# Patient Record
Sex: FEMALE | Race: WHITE | NOT HISPANIC OR LATINO | Employment: OTHER | URBAN - METROPOLITAN AREA
[De-identification: names, ages, dates, MRNs, and addresses within clinical notes are randomized per-mention and may not be internally consistent; named-entity substitution may affect disease eponyms.]

---

## 2017-02-04 ENCOUNTER — HOSPITAL ENCOUNTER (EMERGENCY)
Facility: CLINIC | Age: 82
Discharge: HOME | End: 2017-02-04
Attending: FAMILY MEDICINE | Admitting: FAMILY MEDICINE
Payer: COMMERCIAL

## 2017-02-04 VITALS
DIASTOLIC BLOOD PRESSURE: 54 MMHG | OXYGEN SATURATION: 93 % | HEART RATE: 72 BPM | WEIGHT: 110.89 LBS | BODY MASS INDEX: 24.22 KG/M2 | SYSTOLIC BLOOD PRESSURE: 120 MMHG | RESPIRATION RATE: 7 BRPM | TEMPERATURE: 98.4 F

## 2017-02-04 DIAGNOSIS — R42 POSTURAL DIZZINESS WITH PRESYNCOPE: ICD-10-CM

## 2017-02-04 DIAGNOSIS — F43.21 ADJUSTMENT DISORDER WITH DEPRESSED MOOD: ICD-10-CM

## 2017-02-04 DIAGNOSIS — R55 POSTURAL DIZZINESS WITH PRESYNCOPE: ICD-10-CM

## 2017-02-04 DIAGNOSIS — E86.0 DEHYDRATION: ICD-10-CM

## 2017-02-04 LAB
ANION GAP SERPL CALCULATED.3IONS-SCNC: 8 MMOL/L (ref 3–14)
BASOPHILS # BLD AUTO: 0 10E9/L (ref 0–0.2)
BASOPHILS NFR BLD AUTO: 0.5 %
BUN SERPL-MCNC: 26 MG/DL (ref 7–30)
CALCIUM SERPL-MCNC: 8.2 MG/DL (ref 8.5–10.1)
CHLORIDE SERPL-SCNC: 102 MMOL/L (ref 94–109)
CO2 SERPL-SCNC: 25 MMOL/L (ref 20–32)
CREAT SERPL-MCNC: 1.25 MG/DL (ref 0.52–1.04)
DIFFERENTIAL METHOD BLD: ABNORMAL
EOSINOPHIL # BLD AUTO: 0.1 10E9/L (ref 0–0.7)
EOSINOPHIL NFR BLD AUTO: 1.5 %
ERYTHROCYTE [DISTWIDTH] IN BLOOD BY AUTOMATED COUNT: 14.1 % (ref 10–15)
GFR SERPL CREATININE-BSD FRML MDRD: 41 ML/MIN/1.7M2
GLUCOSE SERPL-MCNC: 102 MG/DL (ref 70–99)
HCT VFR BLD AUTO: 30.9 % (ref 35–47)
HGB BLD-MCNC: 10.2 G/DL (ref 11.7–15.7)
IMM GRANULOCYTES # BLD: 0 10E9/L (ref 0–0.4)
IMM GRANULOCYTES NFR BLD: 0.2 %
LYMPHOCYTES # BLD AUTO: 1.6 10E9/L (ref 0.8–5.3)
LYMPHOCYTES NFR BLD AUTO: 17.9 %
MCH RBC QN AUTO: 29.5 PG (ref 26.5–33)
MCHC RBC AUTO-ENTMCNC: 33 G/DL (ref 31.5–36.5)
MCV RBC AUTO: 89 FL (ref 78–100)
MONOCYTES # BLD AUTO: 0.8 10E9/L (ref 0–1.3)
MONOCYTES NFR BLD AUTO: 9.5 %
NEUTROPHILS # BLD AUTO: 6.2 10E9/L (ref 1.6–8.3)
NEUTROPHILS NFR BLD AUTO: 70.4 %
PLATELET # BLD AUTO: 412 10E9/L (ref 150–450)
POTASSIUM SERPL-SCNC: 3.9 MMOL/L (ref 3.4–5.3)
RBC # BLD AUTO: 3.46 10E12/L (ref 3.8–5.2)
SODIUM SERPL-SCNC: 135 MMOL/L (ref 133–144)
TROPONIN I SERPL-MCNC: NORMAL UG/L (ref 0–0.04)
WBC # BLD AUTO: 8.8 10E9/L (ref 4–11)

## 2017-02-04 PROCEDURE — 99284 EMERGENCY DEPT VISIT MOD MDM: CPT | Mod: 25

## 2017-02-04 PROCEDURE — 99284 EMERGENCY DEPT VISIT MOD MDM: CPT | Mod: 25 | Performed by: FAMILY MEDICINE

## 2017-02-04 PROCEDURE — 85025 COMPLETE CBC W/AUTO DIFF WBC: CPT | Performed by: FAMILY MEDICINE

## 2017-02-04 PROCEDURE — 25000132 ZZH RX MED GY IP 250 OP 250 PS 637: Performed by: FAMILY MEDICINE

## 2017-02-04 PROCEDURE — 93010 ELECTROCARDIOGRAM REPORT: CPT | Performed by: FAMILY MEDICINE

## 2017-02-04 PROCEDURE — 80048 BASIC METABOLIC PNL TOTAL CA: CPT | Performed by: FAMILY MEDICINE

## 2017-02-04 PROCEDURE — 93005 ELECTROCARDIOGRAM TRACING: CPT

## 2017-02-04 PROCEDURE — 84484 ASSAY OF TROPONIN QUANT: CPT | Performed by: FAMILY MEDICINE

## 2017-02-04 RX ORDER — LORAZEPAM 1 MG/1
1 TABLET ORAL ONCE
Status: COMPLETED | OUTPATIENT
Start: 2017-02-04 | End: 2017-02-04

## 2017-02-04 RX ADMIN — LORAZEPAM 1 MG: 1 TABLET ORAL at 11:35

## 2017-02-04 ASSESSMENT — ENCOUNTER SYMPTOMS
FATIGUE: 1
PSYCHIATRIC NEGATIVE: 1
ENDOCRINE NEGATIVE: 1
HEMATOLOGIC/LYMPHATIC NEGATIVE: 1
CHEST TIGHTNESS: 1
ALLERGIC/IMMUNOLOGIC NEGATIVE: 1
HEADACHES: 1
CARDIOVASCULAR NEGATIVE: 1
MUSCULOSKELETAL NEGATIVE: 1
LIGHT-HEADEDNESS: 1
EYES NEGATIVE: 1
APPETITE CHANGE: 1
GASTROINTESTINAL NEGATIVE: 1
ACTIVITY CHANGE: 1

## 2017-02-04 NOTE — DISCHARGE INSTRUCTIONS
Understanding Adjustment Disorders  Most people have stress in their lives, and sometimes you may have more than you can handle. You may find it hard to cope with a stressful event. As a result, you may become anxious and depressed. You might even get sick. These can be symptoms of an adjustment disorder. But you don t have to suffer. Ask your doctor or mental health professional for help.    Common symptoms of an adjustment disorder    Hopelessness    Frequent crying    Depressed mood    Trembling or twitching    Palpitations    Health problems    Withdrawal    Anxiety or tension   What is an adjustment disorder?  Adjustment disorders sometimes occur when life gets to be too much. They often appear within 3 months of a stressful time. The symptoms vary widely. You might pretend the stressful event never happened. Or, you might think about it so much you can t eat or sleep. In most cases, your feelings may seem beyond your control.  What causes it?  The events that trigger an adjustment disorder vary from person to person. Adults may be troubled by work, money, or marriage problems. Teens are more likely bothered by school or conflict with parents. They also may find it hard to cope with a divorce or sex. The death of a loved one can be especially hard to face. So can major life changes such as a move. Poverty or a lack of social skills may make matters worse.  What can be done?  Adjustment disorders can almost always be helped by therapy. You may feel relieved just to talk to someone. In some cases, only you and your therapist will meet. In others, your whole family may be involved. You might also join a group for people with this disorder. The support and concern of others can help you recover more quickly.    [id number] [employer]. [address] [place] [address], [place] [address]. All rights reserved. This information is not intended as a substitute for professional medical care. Always follow your  healthcare professional's instructions.      Home to rest.  Push fluids and try to eat a balanced diet despite the difficulty of this emotional time.  Return to the emergency department as needed.  Follow-up in office with your primary care provider this week.

## 2017-02-04 NOTE — ED PROVIDER NOTES
"  History     Chief Complaint   Patient presents with     Generalized Weakness     recent loss of family death, not eating well, became lightheaded today, laid on ground no injury, , 250cc bolus bt EMS. No CP. comes from [facility]  Nighat Gupta is a 81 year old female, past medical history is significant for depression, hypothyroidism, tobacco abuse, osteopenia, hypertension, chronic low back pain, lumbar radiculopathy, chronic pain syndrome, depression, hypercholesterolemia, presents to the emergency department via EMS after an episode of lightheadedness, intense emotion, and near syncope that led her to lay down on the ground, crying, chest tightness.  The history is obtained from the patient and to a lesser extent her daughter after EMS arrival.  The patient states that she recently found out about the death of her grandchild that was born with our defects after a difficult to conceive pregnancy for her youngest daughter.  The patient lost her  year ago.  Over the past week she has not been eating or drinking well at all, she has been taking her multiple medications that we reviewed, has been drinking lots of coffee but not much water, and has been \"smoking her brains out\" trying to deal with her emotional distress.  She states that she feels better after having some fluids in the ambulance and thinks that her reason for being here is all emotional.      Active Ambulatory Problems     Diagnosis Date Noted     Mild major depression (H) 11/04/2005     Hypothyroidism 11/04/2005     Tobacco abuse 09/08/2008     Osteopenia 11/18/2008     HYPERLIPIDEMIA LDL GOAL <130 10/31/2010     Advanced directives, counseling/discussion 11/21/2011     Benign essential hypertension 11/21/2011     Health Care Home 12/19/2012     Chronic low back pain 09/16/2013     Lumbar radiculopathy 09/16/2013     Chronic pain syndrome 01/07/2016     Resolved Ambulatory Problems     Diagnosis Date Noted     No " Resolved Ambulatory Problems     Past Medical History   Diagnosis Date     DEPRESSION W/ANXIETY 1980's     Unspecified hypothyroidism      Pure hypercholesterolemia      Essential hypertension, benign      Past Surgical History   Procedure Laterality Date     Surgical history of -   04/2004     Oopherectomy - (R)     Cataract iol, rt/lt  2009     Cataract IOL RT/LT     Inject epidural lumbar  4/2/2012     Procedure:INJECT EPIDURAL LUMBAR; MORENA with Flouro--; Surgeon:GENERIC ANESTHESIA PROVIDER; Location:[facility] OR     Inject epidural lumbar  6/4/2012     Procedure:INJECT EPIDURAL LUMBAR; MORENA with Flouro--; Surgeon:GENERIC ANESTHESIA PROVIDER; Location:[facility] OR     Inject epidural lumbar  6/18/2012     Procedure: INJECT EPIDURAL LUMBAR;  MORENA-Dr. Tapia;  Surgeon: Provider, Generic Anesthesia;  Location: [facility] OR     Social History     Social History     Marital Status:      Spouse Name: N/A     Number of Children: N/A     Years of Education: N/A     Occupational History     Not on file.     Social History Main Topics     Smoking status: Current Every Day Smoker -- 0.25 packs/day for 50 years     Types: Cigarettes     Smokeless tobacco: Never Used      Comment: 5-8 cigarettes daily     Alcohol Use: No     Drug Use: No     Sexual Activity: No     Other Topics Concern     Parent/Sibling W/ Cabg, Mi Or Angioplasty Before 65f 55m? No     Social History Narrative     Family History   Problem Relation Age of Onset     HEART DISEASE Mother      Lipids Mother      Hypertension Mother      Lipids Father      Hypertension Father      Hypertension Sister      Lipids Sister      Depression Daughter      HEART DISEASE Sister      Hypertension Sister      Depression Daughter      No current facility-administered medications for this encounter.     Current Outpatient Prescriptions   Medication     levothyroxine (SYNTHROID/LEVOTHROID) 75 MCG tablet     levothyroxine (SYNTHROID/LEVOTHROID) 50 MCG tablet      HYDROcodone-acetaminophen (NORCO) 5-325 MG per tablet     hydrochlorothiazide (HYDRODIURIL) 25 MG tablet     gabapentin (NEURONTIN) 300 MG capsule     gabapentin (NEURONTIN) 100 MG capsule     simvastatin (ZOCOR) 20 MG tablet     lisinopril (PRINIVIL,ZESTRIL) 40 MG tablet     doxycycline (VIBRAMYCIN) 100 MG capsule     citalopram (CELEXA) 40 MG tablet     cholecalciferol (VITAMIN D) 400 UNIT TABS     ZANTAC 75 75 MG OR TABS     CALCIUM 500 + D 500-200 MG-IU OR TABS     ASPIRIN 81 MG OR TABS     OMEGA 3 OR     MULTIVITAMIN TABS   OR        Allergies   Allergen Reactions     No Known Drug Allergy        I have reviewed the Medications, Allergies, Past Medical and Surgical History, and Social History in the Epic system.    Review of Systems   Constitutional: Positive for activity change, appetite change and fatigue.   Eyes: Negative.    Respiratory: Positive for chest tightness.    Cardiovascular: Negative.    Gastrointestinal: Negative.    Endocrine: Negative.    Genitourinary: Negative.    Musculoskeletal: Negative.    Skin: Negative.    Allergic/Immunologic: Negative.    Neurological: Positive for light-headedness and headaches.   Hematological: Negative.    Psychiatric/Behavioral: Negative.        Physical Exam   BP: 152/58 mmHg  Pulse: 72  Temp: 98.4  F (36.9  C)  Resp: 16  Weight: 50.3 kg (110 lb 14.3 oz)  SpO2: 97 %  Physical Exam   Constitutional: She is oriented to person, place, and time. She appears well-developed and well-nourished.   Anxious, tearful, very emotionally labile.   HENT:   Head: Normocephalic and atraumatic.   Right Ear: External ear normal.   Left Ear: External ear normal.   Nose: Nose normal.   Mouth/Throat: Oropharynx is clear and moist.   Eyes: Conjunctivae and EOM are normal. Pupils are equal, round, and reactive to light.   Neck: Normal range of motion. Neck supple.   Cardiovascular: Normal rate, regular rhythm, normal heart sounds and intact distal pulses.    Pulmonary/Chest: Effort  normal and breath sounds normal.   Abdominal: Soft. Bowel sounds are normal.   Musculoskeletal: Normal range of motion.   Neurological: She is alert and oriented to person, place, and time.   Skin: Skin is warm and dry.   Psychiatric: Her behavior is normal. Judgment and thought content normal. Her mood appears anxious. Her affect is labile. Her speech is tangential. Cognition and memory are normal.   Nursing note and vitals reviewed.    Patient declines medication discussed and offered to her at the time of her exam.  (Ativan).    ED Course   Procedures             EKG Interpretation:      Interpreted by Tal Donovan  Time reviewed: 10:20  Symptoms at time of EKG: None   Rhythm: normal sinus   Rate: 77  Axis: Normal  Ectopy: none  Conduction: normal  ST Segments/ T Waves: No ST-T wave changes and No acute ischemic changes  Q Waves: none  Comparison to prior: Unchanged from 12/17/13    Clinical Impression: normal EKG                Critical Care time:  none               Labs Ordered and Resulted from Time of ED Arrival Up to the Time of Departure from the ED   CBC WITH PLATELETS DIFFERENTIAL - Abnormal; Notable for the following:     RBC Count 3.46 (*)     Hemoglobin 10.2 (*)     Hematocrit 30.9 (*)     All other components within normal limits   BASIC METABOLIC PANEL - Abnormal; Notable for the following:     Glucose 102 (*)     Creatinine 1.25 (*)     GFR Estimate 41 (*)     GFR Estimate If Black 50 (*)     Calcium 8.2 (*)     All other components within normal limits   TROPONIN I    feeling considerably less anxious.  She would like to go home.  Tolerating oral intake fine.    Assessments & Plan (with Medical Decision Making)   81-year-old female past medical history reviewed above, presents to the emergency department by EMS with concerns of lightheadedness and presyncope in context of significant severe emotional distress due to light stressors and poor oral intake.  Physical exam finds her alert,  cheerful, very emotional and anxious.  The patient improved with the use of Ativan in the emergency department.  She was able to take oral fluids, became less tearful and less emotionally labile.  I plan to disposition her to home, I think her presentation today is largely due to light stressors and emotional as well as a component of dehydration.  I discussed this with her and she is comfortable with plan for disposition to home.  She feels she is not able to deal with the significant life stressors have encouraged her to follow up in clinic with her primary care provider for further counseling considerations as well as medication in the short-term.    Disclaimer: This note consists of symbols derived from keyboarding, dictation and/or voice recognition software. As a result, there may be errors in the script that have gone undetected. Please consider this when interpreting information found in this chart.      I have reviewed the nursing notes.    I have reviewed the findings, diagnosis, plan and need for follow up with the patient.    Discharge Medication List as of 2/4/2017  1:13 PM          Final diagnoses:   Adjustment disorder with depressed mood   Postural dizziness with presyncope   Dehydration       2/4/2017   [facility] EMERGENCY DEPARTMENT      Tal Donovan MD  02/05/17 1936

## 2017-02-04 NOTE — ED AVS SNAPSHOT
[facility] Emergency Department    [address] [address]    Phone:  [phone]    Fax:  [phone]                                       Nighat Gutpa   MRN: [id number]    Department:  [facility]   Date of Visit:  2/4/2017           After Visit Summary Signature Page     I have received my discharge instructions, and my questions have been answered. I have discussed any challenges I see with this plan with the nurse or doctor.    ..........................................................................................................................................  Patient/Patient Representative Signature      ..........................................................................................................................................  Patient Representative Print Name and Relationship to Patient    ..................................................               ................................................  Date                                            Time    ..........................................................................................................................................  Reviewed by Signature/Title    ...................................................              ..............................................  Date                                                            Time

## 2017-02-04 NOTE — ED NOTES
"Patient states she felt very weak this morning while visiting with a neighbor and had to lay down on the floor. The EMS was called by her friend. The patient states she has a lot of stressful family events in her life currently. She is admittedly depressed and lacking coping skills but states she is not suicidal. Her Daughter came in and and told her to \"get off her cross the entire family is struggling not just her, do you need a psych consult?\" The patient stated I hope I am having a heart attack but I am sure I will not be that maureen  "

## 2017-02-04 NOTE — ED AVS SNAPSHOT
[facility] Emergency Department    [address] [address]    Phone:  [phone]    Fax:  [phone]                                       Nighat Gupta   MRN: [id number]    Department:  [facility] Emergency Department   Date of Visit:  2/4/2017           Patient Information     Date Of Birth          1935        Your diagnoses for this visit were:     Adjustment disorder with depressed mood     Postural dizziness with presyncope     Dehydration        You were seen by Tal Donovan MD.      Follow-up Information     Schedule an appointment as soon as possible for a visit with Nancy Tapia MD.    Specialty:  Family Practice    Why:  As needed, If symptoms worsen    Contact information:    [facility]  [address] [place] [id number]  [phone]          Discharge Instructions         Understanding Adjustment Disorders  Most people have stress in their lives, and sometimes you may have more than you can handle. You may find it hard to cope with a stressful event. As a result, you may become anxious and depressed. You might even get sick. These can be symptoms of an adjustment disorder. But you don t have to suffer. Ask your doctor or mental health professional for help.    Common symptoms of an adjustment disorder    Hopelessness    Frequent crying    Depressed mood    Trembling or twitching    Palpitations    Health problems    Withdrawal    Anxiety or tension   What is an adjustment disorder?  Adjustment disorders sometimes occur when life gets to be too much. They often appear within 3 months of a stressful time. The symptoms vary widely. You might pretend the stressful event never happened. Or, you might think about it so much you can t eat or sleep. In most cases, your feelings may seem beyond your control.  What causes it?  The events that trigger an adjustment disorder vary from person to person. Adults may be troubled by work,  money, or marriage problems. Teens are more likely bothered by school or conflict with parents. They also may find it hard to cope with a divorce or sex. The death of a loved one can be especially hard to face. So can major life changes such as a move. Poverty or a lack of social skills may make matters worse.  What can be done?  Adjustment disorders can almost always be helped by therapy. You may feel relieved just to talk to someone. In some cases, only you and your therapist will meet. In others, your whole family may be involved. You might also join a group for people with this disorder. The support and concern of others can help you recover more quickly.    [id number] [employer]. [address] [place] [address], [place] [address]. All rights reserved. This information is not intended as a substitute for professional medical care. Always follow your healthcare professional's instructions.      Home to rest.  Push fluids and try to eat a balanced diet despite the difficulty of this emotional time.  Return to the emergency department as needed.  Follow-up in office with your primary care provider this week.        Future Appointments        Provider Department Dept Phone Center    2/28/2017 11:00 AM Nancy Tapia MD [facility] [phone] [facility]      24 Hour Appointment Hotline       To make an appointment at any [facility], call [phone] ([phone]). If you don't have a family doctor or clinic, we will help you find one. [facility] are conveniently located to serve the needs of you and your family.             Review of your medicines      Our records show that you are taking the medicines listed below. If these are incorrect, please call your family doctor or clinic.        Dose / Directions Last dose taken    aspirin 81 MG tablet   Quantity:  100        one daily   Refills:  0        CALCIUM 500 + D 500-200 MG-IU Tabs        1500mg daily   Refills:  0         cholecalciferol 400 UNIT Tabs tablet   Commonly known as:  vitamin D   Dose:  400 Units        Take 400 Units by mouth daily.   Refills:  0        citalopram 40 MG tablet   Commonly known as:  celeXA   Dose:  40 mg   Quantity:  90 tablet        Take 1 tablet (40 mg) by mouth daily   Refills:  1        doxycycline 100 MG capsule   Commonly known as:  VIBRAMYCIN   Dose:  100 mg   Quantity:  20 capsule        Take 1 capsule (100 mg) by mouth 2 times daily   Refills:  0        * gabapentin 100 MG capsule   Commonly known as:  NEURONTIN   Dose:  100 mg   Quantity:  30 capsule        Take 1 capsule (100 mg) by mouth 3 times daily   Refills:  1        * gabapentin 300 MG capsule   Commonly known as:  NEURONTIN   Quantity:  90 capsule        Take 300 mg three times per day   Refills:  11        hydrochlorothiazide 25 MG tablet   Commonly known as:  HYDRODIURIL   Dose:  25 mg   Quantity:  90 tablet        Take 1 tablet (25 mg) by mouth daily   Refills:  0        HYDROcodone-acetaminophen 5-325 MG per tablet   Commonly known as:  NORCO   Dose:  1 tablet   Quantity:  40 tablet        Take 1 tablet by mouth every 6 hours as needed for moderate to severe pain Need to last 2 months   Refills:  0        * levothyroxine 75 MCG tablet   Commonly known as:  SYNTHROID/LEVOTHROID   Quantity:  36 tablet        Take 1 tablet daily on Monday, Wednesday, Friday Needs lab before next refill   Refills:  11        * levothyroxine 50 MCG tablet   Commonly known as:  SYNTHROID/LEVOTHROID   Quantity:  30 tablet        Take 1 tab daily on Tuesday, Thursday, Saturday and Sunday (Needs lab work)   Refills:  11        lisinopril 40 MG tablet   Commonly known as:  PRINIVIL/ZESTRIL   Dose:  40 mg   Quantity:  30 tablet        Take 1 tablet (40 mg) by mouth daily Needs blood pressure check   Refills:  11        MULTIVITAMIN TABS   OR        1 TAB DAILY   Refills:  0        OMEGA 3 PO        one tablet daily   Refills:  0        ranitidine 75 MG tablet    Generic drug:  ranitidine        1 TABLET 1 TO 2 TIMES A DAY AS NEEDED   Refills:  0        simvastatin 20 MG tablet   Commonly known as:  ZOCOR   Dose:  20 mg   Quantity:  30 tablet        Take 1 tablet (20 mg) by mouth At Bedtime   Refills:  11        * Notice:  This list has 4 medication(s) that are the same as other medications prescribed for you. Read the directions carefully, and ask your doctor or other care provider to review them with you.            Procedures and tests performed during your visit     Basic metabolic panel    CBC with platelets differential    EKG 12 lead    Troponin I      Orders Needing Specimen Collection     None      Pending Results     No orders found from 2/3/2017 to 2/5/2017.            Pending Culture Results     No orders found from 2/3/2017 to 2/5/2017.       Test Results from your hospital stay           2/4/2017 10:32 AM - Interface, [facility] Results      Component Results     Component Value Ref Range & Units Status    WBC 8.8 4.0 - 11.0 10e9/L Final    RBC Count 3.46 (L) 3.8 - 5.2 10e12/L Final    Hemoglobin 10.2 (L) 11.7 - 15.7 g/dL Final    Hematocrit 30.9 (L) 35.0 - 47.0 % Final    MCV 89 78 - 100 fl Final    MCH 29.5 26.5 - 33.0 pg Final    MCHC 33.0 31.5 - 36.5 g/dL Final    RDW 14.1 10.0 - 15.0 % Final    Platelet Count 412 150 - 450 10e9/L Final    Diff Method Automated Method  Final    % Neutrophils 70.4 % Final    % Lymphocytes 17.9 % Final    % Monocytes 9.5 % Final    % Eosinophils 1.5 % Final    % Basophils 0.5 % Final    % Immature Granulocytes 0.2 % Final    Absolute Neutrophil 6.2 1.6 - 8.3 10e9/L Final    Absolute Lymphocytes 1.6 0.8 - 5.3 10e9/L Final    Absolute Monocytes 0.8 0.0 - 1.3 10e9/L Final    Absolute Eosinophils 0.1 0.0 - 0.7 10e9/L Final    Absolute Basophils 0.0 0.0 - 0.2 10e9/L Final    Abs Immature Granulocytes 0.0 0 - 0.4 10e9/L Final         2/4/2017 10:53 AM - Interface, Flexilab Results      Component Results     Component Value Ref  "Range & Units Status    Sodium 135 133 - 144 mmol/L Final    Potassium 3.9 3.4 - 5.3 mmol/L Final    Chloride 102 94 - 109 mmol/L Final    Carbon Dioxide 25 20 - 32 mmol/L Final    Anion Gap 8 3 - 14 mmol/L Final    Glucose 102 (H) 70 - 99 mg/dL Final    Urea Nitrogen 26 7 - 30 mg/dL Final    Creatinine 1.25 (H) 0.52 - 1.04 mg/dL Final    GFR Estimate 41 (L) >60 mL/min/1.7m2 Final    Non  GFR Calc    GFR Estimate If Black 50 (L) >60 mL/min/1.7m2 Final    African American GFR Calc    Calcium 8.2 (L) 8.5 - 10.1 mg/dL Final         2017 10:53 AM - Interface, [facility] Results      Component Results     Component Value Ref Range & Units Status    Troponin I ES  0.000 - 0.045 ug/L Final    <0.015  The 99th percentile for upper reference range is 0.045 ug/L.  Troponin values in   the range of 0.045 - 0.120 ug/L may be associated with risks of adverse   clinical events.                  Thank you for choosing [facility]       Thank you for choosing [facility] for your care. Our goal is always to provide you with excellent care. Hearing back from our patients is one way we can continue to improve our services. Please take a few minutes to complete the written survey that you may receive in the mail after you visit with us. Thank you!        wooju Information     [employer]oju lets you send messages to your doctor, view your test results, renew your prescriptions, schedule appointments and more. To sign up, go to [email or web address] . Click on \"Log in\" on the left side of the screen, which will take you to the Welcome page. Then click on \"Sign up Now\" on the right side of the page.     You will be asked to enter the access code listed below, as well as some personal information. Please follow the directions to create your username and password.     Your access code is: [email or web address]  Expires: 2017  1:13 PM     Your access code will  in 90 days. If you need help or a new code, please call your [facility] " clinic or [phone].        Care EveryWhere ID     This is your Care EveryWhere ID. This could be used by other organizations to access your [facility] medical records  [id number]        After Visit Summary       This is your record. Keep this with you and show to your community pharmacist(s) and doctor(s) at your next visit.

## 2017-02-04 NOTE — ED NOTES
recent loss of family death, not eating well, became lightheaded today, laid on ground no injury, , 250cc bolus bt EMS. No CP. comes from [facility]

## 2017-02-16 DIAGNOSIS — I10 BENIGN ESSENTIAL HYPERTENSION: ICD-10-CM

## 2017-02-16 RX ORDER — HYDROCHLOROTHIAZIDE 25 MG/1
25 TABLET ORAL DAILY
Qty: 90 TABLET | Refills: 1 | Status: SHIPPED | OUTPATIENT
Start: 2017-02-16 | End: 2017-05-24 | Stop reason: SINTOL

## 2017-02-16 NOTE — TELEPHONE ENCOUNTER
HCTZ 25 mg      Last Written Prescription Date: 11/16/16  Last Fill Quantity: 90, # refills: 0  Last Office Visit with G, P or [facility] prescribing provider: /2/16  Next 5 appointments (look out 90 days)     Feb 28, 2017 11:00 AM CST   SHORT with Nancy Tapia MD   [facility] ([facility])    [address] [place] [id number]   [phone]                   Potassium   Date Value Ref Range Status   02/04/2017 3.9 3.4 - 5.3 mmol/L Final     Creatinine   Date Value Ref Range Status   02/04/2017 1.25 (H) 0.52 - 1.04 mg/dL Final     BP Readings from Last 3 Encounters:   02/04/17 120/54   12/02/16 120/80   09/14/16 142/46

## 2017-02-28 ENCOUNTER — OFFICE VISIT (OUTPATIENT)
Dept: FAMILY MEDICINE | Facility: CLINIC | Age: 82
End: 2017-02-28
Payer: COMMERCIAL

## 2017-02-28 VITALS
WEIGHT: 111.6 LBS | DIASTOLIC BLOOD PRESSURE: 68 MMHG | HEART RATE: 68 BPM | TEMPERATURE: 97.2 F | BODY MASS INDEX: 24.36 KG/M2 | SYSTOLIC BLOOD PRESSURE: 122 MMHG

## 2017-02-28 DIAGNOSIS — G89.4 CHRONIC PAIN SYNDROME: Primary | ICD-10-CM

## 2017-02-28 DIAGNOSIS — E03.9 HYPOTHYROIDISM, UNSPECIFIED TYPE: ICD-10-CM

## 2017-02-28 DIAGNOSIS — M54.16 LUMBAR RADICULOPATHY: ICD-10-CM

## 2017-02-28 PROCEDURE — 99214 OFFICE O/P EST MOD 30 MIN: CPT | Performed by: FAMILY MEDICINE

## 2017-02-28 RX ORDER — HYDROCODONE BITARTRATE AND ACETAMINOPHEN 5; 325 MG/1; MG/1
1 TABLET ORAL EVERY 6 HOURS PRN
Qty: 40 TABLET | Refills: 0 | Status: SHIPPED | OUTPATIENT
Start: 2017-04-28 | End: 2017-05-15

## 2017-02-28 RX ORDER — LEVOTHYROXINE SODIUM 50 UG/1
TABLET ORAL
Qty: 30 TABLET | Refills: 11 | Status: SHIPPED | OUTPATIENT
Start: 2017-02-28 | End: 2018-04-18

## 2017-02-28 RX ORDER — HYDROCODONE BITARTRATE AND ACETAMINOPHEN 5; 325 MG/1; MG/1
1 TABLET ORAL EVERY 6 HOURS PRN
Qty: 40 TABLET | Refills: 0 | Status: SHIPPED | OUTPATIENT
Start: 2017-03-28 | End: 2017-02-28

## 2017-02-28 RX ORDER — HYDROCODONE BITARTRATE AND ACETAMINOPHEN 5; 325 MG/1; MG/1
1 TABLET ORAL EVERY 6 HOURS PRN
Qty: 40 TABLET | Refills: 0 | Status: SHIPPED | OUTPATIENT
Start: 2017-02-28 | End: 2017-02-28

## 2017-02-28 RX ORDER — LEVOTHYROXINE SODIUM 75 UG/1
TABLET ORAL
Qty: 36 TABLET | Refills: 11 | Status: SHIPPED
Start: 2017-02-28 | End: 2018-05-01

## 2017-02-28 ASSESSMENT — PAIN SCALES - GENERAL: PAINLEVEL: EXTREME PAIN (8)

## 2017-02-28 NOTE — NURSING NOTE
"Chief Complaint   Patient presents with     Recheck Medication     Chronic Pain       Initial /68 (BP Location: Right arm, Patient Position: Chair, Cuff Size: Adult Regular)  Pulse 68  Temp 97.2  F (36.2  C) (Tympanic)  Wt 111 lb 9.6 oz (50.6 kg)  BMI 24.36 kg/m2 Estimated body mass index is 24.36 kg/(m^2) as calculated from the following:    Height as of 7/19/16: 4' 8.75\" (1.441 m).    Weight as of this encounter: 111 lb 9.6 oz (50.6 kg).  Medication Reconciliation: complete    Health Maintenance that is potentially due pending provider review:  NONE    Malika Su MA  10:50 AM 2/28/2017  .    "

## 2017-02-28 NOTE — MR AVS SNAPSHOT
"              After Visit Summary   2/28/2017    Nighat Gupta    MRN: [id number]           Patient Information     Date Of Birth          1935        Visit Information        Provider Department      2/28/2017 11:00 AM Nancy Tapia MD [facility]        Today's Diagnoses     Chronic pain syndrome    -  1    Lumbar radiculopathy        Hypothyroidism, unspecified type          Care Instructions    Stay on same pain regimen, 40 pain pills need to last one month     Thyroid meds refilled     See me back in 3 months         Follow-ups after your visit        Who to contact     If you have questions or need follow up information about today's clinic visit or your schedule please contact [facility] directly at [phone].  Normal or non-critical lab and imaging results will be communicated to you by MyChart, letter or phone within 4 business days after the clinic has received the results. If you do not hear from us within 7 days, please contact the clinic through [employer] or phone. If you have a critical or abnormal lab result, we will notify you by phone as soon as possible.  Submit refill requests through [employer] or call your pharmacy and they will forward the refill request to us. Please allow 3 business days for your refill to be completed.          Additional Information About Your Visit        MyChart Information     [employer] lets you send messages to your doctor, view your test results, renew your prescriptions, schedule appointments and more. To sign up, go to [email or web address] . Click on \"Log in\" on the left side of the screen, which will take you to the Welcome page. Then click on \"Sign up Now\" on the right side of the page.     You will be asked to enter the access code listed below, as well as some personal information. Please follow the directions to create your username and password.     Your access code is: [email or web address]  Expires: 5/5/2017  1:13 " PM     Your access code will  in 90 days. If you need help or a new code, please call your [place] clinic or [phone].        Care EveryWhere ID     This is your Care EveryWhere ID. This could be used by other organizations to access your [place] medical records  [id number]        Your Vitals Were     Pulse Temperature BMI (Body Mass Index)             68 97.2  F (36.2  C) (Tympanic) 24.36 kg/m2          Blood Pressure from Last 3 Encounters:   17 122/68   17 120/54   16 120/80    Weight from Last 3 Encounters:   17 111 lb 9.6 oz (50.6 kg)   17 110 lb 14.3 oz (50.3 kg)   16 111 lb (50.3 kg)              Today, you had the following     No orders found for display         Today's Medication Changes          These changes are accurate as of: 17 11:07 AM.  If you have any questions, ask your nurse or doctor.               Start taking these medicines.        Dose/Directions    HYDROcodone-acetaminophen 5-325 MG per tablet   Commonly known as:  NORCO   Used for:  Lumbar radiculopathy   Started by:  Nancy Tapia MD        Dose:  1 tablet   Start taking on:  2017   Take 1 tablet by mouth every 6 hours as needed for moderate to severe pain One month supply   Quantity:  40 tablet   Refills:  0            Where to get your medicines      These medications were sent to [id number] [address] [facility]  [address] [facility] [address]    Hours:  Closed 10-16-08 business to [facility] Phone:  [phone]     levothyroxine 50 MCG tablet    levothyroxine 75 MCG tablet         Some of these will need a paper prescription and others can be bought over the counter.  Ask your nurse if you have questions.     Bring a paper prescription for each of these medications     HYDROcodone-acetaminophen 5-325 MG per tablet                Primary Care Provider Office Phone # Fax #    Nancy Tapia [phone]        [facility] [address] [facility] [address]        Thank you!     Thank you for choosing [facility]  for your care. Our goal is always to provide you with excellent care. Hearing back from our patients is one way we can continue to improve our services. Please take a few minutes to complete the written survey that you may receive in the mail after your visit with us. Thank you!             Your Updated Medication List - Protect others around you: Learn how to safely use, store and throw away your medicines at www.disposemymeds.org.          This list is accurate as of: 2/28/17 11:07 AM.  Always use your most recent med list.                   Brand Name Dispense Instructions for use    aspirin 81 MG tablet     100    one daily       CALCIUM 500 + D 500-200 MG-IU Tabs      1500mg daily       cholecalciferol 400 UNIT Tabs tablet    vitamin D     Take 400 Units by mouth daily.       citalopram 40 MG tablet    celeXA    90 tablet    Take 1 tablet (40 mg) by mouth daily       doxycycline 100 MG capsule    VIBRAMYCIN    20 capsule    Take 1 capsule (100 mg) by mouth 2 times daily       * gabapentin 100 MG capsule    NEURONTIN    30 capsule    Take 1 capsule (100 mg) by mouth 3 times daily       * gabapentin 300 MG capsule    NEURONTIN    90 capsule    Take 300 mg three times per day       hydrochlorothiazide 25 MG tablet    HYDRODIURIL    90 tablet    Take 1 tablet (25 mg) by mouth daily       HYDROcodone-acetaminophen 5-325 MG per tablet   Start taking on:  4/28/2017    NORCO    40 tablet    Take 1 tablet by mouth every 6 hours as needed for moderate to severe pain One month supply       * levothyroxine 75 MCG tablet    SYNTHROID/LEVOTHROID    36 tablet    Take 1 tablet daily on Monday, Wednesday, Friday Needs lab before next refill       * levothyroxine 50 MCG tablet    SYNTHROID/LEVOTHROID    30 tablet    Take 1 tab daily on Tuesday, Thursday, Saturday and Sunday (Needs lab  work)       lisinopril 40 MG tablet    PRINIVIL/ZESTRIL    30 tablet    Take 1 tablet (40 mg) by mouth daily Needs blood pressure check       MULTIVITAMIN TABS   OR      1 TAB DAILY       OMEGA 3 PO      one tablet daily       ranitidine 75 MG tablet   Generic drug:  ranitidine      1 TABLET 1 TO 2 TIMES A DAY AS NEEDED       simvastatin 20 MG tablet    ZOCOR    30 tablet    Take 1 tablet (20 mg) by mouth At Bedtime       * Notice:  This list has 4 medication(s) that are the same as other medications prescribed for you. Read the directions carefully, and ask your doctor or other care provider to review them with you.

## 2017-02-28 NOTE — PATIENT INSTRUCTIONS
Stay on same pain regimen, 40 pain pills need to last one month     Thyroid meds refilled     See me back in 3 months

## 2017-02-28 NOTE — PROGRESS NOTES
SUBJECTIVE:                                                    Nighat Gupta is a 81 year old female who presents to clinic today for the following health issues:        Chronic Pain Follow-Up       Type / Location of Pain: Low Back  Analgesia/pain control:       Recent changes:  Worse, is in the process of moving but has pushed her self and been lifting more than she should be      Overall control: Tolerable with discomfort  Activity level/function:      Daily activities:  Can do most things most days, with some rest    Work:  not applicable  Adverse effects:  No  Adherance    Taking medication as directed?  Yes    Participating in other treatments: no - not at this time  Risk Factors:    Sleep:  Good    Mood/anxiety:  controlled    Recent family or social stressors:  recent move    Other aggravating factors: Walking, Bending, Lifting  PHQ-9 SCORE 3/12/2015 10/23/2015 5/12/2016   Total Score 4 - -   Total Score - 5 3     PRECIOUS-7 SCORE 10/1/2014 3/12/2015 10/23/2015   Total Score 0 1 -   Total Score - - 0     Encounter-Level CSA - 12/02/2016:                 Controlled Substance Agreement - Scan on 12/8/2016  1:38 PM : CONTROLLED SUBSTANCE AGREEMENT 12/02/2016 (below)          Encounter-Level CSA - 12/02/2016:                 Controlled Substance Agreement - Scan on 4/8/2016 10:44 AM : CONTROLLED SUBSTANCE AGREEMENT 04/04/2016 (below)                   Hypothyroidism Follow-up      Since last visit, patient describes the following symptoms: Weight stable, no hair loss, no skin changes, no constipation, no loose stools       Problem list and histories reviewed & adjusted, as indicated.  Additional history: as documented    Labs reviewed in EPIC    Reviewed and updated as needed this visit by clinical staff       Reviewed and updated as needed this visit by Provider  Allergies  Meds  Problems         ROS:  Constitutional, HEENT, cardiovascular, pulmonary, gi and gu systems are negative, except as otherwise  noted.    OBJECTIVE:                                                    /68 (BP Location: Right arm, Patient Position: Chair, Cuff Size: Adult Regular)  Pulse 68  Temp 97.2  F (36.2  C) (Tympanic)  Wt 111 lb 9.6 oz (50.6 kg)  BMI 24.36 kg/m2  Body mass index is 24.36 kg/(m^2).  GENERAL APPEARANCE: healthy, alert and no distress  RESP: lungs clear to auscultation - no rales, rhonchi or wheezes  CV: regular rates and rhythm, normal S1 S2, no S3 or S4 and no murmur, click or rub  PSYCH: mentation appears normal and affect normal/bright         ASSESSMENT/PLAN:                                                    1. Chronic pain syndrome  Stable no change in treatment plan.     2. Lumbar radiculopathy  Stable no change in treatment plan.   - HYDROcodone-acetaminophen (NORCO) 5-325 MG per tablet; Take 1 tablet by mouth every 6 hours as needed for moderate to severe pain One month supply  Dispense: 40 tablet; Refill: 0    3. Hypothyroidism, unspecified type  Stable no change in treatment plan.   - levothyroxine (SYNTHROID/LEVOTHROID) 75 MCG tablet; Take 1 tablet daily on Monday, Wednesday, Friday  Needs lab before next refill  Dispense: 36 tablet; Refill: 11  - levothyroxine (SYNTHROID/LEVOTHROID) 50 MCG tablet; Take 1 tab daily on Tuesday, Thursday, Saturday and Sunday (Needs lab work)  Dispense: 30 tablet; Refill: 11      Patient Instructions   Stay on same pain regimen, 40 pain pills need to last one month     Thyroid meds refilled     See me back in 3 months     Risks, benefits, side effects and rationale for treatment plan fully discussed with the patient and understanding expressed.     Nancy Tapia MD  [facility]

## 2017-04-26 ENCOUNTER — TELEPHONE (OUTPATIENT)
Dept: FAMILY MEDICINE | Facility: CLINIC | Age: 82
End: 2017-04-26

## 2017-04-26 NOTE — TELEPHONE ENCOUNTER
Voice message was left.7:21 AM  \    Reason for call:  Patient reporting a symptom    Symptom or request: Nighat says she had a deer tick on her leg in the crease behind her knee and her daughter had a hard time pulling it off. She says it was on for 8 hrs. She would like a call back.    Phone Number patient can be reached at:  Home number on file [phone] (home)    Best Time:  anytime    Can we leave a detailed message on this number:  DId not say    Call taken on 4/26/2017 at 7:58 AM by Bernie Patterson

## 2017-04-27 NOTE — TELEPHONE ENCOUNTER
RN Tickbite Protocol: Ages 8 and older  Nighat Gupta is a 81 year old female is being assessed for indication of tick bite.     ASSESSMENT/PLAN:   1.  Patient instructions without treatment.   2.  Education: How to identify a deer tick, Important time frames related to tick bite and Preventing tick bites  3.  Follow-up:   4.  Patient/parent verbalized understanding of plan and is agreeable.     SUBJECTIVE/OBJECTIVE:  Removal of tick that has been attached at least 36 hours? no   Location on body of suspected tick bite: behind knee   Symptoms:  none  Complicating factors:  Reports: none   Denies: Allergy to Doxycycline, Age less than 8, Breast feeding, Pregnant and Greater than 72 hours since tick removed    Encounter handled by: Nurse Triage.    Pamela Granados RN

## 2017-04-27 NOTE — TELEPHONE ENCOUNTER
Pt called as she had not gotten a call back yesterday. Her main question is how long a tick has to be on to transmit lyme.  Please call.

## 2017-05-15 ENCOUNTER — OFFICE VISIT (OUTPATIENT)
Dept: FAMILY MEDICINE | Facility: CLINIC | Age: 82
End: 2017-05-15
Payer: COMMERCIAL

## 2017-05-15 ENCOUNTER — TELEPHONE (OUTPATIENT)
Dept: PALLIATIVE MEDICINE | Facility: CLINIC | Age: 82
End: 2017-05-15

## 2017-05-15 VITALS
HEART RATE: 60 BPM | DIASTOLIC BLOOD PRESSURE: 68 MMHG | HEIGHT: 59 IN | SYSTOLIC BLOOD PRESSURE: 124 MMHG | BODY MASS INDEX: 22.09 KG/M2 | WEIGHT: 109.6 LBS | TEMPERATURE: 97.8 F

## 2017-05-15 DIAGNOSIS — M54.42 CHRONIC LEFT-SIDED LOW BACK PAIN WITH LEFT-SIDED SCIATICA: ICD-10-CM

## 2017-05-15 DIAGNOSIS — I10 ESSENTIAL HYPERTENSION, BENIGN: ICD-10-CM

## 2017-05-15 DIAGNOSIS — F32.0 MAJOR DEPRESSIVE DISORDER, SINGLE EPISODE, MILD (H): Primary | ICD-10-CM

## 2017-05-15 DIAGNOSIS — G89.29 CHRONIC LEFT-SIDED LOW BACK PAIN WITH LEFT-SIDED SCIATICA: ICD-10-CM

## 2017-05-15 DIAGNOSIS — E78.5 HYPERLIPIDEMIA LDL GOAL <130: ICD-10-CM

## 2017-05-15 DIAGNOSIS — E03.9 HYPOTHYROIDISM, UNSPECIFIED TYPE: ICD-10-CM

## 2017-05-15 DIAGNOSIS — M54.16 LUMBAR RADICULOPATHY: ICD-10-CM

## 2017-05-15 PROCEDURE — 99214 OFFICE O/P EST MOD 30 MIN: CPT | Performed by: FAMILY MEDICINE

## 2017-05-15 PROCEDURE — 80061 LIPID PANEL: CPT | Performed by: FAMILY MEDICINE

## 2017-05-15 PROCEDURE — 80076 HEPATIC FUNCTION PANEL: CPT | Performed by: FAMILY MEDICINE

## 2017-05-15 PROCEDURE — 80048 BASIC METABOLIC PNL TOTAL CA: CPT | Performed by: FAMILY MEDICINE

## 2017-05-15 PROCEDURE — 36415 COLL VENOUS BLD VENIPUNCTURE: CPT | Performed by: FAMILY MEDICINE

## 2017-05-15 RX ORDER — HYDROCODONE BITARTRATE AND ACETAMINOPHEN 5; 325 MG/1; MG/1
1 TABLET ORAL
Qty: 40 TABLET | Refills: 0 | Status: SHIPPED | OUTPATIENT
Start: 2017-07-15 | End: 2017-07-17

## 2017-05-15 RX ORDER — LISINOPRIL 40 MG/1
40 TABLET ORAL DAILY
Qty: 30 TABLET | Refills: 11 | Status: SHIPPED | OUTPATIENT
Start: 2017-05-15 | End: 2018-05-04

## 2017-05-15 RX ORDER — CITALOPRAM HYDROBROMIDE 40 MG/1
40 TABLET ORAL DAILY
Qty: 90 TABLET | Refills: 1 | Status: SHIPPED | OUTPATIENT
Start: 2017-05-15 | End: 2018-05-11

## 2017-05-15 RX ORDER — GABAPENTIN 300 MG/1
CAPSULE ORAL
Qty: 90 CAPSULE | Refills: 11 | Status: SHIPPED | OUTPATIENT
Start: 2017-05-15 | End: 2017-10-16

## 2017-05-15 RX ORDER — HYDROCODONE BITARTRATE AND ACETAMINOPHEN 5; 325 MG/1; MG/1
1 TABLET ORAL
Qty: 40 TABLET | Refills: 0 | Status: SHIPPED | OUTPATIENT
Start: 2017-05-15 | End: 2017-05-15

## 2017-05-15 RX ORDER — SIMVASTATIN 20 MG
20 TABLET ORAL AT BEDTIME
Qty: 30 TABLET | Refills: 11 | Status: SHIPPED | OUTPATIENT
Start: 2017-05-15 | End: 2018-05-04

## 2017-05-15 RX ORDER — HYDROCODONE BITARTRATE AND ACETAMINOPHEN 5; 325 MG/1; MG/1
1 TABLET ORAL
Qty: 40 TABLET | Refills: 0 | Status: SHIPPED | OUTPATIENT
Start: 2017-06-15 | End: 2017-05-15

## 2017-05-15 ASSESSMENT — ANXIETY QUESTIONNAIRES
6. BECOMING EASILY ANNOYED OR IRRITABLE: NOT AT ALL
3. WORRYING TOO MUCH ABOUT DIFFERENT THINGS: SEVERAL DAYS
1. FEELING NERVOUS, ANXIOUS, OR ON EDGE: NOT AT ALL
2. NOT BEING ABLE TO STOP OR CONTROL WORRYING: SEVERAL DAYS
GAD7 TOTAL SCORE: 5
5. BEING SO RESTLESS THAT IT IS HARD TO SIT STILL: NOT AT ALL
7. FEELING AFRAID AS IF SOMETHING AWFUL MIGHT HAPPEN: NEARLY EVERY DAY

## 2017-05-15 ASSESSMENT — PATIENT HEALTH QUESTIONNAIRE - PHQ9: 5. POOR APPETITE OR OVEREATING: NOT AT ALL

## 2017-05-15 NOTE — MR AVS SNAPSHOT
After Visit Summary   5/15/2017    Nighat Gupta    MRN: [id number]           Patient Information     Date Of Birth          1935        Visit Information        Provider Department      5/15/2017 10:20 AM Nancy Tapia MD [facility]        Today's Diagnoses     Mild major depression (H)    -  1    Major depressive disorder, single episode, mild (H)        Lumbar radiculopathy        Essential hypertension, benign        Hyperlipidemia LDL goal <130          Care Instructions    Gabapentin 300 mg one tab in the afternoon and 2 tabs at bedtime every day     Narcotics - no more than 2 daily     Referral to Dr Urena at the [facility] in [place]    Jennifer with me in 3 months        Follow-ups after your visit        Additional Services     PAIN MANAGEMENT CENTER ([place]) REFERRAL       Your provider has referred you to the [facility].    Reason for Referral: Comprehensive Evaluation and Management    Please complete the following questions:    What is your diagnosis for the patient's pain? Chronic low back pain     Do you have any specific questions for the pain specialist? Yes: pt has had MORENA for many years and these had helped now they do not. Would she be a candidate for ablation procedure she is very interested in this     Are there any red flags that may impact the assessment or management of the patient? None    **ANY DIAGNOSTIC TESTS THAT ARE NOT IN EPIC SHOULD BE SENT TO THE PAIN CENTER**    Please note the Pre-Op Pain Consult must be scheduled 2-3 weeks prior to the patient's surgery.  Patient's trying to schedule within 2 weeks of surgery may not be accommodated.     Pre-Op Pain Consults are only good for 30 days.    REGARDING OPIOID MEDICATIONS:  We will always address appropriateness of opioid pain medications, but we generally will not automatically take on a prescribing role. When we do take on prescribing of opioids for  chronic pain, it is in collaboration with the referring physician for an intermediate period of time (months), with an expectation that the primary physician or provider will assume the prescribing role if medications are effective at stable doses with demonstrated compliance.  Therefore, please do not assume that your prescribing responsibilities end on the day of pain clinic consultation.  Is this agreeable to you? YES    For any questions, contact the [facility] at [phone].    Please be aware that coverage of these services is subject to the terms and limitations of your health insurance plan.  Call member services at your health plan with any benefit or coverage questions.      Please bring the following with you to your appointment:    (1) Any X-Rays, CTs or MRIs which have been performed.  Contact the facility where they were done to arrange for  prior to your scheduled appointment.    (2) List of current medications   (3) This referral request   (4) Any documents/labs given to you for this referral                  Who to contact     If you have questions or need follow up information about today's clinic visit or your schedule please contact [facility] directly at [phone].  Normal or non-critical lab and imaging results will be communicated to you by [employer], letter or phone within 4 business days after the clinic has received the results. If you do not hear from us within 7 days, please contact the clinic through [employer] or phone. If you have a critical or abnormal lab result, we will notify you by phone as soon as possible.  Submit refill requests through [employer] or call your pharmacy and they will forward the refill request to us. Please allow 3 business days for your refill to be completed.          Additional Information About Your Visit        [employer] Information     [employer] lets you send messages to your doctor, view your test results, renew  "your prescriptions, schedule appointments and more. To sign up, go to [email or web address] . Click on \"Log in\" on the left side of the screen, which will take you to the Welcome page. Then click on \"Sign up Now\" on the right side of the page.     You will be asked to enter the access code listed below, as well as some personal information. Please follow the directions to create your username and password.     Your access code is: [email or web address]  Expires: 2017 11:08 AM     Your access code will  in 90 days. If you need help or a new code, please call your [place] clinic or [phone].        Care EveryWhere ID     This is your Care EveryWhere ID. This could be used by other organizations to access your [place] medical records  [id number]        Your Vitals Were     Pulse Temperature Height BMI (Body Mass Index)          60 97.8  F (36.6  C) (Tympanic) 4' 10.75\" (1.492 m) 22.33 kg/m2         Blood Pressure from Last 3 Encounters:   05/15/17 124/68   17 122/68   17 120/54    Weight from Last 3 Encounters:   05/15/17 109 lb 9.6 oz (49.7 kg)   17 111 lb 9.6 oz (50.6 kg)   17 110 lb 14.3 oz (50.3 kg)              We Performed the Following     Basic metabolic panel     DEPRESSION ACTION PLAN (DAP)     Hepatic panel     Lipid panel reflex to direct [facility]     PAIN MANAGEMENT CENTER ([place]) REFERRAL          Today's Medication Changes          These changes are accurate as of: 5/15/17 11:08 AM.  If you have any questions, ask your nurse or doctor.               Start taking these medicines.        Dose/Directions    HYDROcodone-acetaminophen 5-325 MG per tablet   Commonly known as:  NORCO   Used for:  Lumbar radiculopathy   Started by:  Nancy Tapia MD        Dose:  1 tablet   Start taking on:  7/15/2017   Take 1 tablet by mouth 2 times daily PRN severe pain  One month supply   Quantity:  40 tablet   Refills:  0         These medicines have changed or have updated " prescriptions.        Dose/Directions    gabapentin 300 MG capsule   Commonly known as:  NEURONTIN   This may have changed:    - additional instructions  - Another medication with the same name was removed. Continue taking this medication, and follow the directions you see here.   Used for:  Lumbar radiculopathy   Changed by:  Nancy Tapia MD        1 tab in the afternoon and 2 tabs at bedtime   Quantity:  90 capsule   Refills:  11            Where to get your medicines      These medications were sent to [facility] #[id number] - [place], [place] - [address] [facility]  [address] [facility] [address]    Hours:  Closed 10-16-08 business to [facility] Phone:  [phone]     citalopram 40 MG tablet    gabapentin 300 MG capsule    lisinopril 40 MG tablet    simvastatin 20 MG tablet         Some of these will need a paper prescription and others can be bought over the counter.  Ask your nurse if you have questions.     Bring a paper prescription for each of these medications     HYDROcodone-acetaminophen 5-325 MG per tablet                Primary Care Provider Office Phone # Fax #    Nancy Tapia [phone]       [facility] [address] [facility] [address]        Thank you!     Thank you for choosing [facility]  for your care. Our goal is always to provide you with excellent care. Hearing back from our patients is one way we can continue to improve our services. Please take a few minutes to complete the written survey that you may receive in the mail after your visit with us. Thank you!             Your Updated Medication List - Protect others around you: Learn how to safely use, store and throw away your medicines at www.disposemymeds.org.          This list is accurate as of: 5/15/17 11:08 AM.  Always use your most recent med list.                   Brand Name Dispense Instructions for use    aspirin 81 MG tablet     100    one daily        CALCIUM 500 + D 500-200 MG-IU Tabs      1500mg daily       cholecalciferol 400 UNIT Tabs tablet    vitamin D     Take 400 Units by mouth daily.       citalopram 40 MG tablet    celeXA    90 tablet    Take 1 tablet (40 mg) by mouth daily       gabapentin 300 MG capsule    NEURONTIN    90 capsule    1 tab in the afternoon and 2 tabs at bedtime       hydrochlorothiazide 25 MG tablet    HYDRODIURIL    90 tablet    Take 1 tablet (25 mg) by mouth daily       HYDROcodone-acetaminophen 5-325 MG per tablet   Start taking on:  7/15/2017    NORCO    40 tablet    Take 1 tablet by mouth 2 times daily PRN severe pain  One month supply       * levothyroxine 75 MCG tablet    SYNTHROID/LEVOTHROID    36 tablet    Take 1 tablet daily on Monday, Wednesday, Friday Needs lab before next refill       * levothyroxine 50 MCG tablet    SYNTHROID/LEVOTHROID    30 tablet    Take 1 tab daily on Tuesday, Thursday, Saturday and Sunday (Needs lab work)       lisinopril 40 MG tablet    PRINIVIL/ZESTRIL    30 tablet    Take 1 tablet (40 mg) by mouth daily Needs blood pressure check       MULTIVITAMIN TABS   OR      1 TAB DAILY       OMEGA 3 PO      one tablet daily       ranitidine 75 MG tablet   Generic drug:  ranitidine      1 TABLET 1 TO 2 TIMES A DAY AS NEEDED       simvastatin 20 MG tablet    ZOCOR    30 tablet    Take 1 tablet (20 mg) by mouth At Bedtime       * Notice:  This list has 2 medication(s) that are the same as other medications prescribed for you. Read the directions carefully, and ask your doctor or other care provider to review them with you.

## 2017-05-15 NOTE — PROGRESS NOTES
SUBJECTIVE:                                                    Nighat Gupta is a 81 year old female who presents to clinic today for the following health issues:        Chronic Pain Follow-Up       Type / Location of Pain: low back and left leg  She has had mult MORENA they used to work well for her and they no longer do     She is is interested in pursuing other procedural options   She is ok on pain meds but is not able to be as active as she would like   Analgesia/pain control:       Recent changes:  same      Overall control: Inadequate pain control  Activity level/function:      Daily activities:  Able to do light housework, cooking    Work:  not applicable  Adverse effects:  No  Adherance    Taking medication as directed?  Yes    Participating in other treatments: no -   Risk Factors:    Sleep:  Good    Mood/anxiety:  controlled    Recent family or social stressors:  death in family:  Loss of grand daughter in January    Other aggravating factors: sedentary lifestyle  PHQ-9 SCORE 10/23/2015 5/12/2016 5/15/2017   Total Score - - -   Total Score 5 3 2     PRECIOUS-7 SCORE 3/12/2015 10/23/2015 5/15/2017   Total Score 1 - -   Total Score - 0 5     Encounter-Level CSA - 12/02/2016:                 Controlled Substance Agreement - Scan on 12/8/2016  1:38 PM : CONTROLLED SUBSTANCE AGREEMENT 12/02/2016 (below)          Encounter-Level CSA - 12/02/2016:                 Controlled Substance Agreement - Scan on 4/8/2016 10:44 AM : CONTROLLED SUBSTANCE AGREEMENT 04/04/2016 (below)                 Amount of exercise or physical activity: None    Problems taking medications regularly: No    Medication side effects: none    Diet: regular (no restrictions)      Hyperlipidemia Follow-Up      Rate your low fat/cholesterol diet?: good    Taking statin?  Yes no side affects     Other lipid medications/supplements?:  none     Hypertension Follow-up      Outpatient blood pressures are not being checked.    Low Salt Diet: no added  "salt     Depression Followup    Status since last visit: Stable     See PHQ-9 for current symptoms.  Other associated symptoms: None    Complicating factors:   Significant life event:  No   Current substance abuse:  None  Anxiety or Panic symptoms:  No    PHQ-9  English PHQ-9   Any Language          Hypothyroidism Follow-up      Since last visit, patient describes the following symptoms: Weight stable, no hair loss, no skin changes, no constipation, no loose stools       Problem list and histories reviewed & adjusted, as indicated.  Additional history: as documented    Labs reviewed in EPIC    Reviewed and updated as needed this visit by clinical staff  Tobacco  Allergies  Meds  Problems  Med Hx  Surg Hx  Fam Hx  Soc Hx        Reviewed and updated as needed this visit by Provider  Allergies  Meds  Problems         ROS:  Constitutional, HEENT, cardiovascular, pulmonary, gi and gu systems are negative, except as otherwise noted.    OBJECTIVE:                                                    /68  Pulse 60  Temp 97.8  F (36.6  C) (Tympanic)  Ht 4' 10.75\" (1.492 m)  Wt 109 lb 9.6 oz (49.7 kg)  BMI 22.33 kg/m2  Body mass index is 22.33 kg/(m^2).  GENERAL APPEARANCE: healthy, alert and no distress  NECK: no adenopathy, no asymmetry, masses, or scars and thyroid normal to palpation  RESP: lungs clear to auscultation - no rales, rhonchi or wheezes  CV: regular rates and rhythm, normal S1 S2, no S3 or S4 and no murmur, click or rub  MS: extremities normal- no gross deformities noted  PSYCH: mentation appears normal and affect normal/bright         ASSESSMENT/PLAN:                                                    1. Major depressive disorder, single episode, mild (H)  Stable no change in treatment plan.   - citalopram (CELEXA) 40 MG tablet; Take 1 tablet (40 mg) by mouth daily  Dispense: 90 tablet; Refill: 1    2. Lumbar radiculopathy  Not good pain control   - gabapentin (NEURONTIN) 300 MG capsule; 1 " tab in the afternoon and 2 tabs at bedtime  Dispense: 90 capsule; Refill: 11  - HYDROcodone-acetaminophen (NORCO) 5-325 MG per tablet; Take 1 tablet by mouth 2 times daily PRN severe pain  One month supply  Dispense: 40 tablet; Refill: 0  - [facility] REFERRAL    3. Chronic left-sided low back pain with left-sided sciatica  Not good pain control     4. Essential hypertension, benign  Stable no change in treatment plan.   - lisinopril (PRINIVIL/ZESTRIL) 40 MG tablet; Take 1 tablet (40 mg) by mouth daily Needs blood pressure check  Dispense: 30 tablet; Refill: 11  - Hepatic panel  - Basic metabolic panel    5. Hyperlipidemia LDL goal <130  Stable no change in treatment plan.   - simvastatin (ZOCOR) 20 MG tablet; Take 1 tablet (20 mg) by mouth At Bedtime  Dispense: 30 tablet; Refill: 11  - Lipid panel reflex to direct LDL    6. Hypothyroidism, unspecified type  Stable no change in treatment plan.       Patient Instructions   Gabapentin 300 mg one tab in the afternoon and 2 tabs at bedtime every day     Narcotics - no more than 2 daily     Referral to Dr Urena at the [facility] in [place] with me in 3 months    Risks, benefits, side effects and rationale for treatment plan fully discussed with the patient and understanding expressed.       Nancy Tapia MD  [facility]

## 2017-05-15 NOTE — NURSING NOTE
"Chief Complaint   Patient presents with     Pain       Initial /60 (BP Location: Right arm, Patient Position: Chair, Cuff Size: Adult Regular)  Pulse 60  Temp 97.8  F (36.6  C) (Tympanic)  Ht 4' 10.75\" (1.492 m)  Wt 109 lb 9.6 oz (49.7 kg)  BMI 22.33 kg/m2 Estimated body mass index is 22.33 kg/(m^2) as calculated from the following:    Height as of this encounter: 4' 10.75\" (1.492 m).    Weight as of this encounter: 109 lb 9.6 oz (49.7 kg).  Medication Reconciliation: complete      "

## 2017-05-15 NOTE — TELEPHONE ENCOUNTER
Left voicemail for patient to schedule new evaluation.       Marline GREENFIELD    [facility]

## 2017-05-15 NOTE — PATIENT INSTRUCTIONS
Gabapentin 300 mg one tab in the afternoon and 2 tabs at bedtime every day     Narcotics - no more than 2 daily     Referral to Dr Urena at the [facility] in [place]    Recheck with me in 3 months

## 2017-05-15 NOTE — LETTER
May 16, 2017    Nighat Gupta  [address] [place] [id number]    Dear Nighat         Welcome to the [facility].  We are located on the 2nd floor ([address]) of the [facility], located at [address] [place] [address] [place] [address].    Your appointment at the [facility] has been scheduled on Tuesday, July 11TH at 8:00AM with Zayra Chinchilla NP.    At your first visit, you will meet your team of caregivers who will help you to develop pain management strategies that will last a lifetime. You will meet with our support staff to review your insurance information, and collect your co-payment if required by your insurance company. You will also meet with a medical pain specialist and care coordinator who will assess your pain and develop a plan of care for your successful pain rehabilitation. You should expect to spend 1-2 hours at your first visit with us. Usually, patients work with us for a period of 6-12 months, and eventually return to their primary doctor once their pain management has stabilized.      To help us make your visit go as smoothly as possible, please bring the following items with you on your visit:     Completed Pain Questionnaire enclosed in this packet.  If you do not bring the completed questionnaire, we may have to reschedule your appointment.  List of any medicines that you are currently taking or have been prescribed  Important NON-[place] medical information such as medical records or tests results (X-rays, or laboratory tests)  Your health insurance card  Financial resources to cover your co-payment or balance due at the time of service (cash, personal check, Visa, and MasterCard are acceptable methods of payment)     Due to the demand for new patient evaluations, you must notify the scheduling department 48 hours in advance if you are not able to keep this appointment. Failure to do so could affect your ability to  reschedule with our clinic. Please be aware that we will not prescribe any medications at your first visit.     Please call [phone] with any questions regarding your appointment. We look forward to meeting you and working to address your health care needs.     Sincerely,      [facility]

## 2017-05-16 DIAGNOSIS — R79.89 LOW SERUM SODIUM: ICD-10-CM

## 2017-05-16 DIAGNOSIS — R79.89 LOW SERUM SODIUM: Primary | ICD-10-CM

## 2017-05-16 LAB
ALBUMIN SERPL-MCNC: 4 G/DL (ref 3.4–5)
ALP SERPL-CCNC: 55 U/L (ref 40–150)
ALT SERPL W P-5'-P-CCNC: 17 U/L (ref 0–50)
ANION GAP SERPL CALCULATED.3IONS-SCNC: 8 MMOL/L (ref 3–14)
ANION GAP SERPL CALCULATED.3IONS-SCNC: 9 MMOL/L (ref 3–14)
AST SERPL W P-5'-P-CCNC: 24 U/L (ref 0–45)
BILIRUB DIRECT SERPL-MCNC: 0.1 MG/DL (ref 0–0.2)
BILIRUB SERPL-MCNC: 0.4 MG/DL (ref 0.2–1.3)
BUN SERPL-MCNC: 14 MG/DL (ref 7–30)
BUN SERPL-MCNC: 15 MG/DL (ref 7–30)
CALCIUM SERPL-MCNC: 8.8 MG/DL (ref 8.5–10.1)
CALCIUM SERPL-MCNC: 9 MG/DL (ref 8.5–10.1)
CHLORIDE SERPL-SCNC: 92 MMOL/L (ref 94–109)
CHLORIDE SERPL-SCNC: 93 MMOL/L (ref 94–109)
CHOLEST SERPL-MCNC: 203 MG/DL
CO2 SERPL-SCNC: 26 MMOL/L (ref 20–32)
CO2 SERPL-SCNC: 27 MMOL/L (ref 20–32)
CREAT SERPL-MCNC: 0.86 MG/DL (ref 0.52–1.04)
CREAT SERPL-MCNC: 0.94 MG/DL (ref 0.52–1.04)
GFR SERPL CREATININE-BSD FRML MDRD: 57 ML/MIN/1.7M2
GFR SERPL CREATININE-BSD FRML MDRD: 64 ML/MIN/1.7M2
GLUCOSE SERPL-MCNC: 78 MG/DL (ref 70–99)
GLUCOSE SERPL-MCNC: 88 MG/DL (ref 70–99)
HDLC SERPL-MCNC: 93 MG/DL
LDLC SERPL CALC-MCNC: 87 MG/DL
NONHDLC SERPL-MCNC: 110 MG/DL
POTASSIUM SERPL-SCNC: 4.2 MMOL/L (ref 3.4–5.3)
POTASSIUM SERPL-SCNC: 4.6 MMOL/L (ref 3.4–5.3)
PROT SERPL-MCNC: 7 G/DL (ref 6.8–8.8)
SODIUM SERPL-SCNC: 127 MMOL/L (ref 133–144)
SODIUM SERPL-SCNC: 128 MMOL/L (ref 133–144)
TRIGL SERPL-MCNC: 113 MG/DL

## 2017-05-16 PROCEDURE — 80048 BASIC METABOLIC PNL TOTAL CA: CPT | Performed by: FAMILY MEDICINE

## 2017-05-16 PROCEDURE — 36415 COLL VENOUS BLD VENIPUNCTURE: CPT | Performed by: FAMILY MEDICINE

## 2017-05-16 ASSESSMENT — ANXIETY QUESTIONNAIRES: GAD7 TOTAL SCORE: 5

## 2017-05-16 ASSESSMENT — PATIENT HEALTH QUESTIONNAIRE - PHQ9: SUM OF ALL RESPONSES TO PHQ QUESTIONS 1-9: 2

## 2017-05-16 NOTE — TELEPHONE ENCOUNTER
Pain Management Center Referral      1. Confirmed address with patient? Yes  2. Confirmed phone number with patient? Yes  3. Confirmed referring provider? Yes  4. Is the PCP the same as the referring provider? Yes  5. Has the patient been to any previous pain clinics? No  (If yes, send ABHISHEK with welcome letter)  6. Which insurance are we to bill for this appointment?  [employer]    7. Informed pt of cancellation (48 hour) policy? Yes    REGARDING OPIOID MEDICATIONS: We will always address appropriateness of opioid pain medications, but we generally will not automatically take on a prescribing role. When we do take on prescribing of opioids for chronic pain, it is in collaboration with the referring physician for an intermediate period of time (months), with an expectation that the primary physician or provider will assume the prescribing role if medications are effective at stable doses with demonstrated compliance. Therefore, please do not assume that your prescribing responsibilities end on the day of pain clinic consultation.  7. Informed pt of prescribing policy? Yes      8. Referring Provider: Nancy Tapia    [facility]

## 2017-05-17 DIAGNOSIS — R79.89 LOW SERUM SODIUM: Primary | ICD-10-CM

## 2017-05-24 ENCOUNTER — ALLIED HEALTH/NURSE VISIT (OUTPATIENT)
Dept: FAMILY MEDICINE | Facility: CLINIC | Age: 82
End: 2017-05-24
Payer: COMMERCIAL

## 2017-05-24 VITALS — SYSTOLIC BLOOD PRESSURE: 142 MMHG | HEART RATE: 72 BPM | DIASTOLIC BLOOD PRESSURE: 58 MMHG

## 2017-05-24 DIAGNOSIS — R79.89 LOW SERUM SODIUM: ICD-10-CM

## 2017-05-24 DIAGNOSIS — Z01.30 BLOOD PRESSURE CHECK: Primary | ICD-10-CM

## 2017-05-24 LAB
ANION GAP SERPL CALCULATED.3IONS-SCNC: 7 MMOL/L (ref 3–14)
BUN SERPL-MCNC: 8 MG/DL (ref 7–30)
CALCIUM SERPL-MCNC: 9.2 MG/DL (ref 8.5–10.1)
CHLORIDE SERPL-SCNC: 95 MMOL/L (ref 94–109)
CO2 SERPL-SCNC: 28 MMOL/L (ref 20–32)
CREAT SERPL-MCNC: 0.86 MG/DL (ref 0.52–1.04)
GFR SERPL CREATININE-BSD FRML MDRD: 63 ML/MIN/1.7M2
GLUCOSE SERPL-MCNC: 87 MG/DL (ref 70–99)
POTASSIUM SERPL-SCNC: 4 MMOL/L (ref 3.4–5.3)
SODIUM SERPL-SCNC: 130 MMOL/L (ref 133–144)

## 2017-05-24 PROCEDURE — 99207 ZZC NO CHARGE NURSE ONLY: CPT

## 2017-05-24 PROCEDURE — 36415 COLL VENOUS BLD VENIPUNCTURE: CPT | Performed by: FAMILY MEDICINE

## 2017-05-24 PROCEDURE — 80048 BASIC METABOLIC PNL TOTAL CA: CPT | Performed by: FAMILY MEDICINE

## 2017-05-24 NOTE — MR AVS SNAPSHOT
"              After Visit Summary   5/24/2017    Nighat Gupta    MRN: [id number]           Patient Information     Date Of Birth          1935        Visit Information        Provider Department      5/24/2017 9:30 AM FL NB RN [facility]        Today's Diagnoses     Blood pressure check    -  1       Follow-ups after your visit        Your next 10 appointments already scheduled     Jul 11, 2017  8:00 AM CDT   New Visit with MAX Warner CNP   [facility] ([facility])    [address]  [place] [address] [phone]              Who to contact     If you have questions or need follow up information about today's clinic visit or your schedule please contact [facility] directly at [phone].  Normal or non-critical lab and imaging results will be communicated to you by MyChart, letter or phone within 4 business days after the clinic has received the results. If you do not hear from us within 7 days, please contact the clinic through MyChart or phone. If you have a critical or abnormal lab result, we will notify you by phone as soon as possible.  Submit refill requests through [employer] or call your pharmacy and they will forward the refill request to us. Please allow 3 business days for your refill to be completed.          Additional Information About Your Visit        MyChart Information     [employer] lets you send messages to your doctor, view your test results, renew your prescriptions, schedule appointments and more. To sign up, go to [email or web address] . Click on \"Log in\" on the left side of the screen, which will take you to the Welcome page. Then click on \"Sign up Now\" on the right side of the page.     You will be asked to enter the access code listed below, as well as some personal information. Please follow the directions to create your username and password.     Your access code is: " [email or web address]  Expires: 2017 11:08 AM     Your access code will  in 90 days. If you need help or a new code, please call your [place] clinic or [phone].        Care EveryWhere ID     This is your Care EveryWhere ID. This could be used by other organizations to access your [place] medical records  [id number]        Your Vitals Were     Pulse                   72            Blood Pressure from Last 3 Encounters:   17 142/58   05/15/17 124/68   17 122/68    Weight from Last 3 Encounters:   05/15/17 109 lb 9.6 oz (49.7 kg)   17 111 lb 9.6 oz (50.6 kg)   17 110 lb 14.3 oz (50.3 kg)              Today, you had the following     No orders found for display         Today's Medication Changes          These changes are accurate as of: 17  5:41 PM.  If you have any questions, ask your nurse or doctor.               Stop taking these medicines if you haven't already. Please contact your care team if you have questions.     hydrochlorothiazide 25 MG tablet   Commonly known as:  HYDRODIURIL                    Primary Care Provider Office Phone # Fax #    Nancy Taipa [phone]       [address] [address]        Thank you!     Thank you for choosing [facility]  for your care. Our goal is always to provide you with excellent care. Hearing back from our patients is one way we can continue to improve our services. Please take a few minutes to complete the written survey that you may receive in the mail after your visit with us. Thank you!             Your Updated Medication List - Protect others around you: Learn how to safely use, store and throw away your medicines at www.disposemymeds.org.          This list is accurate as of: 17  5:41 PM.  Always use your most recent med list.                   Brand Name Dispense Instructions for use    aspirin 81 MG tablet     100    one daily        CALCIUM 500 + D 500-200 MG-IU Tabs      1500mg daily       cholecalciferol 400 UNIT Tabs tablet    vitamin D     Take 400 Units by mouth daily.       citalopram 40 MG tablet    celeXA    90 tablet    Take 1 tablet (40 mg) by mouth daily       gabapentin 300 MG capsule    NEURONTIN    90 capsule    1 tab in the afternoon and 2 tabs at bedtime       HYDROcodone-acetaminophen 5-325 MG per tablet   Start taking on:  7/15/2017    NORCO    40 tablet    Take 1 tablet by mouth 2 times daily PRN severe pain  One month supply       * levothyroxine 75 MCG tablet    SYNTHROID/LEVOTHROID    36 tablet    Take 1 tablet daily on Monday, Wednesday, Friday Needs lab before next refill       * levothyroxine 50 MCG tablet    SYNTHROID/LEVOTHROID    30 tablet    Take 1 tab daily on Tuesday, Thursday, Saturday and Sunday (Needs lab work)       lisinopril 40 MG tablet    PRINIVIL/ZESTRIL    30 tablet    Take 1 tablet (40 mg) by mouth daily Needs blood pressure check       MULTIVITAMIN TABS   OR      1 TAB DAILY       OMEGA 3 PO      one tablet daily       ranitidine 75 MG tablet   Generic drug:  ranitidine      1 TABLET 1 TO 2 TIMES A DAY AS NEEDED       simvastatin 20 MG tablet    ZOCOR    30 tablet    Take 1 tablet (20 mg) by mouth At Bedtime       * Notice:  This list has 2 medication(s) that are the same as other medications prescribed for you. Read the directions carefully, and ask your doctor or other care provider to review them with you.

## 2017-05-24 NOTE — PROGRESS NOTES
Nighat Gupta is being followed for Blood Pressure management.    NURSING ASSESSMENT/PLAN:  Blood pressure reading today is not at the provider specified goal of <140/90.    Current blood pressure medication(s):  Lisinopril 40 mg one daily  1.  The patient will continue current medication regimen unchanged.     2.  We will  be checking a metabolic lab panel today.    3.  Follow up instructions include:     Next Nurse visit: 2 weeks.    SUBJECTIVE:                                                    The patient is taking medication as prescribed and is tolerating well.   Patient is not monitoring Blood Pressure at home.     In addition notes: none    Out of the following complicating factors: Cough, Headache, Lightheadedness, Shortness of breath, Fatigue, Nausea, Sexual Dysfunction, New onset of swelling or edema, Weakness and New onset of Chest Pain, the patient reports:  None    OBJECTIVE:                                                    Is pulse 55 or greater? - Yes  Pulse Readings from Last 1 Encounters:   05/24/17 72     Today's BP completed using cuff size: regular on right side arm.  BP Readings from Last 3 Encounters:   05/24/17 142/58   05/15/17 124/68   02/28/17 122/68       Current Outpatient Prescriptions   Medication Sig Dispense Refill     citalopram (CELEXA) 40 MG tablet Take 1 tablet (40 mg) by mouth daily 90 tablet 1     gabapentin (NEURONTIN) 300 MG capsule 1 tab in the afternoon and 2 tabs at bedtime 90 capsule 11     lisinopril (PRINIVIL/ZESTRIL) 40 MG tablet Take 1 tablet (40 mg) by mouth daily Needs blood pressure check 30 tablet 11     simvastatin (ZOCOR) 20 MG tablet Take 1 tablet (20 mg) by mouth At Bedtime 30 tablet 11     [START ON 7/15/2017] HYDROcodone-acetaminophen (NORCO) 5-325 MG per tablet Take 1 tablet by mouth 2 times daily PRN severe pain  One month supply 40 tablet 0     levothyroxine (SYNTHROID/LEVOTHROID) 75 MCG tablet Take 1 tablet daily on Monday, Wednesday, Friday  Needs  lab before next refill 36 tablet 11     levothyroxine (SYNTHROID/LEVOTHROID) 50 MCG tablet Take 1 tab daily on Tuesday, Thursday, Saturday and Sunday (Needs lab work) 30 tablet 11     cholecalciferol (VITAMIN D) 400 UNIT TABS Take 400 Units by mouth daily.       ZANTAC 75 75 MG OR TABS 1 TABLET 1 TO 2 TIMES A DAY AS NEEDED       CALCIUM 500 + D 500-200 MG-IU OR TABS 1500mg daily       ASPIRIN 81 MG OR TABS one daily 100 0     OMEGA 3 OR one tablet daily       MULTIVITAMIN TABS   OR 1 TAB DAILY  0     Potassium   Date Value Ref Range Status   05/16/2017 4.6 3.4 - 5.3 mmol/L Final     Creatinine   Date Value Ref Range Status   05/16/2017 0.94 0.52 - 1.04 mg/dL Final     Urea Nitrogen   Date Value Ref Range Status   05/16/2017 15 7 - 30 mg/dL Final     GFR Estimate   Date Value Ref Range Status   05/16/2017 57 (L) >60 mL/min/1.7m2 Final     Comment:     Non  GFR Calc      A baseline potassium, creatinine, BUN, GFR has been done within past 12 months          Professional Reference click here   Copy of current RN HTN MGMT order or refer to Other Orders:    Dosage adjustment made based on patient specific, physician directed, care plan.  Teresa Escobar RN

## 2017-06-06 ENCOUNTER — DOCUMENTATION ONLY (OUTPATIENT)
Dept: LAB | Facility: CLINIC | Age: 82
End: 2017-06-06

## 2017-06-06 DIAGNOSIS — I10 BENIGN ESSENTIAL HYPERTENSION: Primary | ICD-10-CM

## 2017-06-09 ENCOUNTER — ALLIED HEALTH/NURSE VISIT (OUTPATIENT)
Dept: FAMILY MEDICINE | Facility: CLINIC | Age: 82
End: 2017-06-09
Payer: COMMERCIAL

## 2017-06-09 VITALS — HEART RATE: 76 BPM | SYSTOLIC BLOOD PRESSURE: 156 MMHG | DIASTOLIC BLOOD PRESSURE: 60 MMHG | RESPIRATION RATE: 16 BRPM

## 2017-06-09 DIAGNOSIS — Z01.30 BLOOD PRESSURE CHECK: Primary | ICD-10-CM

## 2017-06-09 DIAGNOSIS — I10 BENIGN ESSENTIAL HYPERTENSION: ICD-10-CM

## 2017-06-09 LAB
ANION GAP SERPL CALCULATED.3IONS-SCNC: 6 MMOL/L (ref 3–14)
BUN SERPL-MCNC: 11 MG/DL (ref 7–30)
CALCIUM SERPL-MCNC: 9.3 MG/DL (ref 8.5–10.1)
CHLORIDE SERPL-SCNC: 91 MMOL/L (ref 94–109)
CO2 SERPL-SCNC: 28 MMOL/L (ref 20–32)
CREAT SERPL-MCNC: 0.81 MG/DL (ref 0.52–1.04)
GFR SERPL CREATININE-BSD FRML MDRD: 68 ML/MIN/1.7M2
GLUCOSE SERPL-MCNC: 85 MG/DL (ref 70–99)
POTASSIUM SERPL-SCNC: 4.1 MMOL/L (ref 3.4–5.3)
SODIUM SERPL-SCNC: 125 MMOL/L (ref 133–144)

## 2017-06-09 PROCEDURE — 36415 COLL VENOUS BLD VENIPUNCTURE: CPT | Performed by: FAMILY MEDICINE

## 2017-06-09 PROCEDURE — 80048 BASIC METABOLIC PNL TOTAL CA: CPT | Performed by: FAMILY MEDICINE

## 2017-06-09 PROCEDURE — 99207 ZZC NO CHARGE NURSE ONLY: CPT

## 2017-06-09 RX ORDER — METOPROLOL SUCCINATE 25 MG/1
25 TABLET, EXTENDED RELEASE ORAL DAILY
Qty: 90 TABLET | Refills: 3 | Status: CANCELLED | OUTPATIENT
Start: 2017-06-09

## 2017-06-09 NOTE — PROGRESS NOTES
Nighat Gupta is a 81 year old female who comes in today for a Blood Pressure check because of medication change and ongoing blood pressure monitoring and lab      Vitals as recorded, a regular cuff was used.    Patient is not taking medication as prescribed  Patient is tolerating medications well.  Patient is not monitoring Blood Pressure at home.      Current complaints: none.  When patient got phone call in May she thought she was supposed to stop the lisinopril and continue with the HCTZ.  She though that the HCTZ was for swelling only and was told to stop the blood pressure medication.      Disposition:   Start back on the lisinopril 40 mg one a day  STOP taking the hydrochlorothiazide (the water pill, which also used blood pressure)    See the clinic RN and get lab done in 2 weeks.  Teresa Escobar RN

## 2017-06-09 NOTE — MR AVS SNAPSHOT
"              After Visit Summary   6/9/2017    Nighat Gupta    MRN: [id number]           Patient Information     Date Of Birth          1935        Visit Information        Provider Department      6/9/2017 10:15 AM DAMARIS SMITH RN [facility]        Care Instructions    Start back on the lisinopril 40 mg one a day  STOP taking the hydrochlorothiazide (the water pill, which also used blood pressure)    See the clinic RN and get lab done in 2 weeks          Follow-ups after your visit        Your next 10 appointments already scheduled     Jul 11, 2017  8:00 AM CDT   New Visit with MAX Warner CNP   [facility] ([place] [facility])    [address] [facility] [address] [phone]              Who to contact     If you have questions or need follow up information about today's clinic visit or your schedule please contact [facility] directly at [phone].  Normal or non-critical lab and imaging results will be communicated to you by MyChart, letter or phone within 4 business days after the clinic has received the results. If you do not hear from us within 7 days, please contact the clinic through Invisible Sentinelhart or phone. If you have a critical or abnormal lab result, we will notify you by phone as soon as possible.  Submit refill requests through CrowdPlat or call your pharmacy and they will forward the refill request to us. Please allow 3 business days for your refill to be completed.          Additional Information About Your Visit        Invisible Sentinelhart Information     CrowdPlat lets you send messages to your doctor, view your test results, renew your prescriptions, schedule appointments and more. To sign up, go to [email or web address] . Click on \"Log in\" on the left side of the screen, which will take you to the Welcome page. Then click on \"Sign up Now\" on the right side of the page.     You will be asked to enter the access code " listed below, as well as some personal information. Please follow the directions to create your username and password.     Your access code is: [email or web address]  Expires: 2017 11:08 AM     Your access code will  in 90 days. If you need help or a new code, please call your [place] clinic or [phone].        Care EveryWhere ID     This is your Care EveryWhere ID. This could be used by other organizations to access your [place] medical records  [id number]        Your Vitals Were     Pulse Respirations                76 16           Blood Pressure from Last 3 Encounters:   17 156/60   17 142/58   05/15/17 124/68    Weight from Last 3 Encounters:   05/15/17 109 lb 9.6 oz (49.7 kg)   17 111 lb 9.6 oz (50.6 kg)   17 110 lb 14.3 oz (50.3 kg)              Today, you had the following     No orders found for display       Primary Care Provider Office Phone # Fax #    Nancy Tapia [phone]       [facility] [address] [place] [address]        Thank you!     Thank you for choosing [facility]  for your care. Our goal is always to provide you with excellent care. Hearing back from our patients is one way we can continue to improve our services. Please take a few minutes to complete the written survey that you may receive in the mail after your visit with us. Thank you!             Your Updated Medication List - Protect others around you: Learn how to safely use, store and throw away your medicines at www.disposemymeds.org.          This list is accurate as of: 17 10:59 AM.  Always use your most recent med list.                   Brand Name Dispense Instructions for use    aspirin 81 MG tablet     100    one daily       CALCIUM 500 + D 500-200 MG-IU Tabs      1500mg daily       cholecalciferol 400 UNIT Tabs tablet    vitamin D     Take 400 Units by mouth daily.       citalopram 40 MG tablet    celeXA    90 tablet    Take  1 tablet (40 mg) by mouth daily       gabapentin 300 MG capsule    NEURONTIN    90 capsule    1 tab in the afternoon and 2 tabs at bedtime       HYDROcodone-acetaminophen 5-325 MG per tablet   Start taking on:  7/15/2017    NORCO    40 tablet    Take 1 tablet by mouth 2 times daily PRN severe pain  One month supply       * levothyroxine 75 MCG tablet    SYNTHROID/LEVOTHROID    36 tablet    Take 1 tablet daily on Monday, Wednesday, Friday Needs lab before next refill       * levothyroxine 50 MCG tablet    SYNTHROID/LEVOTHROID    30 tablet    Take 1 tab daily on Tuesday, Thursday, Saturday and Sunday (Needs lab work)       lisinopril 40 MG tablet    PRINIVIL/ZESTRIL    30 tablet    Take 1 tablet (40 mg) by mouth daily Needs blood pressure check       MULTIVITAMIN TABS   OR      1 TAB DAILY       OMEGA 3 PO      one tablet daily       ranitidine 75 MG tablet   Generic drug:  ranitidine      1 TABLET 1 TO 2 TIMES A DAY AS NEEDED       simvastatin 20 MG tablet    ZOCOR    30 tablet    Take 1 tablet (20 mg) by mouth At Bedtime       * Notice:  This list has 2 medication(s) that are the same as other medications prescribed for you. Read the directions carefully, and ask your doctor or other care provider to review them with you.

## 2017-06-09 NOTE — PATIENT INSTRUCTIONS
Start back on the lisinopril 40 mg one a day  STOP taking the hydrochlorothiazide (the water pill, which also used blood pressure)    See the clinic RN and get lab done in 2 weeks

## 2017-06-14 ENCOUNTER — DOCUMENTATION ONLY (OUTPATIENT)
Dept: LAB | Facility: CLINIC | Age: 82
End: 2017-06-14

## 2017-06-14 DIAGNOSIS — I10 ESSENTIAL HYPERTENSION: Primary | ICD-10-CM

## 2017-06-20 ENCOUNTER — ALLIED HEALTH/NURSE VISIT (OUTPATIENT)
Dept: FAMILY MEDICINE | Facility: CLINIC | Age: 82
End: 2017-06-20
Payer: COMMERCIAL

## 2017-06-20 VITALS
WEIGHT: 113 LBS | RESPIRATION RATE: 16 BRPM | SYSTOLIC BLOOD PRESSURE: 170 MMHG | DIASTOLIC BLOOD PRESSURE: 62 MMHG | HEART RATE: 62 BPM | BODY MASS INDEX: 23.02 KG/M2

## 2017-06-20 DIAGNOSIS — I10 BENIGN ESSENTIAL HYPERTENSION: Primary | ICD-10-CM

## 2017-06-20 DIAGNOSIS — Z01.30 BLOOD PRESSURE CHECK: ICD-10-CM

## 2017-06-20 DIAGNOSIS — I10 ESSENTIAL HYPERTENSION: ICD-10-CM

## 2017-06-20 LAB
ANION GAP SERPL CALCULATED.3IONS-SCNC: 4 MMOL/L (ref 3–14)
BUN SERPL-MCNC: 7 MG/DL (ref 7–30)
CALCIUM SERPL-MCNC: 9 MG/DL (ref 8.5–10.1)
CHLORIDE SERPL-SCNC: 98 MMOL/L (ref 94–109)
CO2 SERPL-SCNC: 29 MMOL/L (ref 20–32)
CREAT SERPL-MCNC: 0.87 MG/DL (ref 0.52–1.04)
GFR SERPL CREATININE-BSD FRML MDRD: 63 ML/MIN/1.7M2
GLUCOSE SERPL-MCNC: 84 MG/DL (ref 70–99)
POTASSIUM SERPL-SCNC: 4.3 MMOL/L (ref 3.4–5.3)
SODIUM SERPL-SCNC: 131 MMOL/L (ref 133–144)

## 2017-06-20 PROCEDURE — 80048 BASIC METABOLIC PNL TOTAL CA: CPT | Performed by: FAMILY MEDICINE

## 2017-06-20 PROCEDURE — 99207 ZZC NO CHARGE NURSE ONLY: CPT

## 2017-06-20 PROCEDURE — 36415 COLL VENOUS BLD VENIPUNCTURE: CPT | Performed by: FAMILY MEDICINE

## 2017-06-20 RX ORDER — AMLODIPINE BESYLATE 10 MG/1
10 TABLET ORAL DAILY
Qty: 30 TABLET | Refills: 1 | Status: SHIPPED | OUTPATIENT
Start: 2017-06-20 | End: 2017-08-16

## 2017-06-20 NOTE — PROGRESS NOTES
Nighat Gupta is a 81 year old female who comes in today for a Blood Pressure check because of ongoing blood pressure monitoring.      Vitals as recorded, a regular cuff was used.    Patient is taking medication as prescribed  Patient is tolerating medications well.  Patient is not monitoring Blood Pressure at home.      Current complaints: states ankles feel puffy.  No pitting edema and to me visually, they do not appear swollen.    Disposition: patient instructed to Continue same medications.    Start taking amlodipine 10 mg one daily (this is for blood pressure) this is verbal order per Dr Tapia  Blood pressure check with the clinic RN in 1 week    Teresa Escobar RN

## 2017-06-20 NOTE — PATIENT INSTRUCTIONS
Continue same medications.    Start taking amlodipine 10 mg one daily (this is for blood pressure)  Blood pressure check with the clinic RN in 1 week

## 2017-06-20 NOTE — MR AVS SNAPSHOT
After Visit Summary   6/20/2017    Nighat Gupta    MRN: [id number]           Patient Information     Date Of Birth          1935        Visit Information        Provider Department      6/20/2017 10:00 AM FL NB RN [facility]        Today's Diagnoses     Benign essential hypertension    -  1      Care Instructions    Continue same medications.    Start taking amlodipine 10 mg one daily (this is for blood pressure)  Blood pressure check with the clinic RN in 1 week          Follow-ups after your visit        Your next 10 appointments already scheduled     Jun 20, 2017 10:15 AM CDT   LAB with NB LAB   [facility]    [address] [place] [address]   [phone]           Patient must bring picture ID.  Patient should be prepared to give a urine specimen  Please do not eat 10-12 hours before your appointment if you are coming in fasting for labs on lipids, cholesterol, or glucose (sugar).  Pregnant women should follow their Care Team instructions. Water with medications is okay. Do not drink coffee or other fluids.   If you have concerns about taking  your medications, please ask at office or if scheduling via Contact At Once!, send a message by clicking on Secure Messaging, Message Your Care Team.            Jul 11, 2017  8:00 AM CDT   New Visit with MAX Warner CNP   [facility] ([facility])    [address] [place] [id number] [phone]            Jul 17, 2017 10:20 AM CDT   SHORT with Nancy Tapia MD   [facility]    [address] [place] [address] [phone]              Who to contact     If you have questions or need follow up information about today's clinic visit or your schedule please contact [facility] directly at [phone].  Normal or non-critical  "lab and imaging results will be communicated to you by MyChart, letter or phone within 4 business days after the clinic has received the results. If you do not hear from us within 7 days, please contact the clinic through [place] or phone. If you have a critical or abnormal lab result, we will notify you by phone as soon as possible.  Submit refill requests through Shoptimise or call your pharmacy and they will forward the refill request to us. Please allow 3 business days for your refill to be completed.          Additional Information About Your Visit        [email or web address] Information     Shoptimise lets you send messages to your doctor, view your test results, renew your prescriptions, schedule appointments and more. To sign up, go to [email or web address] . Click on \"Log in\" on the left side of the screen, which will take you to the Welcome page. Then click on \"Sign up Now\" on the right side of the page.     You will be asked to enter the access code listed below, as well as some personal information. Please follow the directions to create your username and password.     Your access code is: [email or web address]  Expires: 2017 11:08 AM     Your access code will  in 90 days. If you need help or a new code, please call your [place] clinic or [phone].        Care EveryWhere ID     This is your Care EveryWhere ID. This could be used by other organizations to access your [place] medical records  [id number]        Your Vitals Were     Pulse Respirations BMI (Body Mass Index)             62 16 23.02 kg/m2          Blood Pressure from Last 3 Encounters:   17 170/62   17 156/60   17 142/58    Weight from Last 3 Encounters:   17 113 lb (51.3 kg)   05/15/17 109 lb 9.6 oz (49.7 kg)   17 111 lb 9.6 oz (50.6 kg)              Today, you had the following     No orders found for display         Today's Medication Changes          These changes are accurate as of: 17 10:12 AM.  If you have any " questions, ask your nurse or doctor.               Start taking these medicines.        Dose/Directions    amLODIPine 10 MG tablet   Commonly known as:  NORVASC   Used for:  Benign essential hypertension        Dose:  10 mg   Take 1 tablet (10 mg) by mouth daily   Quantity:  30 tablet   Refills:  1            Where to get your medicines      These medications were sent to [id number] - [place], [place] - [address] [facility]  [address] [facility] [address]    Hours:  Closed 10-16-08 business to [facility] Phone:  [phone]     amLODIPine 10 MG tablet                Primary Care Provider Office Phone # Fax #    Nancy Tapia -[phone]       [facility] [address] [facility] [address]        Thank you!     Thank you for choosing [facility]  for your care. Our goal is always to provide you with excellent care. Hearing back from our patients is one way we can continue to improve our services. Please take a few minutes to complete the written survey that you may receive in the mail after your visit with us. Thank you!             Your Updated Medication List - Protect others around you: Learn how to safely use, store and throw away your medicines at www.disposemymeds.org.          This list is accurate as of: 6/20/17 10:12 AM.  Always use your most recent med list.                   Brand Name Dispense Instructions for use    amLODIPine 10 MG tablet    NORVASC    30 tablet    Take 1 tablet (10 mg) by mouth daily       aspirin 81 MG tablet     100    one daily       CALCIUM 500 + D 500-200 MG-IU Tabs      1500mg daily       cholecalciferol 400 UNIT Tabs tablet    vitamin D     Take 400 Units by mouth daily.       citalopram 40 MG tablet    celeXA    90 tablet    Take 1 tablet (40 mg) by mouth daily       gabapentin 300 MG capsule    NEURONTIN    90 capsule    1 tab in the afternoon and 2 tabs at bedtime       HYDROcodone-acetaminophen  5-325 MG per tablet   Start taking on:  7/15/2017    NORCO    40 tablet    Take 1 tablet by mouth 2 times daily PRN severe pain  One month supply       * levothyroxine 75 MCG tablet    SYNTHROID/LEVOTHROID    36 tablet    Take 1 tablet daily on Monday, Wednesday, Friday Needs lab before next refill       * levothyroxine 50 MCG tablet    SYNTHROID/LEVOTHROID    30 tablet    Take 1 tab daily on Tuesday, Thursday, Saturday and Sunday (Needs lab work)       lisinopril 40 MG tablet    PRINIVIL/ZESTRIL    30 tablet    Take 1 tablet (40 mg) by mouth daily Needs blood pressure check       MULTIVITAMIN TABS   OR      1 TAB DAILY       OMEGA 3 PO      one tablet daily       ranitidine 75 MG tablet   Generic drug:  ranitidine      1 TABLET 1 TO 2 TIMES A DAY AS NEEDED       simvastatin 20 MG tablet    ZOCOR    30 tablet    Take 1 tablet (20 mg) by mouth At Bedtime       * Notice:  This list has 2 medication(s) that are the same as other medications prescribed for you. Read the directions carefully, and ask your doctor or other care provider to review them with you.

## 2017-06-27 ENCOUNTER — ALLIED HEALTH/NURSE VISIT (OUTPATIENT)
Dept: FAMILY MEDICINE | Facility: CLINIC | Age: 82
End: 2017-06-27
Payer: COMMERCIAL

## 2017-06-27 ENCOUNTER — TELEPHONE (OUTPATIENT)
Dept: FAMILY MEDICINE | Facility: CLINIC | Age: 82
End: 2017-06-27

## 2017-06-27 VITALS — SYSTOLIC BLOOD PRESSURE: 142 MMHG | HEART RATE: 72 BPM | RESPIRATION RATE: 16 BRPM | DIASTOLIC BLOOD PRESSURE: 58 MMHG

## 2017-06-27 DIAGNOSIS — Z01.30 BLOOD PRESSURE CHECK: Primary | ICD-10-CM

## 2017-06-27 PROCEDURE — 99207 ZZC NO CHARGE NURSE ONLY: CPT

## 2017-06-27 NOTE — TELEPHONE ENCOUNTER
Nighat Gupta is a 81 year old female who comes in today for a Blood Pressure check because of ongoing blood pressure monitoring.    Vitals as recorded, a regular cuff was used.    Patient is taking medication as prescribed  Patient is tolerating medications well.  Is taking lisinopril 40 mg one daily and amlodipine 10 mg one daily.  Patient is not monitoring Blood Pressure at home.      Current complaints: none    Disposition: patient to continue with the same medication.  Second BP reading is 142/58 and pulse 72.  She feels great she says.    My plan to her was to come back in a week for BP recheck.  She has only been taking the amlodipine for a week.  Are you OK with this plan or did you want amlodipine adjusted?  I told her I would call her back this afternoon.  Teresa Escobar RN

## 2017-06-27 NOTE — PROGRESS NOTES
Nighat Gupta is a 81 year old female who comes in today for a Blood Pressure check because of ongoing blood pressure monitoring.    Vitals as recorded, a regular cuff was used.    Patient is taking medication as prescribed  Patient is tolerating medications well.  Patient is not monitoring Blood Pressure at home.      Current complaints: none    Disposition: patient to continue with the same medication.  Second BP reading is 142/58 and pulse 72.  She feels great she says.    My plan to her was to come back in a week for BP recheck.  She has only been taking the amlodipine for a week.  Teresa Escobar RN

## 2017-06-27 NOTE — TELEPHONE ENCOUNTER
Great plan. I would be happy with this BP at her age if we can keep it here.   Recheck in one week is perfect

## 2017-06-27 NOTE — MR AVS SNAPSHOT
After Visit Summary   6/27/2017    Nighat Gupta    MRN: [id number]           Patient Information     Date Of Birth          1935        Visit Information        Provider Department      6/27/2017 10:00 AM [place] NB RN [facility]        Today's Diagnoses     Blood pressure check    -  1       Follow-ups after your visit        Your next 10 appointments already scheduled     Jul 05, 2017  1:30 PM CDT   Nurse Only with [place] NB RN   [facility]    [address] [place] [address]   [phone]            Jul 11, 2017  8:00 AM CDT   New Visit with MAX Warner CNP   [facility] ([place] [facility])    [address] [facility]  [place] [id number] [phone]            Jul 17, 2017 10:20 AM CDT   SHORT with Nancy Tapia MD   [facility]    [address] [place] [address]   [phone]              Who to contact     If you have questions or need follow up information about today's clinic visit or your schedule please contact [facility] directly at [phone].  Normal or non-critical lab and imaging results will be communicated to you by Caliopahart, letter or phone within 4 business days after the clinic has received the results. If you do not hear from us within 7 days, please contact the clinic through Caliopahart or phone. If you have a critical or abnormal lab result, we will notify you by phone as soon as possible.  Submit refill requests through RedKix or call your pharmacy and they will forward the refill request to us. Please allow 3 business days for your refill to be completed.          Additional Information About Your Visit        Caliopaharfrestyl Information     RedKix lets you send messages to your doctor, view your test results, renew your prescriptions, schedule appointments and more.  "To sign up, go to [email or web address] . Click on \"Log in\" on the left side of the screen, which will take you to the Welcome page. Then click on \"Sign up Now\" on the right side of the page.     You will be asked to enter the access code listed below, as well as some personal information. Please follow the directions to create your username and password.     Your access code is: [email or web address]  Expires: 2017 11:08 AM     Your access code will  in 90 days. If you need help or a new code, please call your [place] clinic or [phone].        Care EveryWhere ID     This is your Care EveryWhere ID. This could be used by other organizations to access your [place] medical records  [id number]        Your Vitals Were     Pulse Respirations                72 16           Blood Pressure from Last 3 Encounters:   17 142/58   17 170/62   17 156/60    Weight from Last 3 Encounters:   17 113 lb (51.3 kg)   05/15/17 109 lb 9.6 oz (49.7 kg)   17 111 lb 9.6 oz (50.6 kg)              Today, you had the following     No orders found for display       Primary Care Provider Office Phone # Fax #    Nancy Tapia [phone]       [facility] [address] [address]        Equal Access to Services     KAYDEN ALLEN AH: Hadii aad ku hadasho Soomaali, waaxda luqadaha, qaybta kaalmada adeegyada, adriana dotson. So [facility] [phone].    ATENCIÓN: Si habla español, tiene a whipple disposición servicios gratuitos de asistencia lingüística. Farshad al [phone].    We comply with applicable federal civil rights laws and [place] laws. We do not discriminate on the basis of race, color, national origin, age, disability sex, sexual orientation or gender identity.            Thank you!     Thank you for choosing [facility]  for your care. Our goal is always to provide you with excellent care. Hearing back from " our patients is one way we can continue to improve our services. Please take a few minutes to complete the written survey that you may receive in the mail after your visit with us. Thank you!             Your Updated Medication List - Protect others around you: Learn how to safely use, store and throw away your medicines at www.disposemymeds.org.          This list is accurate as of: 6/27/17 10:05 AM.  Always use your most recent med list.                   Brand Name Dispense Instructions for use Diagnosis    amLODIPine 10 MG tablet    NORVASC    30 tablet    Take 1 tablet (10 mg) by mouth daily    Benign essential hypertension       aspirin 81 MG tablet     100    one daily    Essential hypertension, benign, Other disorders of lipoid metabolism       CALCIUM 500 + D 500-200 MG-IU Tabs      1500mg daily        cholecalciferol 400 UNIT Tabs tablet    vitamin D     Take 400 Units by mouth daily.        citalopram 40 MG tablet    celeXA    90 tablet    Take 1 tablet (40 mg) by mouth daily    Major depressive disorder, single episode, mild (H)       gabapentin 300 MG capsule    NEURONTIN    90 capsule    1 tab in the afternoon and 2 tabs at bedtime    Lumbar radiculopathy       HYDROcodone-acetaminophen 5-325 MG per tablet   Start taking on:  7/15/2017    NORCO    40 tablet    Take 1 tablet by mouth 2 times daily PRN severe pain  One month supply    Lumbar radiculopathy       * levothyroxine 75 MCG tablet    SYNTHROID/LEVOTHROID    36 tablet    Take 1 tablet daily on Monday, Wednesday, Friday Needs lab before next refill    Hypothyroidism, unspecified type       * levothyroxine 50 MCG tablet    SYNTHROID/LEVOTHROID    30 tablet    Take 1 tab daily on Tuesday, Thursday, Saturday and Sunday (Needs lab work)    Hypothyroidism, unspecified type       lisinopril 40 MG tablet    PRINIVIL/ZESTRIL    30 tablet    Take 1 tablet (40 mg) by mouth daily Needs blood pressure check    Essential hypertension, benign        MULTIVITAMIN TABS   OR      1 TAB DAILY        OMEGA 3 PO      one tablet daily        ranitidine 75 MG tablet   Generic drug:  ranitidine      1 TABLET 1 TO 2 TIMES A DAY AS NEEDED        simvastatin 20 MG tablet    ZOCOR    30 tablet    Take 1 tablet (20 mg) by mouth At Bedtime    Hyperlipidemia LDL goal <130       * Notice:  This list has 2 medication(s) that are the same as other medications prescribed for you. Read the directions carefully, and ask your doctor or other care provider to review them with you.

## 2017-07-05 ENCOUNTER — ALLIED HEALTH/NURSE VISIT (OUTPATIENT)
Dept: FAMILY MEDICINE | Facility: CLINIC | Age: 82
End: 2017-07-05
Payer: COMMERCIAL

## 2017-07-05 VITALS — DIASTOLIC BLOOD PRESSURE: 60 MMHG | SYSTOLIC BLOOD PRESSURE: 122 MMHG | HEART RATE: 64 BPM

## 2017-07-05 DIAGNOSIS — Z01.30 BP CHECK: Primary | ICD-10-CM

## 2017-07-05 PROCEDURE — 99207 ZZC NO CHARGE NURSE ONLY: CPT

## 2017-07-05 NOTE — MR AVS SNAPSHOT
After Visit Summary   7/5/2017    Nighat Gupta    MRN: [id number]           Patient Information     Date Of Birth          1935        Visit Information        Provider Department      7/5/2017 1:30 PM [place] NB RN [facility]        Today's Diagnoses     BP check    -  1       Follow-ups after your visit        Your next 10 appointments already scheduled     Jul 05, 2017  1:30 PM CDT   Nurse Only with [place] NB RN   [facility] ([facility])    [address] [place] [id number]   [phone]            Jul 11, 2017  8:00 AM CDT   New Visit with MAX Warner CNP   [facility] ([facility])    [address] [facility] [id number] [phone]            Jul 17, 2017 10:20 AM CDT   SHORT with Nancy Tapia MD   [facility] ([facility])    [address] [place] [id number] [phone]              Who to contact     If you have questions or need follow up information about today's clinic visit or your schedule please contact [facility] directly at [phone].  Normal or non-critical lab and imaging results will be communicated to you by Aquaspyhart, letter or phone within 4 business days after the clinic has received the results. If you do not hear from us within 7 days, please contact the clinic through Aquaspyhart or phone. If you have a critical or abnormal lab result, we will notify you by phone as soon as possible.  Submit refill requests through [employer] or call your pharmacy and they will forward the refill request to us. Please allow 3 business days for your refill to be completed.          Additional Information About Your Visit        Aquaspyhart Information     [employer] lets you send messages to your doctor, view your test results, renew your prescriptions, schedule appointments and more. To sign up, go  "to [email or web address] . Click on \"Log in\" on the left side of the screen, which will take you to the Welcome page. Then click on \"Sign up Now\" on the right side of the page.     You will be asked to enter the access code listed below, as well as some personal information. Please follow the directions to create your username and password.     Your access code is: [email or web address]  Expires: 2017 11:08 AM     Your access code will  in 90 days. If you need help or a new code, please call your [facility] clinic or [phone].        Care EveryWhere ID     This is your Care EveryWhere ID. This could be used by other organizations to access your [facility] medical records  [id number]        Your Vitals Were     Pulse                   64            Blood Pressure from Last 3 Encounters:   17 122/60   17 142/58   17 170/62    Weight from Last 3 Encounters:   17 113 lb (51.3 kg)   05/15/17 109 lb 9.6 oz (49.7 kg)   17 111 lb 9.6 oz (50.6 kg)              Today, you had the following     No orders found for display       Primary Care Provider Office Phone # Fax #    Nancy Tapia [phone]       [facility] [address] [facility] [address]        Equal Access to Services     KAYDEN ALLEN : Hadii aad ku hadasho Soomaali, waaxda luqadaha, qaybta kaalmada adeegyada, adriana dotson. So [facility] [phone].    ATENCIÓN: Si habla español, tiene a whipple disposición servicios gratuitos de asistencia lingüística. Llame al [phone].    We comply with applicable federal civil rights laws and [place] laws. We do not discriminate on the basis of race, color, national origin, age, disability sex, sexual orientation or gender identity.            Thank you!     Thank you for choosing [facility]  for your care. Our goal is always to provide you with excellent care. Hearing back from our patients is one way we " can continue to improve our services. Please take a few minutes to complete the written survey that you may receive in the mail after your visit with us. Thank you!             Your Updated Medication List - Protect others around you: Learn how to safely use, store and throw away your medicines at www.disposemymeds.org.          This list is accurate as of: 7/5/17 12:54 PM.  Always use your most recent med list.                   Brand Name Dispense Instructions for use Diagnosis    amLODIPine 10 MG tablet    NORVASC    30 tablet    Take 1 tablet (10 mg) by mouth daily    Benign essential hypertension       aspirin 81 MG tablet     100    one daily    Essential hypertension, benign, Other disorders of lipoid metabolism       CALCIUM 500 + D 500-200 MG-IU Tabs      1500mg daily        cholecalciferol 400 UNIT Tabs tablet    vitamin D     Take 400 Units by mouth daily.        citalopram 40 MG tablet    celeXA    90 tablet    Take 1 tablet (40 mg) by mouth daily    Major depressive disorder, single episode, mild (H)       gabapentin 300 MG capsule    NEURONTIN    90 capsule    1 tab in the afternoon and 2 tabs at bedtime    Lumbar radiculopathy       HYDROcodone-acetaminophen 5-325 MG per tablet   Start taking on:  7/15/2017    NORCO    40 tablet    Take 1 tablet by mouth 2 times daily PRN severe pain  One month supply    Lumbar radiculopathy       * levothyroxine 75 MCG tablet    SYNTHROID/LEVOTHROID    36 tablet    Take 1 tablet daily on Monday, Wednesday, Friday Needs lab before next refill    Hypothyroidism, unspecified type       * levothyroxine 50 MCG tablet    SYNTHROID/LEVOTHROID    30 tablet    Take 1 tab daily on Tuesday, Thursday, Saturday and Sunday (Needs lab work)    Hypothyroidism, unspecified type       lisinopril 40 MG tablet    PRINIVIL/ZESTRIL    30 tablet    Take 1 tablet (40 mg) by mouth daily Needs blood pressure check    Essential hypertension, benign       MULTIVITAMIN TABS   OR      1 TAB DAILY         OMEGA 3 PO      one tablet daily        ranitidine 75 MG tablet   Generic drug:  ranitidine      1 TABLET 1 TO 2 TIMES A DAY AS NEEDED        simvastatin 20 MG tablet    ZOCOR    30 tablet    Take 1 tablet (20 mg) by mouth At Bedtime    Hyperlipidemia LDL goal <130       * Notice:  This list has 2 medication(s) that are the same as other medications prescribed for you. Read the directions carefully, and ask your doctor or other care provider to review them with you.

## 2017-07-05 NOTE — PROGRESS NOTES
Nighat Gupta is a 81 year old female who comes in today for a Blood Pressure check because of ongoing blood pressure monitoring.    Vitals as recorded, a regular cuff was used.    Patient is taking medication as prescribed  Patient is tolerating medications well.  Patient is not monitoring Blood Pressure at home.      Current complaints: none    Disposition: patient to continue with the same medication  Teresa Escobar RN

## 2017-07-11 ENCOUNTER — OFFICE VISIT (OUTPATIENT)
Dept: PALLIATIVE MEDICINE | Facility: CLINIC | Age: 82
End: 2017-07-11
Payer: COMMERCIAL

## 2017-07-11 VITALS
SYSTOLIC BLOOD PRESSURE: 156 MMHG | WEIGHT: 110 LBS | HEART RATE: 70 BPM | DIASTOLIC BLOOD PRESSURE: 68 MMHG | BODY MASS INDEX: 22.41 KG/M2

## 2017-07-11 DIAGNOSIS — M54.50 CHRONIC BILATERAL LOW BACK PAIN WITHOUT SCIATICA: Primary | ICD-10-CM

## 2017-07-11 DIAGNOSIS — M53.3 SI (SACROILIAC) JOINT DYSFUNCTION: ICD-10-CM

## 2017-07-11 DIAGNOSIS — G89.29 CHRONIC BILATERAL LOW BACK PAIN WITHOUT SCIATICA: Primary | ICD-10-CM

## 2017-07-11 DIAGNOSIS — M47.817 LUMBOSACRAL SPONDYLOSIS WITHOUT MYELOPATHY: ICD-10-CM

## 2017-07-11 DIAGNOSIS — M48.062 SPINAL STENOSIS OF LUMBAR REGION WITH NEUROGENIC CLAUDICATION: ICD-10-CM

## 2017-07-11 DIAGNOSIS — M79.18 MYOFASCIAL PAIN: ICD-10-CM

## 2017-07-11 PROCEDURE — 99205 OFFICE O/P NEW HI 60 MIN: CPT | Performed by: NURSE PRACTITIONER

## 2017-07-11 ASSESSMENT — PAIN SCALES - GENERAL: PAINLEVEL: SEVERE PAIN (6)

## 2017-07-11 NOTE — PROGRESS NOTES
"  [facility] Consultation    Date of visit: 7/11/2017    Reason for consultation:    Nighat Gupta is a 81 year old female who is seen in consultation today at the request of her provider, Dr. Nancy Tapia re: patient's chronic low back pain; has had ESIs for years but now they are not working, wondering about ablation procedure.     Primary Care Provider is Regina, Nancy Maya.  Pain medications are being prescribed by Dr. Tapia.    Please see the [facility] health questionnaire which the patient completed and reviewed with me in detail.    Chief Complaint:  Chronic low back pain   Chief Complaint   Patient presents with     Pain       Pain history:  Nighat Gupta is a 81 year old female who first started having problems with pain as follows:    -chronic low back pain  This pain has been present since 201. 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     Pain rating: intensity ranges from 3/10 to 10/10, and Averages 6/10 on a 0-10 scale.    Describes pain as \"aching, sharp and shooting.\"  Pain is constant.    Home self care includes: heating pads, pain medication and nerve medications    Aggravating factors include: riding walking, sitting, bending, kneeling    Relieving factors include: walking short distances with a walker, lying down    Any bowel or bladder incontinence: none    Current pain-related medication treatments include:  -gabapentin 300mg take 300mg in the afternoon and 600mg at bedtime (helpful)  -Norco 5/325mg may take 1 tablet twice daily as needed for pain (helpful, usually takes 1-2 tabs per day)    Other pertinent medications:  -Celexa 40mg QD    Previous medication treatments included:  OPIATES:Norco (helpful)  NSAIDS: ibuprofen (not helpful)  MUSCLE RELAXANTS: none  ANTI-MIGRAINE MEDS: none  ANTI-DEPRESSANTS: Celexa (helpful)  SLEEP AIDS: none  ANTI-CONVULSANTS: gabapentin " (helpful but drowsiness)  TOPICALS: lidocaine patches (none)  Other meds: Tylenol (not helpful)      Other treatments have included:  Nighat NEWELL Alonso has not been seen at a pain clinic in the past.    PT: tried, helpful and also made it worse  Chiropractic: tried, not helpful  Acupuncture: tried and was not helpful  TENs Unit: tried, not helpful    Injections:   Has had multiple lumbar ESIs in the past but these are no longer helpful. None for a couple of years    Past Medical History:  Past Medical History:   Diagnosis Date     DEPRESSION W/ANXIETY 1980's     Essential hypertension, benign      Pure hypercholesterolemia      Unspecified hypothyroidism      Past Surgical History:  Past Surgical History:   Procedure Laterality Date     CATARACT IOL, RT/LT  2009    Cataract IOL RT/LT     INJECT EPIDURAL LUMBAR  4/2/2012    Procedure:INJECT EPIDURAL LUMBAR; MORENA with Jaci--; Surgeon:GENERIC ANESTHESIA PROVIDER; Location:[facility]     INJECT EPIDURAL LUMBAR  6/4/2012    Procedure:INJECT EPIDURAL LUMBAR; MORENA with Jaci--; Surgeon:GENERIC ANESTHESIA PROVIDER; Location:[facility]     INJECT EPIDURAL LUMBAR  6/18/2012    Procedure: INJECT EPIDURAL LUMBAR;  MORENA-Dr. Tapia;  Surgeon: Provider, Generic Anesthesia;  Location: [facility] OR     SURGICAL HISTORY OF -   04/2004    Oopherectomy - (R)     Medications:  Current Outpatient Prescriptions   Medication Sig Dispense Refill     amLODIPine (NORVASC) 10 MG tablet Take 1 tablet (10 mg) by mouth daily 30 tablet 1     citalopram (CELEXA) 40 MG tablet Take 1 tablet (40 mg) by mouth daily 90 tablet 1     gabapentin (NEURONTIN) 300 MG capsule 1 tab in the afternoon and 2 tabs at bedtime 90 capsule 11     lisinopril (PRINIVIL/ZESTRIL) 40 MG tablet Take 1 tablet (40 mg) by mouth daily Needs blood pressure check 30 tablet 11     simvastatin (ZOCOR) 20 MG tablet Take 1 tablet (20 mg) by mouth At Bedtime 30 tablet 11     [START ON 7/15/2017] HYDROcodone-acetaminophen (NORCO) 5-325  MG per tablet Take 1 tablet by mouth 2 times daily PRN severe pain  One month supply 40 tablet 0     levothyroxine (SYNTHROID/LEVOTHROID) 75 MCG tablet Take 1 tablet daily on Monday, Wednesday, Friday  Needs lab before next refill 36 tablet 11     levothyroxine (SYNTHROID/LEVOTHROID) 50 MCG tablet Take 1 tab daily on Tuesday, Thursday, Saturday and Sunday (Needs lab work) 30 tablet 11     cholecalciferol (VITAMIN D) 400 UNIT TABS Take 400 Units by mouth daily.       ZANTAC 75 75 MG OR TABS 1 TABLET 1 TO 2 TIMES A DAY AS NEEDED       CALCIUM 500 + D 500-200 MG-IU OR TABS 1500mg daily       ASPIRIN 81 MG OR TABS one daily 100 0     OMEGA 3 OR one tablet daily       MULTIVITAMIN TABS   OR 1 TAB DAILY  0     Allergies:     Allergies   Allergen Reactions     No Known Drug Allergy      Social History:  Home situation: lives at home alone. .   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  Tobacco use: smoker, about a half a pack per day  Alcohol use: none  Drug use: none  History of chemical dependency treatment: none    Family history:  Family History   Problem Relation Age of Onset     HEART DISEASE Mother      Lipids Mother      Hypertension Mother      Lipids Father      Hypertension Father      Hypertension Sister      Lipids Sister      Depression Daughter      HEART DISEASE Sister      Hypertension Sister      Depression Daughter          Review of Systems:  Skin: negative  Eyes: negative  Ears/Nose/Throat: negative  Respiratory: No shortness of breath, dyspnea on exertion, cough, or hemoptysis  Cardiovascular: HTN   Gastrointestinal: constipation  Genitourinary: urgency and urge incontinence  Musculoskeletal: arthritis and back pain  Neurologic: negative  Psychiatric: well controlled depression  Hematologic/Lymphatic/Immunologic: negative  Endocrine: negative    Physical Exam:  Vitals:    07/11/17 0801   BP: 156/68   Pulse: 70   Weight: 49.9 kg (110 lb)      Exam:  Constitutional: healthy, alert and no distress  Head: normocephalic. Atraumatic.   Eyes: no redness or jaundice noted   ENT: oropharnx normal.  MMM.  Neck supple.    Cardiovascular: RRR no m/g/r   Respiratory: clear to auscultation A/P. Respirations easy and unlabored. Able to speak in full sentences without SOB or cough noted.   Gastrointestinal: soft, non-tender, normoactive bowel sounds   : deferred  Skin: no suspicious lesions or rashes  Psychiatric: mentation appears normal and affect normal/bright    Musculoskeletal exam:  Gait/Station/Posture: normal gait. Able to rise onto toes and heels. Able to perform tandem gait.   Fair posture    Cervical spine:    Flex:  30 degrees   Ext: 20 degrees   Rotation to right: 90 degrees   Rotation to left: 90 degrees       Thoracic spine:  Normal     Lumbar spine:    Flex:  80 degrees   Ext: 15 degrees   Rotation/ext to right: painful    Rotation/ext to left: painful    SI joints: mild tenderness on the right side   Greater trochanters: non-tender    Myofascial tenderness:  Mild lower lumbar paraspinals  Straight leg exam: negative  Sunil's maneuver: negative    Neurologic exam:  CN:  Cranial nerves 2-12 are  Grossly normal  Motor:  5/5 UE and LE strength  Reflexes:     Biceps:     R:  2+/4 L: 2+/4   Brachioradialis   R:  2+/4 L: 2+/4      Patella:  R:  2+/4 L: 2+/4   Achilles:  R:  2+/4 L: 2+/4  Other reflexes:  Toes downgoing   Sensory:  (upper and lower extremities):   Light touch: normal    Vibration: normal    Pin prick: normal    Allodynia: absent    Dysethesia: absent    Hyperalgesia: absent     Diagnostic tests:     MRI LUMBAR SPINE WITHOUT CONTRAST 9/18/2012 1:47 PM     HISTORY: Left low back and leg pain since March 2012.     COMPARISON: MRI on 4/7/2008.     TECHNIQUE: Sagittal T1, T2, and STIR, and transverse proton density  and T2-weighted images were obtained through the lumbar spine.     FINDINGS: Numbering of the levels is again based on the  demonstration  of 5 lumbar type vertebral bodies on prior radiographs. Again seen is  a grade 1 spondylolisthesis at L5-S1 which appears to be  degenerative. There is mild anterolisthesis at L4-5. Minimal  retrolisthesis is again seen at L2-3. There is also a mild right  convexity curvature of the mid lumbar spine. Vertebral body alignment  is otherwise normal. No fracture is seen. No pars interarticularis  defect is demonstrated. No osseous lesion is seen. Degenerative  signal changes are again seen in numerous vertebral body endplates.  No other abnormal marrow signal intensity is identified. The conus  medullaris terminates at the level of the L2 vertebral body. No  intrathecal abnormality is seen. The adjacent soft tissues are  unremarkable.     There has been progression of degenerative changes throughout the  entire spine. Again, no focal disc herniation is seen. The major  findings are as follows:     T12-L1: No stenosis is seen.     L1-L2: There is mild central canal stenosis and bilateral neural  foraminal stenosis.     L2-L3: There is moderate central canal stenosis with severe left and  moderate right neural foraminal stenosis.     L3-L4: There is moderate to severe central canal stenosis with severe  left and mild right neural foraminal stenosis.     L4-L5: There is moderate central canal stenosis with severe left and  mild to moderate right neural foraminal stenosis.     L5-S1: There is mild central canal stenosis with moderate to severe  right and moderate left neural foraminal stenosis.     IMPRESSION: Multilevel degenerative changes throughout the lumbar  spine, progressed since the previous study. There are varying degrees  of stenosis as described above.    Other testing (labs, diagnostics):  6/20/2017  Cr. 0.87  Est. GFR 63      Screening tools:    PHQ-9 score today is 6. Patient denies any suicidal ideation.     DIRE Score for ongoing opioid management is calculated as follows:    Diagnosis =  2-3    Intractability = 2    Risk: Psych = 2  Chem Hlth = 2  Reliability = 3  Social = 3    Efficacy = 2    Total DIRE Score = 16-17 (14 or higher predicts good candidate for ongoing opioid management; 13 or lower predicts poor candidate for opioid management)     Assessment:  1. Chronic axial low back pain  2. Lumbar spondylosis  3. Significant lumbar spinal stenosis with mild neurogenic claudication  4. 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 Myofascial pain  6. Smoker 1/2 ppd, no intent to quit  7. PMHx includes: hypothyroidism, HTN, depression and anxiety  8. PSHx includes: right oophorectomy (2004), LESI in past, last one at least 2 years ago, cataracts        Plan:  Diagnosis reviewed, treatment option addressed, and risk/benefits discussed.  Self-care instructions given.  I am recommending a multidisciplinary treatment plan to help this patient better manage her pain.      1. Physical Therapy: not at this time  2. Clinical Health Psychologist to address issues of relaxation, behavioral change, coping style, and other factors important to improvement: none needed  3. Introductory groups: not ordered  4. Diagnostic Studies: obtain lumbar MRI at [facility], call [phone] to schedule  5. Medication Management:   1. Agree with gabapentin  2. OK with current Norco use, will leave with Dr. Tapia  6. Further procedures recommended: none at present, need to see updated MRI (none since 2012) and will likely recommend LMBBs  7. Patient may be referred to neurosurgery in the future, has severe spinal canal stenosis but not really interested in surgery at this time. Smoker with no intent to quit  8. Acupuncture: none  9. Urine toxicology screen today: none   10. Recommendations/follow-up for PCP:  See above  11. Release of information: none  12. Follow up: 8-10 weeks    Total time spent was 65 minutes, and more than 50% of face to face time was spent in counseling and/or coordination of care regarding principles of  multidisciplinary care, medication management.       Zayra SANTANA RN CNP, FNP  [facility]

## 2017-07-11 NOTE — PATIENT INSTRUCTIONS
PLAN:  1. Obtain lumbar MRI at [facility], call [phone] to schedule  2. Medications:   1. I agree with gabapentin   2. I agree with current Norco dosing, will leave this with Dr. Tapia  3. Procedures: need to see updated MRI. Will likely recommend lumbar medial branch blocks (tests) and proceed to lumbar radiofrequency ablation as indicated.   4. You have known central spinal stenosis. We may decide to have you see neurosurgery in the future just for a consult.  5. Follow-up with me in 8-10 weeks.   6. Given handouts re: medial branch blocks (tests) and radiofrequency nerve ablation (RFA) to review    Nurse Triage line: [phone]   Call this number with any questions or concerns. You may leave a detailed message anytime. Calls are typically returned Monday through Friday between 8 AM and 4:30 PM. We usually get back to you within 2 business days depending on the issue/request.   Medication refills:     For non-narcotic medications, call your pharmacy directly to request a refill. The pharmacy will contact the [facility] for authorization. Please allow 3-4 days for these refills to be processed.     For narcotic refills, call the nurse triage line or send a videof.me message. Please contact us 7-10 days before your refill is due. The message MUST include the name of the specific medication(s) requested and how you would like to receive the prescription(s). The options are as follows:     Pain Clinic staff can mail the prescription to your pharmacy. Please tell us the name of the pharmacy.     You may pick the prescription up at the Pain Clinic (tell us the location) or during a clinic visit with your pain provider     Pain Clinic staff can deliver the prescription to the [facility] pharmacy in the clinic building. Please tell us the location.   Scheduling number: [phone]. Call this number to schedule or change appointments.   We believe regular attendance is key to your success in our program.    Any time you are unable to keep your appointment we ask that you call us at least 24 hours in advance to let us know. This will allow us to offer the appointment time to another patient.

## 2017-07-11 NOTE — NURSING NOTE
"Chief Complaint   Patient presents with     Pain       Initial /68  Pulse 70  Wt 49.9 kg (110 lb)  BMI 22.41 kg/m2 Estimated body mass index is 22.41 kg/(m^2) as calculated from the following:    Height as of 5/15/17: 1.492 m (4' 10.75\").    Weight as of this encounter: 49.9 kg (110 lb).  Medication Reconciliation: complete     Calos Whittington MA      "

## 2017-07-11 NOTE — MR AVS SNAPSHOT
After Visit Summary   7/11/2017    Nighat Gupta    MRN: [id number]           Patient Information     Date Of Birth          1935        Visit Information        Provider Department      7/11/2017 8:00 AM Zayra Chinchilla APRN [facility]        Today's Diagnoses     Chronic bilateral low back pain without sciatica    -  1    Lumbosacral spondylosis without myelopathy        Spinal stenosis of lumbar region with neurogenic claudication        SI (sacroiliac) joint dysfunction        Myofascial pain          Care Instructions    PLAN:  1. Obtain lumbar MRI at [facility], call [phone] to schedule  2. Medications:   1. I agree with gabapentin   2. I agree with current Norco dosing, will leave this with Dr. Tapia  3. Procedures: need to see updated MRI. Will likely recommend lumbar medial branch blocks (tests) and proceed to lumbar radiofrequency ablation as indicated.   4. You have known central spinal stenosis. We may decide to have you see neurosurgery in the future just for a consult.  5. Follow-up with me in 8-10 weeks.   6. Given handouts re: medial branch blocks (tests) and radiofrequency nerve ablation (RFA) to review    Nurse Triage line: [phone]   Call this number with any questions or concerns. You may leave a detailed message anytime. Calls are typically returned Monday through Friday between 8 AM and 4:30 PM. We usually get back to you within 2 business days depending on the issue/request.   Medication refills:     For non-narcotic medications, call your pharmacy directly to request a refill. The pharmacy will contact the [facility] for authorization. Please allow 3-4 days for these refills to be processed.     For narcotic refills, call the nurse triage line or send a [employer] message. Please contact us 7-10 days before your refill is due. The message MUST include the name of the specific medication(s) requested and how you would like to  receive the prescription(s). The options are as follows:     Pain Clinic staff can mail the prescription to your pharmacy. Please tell us the name of the pharmacy.     You may pick the prescription up at the Pain Clinic (tell us the location) or during a clinic visit with your pain provider     Pain Clinic staff can deliver the prescription to the [place] pharmacy in the clinic building. Please tell us the location.   Scheduling number: [phone]. Call this number to schedule or change appointments.   We believe regular attendance is key to your success in our program.   Any time you are unable to keep your appointment we ask that you call us at least 24 hours in advance to let us know. This will allow us to offer the appointment time to another patient.                 Follow-ups after your visit        Your next 10 appointments already scheduled     Jul 17, 2017 10:20 AM CDT   SHORT with Nancy Tapia MD   [facility] ([facility]    [address] [place] [id number]   [phone]              Future tests that were ordered for you today     Open Future Orders        Priority Expected Expires Ordered    MR Lumbar Spine w/o Contrast Routine  7/11/2018 7/11/2017            Who to contact     If you have questions or need follow up information about today's clinic visit or your schedule please contact [facility] directly at [phone].  Normal or non-critical lab and imaging results will be communicated to you by MyChart, letter or phone within 4 business days after the clinic has received the results. If you do not hear from us within 7 days, please contact the clinic through MyChart or phone. If you have a critical or abnormal lab result, we will notify you by phone as soon as possible.  Submit refill requests through [employer] or call your pharmacy and they will forward the refill request to us. Please allow 3 business days for your refill  "to be completed.          Additional Information About Your Visit        MyChart Information     [employer] lets you send messages to your doctor, view your test results, renew your prescriptions, schedule appointments and more. To sign up, go to [email or web address] . Click on \"Log in\" on the left side of the screen, which will take you to the Welcome page. Then click on \"Sign up Now\" on the right side of the page.     You will be asked to enter the access code listed below, as well as some personal information. Please follow the directions to create your username and password.     Your access code is: [email or web address]  Expires: 2017 11:08 AM     Your access code will  in 90 days. If you need help or a new code, please call your [place] clinic or [phone].        Care EveryWhere ID     This is your Care EveryWhere ID. This could be used by other organizations to access your [place] medical records  [id number]        Your Vitals Were     Pulse BMI (Body Mass Index)                70 22.41 kg/m2           Blood Pressure from Last 3 Encounters:   17 156/68   17 122/60   17 142/58    Weight from Last 3 Encounters:   17 49.9 kg (110 lb)   17 51.3 kg (113 lb)   05/15/17 49.7 kg (109 lb 9.6 oz)               Primary Care Provider Office Phone # Fax #    Nancy Tapia [phone]       [address] [address]        Equal Access to Services     [facility]: Hadii aad ku hadasho Sohillary, waaxda luqadaha, qaybta kaalmaadriana gould . So [facility] [phone].    ATENCIÓN: Si habla español, tiene a whipple disposición servicios gratuitos de asistencia lingüística. Llame al [phone].    We comply with applicable federal civil rights laws and [place] laws. We do not discriminate on the basis of race, color, national origin, age, disability sex, sexual orientation or gender " identity.            Thank you!     Thank you for choosing [facility]  for your care. Our goal is always to provide you with excellent care. Hearing back from our patients is one way we can continue to improve our services. Please take a few minutes to complete the written survey that you may receive in the mail after your visit with us. Thank you!             Your Updated Medication List - Protect others around you: Learn how to safely use, store and throw away your medicines at www.disposemymeds.org.          This list is accurate as of: 7/11/17  9:08 AM.  Always use your most recent med list.                   Brand Name Dispense Instructions for use Diagnosis    amLODIPine 10 MG tablet    NORVASC    30 tablet    Take 1 tablet (10 mg) by mouth daily    Benign essential hypertension       aspirin 81 MG tablet     100    one daily    Essential hypertension, benign, Other disorders of lipoid metabolism       CALCIUM 500 + D 500-200 MG-IU Tabs      1500mg daily        cholecalciferol 400 UNIT Tabs tablet    vitamin D     Take 400 Units by mouth daily.        citalopram 40 MG tablet    celeXA    90 tablet    Take 1 tablet (40 mg) by mouth daily    Major depressive disorder, single episode, mild (H)       gabapentin 300 MG capsule    NEURONTIN    90 capsule    1 tab in the afternoon and 2 tabs at bedtime    Lumbar radiculopathy       HYDROcodone-acetaminophen 5-325 MG per tablet   Start taking on:  7/15/2017    NORCO    40 tablet    Take 1 tablet by mouth 2 times daily PRN severe pain  One month supply    Lumbar radiculopathy       * levothyroxine 75 MCG tablet    SYNTHROID/LEVOTHROID    36 tablet    Take 1 tablet daily on Monday, Wednesday, Friday Needs lab before next refill    Hypothyroidism, unspecified type       * levothyroxine 50 MCG tablet    SYNTHROID/LEVOTHROID    30 tablet    Take 1 tab daily on Tuesday, Thursday, Saturday and Sunday (Needs lab work)    Hypothyroidism, unspecified type        lisinopril 40 MG tablet    PRINIVIL/ZESTRIL    30 tablet    Take 1 tablet (40 mg) by mouth daily Needs blood pressure check    Essential hypertension, benign       MULTIVITAMIN TABS   OR      1 TAB DAILY        OMEGA 3 PO      one tablet daily        ranitidine 75 MG tablet   Generic drug:  ranitidine      1 TABLET 1 TO 2 TIMES A DAY AS NEEDED        simvastatin 20 MG tablet    ZOCOR    30 tablet    Take 1 tablet (20 mg) by mouth At Bedtime    Hyperlipidemia LDL goal <130       * Notice:  This list has 2 medication(s) that are the same as other medications prescribed for you. Read the directions carefully, and ask your doctor or other care provider to review them with you.

## 2017-07-17 ENCOUNTER — OFFICE VISIT (OUTPATIENT)
Dept: FAMILY MEDICINE | Facility: CLINIC | Age: 82
End: 2017-07-17
Payer: COMMERCIAL

## 2017-07-17 VITALS
SYSTOLIC BLOOD PRESSURE: 124 MMHG | TEMPERATURE: 97.1 F | WEIGHT: 112.8 LBS | DIASTOLIC BLOOD PRESSURE: 58 MMHG | RESPIRATION RATE: 18 BRPM | BODY MASS INDEX: 22.98 KG/M2 | HEART RATE: 68 BPM

## 2017-07-17 DIAGNOSIS — M54.42 CHRONIC LEFT-SIDED LOW BACK PAIN WITH LEFT-SIDED SCIATICA: Primary | ICD-10-CM

## 2017-07-17 DIAGNOSIS — G89.4 CHRONIC PAIN SYNDROME: ICD-10-CM

## 2017-07-17 DIAGNOSIS — G89.29 CHRONIC LEFT-SIDED LOW BACK PAIN WITH LEFT-SIDED SCIATICA: Primary | ICD-10-CM

## 2017-07-17 PROCEDURE — 99214 OFFICE O/P EST MOD 30 MIN: CPT | Performed by: FAMILY MEDICINE

## 2017-07-17 RX ORDER — HYDROCODONE BITARTRATE AND ACETAMINOPHEN 5; 325 MG/1; MG/1
1 TABLET ORAL
Qty: 40 TABLET | Refills: 0 | Status: SHIPPED | OUTPATIENT
Start: 2017-07-20 | End: 2017-07-17

## 2017-07-17 RX ORDER — HYDROCODONE BITARTRATE AND ACETAMINOPHEN 5; 325 MG/1; MG/1
1 TABLET ORAL
Qty: 40 TABLET | Refills: 0 | Status: SHIPPED | OUTPATIENT
Start: 2017-09-20 | End: 2017-10-16

## 2017-07-17 RX ORDER — HYDROCODONE BITARTRATE AND ACETAMINOPHEN 5; 325 MG/1; MG/1
1 TABLET ORAL
Qty: 40 TABLET | Refills: 0 | Status: SHIPPED | OUTPATIENT
Start: 2017-08-20 | End: 2017-07-17

## 2017-07-17 ASSESSMENT — PAIN SCALES - GENERAL: PAINLEVEL: MODERATE PAIN (4)

## 2017-07-17 ASSESSMENT — PATIENT HEALTH QUESTIONNAIRE - PHQ9: 5. POOR APPETITE OR OVEREATING: NOT AT ALL

## 2017-07-17 ASSESSMENT — ANXIETY QUESTIONNAIRES
6. BECOMING EASILY ANNOYED OR IRRITABLE: NOT AT ALL
3. WORRYING TOO MUCH ABOUT DIFFERENT THINGS: NOT AT ALL
GAD7 TOTAL SCORE: 2
IF YOU CHECKED OFF ANY PROBLEMS ON THIS QUESTIONNAIRE, HOW DIFFICULT HAVE THESE PROBLEMS MADE IT FOR YOU TO DO YOUR WORK, TAKE CARE OF THINGS AT HOME, OR GET ALONG WITH OTHER PEOPLE: SOMEWHAT DIFFICULT
1. FEELING NERVOUS, ANXIOUS, OR ON EDGE: NOT AT ALL
7. FEELING AFRAID AS IF SOMETHING AWFUL MIGHT HAPPEN: MORE THAN HALF THE DAYS
5. BEING SO RESTLESS THAT IT IS HARD TO SIT STILL: NOT AT ALL
2. NOT BEING ABLE TO STOP OR CONTROL WORRYING: NOT AT ALL

## 2017-07-17 NOTE — NURSING NOTE
"Chief Complaint   Patient presents with     Back Pain     chronic low back pain.  Refill Vicodin.       Initial /58 (BP Location: Right arm, Patient Position: Chair, Cuff Size: Adult Regular)  Pulse 68  Temp 97.1  F (36.2  C) (Tympanic)  Resp 18  Wt 112 lb 12.8 oz (51.2 kg)  Breastfeeding? No  BMI 22.98 kg/m2 Estimated body mass index is 22.98 kg/(m^2) as calculated from the following:    Height as of 5/15/17: 4' 10.75\" (1.492 m).    Weight as of this encounter: 112 lb 12.8 oz (51.2 kg).  Medication Reconciliation: complete    Health Maintenance that is potentially due pending provider review:  NONE- does not want TDAP or Prevnar 13    N/a  Teresa Davis CMA      "

## 2017-07-17 NOTE — PATIENT INSTRUCTIONS
Stay on current pain med regimen     Set up MRI scan per pain clinic     Recheck with me in 3 months

## 2017-07-17 NOTE — MR AVS SNAPSHOT
"              After Visit Summary   7/17/2017    Nighat Gupta    MRN: [id number]           Patient Information     Date Of Birth          1935        Visit Information        Provider Department      7/17/2017 10:20 AM Nancy Tapia MD [facility]        Today's Diagnoses     Chronic left-sided low back pain with left-sided sciatica    -  1    Chronic pain syndrome        Lumbar radiculopathy          Care Instructions    Stay on current pain med regimen     Set up MRI scan per pain clinic     Recheck with me in 3 months           Follow-ups after your visit        Your next 10 appointments already scheduled     Sep 26, 2017 10:30 AM CDT   Return Visit with MAX Warner CNP   [facility] ([facility])    [address] [address] [phone]              Who to contact     If you have questions or need follow up information about today's clinic visit or your schedule please contact [facility] directly at [phone].  Normal or non-critical lab and imaging results will be communicated to you by deeplocalhart, letter or phone within 4 business days after the clinic has received the results. If you do not hear from us within 7 days, please contact the clinic through [employer] or phone. If you have a critical or abnormal lab result, we will notify you by phone as soon as possible.  Submit refill requests through [employer] or call your pharmacy and they will forward the refill request to us. Please allow 3 business days for your refill to be completed.          Additional Information About Your Visit        deeplocalhart Information     [employer] lets you send messages to your doctor, view your test results, renew your prescriptions, schedule appointments and more. To sign up, go to [email or web address] . Click on \"Log in\" on the left side of the screen, which will take you to the Welcome page. Then click on " "\"Sign up Now\" on the right side of the page.     You will be asked to enter the access code listed below, as well as some personal information. Please follow the directions to create your username and password.     Your access code is: [email or web address]  Expires: 2017 11:08 AM     Your access code will  in 90 days. If you need help or a new code, please call your [facility] or [phone].        Care EveryWhere ID     This is your Care EveryWhere ID. This could be used by other organizations to access your [place] medical records  [id number]        Your Vitals Were     Pulse Temperature Respirations Breastfeeding? BMI (Body Mass Index)       68 97.1  F (36.2  C) (Tympanic) 18 No 22.98 kg/m2        Blood Pressure from Last 3 Encounters:   17 124/58   17 156/68   17 122/60    Weight from Last 3 Encounters:   17 112 lb 12.8 oz (51.2 kg)   17 110 lb (49.9 kg)   17 113 lb (51.3 kg)              Today, you had the following     No orders found for display         Today's Medication Changes          These changes are accurate as of: 17 11:13 AM.  If you have any questions, ask your nurse or doctor.               Start taking these medicines.        Dose/Directions    HYDROcodone-acetaminophen 5-325 MG per tablet   Commonly known as:  NORCO   Used for:  Lumbar radiculopathy   Started by:  Nancy Tapia MD        Dose:  1 tablet   Start taking on:  2017   Take 1 tablet by mouth 2 times daily PRN severe pain  One month supply   Quantity:  40 tablet   Refills:  0            Where to get your medicines      Some of these will need a paper prescription and others can be bought over the counter.  Ask your nurse if you have questions.     Bring a paper prescription for each of these medications     HYDROcodone-acetaminophen 5-325 MG per tablet                Primary Care Provider Office Phone # Fax #    Nancy Tapia [phone]       " [facility] [address] [facility] [address]        Equal Access to Services     [facility] : Hadii aad ku hadmiriamleticia Soluzmaali, waaxda luqadaha, qaybta kaalmada lorialexanderolive, adriana antoineashfeli dotson. So [facility] [phone].    ATENCIÓN: Si habla español, tiene a whipple disposición servicios gratuitos de asistencia lingüística. Llame al [phone].    We comply with applicable federal civil rights laws and [place] laws. We do not discriminate on the basis of race, color, national origin, age, disability sex, sexual orientation or gender identity.            Thank you!     Thank you for choosing [facility]  for your care. Our goal is always to provide you with excellent care. Hearing back from our patients is one way we can continue to improve our services. Please take a few minutes to complete the written survey that you may receive in the mail after your visit with us. Thank you!             Your Updated Medication List - Protect others around you: Learn how to safely use, store and throw away your medicines at www.disposemymeds.org.          This list is accurate as of: 7/17/17 11:13 AM.  Always use your most recent med list.                   Brand Name Dispense Instructions for use Diagnosis    amLODIPine 10 MG tablet    NORVASC    30 tablet    Take 1 tablet (10 mg) by mouth daily    Benign essential hypertension       aspirin 81 MG tablet     100    one daily    Essential hypertension, benign, Other disorders of lipoid metabolism       CALCIUM 500 + D 500-200 MG-IU Tabs      1500mg daily        cholecalciferol 400 UNIT Tabs tablet    vitamin D     Take 400 Units by mouth daily.        citalopram 40 MG tablet    celeXA    90 tablet    Take 1 tablet (40 mg) by mouth daily    Major depressive disorder, single episode, mild (H)       gabapentin 300 MG capsule    NEURONTIN    90 capsule    1 tab in the afternoon and 2 tabs at bedtime    Lumbar radiculopathy        HYDROcodone-acetaminophen 5-325 MG per tablet   Start taking on:  9/20/2017    NORCO    40 tablet    Take 1 tablet by mouth 2 times daily PRN severe pain  One month supply    Lumbar radiculopathy       * levothyroxine 75 MCG tablet    SYNTHROID/LEVOTHROID    36 tablet    Take 1 tablet daily on Monday, Wednesday, Friday Needs lab before next refill    Hypothyroidism, unspecified type       * levothyroxine 50 MCG tablet    SYNTHROID/LEVOTHROID    30 tablet    Take 1 tab daily on Tuesday, Thursday, Saturday and Sunday (Needs lab work)    Hypothyroidism, unspecified type       lisinopril 40 MG tablet    PRINIVIL/ZESTRIL    30 tablet    Take 1 tablet (40 mg) by mouth daily Needs blood pressure check    Essential hypertension, benign       MULTIVITAMIN TABS   OR      1 TAB DAILY        OMEGA 3 PO      one tablet daily        ranitidine 75 MG tablet   Generic drug:  ranitidine      1 TABLET 1 TO 2 TIMES A DAY AS NEEDED        simvastatin 20 MG tablet    ZOCOR    30 tablet    Take 1 tablet (20 mg) by mouth At Bedtime    Hyperlipidemia LDL goal <130       * Notice:  This list has 2 medication(s) that are the same as other medications prescribed for you. Read the directions carefully, and ask your doctor or other care provider to review them with you.

## 2017-07-17 NOTE — PROGRESS NOTES
SUBJECTIVE:                                                    Nighat Gupta is a 81 year old female who presents to clinic today for the following health issues:      Back Pain Follow Up      Description:   Location of pain:  bilateral  Character of pain: sharp, dull ache, stabbing, gnawing and constant  Pain radiation: radiates into the right buttocks and radiates into the left buttocks  Since last visit, pain is:  unchanged  New numbness or weakness in legs, not attributed to pain:  no     Intensity: Currently 4-5/10, At its worst 9-10/10    History:   Pain interferes with job: Not applicable,   Therapies tried without relief: Physical Therapy  Therapies tried with relief: opioids           Accompanying Signs & Symptoms:  Risk of Fracture:  None  Risk of Cauda Equina:  None  Risk of Infection:  None  Risk of Cancer:  None          Chronic Pain Follow-Up       Type / Location of Pain: back pain   Analgesia/pain control:       Recent changes:  same      Overall control: Tolerable with discomfort  Activity level/function:      Daily activities:  Able to do light housework, cooking    Work:  not applicable  Adverse effects:  No  Adherance    Taking medication as directed?  Yes    Participating in other treatments: yes  Risk Factors:    Sleep:  Fair    Mood/anxiety:  controlled    Recent family or social stressors:  none noted    Other aggravating factors: none  PHQ-9 SCORE 5/12/2016 5/15/2017 7/17/2017   Total Score - - -   Total Score 3 2 9     PRECIOUS-7 SCORE 10/23/2015 5/15/2017 7/17/2017   Total Score - - -   Total Score 0 5 2     Encounter-Level CSA - 12/02/2016:          Controlled Substance Agreement - Scan on 12/8/2016  1:38 PM : CONTROLLED SUBSTANCE AGREEMENT 12/02/2016 (below)            Encounter-Level CSA - 12/02/2016:          Controlled Substance Agreement - Scan on 4/8/2016 10:44 AM : CONTROLLED SUBSTANCE AGREEMENT 04/04/2016 (below)                    Problem list and histories reviewed & adjusted,  as indicated.  Additional history: as documented    Labs reviewed in EPIC    Reviewed and updated as needed this visit by clinical staffTobacco  Allergies  Meds  Problems  Med Hx  Surg Hx  Fam Hx  Soc Hx        Reviewed and updated as needed this visit by Provider  Allergies  Meds  Problems         ROS:  Constitutional, HEENT, cardiovascular, pulmonary, gi and gu systems are negative, except as otherwise noted.      OBJECTIVE:                                                    /58 (BP Location: Right arm, Patient Position: Chair, Cuff Size: Adult Regular)  Pulse 68  Temp 97.1  F (36.2  C) (Tympanic)  Resp 18  Wt 112 lb 12.8 oz (51.2 kg)  Breastfeeding? No  BMI 22.98 kg/m2  Body mass index is 22.98 kg/(m^2).  GENERAL APPEARANCE: healthy, alert and no distress  RESP: lungs clear to auscultation - no rales, rhonchi or wheezes  CV: regular rates and rhythm, normal S1 S2, no S3 or S4 and no murmur, click or rub  ORTHO: Lumber/Thoracic Spine Exam: Tender:  left para lumbar muscles, right para lumbar muscles  Non-tender:    Range of Motion:  intact  Strength:  able to heel walk, able to toe walk  Special tests:  negative straight leg raises    Hip Exam: Hip ROM full      PSYCH: mentation appears normal and affect normal/bright         ASSESSMENT/PLAN:                                                    1. Chronic left-sided low back pain with left-sided sciatica  Pain meds with out change     2. Chronic pain syndrome  Pain meds with out change       Patient Instructions   Stay on current pain med regimen     Set up MRI scan per pain clinic     Recheck with me in 3 months     Risks, benefits, side effects and rationale for treatment plan fully discussed with the patient and understanding expressed.   Nancy Tapia MD  [facility]

## 2017-07-18 ASSESSMENT — ANXIETY QUESTIONNAIRES: GAD7 TOTAL SCORE: 2

## 2017-07-18 ASSESSMENT — PATIENT HEALTH QUESTIONNAIRE - PHQ9: SUM OF ALL RESPONSES TO PHQ QUESTIONS 1-9: 9

## 2017-08-02 ENCOUNTER — HOSPITAL ENCOUNTER (OUTPATIENT)
Dept: MRI IMAGING | Facility: CLINIC | Age: 82
Discharge: HOME | End: 2017-08-02
Attending: NURSE PRACTITIONER | Admitting: NURSE PRACTITIONER
Payer: COMMERCIAL

## 2017-08-02 DIAGNOSIS — M48.062 SPINAL STENOSIS OF LUMBAR REGION WITH NEUROGENIC CLAUDICATION: ICD-10-CM

## 2017-08-02 DIAGNOSIS — M47.817 LUMBOSACRAL SPONDYLOSIS WITHOUT MYELOPATHY: ICD-10-CM

## 2017-08-02 DIAGNOSIS — G89.29 CHRONIC BILATERAL LOW BACK PAIN WITHOUT SCIATICA: ICD-10-CM

## 2017-08-02 DIAGNOSIS — M54.50 CHRONIC BILATERAL LOW BACK PAIN WITHOUT SCIATICA: ICD-10-CM

## 2017-08-02 PROCEDURE — 72148 MRI LUMBAR SPINE W/O DYE: CPT

## 2017-10-16 ENCOUNTER — OFFICE VISIT (OUTPATIENT)
Dept: FAMILY MEDICINE | Facility: CLINIC | Age: 82
End: 2017-10-16
Payer: COMMERCIAL

## 2017-10-16 ENCOUNTER — TELEPHONE (OUTPATIENT)
Dept: FAMILY MEDICINE | Facility: CLINIC | Age: 82
End: 2017-10-16

## 2017-10-16 VITALS
TEMPERATURE: 97.4 F | HEIGHT: 59 IN | DIASTOLIC BLOOD PRESSURE: 60 MMHG | BODY MASS INDEX: 22.78 KG/M2 | SYSTOLIC BLOOD PRESSURE: 142 MMHG | WEIGHT: 113 LBS | HEART RATE: 76 BPM

## 2017-10-16 DIAGNOSIS — G89.29 CHRONIC LEFT-SIDED LOW BACK PAIN WITH LEFT-SIDED SCIATICA: ICD-10-CM

## 2017-10-16 DIAGNOSIS — F32.0 MILD MAJOR DEPRESSION (H): ICD-10-CM

## 2017-10-16 DIAGNOSIS — G89.4 CHRONIC PAIN SYNDROME: Primary | ICD-10-CM

## 2017-10-16 DIAGNOSIS — M54.16 LUMBAR RADICULOPATHY: ICD-10-CM

## 2017-10-16 DIAGNOSIS — M54.42 CHRONIC LEFT-SIDED LOW BACK PAIN WITH LEFT-SIDED SCIATICA: ICD-10-CM

## 2017-10-16 PROCEDURE — 99214 OFFICE O/P EST MOD 30 MIN: CPT | Performed by: FAMILY MEDICINE

## 2017-10-16 RX ORDER — HYDROCODONE BITARTRATE AND ACETAMINOPHEN 5; 325 MG/1; MG/1
1 TABLET ORAL
Qty: 40 TABLET | Refills: 0 | Status: SHIPPED | OUTPATIENT
Start: 2017-10-16 | End: 2017-10-16

## 2017-10-16 RX ORDER — HYDROCODONE BITARTRATE AND ACETAMINOPHEN 5; 325 MG/1; MG/1
1 TABLET ORAL
Qty: 40 TABLET | Refills: 0 | Status: SHIPPED | OUTPATIENT
Start: 2017-12-16 | End: 2017-12-15

## 2017-10-16 RX ORDER — GABAPENTIN 300 MG/1
CAPSULE ORAL
Qty: 120 CAPSULE | Refills: 11 | Status: SHIPPED | OUTPATIENT
Start: 2017-10-16 | End: 2018-05-04

## 2017-10-16 RX ORDER — HYDROCODONE BITARTRATE AND ACETAMINOPHEN 5; 325 MG/1; MG/1
1 TABLET ORAL
Qty: 40 TABLET | Refills: 0 | Status: SHIPPED | OUTPATIENT
Start: 2017-11-16 | End: 2017-10-16

## 2017-10-16 NOTE — PROGRESS NOTES
SUBJECTIVE:   Nighat Gupta is a 82 year old female who presents to clinic today for the following health issues:    Chronic Pain Follow-Up       Type / Location of Pain: low back  Analgesia/pain control:       Recent changes:  same      Overall control: Comfortably manageable  Activity level/function:      Daily activities:  Able to do all daily activities    Work:  not applicable  Adverse effects:  No  Adherance    Taking medication as directed?  Yes    Participating in other treatments: yes- saw a pain clinic in the [facility] last month  Risk Factors:    Sleep:  Good    Mood/anxiety:  controlled    Recent family or social stressors:  none noted    Other aggravating factors: none  PHQ-9 SCORE 5/12/2016 5/15/2017 7/17/2017   Total Score - - -   Total Score 3 2 9     PRECIOUS-7 SCORE 10/23/2015 5/15/2017 7/17/2017   Total Score - - -   Total Score 0 5 2     Encounter-Level CSA - 12/02/2016:          Controlled Substance Agreement - Scan on 12/8/2016  1:38 PM : CONTROLLED SUBSTANCE AGREEMENT 12/02/2016 (below)            Encounter-Level CSA - 12/02/2016:          Controlled Substance Agreement - Scan on 4/8/2016 10:44 AM : CONTROLLED SUBSTANCE AGREEMENT 04/04/2016 (below)                  Amount of exercise or physical activity: just housework, walking once a day    Problems taking medications regularly: No    Medication side effects: none  Diet: regular (no restrictions)      Depression Followup    Status since last visit: Stable     See PHQ-9 for current symptoms.  Other associated symptoms: None    Complicating factors:   Significant life event:  No   Current substance abuse:  None  Anxiety or Panic symptoms:  No    PHQ-9 Score and MyChart F/U Questions 5/15/2017 7/11/2017 7/17/2017   Total Score 2 - 9   Q9: Suicide Ideation Not at all Not at all Not at all     In the past two weeks have you had thoughts of suicide or self-harm?  No.    Do you have concerns about your personal safety or the safety of others?    "No    PHQ-9  English  PHQ-9   Any Language  Suicide Assessment Five-step Evaluation and Treatment (SAFE-T)        Problem list and histories reviewed & adjusted, as indicated.  Additional history: as documented    Labs reviewed in EPIC    Reviewed and updated as needed this visit by clinical staffTobacco  Allergies  Meds  Problems  Med Hx  Surg Hx  Fam Hx  Soc Hx        Reviewed and updated as needed this visit by Provider  Allergies  Meds  Problems         ROS:  Constitutional, HEENT, cardiovascular, pulmonary, gi and gu systems are negative, except as otherwise noted.      OBJECTIVE:                                                    /60 (BP Location: Right arm, Cuff Size: Adult Regular)  Pulse 76  Temp 97.4  F (36.3  C) (Tympanic)  Ht 4' 10.75\" (1.492 m)  Wt 113 lb (51.3 kg)  BMI 23.02 kg/m2  Body mass index is 23.02 kg/(m^2).  GENERAL APPEARANCE: healthy, alert and no distress  RESP: lungs clear to auscultation - no rales, rhonchi or wheezes  CV: regular rates and rhythm, normal S1 S2, no S3 or S4 and no murmur, click or rub  PSYCH: mentation appears normal and affect normal/bright         ASSESSMENT/PLAN:                                                    1. Chronic pain syndrome  Stable no change in treatment plan.   - HYDROcodone-acetaminophen (NORCO) 5-325 MG per tablet; Take 1 tablet by mouth 2 times daily PRN severe pain  One month supply  Dispense: 40 tablet; Refill: 0    2. Chronic left-sided low back pain with left-sided sciatica  Stable no change in treatment plan.   - HYDROcodone-acetaminophen (NORCO) 5-325 MG per tablet; Take 1 tablet by mouth 2 times daily PRN severe pain  One month supply  Dispense: 40 tablet; Refill: 0    3. Lumbar radiculopathy  May increase use of gabapentin if needed   - gabapentin (NEURONTIN) 300 MG capsule; 2 tab in the afternoon and 2 tabs at bedtime  Dispense: 120 capsule; Refill: 11    4. Mild major depression (H)  Stable no change in treatment plan. "       Recheck with me in 3 months and sooner if worse.     Risks, benefits, side effects and rationale for treatment plan fully discussed with the patient and understanding expressed.     Nancy Tapia MD  [facility]

## 2017-10-16 NOTE — NURSING NOTE
"Chief Complaint   Patient presents with     Pain Management       Initial /60 (BP Location: Right arm, Cuff Size: Adult Regular)  Pulse 76  Temp 97.4  F (36.3  C) (Tympanic)  Ht 4' 10.75\" (1.492 m)  Wt 113 lb (51.3 kg)  BMI 23.02 kg/m2 Estimated body mass index is 23.02 kg/(m^2) as calculated from the following:    Height as of this encounter: 4' 10.75\" (1.492 m).    Weight as of this encounter: 113 lb (51.3 kg).  Medication Reconciliation: complete    Health Maintenance that is potentially due pending provider review:  NONE    Pt declines to have flu and pneumonia vaccines.    Is there anyone who you would like to be able to receive your results? No  If yes have patient fill out ABHISHEK    Julisa Clark MA     "

## 2017-10-16 NOTE — MR AVS SNAPSHOT
"              After Visit Summary   10/16/2017    Nighat Gupta    MRN: [id number]           Patient Information     Date Of Birth          1935        Visit Information        Provider Department      10/16/2017 11:00 AM Nancy Tapia MD [facility]        Today's Diagnoses     Chronic pain syndrome    -  1    Chronic left-sided low back pain with left-sided sciatica        Lumbar radiculopathy        Mild major depression (H)           Follow-ups after your visit        Who to contact     If you have questions or need follow up information about today's clinic visit or your schedule please contact [facility] directly at [phone].  Normal or non-critical lab and imaging results will be communicated to you by MyChart, letter or phone within 4 business days after the clinic has received the results. If you do not hear from us within 7 days, please contact the clinic through MyChart or phone. If you have a critical or abnormal lab result, we will notify you by phone as soon as possible.  Submit refill requests through [employer] or call your pharmacy and they will forward the refill request to us. Please allow 3 business days for your refill to be completed.          Additional Information About Your Visit        MyChart Information     [employer] lets you send messages to your doctor, view your test results, renew your prescriptions, schedule appointments and more. To sign up, go to [email or web address] . Click on \"Log in\" on the left side of the screen, which will take you to the Welcome page. Then click on \"Sign up Now\" on the right side of the page.     You will be asked to enter the access code listed below, as well as some personal information. Please follow the directions to create your username and password.     Your access code is: [email or web address]  Expires: 2017 11:07 AM     Your access code will  in 90 days. If you need help or a new code, " "please call your [facility] clinic or [phone].        Care EveryWhere ID     This is your Care EveryWhere ID. This could be used by other organizations to access your [facility] medical records  [id number]        Your Vitals Were     Pulse Temperature Height BMI (Body Mass Index)          76 97.4  F (36.3  C) (Tympanic) 4' 10.75\" (1.492 m) 23.02 kg/m2         Blood Pressure from Last 3 Encounters:   10/16/17 142/60   07/17/17 124/58   07/11/17 156/68    Weight from Last 3 Encounters:   10/16/17 113 lb (51.3 kg)   07/17/17 112 lb 12.8 oz (51.2 kg)   07/11/17 110 lb (49.9 kg)              We Performed the Following     PAF COMPLETED          Today's Medication Changes          These changes are accurate as of: 10/16/17 11:59 PM.  If you have any questions, ask your nurse or doctor.               Start taking these medicines.        Dose/Directions    HYDROcodone-acetaminophen 5-325 MG per tablet   Commonly known as:  NORCO   Used for:  Chronic left-sided low back pain with left-sided sciatica, Chronic pain syndrome   Started by:  Nancy Tapia MD        Dose:  1 tablet   Start taking on:  12/16/2017   Take 1 tablet by mouth 2 times daily PRN severe pain  One month supply   Quantity:  40 tablet   Refills:  0         These medicines have changed or have updated prescriptions.        Dose/Directions    gabapentin 300 MG capsule   Commonly known as:  NEURONTIN   This may have changed:  additional instructions   Used for:  Lumbar radiculopathy   Changed by:  Nancy Tapia MD        2 tab in the afternoon and 2 tabs at bedtime   Quantity:  120 capsule   Refills:  11            Where to get your medicines      These medications were sent to [id number] [address]  [address] [facility] [address]    Hours:  Closed 10-16-08 business to [facility] Phone:  [phone]     gabapentin 300 MG capsule         Some of these will need a paper prescription and " others can be bought over the counter.  Ask your nurse if you have questions.     Bring a paper prescription for each of these medications     HYDROcodone-acetaminophen 5-325 MG per tablet                Primary Care Provider Office Phone # Fax #    Nancy Tapia [phone] [address] [place] [address]        Equal Access to Services     KAYDEN ALLEN : Hadii aad ku hadasho Soomaali, waaxda luqadaha, qaybta kaalmada adeegyada, waxkevin ortega hayaan lori antoineashfeli santana . So [facility] [phone].    ATENCIÓN: Si habla español, tiene a whipple disposición servicios gratuitos de asistencia lingüística. Llame al [phone].    We comply with applicable federal civil rights laws and [place] laws. We do not discriminate on the basis of race, color, national origin, age, disability, sex, sexual orientation, or gender identity.            Thank you!     Thank you for choosing [facility]  for your care. Our goal is always to provide you with excellent care. Hearing back from our patients is one way we can continue to improve our services. Please take a few minutes to complete the written survey that you may receive in the mail after your visit with us. Thank you!             Your Updated Medication List - Protect others around you: Learn how to safely use, store and throw away your medicines at www.disposemymeds.org.          This list is accurate as of: 10/16/17 11:59 PM.  Always use your most recent med list.                   Brand Name Dispense Instructions for use Diagnosis    amLODIPine 10 MG tablet    NORVASC    90 tablet    TAKE ONE TABLET BY MOUTH ONE TIME DAILY    Benign essential hypertension       aspirin 81 MG tablet     100    one daily    Essential hypertension, benign, Other disorders of lipoid metabolism       CALCIUM 500 + D 500-200 MG-IU Tabs      1500mg daily        cholecalciferol 400 UNIT Tabs tablet    vitamin D     Take 400 Units by mouth daily.         citalopram 40 MG tablet    celeXA    90 tablet    Take 1 tablet (40 mg) by mouth daily    Major depressive disorder, single episode, mild (H)       gabapentin 300 MG capsule    NEURONTIN    120 capsule    2 tab in the afternoon and 2 tabs at bedtime    Lumbar radiculopathy       HYDROcodone-acetaminophen 5-325 MG per tablet   Start taking on:  12/16/2017    NORCO    40 tablet    Take 1 tablet by mouth 2 times daily PRN severe pain  One month supply    Chronic left-sided low back pain with left-sided sciatica, Chronic pain syndrome       * levothyroxine 75 MCG tablet    SYNTHROID/LEVOTHROID    36 tablet    Take 1 tablet daily on Monday, Wednesday, Friday Needs lab before next refill    Hypothyroidism, unspecified type       * levothyroxine 50 MCG tablet    SYNTHROID/LEVOTHROID    30 tablet    Take 1 tab daily on Tuesday, Thursday, Saturday and Sunday (Needs lab work)    Hypothyroidism, unspecified type       lisinopril 40 MG tablet    PRINIVIL/ZESTRIL    30 tablet    Take 1 tablet (40 mg) by mouth daily Needs blood pressure check    Essential hypertension, benign       MULTIVITAMIN TABS   OR      1 TAB DAILY        OMEGA 3 PO      one tablet daily        ranitidine 75 MG tablet   Generic drug:  ranitidine      1 TABLET 1 TO 2 TIMES A DAY AS NEEDED        simvastatin 20 MG tablet    ZOCOR    30 tablet    Take 1 tablet (20 mg) by mouth At Bedtime    Hyperlipidemia LDL goal <130       * Notice:  This list has 2 medication(s) that are the same as other medications prescribed for you. Read the directions carefully, and ask your doctor or other care provider to review them with you.

## 2017-12-15 ENCOUNTER — OFFICE VISIT (OUTPATIENT)
Dept: FAMILY MEDICINE | Facility: CLINIC | Age: 82
End: 2017-12-15
Payer: COMMERCIAL

## 2017-12-15 VITALS
TEMPERATURE: 97.6 F | DIASTOLIC BLOOD PRESSURE: 54 MMHG | HEIGHT: 59 IN | OXYGEN SATURATION: 98 % | BODY MASS INDEX: 23.47 KG/M2 | WEIGHT: 116.4 LBS | SYSTOLIC BLOOD PRESSURE: 140 MMHG | HEART RATE: 64 BPM

## 2017-12-15 DIAGNOSIS — I10 BENIGN ESSENTIAL HYPERTENSION: ICD-10-CM

## 2017-12-15 DIAGNOSIS — G89.4 CHRONIC PAIN SYNDROME: ICD-10-CM

## 2017-12-15 DIAGNOSIS — E03.9 HYPOTHYROIDISM, UNSPECIFIED TYPE: ICD-10-CM

## 2017-12-15 DIAGNOSIS — M54.42 CHRONIC LEFT-SIDED LOW BACK PAIN WITH LEFT-SIDED SCIATICA: Primary | ICD-10-CM

## 2017-12-15 DIAGNOSIS — G89.29 CHRONIC LEFT-SIDED LOW BACK PAIN WITH LEFT-SIDED SCIATICA: Primary | ICD-10-CM

## 2017-12-15 LAB
BASOPHILS # BLD AUTO: 0 10E9/L (ref 0–0.2)
BASOPHILS NFR BLD AUTO: 0.5 %
DIFFERENTIAL METHOD BLD: ABNORMAL
EOSINOPHIL # BLD AUTO: 0.2 10E9/L (ref 0–0.7)
EOSINOPHIL NFR BLD AUTO: 1.7 %
ERYTHROCYTE [DISTWIDTH] IN BLOOD BY AUTOMATED COUNT: 14.7 % (ref 10–15)
HCT VFR BLD AUTO: 33.1 % (ref 35–47)
HGB BLD-MCNC: 11 G/DL (ref 11.7–15.7)
LYMPHOCYTES # BLD AUTO: 1.7 10E9/L (ref 0.8–5.3)
LYMPHOCYTES NFR BLD AUTO: 19.1 %
MCH RBC QN AUTO: 29.2 PG (ref 26.5–33)
MCHC RBC AUTO-ENTMCNC: 33.2 G/DL (ref 31.5–36.5)
MCV RBC AUTO: 88 FL (ref 78–100)
MONOCYTES # BLD AUTO: 0.9 10E9/L (ref 0–1.3)
MONOCYTES NFR BLD AUTO: 9.7 %
NEUTROPHILS # BLD AUTO: 6.1 10E9/L (ref 1.6–8.3)
NEUTROPHILS NFR BLD AUTO: 69 %
PLATELET # BLD AUTO: 298 10E9/L (ref 150–450)
RBC # BLD AUTO: 3.77 10E12/L (ref 3.8–5.2)
WBC # BLD AUTO: 8.8 10E9/L (ref 4–11)

## 2017-12-15 PROCEDURE — 99214 OFFICE O/P EST MOD 30 MIN: CPT | Performed by: FAMILY MEDICINE

## 2017-12-15 PROCEDURE — 80061 LIPID PANEL: CPT | Performed by: FAMILY MEDICINE

## 2017-12-15 PROCEDURE — 80048 BASIC METABOLIC PNL TOTAL CA: CPT | Performed by: FAMILY MEDICINE

## 2017-12-15 PROCEDURE — 36415 COLL VENOUS BLD VENIPUNCTURE: CPT | Performed by: FAMILY MEDICINE

## 2017-12-15 PROCEDURE — 85025 COMPLETE CBC W/AUTO DIFF WBC: CPT | Performed by: FAMILY MEDICINE

## 2017-12-15 PROCEDURE — 80076 HEPATIC FUNCTION PANEL: CPT | Performed by: FAMILY MEDICINE

## 2017-12-15 PROCEDURE — 84443 ASSAY THYROID STIM HORMONE: CPT | Performed by: FAMILY MEDICINE

## 2017-12-15 RX ORDER — HYDROCODONE BITARTRATE AND ACETAMINOPHEN 5; 325 MG/1; MG/1
1 TABLET ORAL
Qty: 40 TABLET | Refills: 0 | Status: SHIPPED | OUTPATIENT
Start: 2018-02-16 | End: 2017-12-15

## 2017-12-15 RX ORDER — HYDROCODONE BITARTRATE AND ACETAMINOPHEN 5; 325 MG/1; MG/1
1 TABLET ORAL
Qty: 40 TABLET | Refills: 0 | Status: SHIPPED | OUTPATIENT
Start: 2018-03-16 | End: 2018-05-04

## 2017-12-15 RX ORDER — HYDROCODONE BITARTRATE AND ACETAMINOPHEN 5; 325 MG/1; MG/1
1 TABLET ORAL
Qty: 40 TABLET | Refills: 0 | Status: SHIPPED | OUTPATIENT
Start: 2018-01-16 | End: 2017-12-15

## 2017-12-15 ASSESSMENT — ANXIETY QUESTIONNAIRES
GAD7 TOTAL SCORE: 2
3. WORRYING TOO MUCH ABOUT DIFFERENT THINGS: MORE THAN HALF THE DAYS
5. BEING SO RESTLESS THAT IT IS HARD TO SIT STILL: NOT AT ALL
7. FEELING AFRAID AS IF SOMETHING AWFUL MIGHT HAPPEN: NOT AT ALL
GAD7 TOTAL SCORE: 2
2. NOT BEING ABLE TO STOP OR CONTROL WORRYING: NOT AT ALL
6. BECOMING EASILY ANNOYED OR IRRITABLE: NOT AT ALL
1. FEELING NERVOUS, ANXIOUS, OR ON EDGE: NOT AT ALL
7. FEELING AFRAID AS IF SOMETHING AWFUL MIGHT HAPPEN: NOT AT ALL
4. TROUBLE RELAXING: NOT AT ALL
GAD7 TOTAL SCORE: 2

## 2017-12-15 ASSESSMENT — PATIENT HEALTH QUESTIONNAIRE - PHQ9
10. IF YOU CHECKED OFF ANY PROBLEMS, HOW DIFFICULT HAVE THESE PROBLEMS MADE IT FOR YOU TO DO YOUR WORK, TAKE CARE OF THINGS AT HOME, OR GET ALONG WITH OTHER PEOPLE: NOT DIFFICULT AT ALL
SUM OF ALL RESPONSES TO PHQ QUESTIONS 1-9: 7
SUM OF ALL RESPONSES TO PHQ QUESTIONS 1-9: 7

## 2017-12-15 NOTE — PROGRESS NOTES
SUBJECTIVE:   Nighat Gupta is a 82 year old female who presents to clinic today for the following health issues:      Chronic Pain Follow-Up       Type / Location of Pain: low back   Analgesia/pain control:       Recent changes:  same      Overall control: Comfortably manageable  Activity level/function:      Daily activities:  Able to do all daily activities    Work:  not applicable  Adverse effects:  No  Adherance    Taking medication as directed?  Yes    Participating in other treatments: no  Risk Factors:    Sleep:  Good    Mood/anxiety:  controlled    Recent family or social stressors:  none noted    Other aggravating factors: none  PHQ-9 SCORE 5/15/2017 7/17/2017 12/15/2017   Total Score - - -   Total Score MyChart - - 7 (Mild depression)   Total Score 2 9 7     PRECIOUS-7 SCORE 5/15/2017 7/17/2017 12/15/2017   Total Score - - -   Total Score - - 2 (minimal anxiety)   Total Score 5 2 2     Encounter-Level CSA - 12/02/2016:          Controlled Substance Agreement - Scan on 12/8/2016  1:38 PM : CONTROLLED SUBSTANCE AGREEMENT 12/02/2016 (below)            Encounter-Level CSA - 12/02/2016:          Controlled Substance Agreement - Scan on 4/8/2016 10:44 AM : CONTROLLED SUBSTANCE AGREEMENT 04/04/2016 (below)             Answers for HPI/ROS submitted by the patient on 12/15/2017   If you checked off any problems, how difficult have these problems made it for you to do your work, take care of things at home, or get along with other people?: Not difficult at all  PHQ9 TOTAL SCORE: 7  PRECIOUS 7 TOTAL SCORE: 2      Amount of exercise or physical activity: 2-3 days/week for an average of 30-45 minutes    Problems taking medications regularly: No    Medication side effects: none    Diet: regular (no restrictions)      Problem list and histories reviewed & adjusted, as indicated.  Additional history: as documented    Labs reviewed in EPIC    Reviewed and updated as needed this visit by clinical staffTobacco  Allergies   "Meds  Problems  Med Hx  Surg Hx  Fam Hx  Soc Hx        Reviewed and updated as needed this visit by Provider  Allergies  Meds  Problems         ROS:  Constitutional, HEENT, cardiovascular, pulmonary, gi and gu systems are negative, except as otherwise noted.      OBJECTIVE:                                                    /54  Pulse 64  Temp 97.6  F (36.4  C) (Tympanic)  Ht 4' 10.75\" (1.492 m)  Wt 116 lb 6.4 oz (52.8 kg)  SpO2 98%  BMI 23.71 kg/m2  Body mass index is 23.71 kg/(m^2).  GENERAL APPEARANCE: healthy, alert and no distress  RESP: lungs clear to auscultation - no rales, rhonchi or wheezes  CV: regular rates and rhythm, normal S1 S2, no S3 or S4 and no murmur, click or rub  MS: extremities normal- no gross deformities noted  PSYCH: mentation appears normal and affect normal/bright         ASSESSMENT/PLAN:                                                    1. Chronic left-sided low back pain with left-sided sciatica  Stable no change in treatment plan.   - HYDROcodone-acetaminophen (NORCO) 5-325 MG per tablet; Take 1 tablet by mouth 2 times daily PRN severe pain  One month supply  Dispense: 40 tablet; Refill: 0    2. Chronic pain syndrome  Stable no change in treatment plan.   - HYDROcodone-acetaminophen (NORCO) 5-325 MG per tablet; Take 1 tablet by mouth 2 times daily PRN severe pain  One month supply  Dispense: 40 tablet; Refill: 0    3. Hypothyroidism, unspecified type  Adjust meds as indicated by above labs.   - TSH with free T4 reflex    4. Benign essential hypertension  Stable no change in treatment plan.   - Hepatic panel  - Basic metabolic panel  - CBC with platelets differential  - Lipid panel reflex to direct LDL Fasting      Recheck with me in 3 months     No med changes     Risks, benefits, side effects and rationale for treatment plan fully discussed with the patient and understanding expressed.     Nancy Tapia MD  [facility]  "

## 2017-12-15 NOTE — LETTER
December 18, 2017      Nighat OSIRIS Gupta  [address] [place] [id number]        Dear ,    We are writing to inform you of your test results.    Your results are all stable. No change in medications.    Resulted Orders   Hepatic panel   Result Value Ref Range    Bilirubin Direct <0.1 0.0 - 0.2 mg/dL    Bilirubin Total 0.3 0.2 - 1.3 mg/dL    Albumin 3.8 3.4 - 5.0 g/dL    Protein Total 7.1 6.8 - 8.8 g/dL    Alkaline Phosphatase 63 40 - 150 U/L    ALT 17 0 - 50 U/L    AST 22 0 - 45 U/L   Basic metabolic panel   Result Value Ref Range    Sodium 133 133 - 144 mmol/L    Potassium 4.2 3.4 - 5.3 mmol/L    Chloride 100 94 - 109 mmol/L    Carbon Dioxide 28 20 - 32 mmol/L    Anion Gap 5 3 - 14 mmol/L    Glucose 77 70 - 99 mg/dL      Comment:      Fasting specimen    Urea Nitrogen 10 7 - 30 mg/dL    Creatinine 0.90 0.52 - 1.04 mg/dL    GFR Estimate 60 (L) >60 mL/min/1.7m2      Comment:      Non  GFR Calc    GFR Estimate If Black 73 >60 mL/min/1.7m2      Comment:       GFR Calc    Calcium 8.7 8.5 - 10.1 mg/dL   CBC with platelets differential   Result Value Ref Range    WBC 8.8 4.0 - 11.0 10e9/L    RBC Count 3.77 (L) 3.8 - 5.2 10e12/L    Hemoglobin 11.0 (L) 11.7 - 15.7 g/dL    Hematocrit 33.1 (L) 35.0 - 47.0 %    MCV 88 78 - 100 fl    MCH 29.2 26.5 - 33.0 pg    MCHC 33.2 31.5 - 36.5 g/dL    RDW 14.7 10.0 - 15.0 %    Platelet Count 298 150 - 450 10e9/L    Diff Method Automated Method     % Neutrophils 69.0 %    % Lymphocytes 19.1 %    % Monocytes 9.7 %    % Eosinophils 1.7 %    % Basophils 0.5 %    Absolute Neutrophil 6.1 1.6 - 8.3 10e9/L    Absolute Lymphocytes 1.7 0.8 - 5.3 10e9/L    Absolute Monocytes 0.9 0.0 - 1.3 10e9/L    Absolute Eosinophils 0.2 0.0 - 0.7 10e9/L    Absolute Basophils 0.0 0.0 - 0.2 10e9/L   TSH with free T4 reflex   Result Value Ref Range    TSH 0.62 0.40 - 4.00 mU/L   Lipid panel reflex to direct LDL Fasting   Result Value Ref Range    Cholesterol  203 (H) <200 mg/dL      Comment:      Desirable:       <200 mg/dl    Triglycerides 69 <150 mg/dL      Comment:      Fasting specimen    HDL Cholesterol 105 >49 mg/dL    LDL Cholesterol Calculated 84 <100 mg/dL      Comment:      Desirable:       <100 mg/dl    Non HDL Cholesterol 98 <130 mg/dL       If you have any questions or concerns, please call the clinic at the number listed above.       Sincerely,        Nancy Tapia MD/vasiliy

## 2017-12-15 NOTE — MR AVS SNAPSHOT
"              After Visit Summary   12/15/2017    Nighat Gupta    MRN: [id number]           Patient Information     Date Of Birth          1935        Visit Information        Provider Department      12/15/2017 11:20 AM Nancy Tapia MD [facility]        Today's Diagnoses     Chronic left-sided low back pain with left-sided sciatica    -  1    Chronic pain syndrome        Hypothyroidism, unspecified type        Benign essential hypertension           Follow-ups after your visit        Who to contact     If you have questions or need follow up information about today's clinic visit or your schedule please contact [facility] directly at [phone].  Normal or non-critical lab and imaging results will be communicated to you by [employer], letter or phone within 4 business days after the clinic has received the results. If you do not hear from us within 7 days, please contact the clinic through [employer] or phone. If you have a critical or abnormal lab result, we will notify you by phone as soon as possible.  Submit refill requests through [employer] or call your pharmacy and they will forward the refill request to us. Please allow 3 business days for your refill to be completed.          Additional Information About Your Visit        MyChart Information     [employer] lets you send messages to your doctor, view your test results, renew your prescriptions, schedule appointments and more. To sign up, go to [email or web address] . Click on \"Log in\" on the left side of the screen, which will take you to the Welcome page. Then click on \"Sign up Now\" on the right side of the page.     You will be asked to enter the access code listed below, as well as some personal information. Please follow the directions to create your username and password.     Your access code is: [email or web address]  Expires: 2017 10:07 AM     Your access code will  in 90 days. If you need help or " "a new code, please call your [facility] or [phone].        Care EveryWhere ID     This is your Care EveryWhere ID. This could be used by other organizations to access your [place] medical records  [id number]        Your Vitals Were     Pulse Temperature Height Pulse Oximetry BMI (Body Mass Index)       64 97.6  F (36.4  C) (Tympanic) 4' 10.75\" (1.492 m) 98% 23.71 kg/m2        Blood Pressure from Last 3 Encounters:   12/15/17 140/54   10/16/17 142/60   07/17/17 124/58    Weight from Last 3 Encounters:   12/15/17 116 lb 6.4 oz (52.8 kg)   10/16/17 113 lb (51.3 kg)   07/17/17 112 lb 12.8 oz (51.2 kg)              We Performed the Following     Basic metabolic panel     CBC with platelets differential     Hepatic panel     Lipid panel reflex to direct LDL Fasting     TSH with free T4 reflex          Today's Medication Changes          These changes are accurate as of: 12/15/17 11:59 PM.  If you have any questions, ask your nurse or doctor.               Start taking these medicines.        Dose/Directions    HYDROcodone-acetaminophen 5-325 MG per tablet   Commonly known as:  NORCO   Used for:  Chronic left-sided low back pain with left-sided sciatica, Chronic pain syndrome   Started by:  Nancy Tapia MD        Dose:  1 tablet   Start taking on:  3/16/2018   Take 1 tablet by mouth 2 times daily PRN severe pain  One month supply   Quantity:  40 tablet   Refills:  0            Where to get your medicines      Some of these will need a paper prescription and others can be bought over the counter.  Ask your nurse if you have questions.     Bring a paper prescription for each of these medications     HYDROcodone-acetaminophen 5-325 MG per tablet                Primary Care Provider Office Phone # Fax #    Nancy Tapia [phone] [address] [place], [place] [address]        Equal Access to Services     KAYDEN ALLEN AH: Helen Sanchez, laura meyers, qaybta " adriana rhodes hayallen keenriley [facility] [phone].    ATENCIÓN: Si nick cook, tiene a whipple disposición servicios gratuitos de asistencia lingüística. Farshad al [phone].    We comply with applicable federal civil rights laws and [place] laws. We do not discriminate on the basis of race, color, national origin, age, disability, sex, sexual orientation, or gender identity.            Thank you!     Thank you for choosing [facility]  for your care. Our goal is always to provide you with excellent care. Hearing back from our patients is one way we can continue to improve our services. Please take a few minutes to complete the written survey that you may receive in the mail after your visit with us. Thank you!             Your Updated Medication List - Protect others around you: Learn how to safely use, store and throw away your medicines at www.disposemymeds.org.          This list is accurate as of: 12/15/17 11:59 PM.  Always use your most recent med list.                   Brand Name Dispense Instructions for use Diagnosis    amLODIPine 10 MG tablet    NORVASC    90 tablet    TAKE ONE TABLET BY MOUTH ONE TIME DAILY    Benign essential hypertension       aspirin 81 MG tablet     100    one daily    Essential hypertension, benign, Other disorders of lipoid metabolism       CALCIUM 500 + D 500-200 MG-IU Tabs      1500mg daily        cholecalciferol 400 UNIT Tabs tablet    vitamin D3     Take 400 Units by mouth daily.        citalopram 40 MG tablet    celeXA    90 tablet    Take 1 tablet (40 mg) by mouth daily    Major depressive disorder, single episode, mild (H)       gabapentin 300 MG capsule    NEURONTIN    120 capsule    2 tab in the afternoon and 2 tabs at bedtime    Lumbar radiculopathy       HYDROcodone-acetaminophen 5-325 MG per tablet   Start taking on:  3/16/2018    NORCO    40 tablet    Take 1 tablet by mouth 2 times daily PRN severe pain  One month  supply    Chronic left-sided low back pain with left-sided sciatica, Chronic pain syndrome       * levothyroxine 75 MCG tablet    SYNTHROID/LEVOTHROID    36 tablet    Take 1 tablet daily on Monday, Wednesday, Friday Needs lab before next refill    Hypothyroidism, unspecified type       * levothyroxine 50 MCG tablet    SYNTHROID/LEVOTHROID    30 tablet    Take 1 tab daily on Tuesday, Thursday, Saturday and Sunday (Needs lab work)    Hypothyroidism, unspecified type       lisinopril 40 MG tablet    PRINIVIL/ZESTRIL    30 tablet    Take 1 tablet (40 mg) by mouth daily Needs blood pressure check    Essential hypertension, benign       MULTIVITAMIN TABS   OR      1 TAB DAILY        OMEGA 3 PO      one tablet daily        ranitidine 75 MG tablet   Generic drug:  ranitidine      1 TABLET 1 TO 2 TIMES A DAY AS NEEDED        simvastatin 20 MG tablet    ZOCOR    30 tablet    Take 1 tablet (20 mg) by mouth At Bedtime    Hyperlipidemia LDL goal <130       * Notice:  This list has 2 medication(s) that are the same as other medications prescribed for you. Read the directions carefully, and ask your doctor or other care provider to review them with you.

## 2017-12-15 NOTE — NURSING NOTE
"Chief Complaint   Patient presents with     Pain       Initial LMP 02/24/2017 (Exact Date) Estimated body mass index is 23.49 kg/(m^2) as calculated from the following:    Height as of 4/4/17: 5' 7\" (1.702 m).    Weight as of 4/4/17: 150 lb (68 kg).  Medication Reconciliation: complete     Alessandra Darling CMA   "

## 2017-12-16 LAB
ALBUMIN SERPL-MCNC: 3.8 G/DL (ref 3.4–5)
ALP SERPL-CCNC: 63 U/L (ref 40–150)
ALT SERPL W P-5'-P-CCNC: 17 U/L (ref 0–50)
ANION GAP SERPL CALCULATED.3IONS-SCNC: 5 MMOL/L (ref 3–14)
AST SERPL W P-5'-P-CCNC: 22 U/L (ref 0–45)
BILIRUB DIRECT SERPL-MCNC: <0.1 MG/DL (ref 0–0.2)
BILIRUB SERPL-MCNC: 0.3 MG/DL (ref 0.2–1.3)
BUN SERPL-MCNC: 10 MG/DL (ref 7–30)
CALCIUM SERPL-MCNC: 8.7 MG/DL (ref 8.5–10.1)
CHLORIDE SERPL-SCNC: 100 MMOL/L (ref 94–109)
CHOLEST SERPL-MCNC: 203 MG/DL
CO2 SERPL-SCNC: 28 MMOL/L (ref 20–32)
CREAT SERPL-MCNC: 0.9 MG/DL (ref 0.52–1.04)
GFR SERPL CREATININE-BSD FRML MDRD: 60 ML/MIN/1.7M2
GLUCOSE SERPL-MCNC: 77 MG/DL (ref 70–99)
HDLC SERPL-MCNC: 105 MG/DL
LDLC SERPL CALC-MCNC: 84 MG/DL
NONHDLC SERPL-MCNC: 98 MG/DL
POTASSIUM SERPL-SCNC: 4.2 MMOL/L (ref 3.4–5.3)
PROT SERPL-MCNC: 7.1 G/DL (ref 6.8–8.8)
SODIUM SERPL-SCNC: 133 MMOL/L (ref 133–144)
TRIGL SERPL-MCNC: 69 MG/DL
TSH SERPL DL<=0.005 MIU/L-ACNC: 0.62 MU/L (ref 0.4–4)

## 2017-12-16 ASSESSMENT — ANXIETY QUESTIONNAIRES: GAD7 TOTAL SCORE: 2

## 2017-12-16 ASSESSMENT — PATIENT HEALTH QUESTIONNAIRE - PHQ9: SUM OF ALL RESPONSES TO PHQ QUESTIONS 1-9: 7

## 2018-02-14 DIAGNOSIS — I10 BENIGN ESSENTIAL HYPERTENSION: ICD-10-CM

## 2018-02-14 NOTE — TELEPHONE ENCOUNTER
"Requested Prescriptions   Pending Prescriptions Disp Refills     amLODIPine (NORVASC) 10 MG tablet [Pharmacy Med Name: AMLODIPINE 10 MG    TAB ASCE] 90 tablet 0     Sig: TAKE ONE TABLET BY MOUTH DAILY    Calcium Channel Blockers Protocol  Failed    2/14/2018 10:07 AM       Failed - Blood pressure under 140/90 in past 12 months    BP Readings from Last 3 Encounters:   12/15/17 140/54   10/16/17 142/60   07/17/17 124/58                Passed - Recent or future visit with authorizing provider    Patient had office visit in the last year or has a visit in the next 30 days with authorizing provider.  See \"Patient Info\" tab in inbasket, or \"Choose Columns\" in Meds & Orders section of the refill encounter.            Passed - Patient is age 18 or older       Passed - No active pregnancy on record       Passed - Normal serum creatinine on file in past 12 months    Recent Labs   Lab Test  12/15/17   1149   CR  0.90            Passed - No positive pregnancy test in past 12 months        Last Written Prescription Date:  8/17/17  Last Fill Quantity: 90,  # refills: 1   Last office visit: 12/15/2017 with prescribing provider:  12/15/17   Future Office Visit:      "

## 2018-02-15 RX ORDER — AMLODIPINE BESYLATE 10 MG/1
TABLET ORAL
Qty: 90 TABLET | Refills: 0 | Status: SHIPPED | OUTPATIENT
Start: 2018-02-15 | End: 2018-05-17

## 2018-04-18 DIAGNOSIS — E03.9 HYPOTHYROIDISM, UNSPECIFIED TYPE: ICD-10-CM

## 2018-04-18 RX ORDER — LEVOTHYROXINE SODIUM 50 UG/1
TABLET ORAL
Qty: 30 TABLET | Refills: 10 | Status: SHIPPED | OUTPATIENT
Start: 2018-04-18 | End: 2019-05-10

## 2018-04-18 NOTE — TELEPHONE ENCOUNTER
"Requested Prescriptions   Pending Prescriptions Disp Refills     levothyroxine (SYNTHROID/LEVOTHROID) 50 MCG tablet [Pharmacy Med Name: LEVOTHYROXIN 50 MCG TAB SAND] 30 tablet 10     Sig: TAKE 1 TABLET BY MOUTH ON TUESDAY, THURSDAY, SATURDAY, AND SUNDAY    Thyroid Protocol Passed    4/18/2018 10:19 AM       Passed - Patient is 12 years or older       Passed - Recent (12 mo) or future (30 days) visit within the authorizing provider's specialty    Patient had office visit in the last 12 months or has a visit in the next 30 days with authorizing provider or within the authorizing provider's specialty.  See \"Patient Info\" tab in inbasket, or \"Choose Columns\" in Meds & Orders section of the refill encounter.           Passed - Normal TSH on file in past 12 months    Recent Labs   Lab Test  12/15/17   1149   TSH  0.62             Passed - No active pregnancy on record    If patient is pregnant or has had a positive pregnancy test, please check TSH.         Passed - No positive pregnancy test in past 12 months    If patient is pregnant or has had a positive pregnancy test, please check TSH.          levothyroxine (SYNTHROID/LEVOTHROID) 50 MCG tablet  Last Written Prescription Date:  02/28/2017  Last Fill Quantity: 30 tablet,  # refills: 11   Last office visit: 12/15/2017 with prescribing provider:  ANTONY Tapia   Future Office Visit:   Next 5 appointments (look out 90 days)     May 04, 2018 10:20 AM CDT   SHORT with Nancy Tapia MD   [facility] ([facility])    [address] [place] [id number] [phone]                 Crystal JOLLY (R) (M)    "

## 2018-05-01 DIAGNOSIS — E03.9 HYPOTHYROIDISM, UNSPECIFIED TYPE: ICD-10-CM

## 2018-05-01 RX ORDER — LEVOTHYROXINE SODIUM 75 UG/1
TABLET ORAL
Qty: 36 TABLET | Refills: 1 | Status: SHIPPED | OUTPATIENT
Start: 2018-05-01 | End: 2018-10-16

## 2018-05-04 ENCOUNTER — OFFICE VISIT (OUTPATIENT)
Dept: FAMILY MEDICINE | Facility: CLINIC | Age: 83
End: 2018-05-04
Payer: COMMERCIAL

## 2018-05-04 VITALS
BODY MASS INDEX: 23.35 KG/M2 | WEIGHT: 115.8 LBS | RESPIRATION RATE: 18 BRPM | HEART RATE: 64 BPM | TEMPERATURE: 97.5 F | SYSTOLIC BLOOD PRESSURE: 136 MMHG | HEIGHT: 59 IN | DIASTOLIC BLOOD PRESSURE: 60 MMHG

## 2018-05-04 DIAGNOSIS — G89.29 CHRONIC LEFT-SIDED LOW BACK PAIN WITH LEFT-SIDED SCIATICA: ICD-10-CM

## 2018-05-04 DIAGNOSIS — E78.5 HYPERLIPIDEMIA LDL GOAL <130: ICD-10-CM

## 2018-05-04 DIAGNOSIS — G89.4 CHRONIC PAIN SYNDROME: Primary | ICD-10-CM

## 2018-05-04 DIAGNOSIS — M54.42 CHRONIC LEFT-SIDED LOW BACK PAIN WITH LEFT-SIDED SCIATICA: ICD-10-CM

## 2018-05-04 DIAGNOSIS — E03.9 HYPOTHYROIDISM, UNSPECIFIED TYPE: ICD-10-CM

## 2018-05-04 DIAGNOSIS — M54.16 LUMBAR RADICULOPATHY: ICD-10-CM

## 2018-05-04 DIAGNOSIS — F32.0 MILD MAJOR DEPRESSION (H): ICD-10-CM

## 2018-05-04 DIAGNOSIS — I10 ESSENTIAL HYPERTENSION, BENIGN: ICD-10-CM

## 2018-05-04 LAB — TSH SERPL DL<=0.005 MIU/L-ACNC: 0.85 MU/L (ref 0.4–4)

## 2018-05-04 PROCEDURE — 36415 COLL VENOUS BLD VENIPUNCTURE: CPT | Performed by: FAMILY MEDICINE

## 2018-05-04 PROCEDURE — 84443 ASSAY THYROID STIM HORMONE: CPT | Performed by: FAMILY MEDICINE

## 2018-05-04 PROCEDURE — 99214 OFFICE O/P EST MOD 30 MIN: CPT | Performed by: FAMILY MEDICINE

## 2018-05-04 RX ORDER — GABAPENTIN 300 MG/1
CAPSULE ORAL
Qty: 180 CAPSULE | Refills: 11 | Status: SHIPPED | OUTPATIENT
Start: 2018-05-04 | End: 2019-05-17

## 2018-05-04 RX ORDER — HYDROCODONE BITARTRATE AND ACETAMINOPHEN 5; 325 MG/1; MG/1
1 TABLET ORAL
Qty: 50 TABLET | Refills: 0 | Status: SHIPPED | OUTPATIENT
Start: 2018-05-04 | End: 2018-05-04

## 2018-05-04 RX ORDER — HYDROCODONE BITARTRATE AND ACETAMINOPHEN 5; 325 MG/1; MG/1
1 TABLET ORAL
Qty: 50 TABLET | Refills: 0 | Status: SHIPPED | OUTPATIENT
Start: 2018-07-04 | End: 2018-08-09

## 2018-05-04 RX ORDER — SIMVASTATIN 20 MG
20 TABLET ORAL AT BEDTIME
Qty: 30 TABLET | Refills: 11 | Status: SHIPPED | OUTPATIENT
Start: 2018-05-04 | End: 2019-05-10

## 2018-05-04 RX ORDER — LISINOPRIL 40 MG/1
40 TABLET ORAL DAILY
Qty: 30 TABLET | Refills: 11 | Status: SHIPPED | OUTPATIENT
Start: 2018-05-04 | End: 2019-05-10

## 2018-05-04 RX ORDER — HYDROCODONE BITARTRATE AND ACETAMINOPHEN 5; 325 MG/1; MG/1
1 TABLET ORAL
Qty: 50 TABLET | Refills: 0 | Status: SHIPPED | OUTPATIENT
Start: 2018-06-04 | End: 2018-05-04

## 2018-05-04 ASSESSMENT — PAIN SCALES - GENERAL: PAINLEVEL: SEVERE PAIN (6)

## 2018-05-04 NOTE — NURSING NOTE
"Chief Complaint   Patient presents with     Pain       Initial /60  Pulse 64  Temp 97.5  F (36.4  C) (Tympanic)  Resp 18  Ht 4' 10.75\" (1.492 m)  Wt 115 lb 12.8 oz (52.5 kg)  BMI 23.59 kg/m2 Estimated body mass index is 23.59 kg/(m^2) as calculated from the following:    Height as of this encounter: 4' 10.75\" (1.492 m).    Weight as of this encounter: 115 lb 12.8 oz (52.5 kg).      Health Maintenance that is potentially due pending provider review:          Is there anyone who you would like to be able to receive your results?   If yes have patient fill out ABHISHEK    "

## 2018-05-04 NOTE — LETTER
My Depression Action Plan  Name: Nighat Gupta   Date of Birth 1935  Date: 5/4/2018    My doctor: Nancy Tapia   My clinic: [address] [address] [phone]          GREEN    ZONE   Good Control    What it looks like:     Things are going generally well. You have normal up s and down s. You may even feel depressed from time to time, but bad moods usually last less than a day.   What you need to do:  1. Continue to care for yourself (see self care plan)  2. Check your depression survival kit and update it as needed  3. Follow your physician s recommendations including any medication.  4. Do not stop taking medication unless you consult with your physician first.           YELLOW         ZONE Getting Worse    What it looks like:     Depression is starting to interfere with your life.     It may be hard to get out of bed; you may be starting to isolate yourself from others.    Symptoms of depression are starting to last most all day and this has happened for several days.     You may have suicidal thoughts but they are not constant.   What you need to do:     1. Call your care team, your response to treatment will improve if you keep your care team informed of your progress. Yellow periods are signs an adjustment may need to be made.     2. Continue your self-care, even if you have to fake it!    3. Talk to someone in your support network    4. Open up your depression survival kit           RED    ZONE Medical Alert - Get Help    What it looks like:     Depression is seriously interfering with your life.     You may experience these or other symptoms: You can t get out of bed most days, can t work or engage in other necessary activities, you have trouble taking care of basic hygiene, or basic responsibilities, thoughts of suicide or death that will not go away, self-injurious behavior.     What you need to do:  1. Call your care  team and request a same-day appointment. If they are not available (weekends or after hours) call your local crisis line, emergency room or 911.            Depression Self Care Plan / Survival Kit    Self-Care for Depression  Here s the deal. Your body and mind are really not as separate as most people think.  What you do and think affects how you feel and how you feel influences what you do and think. This means if you do things that people who feel good do, it will help you feel better.  Sometimes this is all it takes.  There is also a place for medication and therapy depending on how severe your depression is, so be sure to consult with your medical provider and/ or Behavioral Health Consultant if your symptoms are worsening or not improving.     In order to better manage my stress, I will:    Exercise  Get some form of exercise, every day. This will help reduce pain and release endorphins, the  feel good  chemicals in your brain. This is almost as good as taking antidepressants!  This is not the same as joining a gym and then never going! (they count on that by the way ) It can be as simple as just going for a walk or doing some gardening, anything that will get you moving.      Hygiene   Maintain good hygiene (Get out of bed in the morning, Make your bed, Brush your teeth, Take a shower, and Get dressed like you were going to work, even if you are unemployed).  If your clothes don't fit try to get ones that do.    Diet  I will strive to eat foods that are good for me, drink plenty of water, and avoid excessive sugar, caffeine, alcohol, and other mood-altering substances.  Some foods that are helpful in depression are: complex carbohydrates, B vitamins, flaxseed, fish or fish oil, fresh fruits and vegetables.    Psychotherapy  I agree to participate in Individual Therapy (if recommended).    Medication  If prescribed medications, I agree to take them.  Missing doses can result in serious side effects.  I  understand that drinking alcohol, or other illicit drug use, may cause potential side effects.  I will not stop my medication abruptly without first discussing it with my provider.    Staying Connected With Others  I will stay in touch with my friends, family members, and my primary care provider/team.    Use your imagination  Be creative.  We all have a creative side; it doesn t matter if it s oil painting, sand castles, or mud pies! This will also kick up the endorphins.    Witness Beauty  (AKA stop and smell the roses) Take a look outside, even in mid-winter. Notice colors, textures. Watch the squirrels and birds.     Service to others  Be of service to others.  There is always someone else in need.  By helping others we can  get out of ourselves  and remember the really important things.  This also provides opportunities for practicing all the other parts of the program.    Humor  Laugh and be silly!  Adjust your TV habits for less news and crime-drama and more comedy.    Control your stress  Try breathing deep, massage therapy, biofeedback, and meditation. Find time to relax each day.     My support system    Clinic Contact:  Phone number:    Contact 1:  Phone number:    Contact 2:  Phone number:    Sikh/:  Phone number:    Therapist:  Phone number:    Local crisis center:    Phone number:    Other community support:  Phone number:

## 2018-05-04 NOTE — PROGRESS NOTES
SUBJECTIVE:   Nighat Gupta is a 82 year old female who presents to clinic today for the following health issues:      Hyperlipidemia Follow-Up      Rate your low fat/cholesterol diet?: good    Taking statin?  Yes, no muscle aches from statin    Other lipid medications/supplements?:  none    Hypertension Follow-up      Outpatient blood pressures are not being checked.    Low Salt Diet: no added salt      Amount of exercise or physical activity: None    Problems taking medications regularly: No    Medication side effects: none    Chronic Pain Follow Up  Type / Location of Pain: low back   Analgesia/pain control:       Recent changes:  Worse      Overall control: break through pain has needed to take more gabapentin   Activity level/function:      Daily activities:  Able to do all daily activities    Work:  not applicable  Adverse effects:  No  Adherance    Taking medication as directed?  Yes     Participating in other treatments: no  Risk Factors:    Sleep:  Good    Mood/anxiety:  controlled    Recent family or social stressors: Daughter pregnant with twins.             Depression Followup    Status since last visit: Stable     See PHQ-9 for current symptoms.  Other associated symptoms: None    Complicating factors:   Significant life event:  No   Current substance abuse:  None  Anxiety or Panic symptoms:  No    PHQ-9 7/11/2017 7/17/2017 12/15/2017   Total Score - 9 7   Q9: Suicide Ideation Not at all Not at all Not at all     In the past two weeks have you had thoughts of suicide or self-harm?  No.    Do you have concerns about your personal safety or the safety of others?   No  PHQ-9  English  PHQ-9   Any Language  Suicide Assessment Five-step Evaluation and Treatment (SAFE-T)  Hypothyroidism Follow-up      Since last visit, patient describes the following symptoms: Weight stable, no hair loss, no skin changes, no constipation, no loose stools      Problem list and histories reviewed & adjusted, as  "indicated.  Additional history: as documented    Labs reviewed in EPIC    Reviewed and updated as needed this visit by clinical staff  Tobacco  Allergies  Meds  Problems  Med Hx  Surg Hx  Fam Hx  Soc Hx        Reviewed and updated as needed this visit by Provider  Allergies  Meds  Problems         ROS:  Constitutional, HEENT, cardiovascular, pulmonary, gi and gu systems are negative, except as otherwise noted.    OBJECTIVE:                                                    /60  Pulse 64  Temp 97.5  F (36.4  C) (Tympanic)  Resp 18  Ht 4' 10.75\" (1.492 m)  Wt 115 lb 12.8 oz (52.5 kg)  BMI 23.59 kg/m2  Body mass index is 23.59 kg/(m^2).  GENERAL APPEARANCE: healthy, alert and no distress  NECK: no adenopathy, no asymmetry, masses, or scars and thyroid normal to palpation  RESP: lungs clear to auscultation - no rales, rhonchi or wheezes  CV: regular rates and rhythm, normal S1 S2, no S3 or S4 and no murmur, click or rub  ABDOMEN: soft, nontender, without hepatosplenomegaly or masses and bowel sounds normal  MS: extremities normal- no gross deformities noted  PSYCH: mentation appears normal and affect normal/bright         ASSESSMENT/PLAN:                                                    1. Chronic pain syndrome  Slightly worse will give her #10 extra per month for prn use   - HYDROcodone-acetaminophen (NORCO) 5-325 MG per tablet; Take 1 tablet by mouth 2 times daily PRN severe pain  One month supply  Dispense: 50 tablet; Refill: 0    2. Lumbar radiculopathy    - gabapentin (NEURONTIN) 300 MG capsule; 2 tabs tid  Dispense: 180 capsule; Refill: 11    3. Chronic left-sided low back pain with left-sided sciatica    - HYDROcodone-acetaminophen (NORCO) 5-325 MG per tablet; Take 1 tablet by mouth 2 times daily PRN severe pain  One month supply  Dispense: 50 tablet; Refill: 0    4. Hyperlipidemia LDL goal <130  Stable no change in treatment plan.   - simvastatin (ZOCOR) 20 MG tablet; Take 1 tablet (20 " mg) by mouth At Bedtime  Dispense: 30 tablet; Refill: 11    5. Essential hypertension, benign  Stable no change in treatment plan.   - lisinopril (PRINIVIL/ZESTRIL) 40 MG tablet; Take 1 tablet (40 mg) by mouth daily  Dispense: 30 tablet; Refill: 11    6. Hypothyroidism, unspecified type  Adjust meds as indicated by above labs.   - TSH with free T4 reflex    7. Mild major depression (H)  Stable no change in treatment plan.       Patient Instructions   Meds refilled     Increase gabapentin to 2 tabs 3 times per day every day     Gave you #50 tabs of vicodin    Risks, benefits, side effects and rationale for treatment plan fully discussed with the patient and understanding expressed.   Nancy Tapia MD  [facility]

## 2018-05-04 NOTE — MR AVS SNAPSHOT
"              After Visit Summary   5/4/2018    Nighat Gupta    MRN: [id number]           Patient Information     Date Of Birth          1935        Visit Information        Provider Department      5/4/2018 10:20 AM Nancy Tapia MD [facility]        Today's Diagnoses     Chronic pain syndrome    -  1    Lumbar radiculopathy        Hyperlipidemia LDL goal <130        Essential hypertension, benign        Chronic left-sided low back pain with left-sided sciatica        Hypothyroidism, unspecified type          Care Instructions    Meds refilled     Increase gabapentin to 2 tabs 3 times per day every day     Gave you #50 tabs of vicodin          Follow-ups after your visit        Who to contact     If you have questions or need follow up information about today's clinic visit or your schedule please contact [facility] directly at [phone].  Normal or non-critical lab and imaging results will be communicated to you by MyChart, letter or phone within 4 business days after the clinic has received the results. If you do not hear from us within 7 days, please contact the clinic through MyChart or phone. If you have a critical or abnormal lab result, we will notify you by phone as soon as possible.  Submit refill requests through Sendbloom or call your pharmacy and they will forward the refill request to us. Please allow 3 business days for your refill to be completed.          Additional Information About Your Visit        MyChart Information     Sendbloom lets you send messages to your doctor, view your test results, renew your prescriptions, schedule appointments and more. To sign up, go to [email or web address] . Click on \"Log in\" on the left side of the screen, which will take you to the Welcome page. Then click on \"Sign up Now\" on the right side of the page.     You will be asked to enter the access code listed below, as well as some personal information. " "Please follow the directions to create your username and password.     Your access code is: [email or web address]  Expires: 2018 10:57 AM     Your access code will  in 90 days. If you need help or a new code, please call your [place] clinic or [phone].        Care EveryWhere ID     This is your Care EveryWhere ID. This could be used by other organizations to access your [place] medical records  [id number]        Your Vitals Were     Pulse Temperature Respirations Height BMI (Body Mass Index)       64 97.5  F (36.4  C) (Tympanic) 18 4' 10.75\" (1.492 m) 23.59 kg/m2        Blood Pressure from Last 3 Encounters:   18 136/60   12/15/17 140/54   10/16/17 142/60    Weight from Last 3 Encounters:   18 115 lb 12.8 oz (52.5 kg)   12/15/17 116 lb 6.4 oz (52.8 kg)   10/16/17 113 lb (51.3 kg)              We Performed the Following     DEPRESSION ACTION PLAN (DAP)     TSH with free T4 reflex          Today's Medication Changes          These changes are accurate as of 18 10:57 AM.  If you have any questions, ask your nurse or doctor.               Start taking these medicines.        Dose/Directions    HYDROcodone-acetaminophen 5-325 MG per tablet   Commonly known as:  NORCO   Used for:  Chronic left-sided low back pain with left-sided sciatica, Chronic pain syndrome   Started by:  Nancy Tapia MD        Dose:  1 tablet   Start taking on:  2018   Take 1 tablet by mouth 2 times daily PRN severe pain  One month supply   Quantity:  50 tablet   Refills:  0         These medicines have changed or have updated prescriptions.        Dose/Directions    gabapentin 300 MG capsule   Commonly known as:  NEURONTIN   This may have changed:  additional instructions   Used for:  Lumbar radiculopathy   Changed by:  Nancy Tapia MD        2 tabs tid   Quantity:  180 capsule   Refills:  11       lisinopril 40 MG tablet   Commonly known as:  PRINIVIL/ZESTRIL   This may have changed:  additional " instructions   Used for:  Essential hypertension, benign   Changed by:  Nancy Tapia MD        Dose:  40 mg   Take 1 tablet (40 mg) by mouth daily   Quantity:  30 tablet   Refills:  11            Where to get your medicines      These medications were sent to [id number] - [place], [place] - [address]  [address] [facility] [address]    Hours:  Closed 10-16-08 business to [facility] Phone:  [phone]     gabapentin 300 MG capsule    lisinopril 40 MG tablet    simvastatin 20 MG tablet         Some of these will need a paper prescription and others can be bought over the counter.  Ask your nurse if you have questions.     Bring a paper prescription for each of these medications     HYDROcodone-acetaminophen 5-325 MG per tablet               Information about OPIOIDS     PRESCRIPTION OPIOIDS: WHAT YOU NEED TO KNOW   You have a prescription for an opioid (narcotic) pain medicine. Opioids can cause addiction. If you have a history of chemical dependency of any type, you are at a higher risk of becoming addicted to opioids. Only take this medicine after all other options have been tried. Take it for as short a time and as few doses as possible.     Do not:    Drive. If you drive while taking these medicines, you could be arrested for driving under the influence (DUI).    Operate heavy machinery    Do any other dangerous activities while taking these medicines.     Drink any alcohol while taking these medicines.      Take with any other medicines that contain acetaminophen. Read all labels carefully. Look for the word  acetaminophen  or  Tylenol.  Ask your pharmacist if you have questions or are unsure.    Store your pills in a secure place, locked if possible. We will not replace any lost or stolen medicine. If you don t finish your medicine, please throw away (dispose) as directed by your pharmacist. The [place] Pollution Control Agency has more information about safe  disposal: https://www.pca.[facility].us/living-green/managing-unwanted-medications    All opioids tend to cause constipation. Drink plenty of water and eat foods that have a lot of fiber, such as fruits, vegetables, prune juice, apple juice and high-fiber cereal. Take a laxative (Miralax, milk of magnesia, Colace, Senna) if you don t move your bowels at least every other day.          Primary Care Provider Office Phone # Fax #    Nancy Tapia [phone] [address] [address]        Equal Access to Services     KAYDEN ALLEN : Hadii alvarez arias hadasho Sohillary, waaxda luqadaha, qaybta kaalmada aderileyyaolive, adriana santana . So [facility] [phone].    ATENCIÓN: Si habla español, tiene a whipple disposición servicios gratuitos de asistencia lingüística. Llame al [phone].    We comply with applicable federal civil rights laws and [place] laws. We do not discriminate on the basis of race, color, national origin, age, disability, sex, sexual orientation, or gender identity.            Thank you!     Thank you for choosing [facility]  for your care. Our goal is always to provide you with excellent care. Hearing back from our patients is one way we can continue to improve our services. Please take a few minutes to complete the written survey that you may receive in the mail after your visit with us. Thank you!             Your Updated Medication List - Protect others around you: Learn how to safely use, store and throw away your medicines at www.disposemymeds.org.          This list is accurate as of 5/4/18 10:57 AM.  Always use your most recent med list.                   Brand Name Dispense Instructions for use Diagnosis    amLODIPine 10 MG tablet    NORVASC    90 tablet    TAKE ONE TABLET BY MOUTH DAILY    Benign essential hypertension       aspirin 81 MG tablet     100    one daily    Essential hypertension, benign, Other disorders of  lipoid metabolism       CALCIUM 500 + D 500-200 MG-IU Tabs      1500mg daily        cholecalciferol 400 UNIT Tabs tablet    vitamin D3     Take 400 Units by mouth daily.        citalopram 40 MG tablet    celeXA    90 tablet    Take 1 tablet (40 mg) by mouth daily    Major depressive disorder, single episode, mild (H)       gabapentin 300 MG capsule    NEURONTIN    180 capsule    2 tabs tid    Lumbar radiculopathy       HYDROcodone-acetaminophen 5-325 MG per tablet   Start taking on:  7/4/2018    NORCO    50 tablet    Take 1 tablet by mouth 2 times daily PRN severe pain  One month supply    Chronic left-sided low back pain with left-sided sciatica, Chronic pain syndrome       * levothyroxine 50 MCG tablet    SYNTHROID/LEVOTHROID    30 tablet    TAKE 1 TABLET BY MOUTH ON TUESDAY, THURSDAY, SATURDAY, AND SUNDAY    Hypothyroidism, unspecified type       * levothyroxine 75 MCG tablet    SYNTHROID/LEVOTHROID    36 tablet    TAKE 1 TABLET BY MOUTH ON MONDAY, WEDNESDAY, AND FRIDAY    Hypothyroidism, unspecified type       lisinopril 40 MG tablet    PRINIVIL/ZESTRIL    30 tablet    Take 1 tablet (40 mg) by mouth daily    Essential hypertension, benign       MULTIVITAMIN TABS   OR      1 TAB DAILY        OMEGA 3 PO      one tablet daily        ranitidine 75 MG tablet   Generic drug:  ranitidine      1 TABLET 1 TO 2 TIMES A DAY AS NEEDED        simvastatin 20 MG tablet    ZOCOR    30 tablet    Take 1 tablet (20 mg) by mouth At Bedtime    Hyperlipidemia LDL goal <130       * Notice:  This list has 2 medication(s) that are the same as other medications prescribed for you. Read the directions carefully, and ask your doctor or other care provider to review them with you.

## 2018-05-04 NOTE — PATIENT INSTRUCTIONS
Meds refilled     Increase gabapentin to 2 tabs 3 times per day every day     Gave you #50 tabs of vicodin

## 2018-05-11 DIAGNOSIS — F32.0 MAJOR DEPRESSIVE DISORDER, SINGLE EPISODE, MILD (H): ICD-10-CM

## 2018-05-11 NOTE — TELEPHONE ENCOUNTER
"Requested Prescriptions   Pending Prescriptions Disp Refills     citalopram (CELEXA) 40 MG tablet [Pharmacy Med Name: CITALOPRAM 40 MG    TAB AURO] 90 tablet 0     Sig: TAKE ONE TABLET BY MOUTH ONE TIME DAILY    SSRIs Protocol Failed    5/11/2018  9:10 AM       Failed - PHQ-9 score less than 5 in past 6 months    Please review last PHQ-9 score.          Passed - Patient is age 18 or older       Passed - No active pregnancy on record       Passed - No positive pregnancy test in last 12 months       Passed - Recent (6 mo) or future (30 days) visit within the authorizing provider's specialty    Patient had office visit in the last 6 months or has a visit in the next 30 days with authorizing provider or within the authorizing provider's specialty.  See \"Patient Info\" tab in inbasket, or \"Choose Columns\" in Meds & Orders section of the refill encounter.            citalopram (CELEXA) 40 MG tablet  Last Written Prescription Date:  05/15/2017  Last Fill Quantity: 90 tablet,  # refills: 1   Last office visit: 5/4/2018 with prescribing provider:  ANTONY Tapia   Future Office Visit:      PHQ-9 SCORE 5/15/2017 7/17/2017 12/15/2017   Total Score - - -   Total Score MyChart - - 7 (Mild depression)   Total Score 2 9 7     PRECIOUS-7 SCORE 5/15/2017 7/17/2017 12/15/2017   Total Score - - -   Total Score - - 2 (minimal anxiety)   Total Score 5 2 2       Crystal Joshi RT (R) (M)      "

## 2018-05-21 RX ORDER — CITALOPRAM HYDROBROMIDE 40 MG/1
TABLET ORAL
Qty: 90 TABLET | Refills: 0 | Status: SHIPPED | OUTPATIENT
Start: 2018-05-21 | End: 2019-03-19

## 2018-05-22 DIAGNOSIS — F32.0 MAJOR DEPRESSIVE DISORDER, SINGLE EPISODE, MILD (H): ICD-10-CM

## 2018-05-22 RX ORDER — CITALOPRAM HYDROBROMIDE 40 MG/1
40 TABLET ORAL DAILY
Qty: 90 TABLET | Refills: 0 | Status: CANCELLED | OUTPATIENT
Start: 2018-05-22

## 2018-05-22 NOTE — TELEPHONE ENCOUNTER
"Nighat says she needs refill of her Citalopram. She says she had been cutting the dose but says some days she is finding she is getting in the \"red zone\" so she says she is going to go back to taking as she should.     Requested Prescriptions   Pending Prescriptions Disp Refills     citalopram (CELEXA) 40 MG tablet 90 tablet 0     Sig: Take 1 tablet (40 mg) by mouth daily    SSRIs Protocol Failed    5/22/2018  8:19 AM       Failed - PHQ-9 score less than 5 in past 6 months    Please review last PHQ-9 score.          Passed - Patient is age 18 or older       Passed - No active pregnancy on record       Passed - No positive pregnancy test in last 12 months       Passed - Recent (6 mo) or future (30 days) visit within the authorizing provider's specialty    Patient had office visit in the last 6 months or has a visit in the next 30 days with authorizing provider or within the authorizing provider's specialty.  See \"Patient Info\" tab in inbasket, or \"Choose Columns\" in Meds & Orders section of the refill encounter.            Last Written Prescription Date:  5/21/18  Last Fill Quantity: 90,  # refills: 0   Last office visit: 5/4/2018 with prescribing provider:  Regina   Future Office Visit:      "

## 2018-08-09 DIAGNOSIS — M54.42 CHRONIC LEFT-SIDED LOW BACK PAIN WITH LEFT-SIDED SCIATICA: ICD-10-CM

## 2018-08-09 DIAGNOSIS — G89.4 CHRONIC PAIN SYNDROME: ICD-10-CM

## 2018-08-09 DIAGNOSIS — G89.29 CHRONIC LEFT-SIDED LOW BACK PAIN WITH LEFT-SIDED SCIATICA: ICD-10-CM

## 2018-08-09 NOTE — TELEPHONE ENCOUNTER
Controlled Substance Refill Request for HYDROcodone-acetaminophen (NORCO) 5-325 MG per tablet  Problem List Complete:  Yes,   Chronic pain syndrome [G89.4]  - Primary       Chronic left-sided low back pain with left-sided sciatica [M54.42, G89.29]            checked in past 3 months?  No, route to RN      Last Written Prescription Date:  7/4/18  Last Fill Quantity: 50,  # refills: 0   Last office visit: 5/4/2018 with prescribing provider:     Future Office Visit:

## 2018-08-09 NOTE — TELEPHONE ENCOUNTER
Call pt needs visit every 3 months for chronic opioid use   I would fill amall number until appt but will not be in until Monday so another provider will have to sign

## 2018-08-09 NOTE — TELEPHONE ENCOUNTER
Routing refill request to provider for review/approval because:  Drug not on the [facility] refill protocol   Jennifer SNEED RN

## 2018-08-10 RX ORDER — HYDROCODONE BITARTRATE AND ACETAMINOPHEN 5; 325 MG/1; MG/1
TABLET ORAL
Qty: 10 TABLET | Refills: 0 | Status: SHIPPED | OUTPATIENT
Start: 2018-08-10 | End: 2018-12-11

## 2018-08-16 ENCOUNTER — OFFICE VISIT (OUTPATIENT)
Dept: FAMILY MEDICINE | Facility: CLINIC | Age: 83
End: 2018-08-16
Payer: COMMERCIAL

## 2018-08-16 VITALS
WEIGHT: 114.4 LBS | HEIGHT: 59 IN | HEART RATE: 72 BPM | SYSTOLIC BLOOD PRESSURE: 136 MMHG | BODY MASS INDEX: 23.06 KG/M2 | TEMPERATURE: 97.7 F | RESPIRATION RATE: 16 BRPM | DIASTOLIC BLOOD PRESSURE: 62 MMHG

## 2018-08-16 DIAGNOSIS — F32.0 MAJOR DEPRESSIVE DISORDER, SINGLE EPISODE, MILD (H): ICD-10-CM

## 2018-08-16 DIAGNOSIS — M54.42 CHRONIC LEFT-SIDED LOW BACK PAIN WITH LEFT-SIDED SCIATICA: ICD-10-CM

## 2018-08-16 DIAGNOSIS — G89.4 CHRONIC PAIN SYNDROME: ICD-10-CM

## 2018-08-16 DIAGNOSIS — G89.29 CHRONIC LEFT-SIDED LOW BACK PAIN WITH LEFT-SIDED SCIATICA: ICD-10-CM

## 2018-08-16 DIAGNOSIS — F32.0 MILD MAJOR DEPRESSION (H): Primary | ICD-10-CM

## 2018-08-16 PROCEDURE — 99214 OFFICE O/P EST MOD 30 MIN: CPT | Performed by: FAMILY MEDICINE

## 2018-08-16 RX ORDER — BUPROPION HYDROCHLORIDE 150 MG/1
150 TABLET ORAL EVERY MORNING
Qty: 90 TABLET | Refills: 1 | Status: SHIPPED | OUTPATIENT
Start: 2018-08-16 | End: 2019-03-19 | Stop reason: ALTCHOICE

## 2018-08-16 RX ORDER — HYDROCODONE BITARTRATE AND ACETAMINOPHEN 5; 325 MG/1; MG/1
1 TABLET ORAL 2 TIMES DAILY PRN
Qty: 50 TABLET | Refills: 0 | Status: SHIPPED | OUTPATIENT
Start: 2018-10-16 | End: 2018-12-11

## 2018-08-16 RX ORDER — CITALOPRAM HYDROBROMIDE 40 MG/1
TABLET ORAL
Qty: 90 TABLET | Refills: 0 | OUTPATIENT
Start: 2018-08-16

## 2018-08-16 RX ORDER — HYDROCODONE BITARTRATE AND ACETAMINOPHEN 5; 325 MG/1; MG/1
1 TABLET ORAL
Qty: 50 TABLET | Refills: 0 | Status: SHIPPED | OUTPATIENT
Start: 2018-08-16 | End: 2018-08-16

## 2018-08-16 RX ORDER — HYDROCODONE BITARTRATE AND ACETAMINOPHEN 5; 325 MG/1; MG/1
1 TABLET ORAL 2 TIMES DAILY PRN
Qty: 50 TABLET | Refills: 0 | Status: SHIPPED | OUTPATIENT
Start: 2018-09-16 | End: 2018-08-16

## 2018-08-16 ASSESSMENT — PATIENT HEALTH QUESTIONNAIRE - PHQ9
SUM OF ALL RESPONSES TO PHQ QUESTIONS 1-9: 6
SUM OF ALL RESPONSES TO PHQ QUESTIONS 1-9: 6

## 2018-08-16 ASSESSMENT — ANXIETY QUESTIONNAIRES
GAD7 TOTAL SCORE: 10
GAD7 TOTAL SCORE: 10
3. WORRYING TOO MUCH ABOUT DIFFERENT THINGS: MORE THAN HALF THE DAYS
7. FEELING AFRAID AS IF SOMETHING AWFUL MIGHT HAPPEN: MORE THAN HALF THE DAYS
2. NOT BEING ABLE TO STOP OR CONTROL WORRYING: MORE THAN HALF THE DAYS
GAD7 TOTAL SCORE: 10
5. BEING SO RESTLESS THAT IT IS HARD TO SIT STILL: NOT AT ALL
7. FEELING AFRAID AS IF SOMETHING AWFUL MIGHT HAPPEN: MORE THAN HALF THE DAYS
6. BECOMING EASILY ANNOYED OR IRRITABLE: MORE THAN HALF THE DAYS
4. TROUBLE RELAXING: NOT AT ALL
1. FEELING NERVOUS, ANXIOUS, OR ON EDGE: MORE THAN HALF THE DAYS

## 2018-08-16 NOTE — MR AVS SNAPSHOT
"              After Visit Summary   8/16/2018    Nighat Gupta    MRN: [id number]           Patient Information     Date Of Birth          1935        Visit Information        Provider Department      8/16/2018 9:20 AM Nancy Tapia MD [facility]        Today's Diagnoses     Mild major depression (H)    -  1    Chronic left-sided low back pain with left-sided sciatica        Chronic pain syndrome          Care Instructions    Pain meds refilled     Start wellbutrin 1 tab in the am for mood     Recheck with me in 3 months           Follow-ups after your visit        Who to contact     If you have questions or need follow up information about today's clinic visit or your schedule please contact [facility] directly at [phone].  Normal or non-critical lab and imaging results will be communicated to you by MyChart, letter or phone within 4 business days after the clinic has received the results. If you do not hear from us within 7 days, please contact the clinic through MyChart or phone. If you have a critical or abnormal lab result, we will notify you by phone as soon as possible.  Submit refill requests through Glow or call your pharmacy and they will forward the refill request to us. Please allow 3 business days for your refill to be completed.          Additional Information About Your Visit        Care EveryWhere ID     This is your Care EveryWhere ID. This could be used by other organizations to access your [place] medical records  [id number]        Your Vitals Were     Pulse Temperature Respirations Height BMI (Body Mass Index)       72 97.7  F (36.5  C) (Tympanic) 16 4' 10.75\" (1.492 m) 23.3 kg/m2        Blood Pressure from Last 3 Encounters:   08/16/18 136/62   05/04/18 136/60   12/15/17 140/54    Weight from Last 3 Encounters:   08/16/18 114 lb 6.4 oz (51.9 kg)   05/04/18 115 lb 12.8 oz (52.5 kg)   12/15/17 116 lb 6.4 oz (52.8 kg)            "   Today, you had the following     No orders found for display         Today's Medication Changes          These changes are accurate as of 8/16/18  9:46 AM.  If you have any questions, ask your nurse or doctor.               Start taking these medicines.        Dose/Directions    buPROPion 150 MG 24 hr tablet   Commonly known as:  WELLBUTRIN XL   Used for:  Mild major depression (H)   Started by:  Nancy Tapia MD        Dose:  150 mg   Take 1 tablet (150 mg) by mouth every morning   Quantity:  90 tablet   Refills:  1         These medicines have changed or have updated prescriptions.        Dose/Directions    * HYDROcodone-acetaminophen 5-325 MG per tablet   Commonly known as:  NORCO   This may have changed:  Another medication with the same name was added. Make sure you understand how and when to take each.   Used for:  Chronic left-sided low back pain with left-sided sciatica, Chronic pain syndrome   Changed by:  Nancy Tapia MD        TAKE ONE TABLET BY MOUTH 2 TIMES DAILY AS NEEDED FOR PAIN   Quantity:  10 tablet   Refills:  0       * HYDROcodone-acetaminophen 5-325 MG per tablet   Commonly known as:  NORCO   This may have changed:  You were already taking a medication with the same name, and this prescription was added. Make sure you understand how and when to take each.   Used for:  Chronic left-sided low back pain with left-sided sciatica, Chronic pain syndrome   Changed by:  Nancy Tapia MD        Dose:  1 tablet   Start taking on:  10/16/2018   Take 1 tablet by mouth 2 times daily as needed for severe pain PRN severe pain  One month supply   Quantity:  50 tablet   Refills:  0       * Notice:  This list has 2 medication(s) that are the same as other medications prescribed for you. Read the directions carefully, and ask your doctor or other care provider to review them with you.         Where to get your medicines      These medications were sent to [id number]  [place], [place] - [address]  [address] [facility] [address]    Hours:  Closed 10-16-08 business to [facility] Phone:  [phone]     buPROPion 150 MG 24 hr tablet         Some of these will need a paper prescription and others can be bought over the counter.  Ask your nurse if you have questions.     Bring a paper prescription for each of these medications     HYDROcodone-acetaminophen 5-325 MG per tablet               Information about OPIOIDS     PRESCRIPTION OPIOIDS: WHAT YOU NEED TO KNOW   We gave you an opioid (narcotic) pain medicine. It is important to manage your pain, but opioids are not always the best choice. You should first try all the other options your care team gave you. Take this medicine for as short a time (and as few doses) as possible.    Some activities can increase your pain, such as bandage changes or therapy sessions. It may help to take your pain medicine 30 to 60 minutes before these activities. Reduce your stress by getting enough sleep, working on hobbies you enjoy and practicing relaxation or meditation. Talk to your care team about ways to manage your pain beyond prescription opioids.    These medicines have risks:    DO NOT drive when on new or higher doses of pain medicine. These medicines can affect your alertness and reaction times, and you could be arrested for driving under the influence (DUI). If you need to use opioids long-term, talk to your care team about driving.    DO NOT operate heavy machinery    DO NOT do any other dangerous activities while taking these medicines.    DO NOT drink any alcohol while taking these medicines.     If the opioid prescribed includes acetaminophen, DO NOT take with any other medicines that contain acetaminophen. Read all labels carefully. Look for the word  acetaminophen  or  Tylenol.  Ask your pharmacist if you have questions or are unsure.    You can get addicted to pain medicines, especially if you have a history of  addiction (chemical, alcohol or substance dependence). Talk to your care team about ways to reduce this risk.    All opioids tend to cause constipation. Drink plenty of water and eat foods that have a lot of fiber, such as fruits, vegetables, prune juice, apple juice and high-fiber cereal. Take a laxative (Miralax, milk of magnesia, Colace, Senna) if you don t move your bowels at least every other day. Other side effects include upset stomach, sleepiness, dizziness, throwing up, tolerance (needing more of the medicine to have the same effect), physical dependence and slowed breathing.    Store your pills in a secure place, locked if possible. We will not replace any lost or stolen medicine. If you don t finish your medicine, please throw away (dispose) as directed by your pharmacist. The [place] Pollution Control Agency has more information about safe disposal: [email or web address].[facility].us/living-green/managing-unwanted-medications         Primary Care Provider Office Phone # Fax #    Nancy Tapia -[phone] [address] [place] [address]        Equal Access to Services     KAYDEN ALLEN : Hadii alvarez Sanchez, wasoniada luigi, qaamara lakhani, adriana santana . So [facility] [phone].    ATENCIÓN: Si habla español, tiene a whipple disposición servicios gratuitos de asistencia lingüística. Llame al [phone].    We comply with applicable federal civil rights laws and [place] laws. We do not discriminate on the basis of race, color, national origin, age, disability, sex, sexual orientation, or gender identity.            Thank you!     Thank you for choosing [facility]  for your care. Our goal is always to provide you with excellent care. Hearing back from our patients is one way we can continue to improve our services. Please take a few minutes to complete the written survey that you may receive in the mail after your  visit with us. Thank you!             Your Updated Medication List - Protect others around you: Learn how to safely use, store and throw away your medicines at www.disposemymeds.org.          This list is accurate as of 8/16/18  9:46 AM.  Always use your most recent med list.                   Brand Name Dispense Instructions for use Diagnosis    amLODIPine 10 MG tablet    NORVASC    90 tablet    TAKE ONE TABLET BY MOUTH DAILY    Benign essential hypertension       aspirin 81 MG tablet     100    one daily    Essential hypertension, benign, Other disorders of lipoid metabolism       buPROPion 150 MG 24 hr tablet    WELLBUTRIN XL    90 tablet    Take 1 tablet (150 mg) by mouth every morning    Mild major depression (H)       CALCIUM 500 + D 500-200 MG-IU Tabs      1500mg daily        cholecalciferol 400 UNIT Tabs tablet    vitamin D3     Take 400 Units by mouth daily.        citalopram 40 MG tablet    celeXA    90 tablet    TAKE ONE TABLET BY MOUTH ONE TIME DAILY    Major depressive disorder, single episode, mild (H)       gabapentin 300 MG capsule    NEURONTIN    180 capsule    2 tabs tid    Lumbar radiculopathy       * HYDROcodone-acetaminophen 5-325 MG per tablet    NORCO    10 tablet    TAKE ONE TABLET BY MOUTH 2 TIMES DAILY AS NEEDED FOR PAIN    Chronic left-sided low back pain with left-sided sciatica, Chronic pain syndrome       * HYDROcodone-acetaminophen 5-325 MG per tablet   Start taking on:  10/16/2018    NORCO    50 tablet    Take 1 tablet by mouth 2 times daily as needed for severe pain PRN severe pain  One month supply    Chronic left-sided low back pain with left-sided sciatica, Chronic pain syndrome       * levothyroxine 50 MCG tablet    SYNTHROID/LEVOTHROID    30 tablet    TAKE 1 TABLET BY MOUTH ON TUESDAY, THURSDAY, SATURDAY, AND SUNDAY    Hypothyroidism, unspecified type       * levothyroxine 75 MCG tablet    SYNTHROID/LEVOTHROID    36 tablet    TAKE 1 TABLET BY MOUTH ON MONDAY, WEDNESDAY, AND  FRIDAY    Hypothyroidism, unspecified type       lisinopril 40 MG tablet    PRINIVIL/ZESTRIL    30 tablet    Take 1 tablet (40 mg) by mouth daily    Essential hypertension, benign       MULTIVITAMIN TABS   OR      1 TAB DAILY        OMEGA 3 PO      one tablet daily        ranitidine 75 MG tablet   Generic drug:  ranitidine      1 TABLET 1 TO 2 TIMES A DAY AS NEEDED        simvastatin 20 MG tablet    ZOCOR    30 tablet    Take 1 tablet (20 mg) by mouth At Bedtime    Hyperlipidemia LDL goal <130       * Notice:  This list has 4 medication(s) that are the same as other medications prescribed for you. Read the directions carefully, and ask your doctor or other care provider to review them with you.

## 2018-08-16 NOTE — NURSING NOTE
"Chief Complaint   Patient presents with     Pain Management       Initial /62 (BP Location: Right arm, Patient Position: Chair, Cuff Size: Adult Regular)  Pulse 72  Temp 97.7  F (36.5  C) (Tympanic)  Resp 16  Ht 4' 10.75\" (1.492 m)  Wt 114 lb 6.4 oz (51.9 kg)  BMI 23.3 kg/m2 Estimated body mass index is 23.3 kg/(m^2) as calculated from the following:    Height as of this encounter: 4' 10.75\" (1.492 m).    Weight as of this encounter: 114 lb 6.4 oz (51.9 kg).      Health Maintenance that is potentially due pending provider review:  NONE        Is there anyone who you would like to be able to receive your results? No  If yes have patient fill out ABHISHEK      "

## 2018-08-16 NOTE — PROGRESS NOTES
SUBJECTIVE:   Nighat Gupta is a 82 year old female who presents to clinic today for the following health issues:    Depression and Anxiety Follow-Up    Status since last visit: Worsened feels she needs a dose increase.  Has no energy or motivation.  Hard to concentrate    Other associated symptoms:None    Complicating factors:     Significant life event: No     Current substance abuse: None    PHQ-9 7/17/2017 12/15/2017 8/16/2018   Total Score 9 7 6   Q9: Suicide Ideation Not at all Not at all Not at all     PRECIOUS-7 SCORE 7/17/2017 12/15/2017 8/16/2018   Total Score - - -   Total Score - 2 (minimal anxiety) 10 (moderate anxiety)   Total Score 2 2 10     In the past two weeks have you had thoughts of suicide or self-harm?  No.    Do you have concerns about your personal safety or the safety of others?   No  PHQ-9  English  PHQ-9   Any Language  PRECIOUS-7  Suicide Assessment Five-step Evaluation and Treatment (SAFE-T)        Chronic Pain Follow-Up       Type / Location of Pain: low back   Analgesia/pain control:       Recent changes:  Worse      Overall control: break through pain has needed to take more gabapentin   Activity level/function:      Daily activities:  Able to do all daily activities    Work:  not applicable  Adverse effects:  No  Adherance    Taking medication as directed?  Yes     Participating in other treatments: no  Risk Factors:    Sleep:  Good    Mood/anxiety:  Worsened, would like dose increase    Recent family or social stressors:  none noted    Other aggravating factors: none  PHQ-9 SCORE 7/17/2017 12/15/2017 8/16/2018   Total Score - - -   Total Score MyChart - 7 (Mild depression) 6 (Mild depression)   Total Score 9 7 6     PRECIOUS-7 SCORE 7/17/2017 12/15/2017 8/16/2018   Total Score - - -   Total Score - 2 (minimal anxiety) 10 (moderate anxiety)   Total Score 2 2 10     Encounter-Level CSA - 12/02/2016:          Controlled Substance Agreement - Scan on 12/8/2016  1:38 PM : CONTROLLED SUBSTANCE  "AGREEMENT 12/02/2016 (below)            Encounter-Level CSA - 12/02/2016:          Controlled Substance Agreement - Scan on 4/8/2016 10:44 AM : CONTROLLED SUBSTANCE AGREEMENT 04/04/2016 (below)                Amount of exercise or physical activity: None    Problems taking medications regularly: No    Medication side effects: none    Diet: regular (no restrictions)            Problem list and histories reviewed & adjusted, as indicated.  Additional history: as documented    Labs reviewed in EPIC    Reviewed and updated as needed this visit by clinical staff  Tobacco  Allergies  Meds  Problems  Med Hx  Surg Hx  Fam Hx  Soc Hx        Reviewed and updated as needed this visit by Provider  Allergies  Meds  Problems         ROS:  Constitutional, HEENT, cardiovascular, pulmonary, gi and gu systems are negative, except as otherwise noted.    OBJECTIVE:                                                    /62 (BP Location: Right arm, Patient Position: Chair, Cuff Size: Adult Regular)  Pulse 72  Temp 97.7  F (36.5  C) (Tympanic)  Resp 16  Ht 4' 10.75\" (1.492 m)  Wt 114 lb 6.4 oz (51.9 kg)  BMI 23.3 kg/m2  Body mass index is 23.3 kg/(m^2).  GENERAL APPEARANCE: healthy, alert and no distress  PSYCH: mentation appears normal and affect flat         ASSESSMENT/PLAN:                                                    1. Mild major depression (H)  worsened  - buPROPion (WELLBUTRIN XL) 150 MG 24 hr tablet; Take 1 tablet (150 mg) by mouth every morning  Dispense: 90 tablet; Refill: 1    2. Chronic left-sided low back pain with left-sided sciatica  Stable no change in treatment plan.   - HYDROcodone-acetaminophen (NORCO) 5-325 MG per tablet; Take 1 tablet by mouth 2 times daily as needed for severe pain PRN severe pain  One month supply  Dispense: 50 tablet; Refill: 0    3. Chronic pain syndrome  Stable no change in treatment plan.   - HYDROcodone-acetaminophen (NORCO) 5-325 MG per tablet; Take 1 tablet by mouth 2 " times daily as needed for severe pain PRN severe pain  One month supply  Dispense: 50 tablet; Refill: 0      Patient Instructions   Pain meds refilled     Start wellbutrin 1 tab in the am for mood     Recheck with me in 3 months     Risks, benefits, side effects and rationale for treatment plan fully discussed with the patient and understanding expressed.   Nancy Tapia MD  [facility]

## 2018-08-17 ASSESSMENT — PATIENT HEALTH QUESTIONNAIRE - PHQ9: SUM OF ALL RESPONSES TO PHQ QUESTIONS 1-9: 6

## 2018-08-17 ASSESSMENT — ANXIETY QUESTIONNAIRES: GAD7 TOTAL SCORE: 10

## 2018-09-21 ENCOUNTER — TELEPHONE (OUTPATIENT)
Dept: FAMILY MEDICINE | Facility: CLINIC | Age: 83
End: 2018-09-21

## 2018-09-21 ENCOUNTER — OFFICE VISIT (OUTPATIENT)
Dept: FAMILY MEDICINE | Facility: CLINIC | Age: 83
End: 2018-09-21
Payer: COMMERCIAL

## 2018-09-21 VITALS
DIASTOLIC BLOOD PRESSURE: 52 MMHG | TEMPERATURE: 96.5 F | BODY MASS INDEX: 23.22 KG/M2 | SYSTOLIC BLOOD PRESSURE: 120 MMHG | HEART RATE: 80 BPM | RESPIRATION RATE: 20 BRPM | WEIGHT: 114 LBS

## 2018-09-21 DIAGNOSIS — H61.23 BILATERAL IMPACTED CERUMEN: Primary | ICD-10-CM

## 2018-09-21 PROCEDURE — 69209 REMOVE IMPACTED EAR WAX UNI: CPT | Mod: 50 | Performed by: PHYSICIAN ASSISTANT

## 2018-09-21 ASSESSMENT — ENCOUNTER SYMPTOMS
COUGH: 0
SORE THROAT: 0
SHORTNESS OF BREATH: 0
CARDIOVASCULAR NEGATIVE: 1
FEVER: 0
RESPIRATORY NEGATIVE: 1
WOUND: 0
NECK STIFFNESS: 0
WEAKNESS: 0
BRUISES/BLEEDS EASILY: 0
NECK PAIN: 0
DIZZINESS: 0
VOMITING: 0
HEMATOLOGIC/LYMPHATIC NEGATIVE: 1
CHILLS: 0
MYALGIAS: 0
ALLERGIC/IMMUNOLOGIC NEGATIVE: 1
MUSCULOSKELETAL NEGATIVE: 1
DIARRHEA: 0
BACK PAIN: 0
LIGHT-HEADEDNESS: 0
NAUSEA: 0
ARTHRALGIAS: 0
ENDOCRINE NEGATIVE: 1
JOINT SWELLING: 0
EYES NEGATIVE: 1
HEADACHES: 0
RHINORRHEA: 0
PALPITATIONS: 0

## 2018-09-21 NOTE — PROGRESS NOTES
Chief Complaint:    Chief Complaint   Patient presents with     Otalgia     Bilateral ear pain.  sometimes pop.        HPI: Nighat Gupta is an 82 year old female who presents for evaluation and treatment of bilateral plugged ear sensation.  This started some time ago and has not changed.  She denies any dizziness or problems with balance.  She denies any hearing loss.  She has not tried any OTC medications for this.      ROS:      Review of Systems   Constitutional: Negative for chills and fever.   HENT: Negative for congestion, ear discharge, ear pain, rhinorrhea and sore throat.         Pos for plugged ears   Eyes: Negative.    Respiratory: Negative.  Negative for cough and shortness of breath.    Cardiovascular: Negative.  Negative for chest pain and palpitations.   Gastrointestinal: Negative for diarrhea, nausea and vomiting.   Endocrine: Negative.    Genitourinary: Negative.    Musculoskeletal: Negative.  Negative for arthralgias, back pain, joint swelling, myalgias, neck pain and neck stiffness.   Skin: Negative.  Negative for rash and wound.   Allergic/Immunologic: Negative.  Negative for immunocompromised state.   Neurological: Negative for dizziness, weakness, light-headedness and headaches.   Hematological: Negative.  Does not bruise/bleed easily.        Family History   Family History   Problem Relation Age of Onset     HEART DISEASE Mother      Lipids Mother      Hypertension Mother      Lipids Father      Hypertension Father      Hypertension Sister      Lipids Sister      Depression Daughter      HEART DISEASE Sister      Hypertension Sister      Depression Daughter        Social History  Social History     Social History     Marital status:      Spouse name: N/A     Number of children: N/A     Years of education: N/A     Occupational History     Not on file.     Social History Main Topics     Smoking status: Current Every Day Smoker     Packs/day: 0.25     Years: 50.00     Types:  Cigarettes     Smokeless tobacco: Never Used      Comment: 5-8 cigarettes daily     Alcohol use No     Drug use: No     Sexual activity: No     Other Topics Concern     Parent/Sibling W/ Cabg, Mi Or Angioplasty Before 65f 55m? No     Social History Narrative        Surgical History:  Past Surgical History:   Procedure Laterality Date     CATARACT IOL, RT/LT  2009    Cataract IOL RT/LT     INJECT EPIDURAL LUMBAR  4/2/2012    Procedure:INJECT EPIDURAL LUMBAR; MORENA with Roberto--; Surgeon:GENERIC ANESTHESIA PROVIDER; Location:[facility]     INJECT EPIDURAL LUMBAR  6/4/2012    Procedure:INJECT EPIDURAL LUMBAR; MORENA with Roberto--; Surgeon:GENERIC ANESTHESIA PROVIDER; Location:[facility]     INJECT EPIDURAL LUMBAR  6/18/2012    Procedure: INJECT EPIDURAL LUMBAR;  MORENA-Dr. Tapia;  Surgeon: Provider, Generic Anesthesia;  Location: [facility]     SURGICAL HISTORY OF -   04/2004    Oopherectomy - (R)        Problem List:  Patient Active Problem List   Diagnosis     Mild major depression (H)     Hypothyroidism     Tobacco abuse     Osteopenia     HYPERLIPIDEMIA LDL GOAL <130     Advanced directives, counseling/discussion     Benign essential hypertension     Health Care Home     Chronic low back pain     Lumbar radiculopathy     Chronic pain syndrome        Allergies:  Allergies   Allergen Reactions     No Known Drug Allergy         Current Meds:    Current Outpatient Prescriptions:      amLODIPine (NORVASC) 10 MG tablet, TAKE ONE TABLET BY MOUTH DAILY, Disp: 90 tablet, Rfl: 1     ASPIRIN 81 MG OR TABS, one daily, Disp: 100, Rfl: 0     buPROPion (WELLBUTRIN XL) 150 MG 24 hr tablet, Take 1 tablet (150 mg) by mouth every morning, Disp: 90 tablet, Rfl: 1     CALCIUM 500 + D 500-200 MG-IU OR TABS, 1500mg daily, Disp: , Rfl:      cholecalciferol (VITAMIN D) 400 UNIT TABS, Take 400 Units by mouth daily., Disp: , Rfl:      citalopram (CELEXA) 40 MG tablet, TAKE ONE TABLET BY MOUTH ONE TIME DAILY, Disp: 90 tablet, Rfl: 0      gabapentin (NEURONTIN) 300 MG capsule, 2 tabs tid, Disp: 180 capsule, Rfl: 11     [START ON 10/16/2018] HYDROcodone-acetaminophen (NORCO) 5-325 MG per tablet, Take 1 tablet by mouth 2 times daily as needed for severe pain PRN severe pain  One month supply, Disp: 50 tablet, Rfl: 0     HYDROcodone-acetaminophen (NORCO) 5-325 MG per tablet, TAKE ONE TABLET BY MOUTH 2 TIMES DAILY AS NEEDED FOR PAIN, Disp: 10 tablet, Rfl: 0     levothyroxine (SYNTHROID/LEVOTHROID) 50 MCG tablet, TAKE 1 TABLET BY MOUTH ON TUESDAY, THURSDAY, SATURDAY, AND SUNDAY, Disp: 30 tablet, Rfl: 10     levothyroxine (SYNTHROID/LEVOTHROID) 75 MCG tablet, TAKE 1 TABLET BY MOUTH ON MONDAY, WEDNESDAY, AND FRIDAY, Disp: 36 tablet, Rfl: 1     lisinopril (PRINIVIL/ZESTRIL) 40 MG tablet, Take 1 tablet (40 mg) by mouth daily, Disp: 30 tablet, Rfl: 11     MULTIVITAMIN TABS   OR, 1 TAB DAILY, Disp: , Rfl: 0     OMEGA 3 OR, one tablet daily, Disp: , Rfl:      simvastatin (ZOCOR) 20 MG tablet, Take 1 tablet (20 mg) by mouth At Bedtime, Disp: 30 tablet, Rfl: 11     ZANTAC 75 75 MG OR TABS, 1 TABLET 1 TO 2 TIMES A DAY AS NEEDED, Disp: , Rfl:      PHYSICAL EXAM:     Vital signs noted and reviewed by Scottie Puri  /52 (BP Location: Right arm, Cuff Size: Adult Regular)  Pulse 80  Temp 96.5  F (35.8  C) (Tympanic)  Resp 20  Wt 114 lb (51.7 kg)  BMI 23.22 kg/m2     PEFR:    Physical Exam   Constitutional: She is oriented to person, place, and time. She appears well-developed and well-nourished. No distress.   HENT:   Head: Normocephalic and atraumatic.   Right Ear: Tympanic membrane and external ear normal. A foreign body is present.   Left Ear: Tympanic membrane and external ear normal. A foreign body is present.   Mouth/Throat: Oropharynx is clear and moist.   Bilateral ears normal after cerumen was removed.   Eyes: EOM are normal. Pupils are equal, round, and reactive to light.   Neck: Normal range of motion. Neck supple.   Cardiovascular: Normal rate,  regular rhythm, normal heart sounds and intact distal pulses.  Exam reveals no gallop and no friction rub.    No murmur heard.  Pulmonary/Chest: Effort normal and breath sounds normal. No respiratory distress.   Abdominal: Soft. Bowel sounds are normal. She exhibits no distension and no mass. There is no tenderness. There is no guarding.   Lymphadenopathy:     She has no cervical adenopathy.   Neurological: She is alert and oriented to person, place, and time. She has normal reflexes. No cranial nerve deficit.   Skin: Skin is warm and dry. She is not diaphoretic.   Psychiatric: She has a normal mood and affect. Her behavior is normal. Judgment and thought content normal.   Nursing note and vitals reviewed.       ASSESSMENT:     1. Bilateral impacted cerumen           PLAN:     Bilateral ears were lavaged and cerumen removed with curette by me.  Patient verbalized resolution of symptoms.       Scottie Puri  9/21/2018, 1:33 PM

## 2018-09-21 NOTE — TELEPHONE ENCOUNTER
Patient forgot sunglasses in the room.  Called patient and informed that I left at .   Fazal Ferro M.A.

## 2018-09-21 NOTE — NURSING NOTE
"Chief Complaint   Patient presents with     Otalgia     Bilateral ear pain.  sometimes pop.        Initial /52 (BP Location: Right arm, Cuff Size: Adult Regular)  Pulse 80  Temp 96.5  F (35.8  C) (Tympanic)  Resp 20  Wt 114 lb (51.7 kg)  BMI 23.22 kg/m2 Estimated body mass index is 23.22 kg/(m^2) as calculated from the following:    Height as of 8/16/18: 4' 10.75\" (1.492 m).    Weight as of this encounter: 114 lb (51.7 kg).    Patient presents to the clinic using No DME    Health Maintenance that is potentially due pending provider review:  NONE    n/a    Is there anyone who you would like to be able to receive your results? Not Applicable  If yes have patient fill out ABHISHEK Ferro M.A.      "

## 2018-09-21 NOTE — MR AVS SNAPSHOT
After Visit Summary   9/21/2018    Nighat Gupta    MRN: [id number]           Patient Information     Date Of Birth          1935        Visit Information        Provider Department      9/21/2018 1:20 PM Scottie Puri PA-C [facility]        Today's Diagnoses     Bilateral impacted cerumen    -  1       Follow-ups after your visit        Who to contact     If you have questions or need follow up information about today's clinic visit or your schedule please contact [facility] directly at [phone].  Normal or non-critical lab and imaging results will be communicated to you by MyChart, letter or phone within 4 business days after the clinic has received the results. If you do not hear from us within 7 days, please contact the clinic through MyChart or phone. If you have a critical or abnormal lab result, we will notify you by phone as soon as possible.  Submit refill requests through [employer] or call your pharmacy and they will forward the refill request to us. Please allow 3 business days for your refill to be completed.          Additional Information About Your Visit        Care EveryWhere ID     This is your Care EveryWhere ID. This could be used by other organizations to access your [facility] medical records  [id number]        Your Vitals Were     Pulse Temperature Respirations BMI (Body Mass Index)          80 96.5  F (35.8  C) (Tympanic) 20 23.22 kg/m2         Blood Pressure from Last 3 Encounters:   09/21/18 120/52   08/16/18 136/62   05/04/18 136/60    Weight from Last 3 Encounters:   09/21/18 114 lb (51.7 kg)   08/16/18 114 lb 6.4 oz (51.9 kg)   05/04/18 115 lb 12.8 oz (52.5 kg)              We Performed the Following     REMOVE IMPACTED CERUMEN        Primary Care Provider Office Phone # Fax #    Nancy Tapia [phone] [address] [place] [id number]        Equal Access to Services     KAYDEN ALLEN  AH: Helen benitezmiriamleticia Laura, wasoniada luqadaha, qaybta kacarmella lakhani, adriana jordan johnnyclayton antoineashfeli dotson. So [facility] [phone].    ATENCIÓN: Si habla nelsonañol, tiene a whipple disposición servicios gratuitos de asistencia lingüística. Llame al [phone].    We comply with applicable federal civil rights laws and [place] laws. We do not discriminate on the basis of race, color, national origin, age, disability, sex, sexual orientation, or gender identity.            Thank you!     Thank you for choosing [facility]  for your care. Our goal is always to provide you with excellent care. Hearing back from our patients is one way we can continue to improve our services. Please take a few minutes to complete the written survey that you may receive in the mail after your visit with us. Thank you!             Your Updated Medication List - Protect others around you: Learn how to safely use, store and throw away your medicines at www.disposemymeds.org.          This list is accurate as of 9/21/18  1:58 PM.  Always use your most recent med list.                   Brand Name Dispense Instructions for use Diagnosis    amLODIPine 10 MG tablet    NORVASC    90 tablet    TAKE ONE TABLET BY MOUTH DAILY    Benign essential hypertension       aspirin 81 MG tablet     100    one daily    Essential hypertension, benign, Other disorders of lipoid metabolism       buPROPion 150 MG 24 hr tablet    WELLBUTRIN XL    90 tablet    Take 1 tablet (150 mg) by mouth every morning    Mild major depression (H)       CALCIUM 500 + D 500-200 MG-IU Tabs      1500mg daily        cholecalciferol 400 UNIT Tabs tablet    vitamin D3     Take 400 Units by mouth daily.        citalopram 40 MG tablet    celeXA    90 tablet    TAKE ONE TABLET BY MOUTH ONE TIME DAILY    Major depressive disorder, single episode, mild (H)       gabapentin 300 MG capsule    NEURONTIN    180 capsule    2 tabs tid    Lumbar radiculopathy       *  HYDROcodone-acetaminophen 5-325 MG per tablet    NORCO    10 tablet    TAKE ONE TABLET BY MOUTH 2 TIMES DAILY AS NEEDED FOR PAIN    Chronic left-sided low back pain with left-sided sciatica, Chronic pain syndrome       * HYDROcodone-acetaminophen 5-325 MG per tablet   Start taking on:  10/16/2018    NORCO    50 tablet    Take 1 tablet by mouth 2 times daily as needed for severe pain PRN severe pain  One month supply    Chronic left-sided low back pain with left-sided sciatica, Chronic pain syndrome       * levothyroxine 50 MCG tablet    SYNTHROID/LEVOTHROID    30 tablet    TAKE 1 TABLET BY MOUTH ON TUESDAY, THURSDAY, SATURDAY, AND SUNDAY    Hypothyroidism, unspecified type       * levothyroxine 75 MCG tablet    SYNTHROID/LEVOTHROID    36 tablet    TAKE 1 TABLET BY MOUTH ON MONDAY, WEDNESDAY, AND FRIDAY    Hypothyroidism, unspecified type       lisinopril 40 MG tablet    PRINIVIL/ZESTRIL    30 tablet    Take 1 tablet (40 mg) by mouth daily    Essential hypertension, benign       MULTIVITAMIN TABS   OR      1 TAB DAILY        OMEGA 3 PO      one tablet daily        ranitidine 75 MG tablet   Generic drug:  ranitidine      1 TABLET 1 TO 2 TIMES A DAY AS NEEDED        simvastatin 20 MG tablet    ZOCOR    30 tablet    Take 1 tablet (20 mg) by mouth At Bedtime    Hyperlipidemia LDL goal <130       * Notice:  This list has 4 medication(s) that are the same as other medications prescribed for you. Read the directions carefully, and ask your doctor or other care provider to review them with you.

## 2018-10-16 DIAGNOSIS — E03.9 HYPOTHYROIDISM, UNSPECIFIED TYPE: ICD-10-CM

## 2018-10-16 RX ORDER — LEVOTHYROXINE SODIUM 75 UG/1
TABLET ORAL
Qty: 36 TABLET | Refills: 1 | Status: SHIPPED | OUTPATIENT
Start: 2018-10-16 | End: 2019-03-19

## 2018-10-16 NOTE — TELEPHONE ENCOUNTER
"Requested Prescriptions   Pending Prescriptions Disp Refills     levothyroxine (SYNTHROID/LEVOTHROID) 75 MCG tablet [Pharmacy Med Name: LEVOTHYROXIN 75 MCG TAB SORIN] 36 tablet 0     Sig: TAKE 1 TABLET BY MOUTH ON MONDAY, WEDNESDAY, AND FRIDAY    Thyroid Protocol Passed    10/16/2018  9:03 AM       Passed - Patient is 12 years or older       Passed - Recent (12 mo) or future (30 days) visit within the authorizing provider's specialty    Patient had office visit in the last 12 months or has a visit in the next 30 days with authorizing provider or within the authorizing provider's specialty.  See \"Patient Info\" tab in inbasket, or \"Choose Columns\" in Meds & Orders section of the refill encounter.           Passed - Normal TSH on file in past 12 months    Recent Labs   Lab Test  05/04/18   1104   TSH  0.85             Passed - No active pregnancy on record    If patient is pregnant or has had a positive pregnancy test, please check TSH.         Passed - No positive pregnancy test in past 12 months    If patient is pregnant or has had a positive pregnancy test, please check TSH.          Last Written Prescription Date:  5/1/18  Last Fill Quantity: 36,  # refills: 1   Last office visit: 9/21/2018 with prescribing provider:  GARETT   Future Office Visit:   Next 5 appointments (look out 90 days)     Nov 19, 2018 10:20 AM CST   SHORT with Nancy Tapia MD   [facility]    [address] [place] [id number] [phone]                   "

## 2018-11-15 DIAGNOSIS — I10 BENIGN ESSENTIAL HYPERTENSION: ICD-10-CM

## 2018-11-15 NOTE — TELEPHONE ENCOUNTER
"Requested Prescriptions   Pending Prescriptions Disp Refills     amLODIPine (NORVASC) 10 MG tablet [Pharmacy Med Name: AMLODIPINE 10 MG    TAB ASCE] 90 tablet 0     Sig: TAKE ONE TABLET BY MOUTH ONE TIME DAILY    Calcium Channel Blockers Protocol  Passed    11/15/2018 10:04 AM       Passed - Blood pressure under 140/90 in past 12 months    BP Readings from Last 3 Encounters:   09/21/18 120/52   08/16/18 136/62   05/04/18 136/60                Passed - Recent (12 mo) or future (30 days) visit within the authorizing provider's specialty    Patient had office visit in the last 12 months or has a visit in the next 30 days with authorizing provider or within the authorizing provider's specialty.  See \"Patient Info\" tab in inbasket, or \"Choose Columns\" in Meds & Orders section of the refill encounter.             Passed - Patient is age 18 or older       Passed - No active pregnancy on record       Passed - Normal serum creatinine on file in past 12 months    Recent Labs   Lab Test  12/15/17   1149   CR  0.90            Passed - No positive pregnancy test in past 12 months        Last Written Prescription Date:  5/17/18  Last Fill Quantity: 90,  # refills: 1   Last office visit: 9/21/2018 with prescribing provider:     Future Office Visit:   Next 5 appointments (look out 90 days)     Nov 19, 2018 10:20 AM CST   SHORT with Nancy Tapia MD   [facility] ([facility])    [address] [place] [id number] [phone]                   "

## 2018-11-16 RX ORDER — AMLODIPINE BESYLATE 10 MG/1
TABLET ORAL
Qty: 90 TABLET | Refills: 0 | Status: SHIPPED | OUTPATIENT
Start: 2018-11-16 | End: 2019-07-19

## 2018-11-16 NOTE — TELEPHONE ENCOUNTER
Prescription approved per [facility] Refill Protocol.  Jennifer SNEED RN

## 2018-11-16 NOTE — TELEPHONE ENCOUNTER
I left a message for the patient to return my call.  Does she have enough pills to last until her appt with Dr. Tapia on Monday 11/19/18?    Jennifer SNEED RN

## 2018-11-16 NOTE — TELEPHONE ENCOUNTER
Pt returned call - Pt does not have enough Norvasc until appt.  Pt requesting script to be sent to Pharmacy  [facility]

## 2018-12-11 ENCOUNTER — OFFICE VISIT (OUTPATIENT)
Dept: FAMILY MEDICINE | Facility: CLINIC | Age: 83
End: 2018-12-11
Payer: COMMERCIAL

## 2018-12-11 VITALS
BODY MASS INDEX: 22.81 KG/M2 | DIASTOLIC BLOOD PRESSURE: 66 MMHG | OXYGEN SATURATION: 99 % | SYSTOLIC BLOOD PRESSURE: 130 MMHG | WEIGHT: 112 LBS | HEART RATE: 92 BPM | TEMPERATURE: 97.2 F

## 2018-12-11 DIAGNOSIS — G89.4 CHRONIC PAIN SYNDROME: ICD-10-CM

## 2018-12-11 DIAGNOSIS — G89.29 CHRONIC LEFT-SIDED LOW BACK PAIN WITH LEFT-SIDED SCIATICA: ICD-10-CM

## 2018-12-11 DIAGNOSIS — M54.42 CHRONIC LEFT-SIDED LOW BACK PAIN WITH LEFT-SIDED SCIATICA: ICD-10-CM

## 2018-12-11 PROCEDURE — 99214 OFFICE O/P EST MOD 30 MIN: CPT | Performed by: FAMILY MEDICINE

## 2018-12-11 RX ORDER — HYDROCODONE BITARTRATE AND ACETAMINOPHEN 5; 325 MG/1; MG/1
1 TABLET ORAL 2 TIMES DAILY PRN
Qty: 50 TABLET | Refills: 0 | Status: SHIPPED | OUTPATIENT
Start: 2018-12-11 | End: 2018-12-11

## 2018-12-11 RX ORDER — HYDROCODONE BITARTRATE AND ACETAMINOPHEN 5; 325 MG/1; MG/1
1 TABLET ORAL 2 TIMES DAILY PRN
Qty: 50 TABLET | Refills: 0 | Status: SHIPPED | OUTPATIENT
Start: 2019-01-11 | End: 2018-12-11

## 2018-12-11 RX ORDER — HYDROCODONE BITARTRATE AND ACETAMINOPHEN 5; 325 MG/1; MG/1
1 TABLET ORAL 2 TIMES DAILY PRN
Qty: 50 TABLET | Refills: 0 | Status: SHIPPED | OUTPATIENT
Start: 2019-02-11 | End: 2019-03-19

## 2018-12-11 NOTE — NURSING NOTE
"Chief Complaint   Patient presents with     Pain       Initial /66 (BP Location: Right arm, Patient Position: Chair, Cuff Size: Adult Regular)   Pulse 92   Temp 97.2  F (36.2  C) (Tympanic)   Wt 50.8 kg (112 lb)   SpO2 99%   BMI 22.81 kg/m   Estimated body mass index is 22.81 kg/m  as calculated from the following:    Height as of 8/16/18: 1.492 m (4' 10.75\").    Weight as of this encounter: 50.8 kg (112 lb).    Patient presents to the clinic using No DME    Health Maintenance that is potentially due pending provider review:  NONE    n/a    Is there anyone who you would like to be able to receive your results? No  If yes have patient fill out ABHISHEK    "

## 2019-03-19 ENCOUNTER — OFFICE VISIT (OUTPATIENT)
Dept: FAMILY MEDICINE | Facility: CLINIC | Age: 84
End: 2019-03-19
Payer: COMMERCIAL

## 2019-03-19 VITALS
TEMPERATURE: 97.2 F | SYSTOLIC BLOOD PRESSURE: 138 MMHG | WEIGHT: 113.6 LBS | RESPIRATION RATE: 14 BRPM | BODY MASS INDEX: 22.9 KG/M2 | HEIGHT: 59 IN | DIASTOLIC BLOOD PRESSURE: 60 MMHG | HEART RATE: 78 BPM | OXYGEN SATURATION: 98 %

## 2019-03-19 DIAGNOSIS — M54.42 CHRONIC LEFT-SIDED LOW BACK PAIN WITH LEFT-SIDED SCIATICA: ICD-10-CM

## 2019-03-19 DIAGNOSIS — E03.9 HYPOTHYROIDISM, UNSPECIFIED TYPE: ICD-10-CM

## 2019-03-19 DIAGNOSIS — F32.0 MAJOR DEPRESSIVE DISORDER, SINGLE EPISODE, MILD (H): Primary | ICD-10-CM

## 2019-03-19 DIAGNOSIS — G89.4 CHRONIC PAIN SYNDROME: ICD-10-CM

## 2019-03-19 DIAGNOSIS — G89.29 CHRONIC LEFT-SIDED LOW BACK PAIN WITH LEFT-SIDED SCIATICA: ICD-10-CM

## 2019-03-19 LAB
T4 FREE SERPL-MCNC: 1.15 NG/DL (ref 0.76–1.46)
TSH SERPL DL<=0.005 MIU/L-ACNC: 0.38 MU/L (ref 0.4–4)

## 2019-03-19 PROCEDURE — 99214 OFFICE O/P EST MOD 30 MIN: CPT | Performed by: FAMILY MEDICINE

## 2019-03-19 PROCEDURE — 84439 ASSAY OF FREE THYROXINE: CPT | Performed by: FAMILY MEDICINE

## 2019-03-19 PROCEDURE — 84443 ASSAY THYROID STIM HORMONE: CPT | Performed by: FAMILY MEDICINE

## 2019-03-19 PROCEDURE — 36415 COLL VENOUS BLD VENIPUNCTURE: CPT | Performed by: FAMILY MEDICINE

## 2019-03-19 RX ORDER — HYDROCODONE BITARTRATE AND ACETAMINOPHEN 5; 325 MG/1; MG/1
1 TABLET ORAL 2 TIMES DAILY PRN
Qty: 50 TABLET | Refills: 0 | Status: SHIPPED | OUTPATIENT
Start: 2019-04-19 | End: 2019-03-19

## 2019-03-19 RX ORDER — CITALOPRAM HYDROBROMIDE 40 MG/1
40 TABLET ORAL DAILY
Qty: 90 TABLET | Refills: 3 | Status: SHIPPED | OUTPATIENT
Start: 2019-03-19 | End: 2019-12-16

## 2019-03-19 RX ORDER — LEVOTHYROXINE SODIUM 75 UG/1
TABLET ORAL
Qty: 36 TABLET | Refills: 0 | Status: SHIPPED | OUTPATIENT
Start: 2019-03-19 | End: 2019-06-24

## 2019-03-19 RX ORDER — HYDROCODONE BITARTRATE AND ACETAMINOPHEN 5; 325 MG/1; MG/1
1 TABLET ORAL 2 TIMES DAILY PRN
Qty: 50 TABLET | Refills: 0 | Status: SHIPPED | OUTPATIENT
Start: 2019-03-19 | End: 2019-03-19

## 2019-03-19 RX ORDER — HYDROCODONE BITARTRATE AND ACETAMINOPHEN 5; 325 MG/1; MG/1
1 TABLET ORAL 2 TIMES DAILY PRN
Qty: 50 TABLET | Refills: 0 | Status: SHIPPED | OUTPATIENT
Start: 2019-05-19 | End: 2019-07-19

## 2019-03-19 ASSESSMENT — ANXIETY QUESTIONNAIRES
GAD7 TOTAL SCORE: 3
5. BEING SO RESTLESS THAT IT IS HARD TO SIT STILL: NOT AT ALL
7. FEELING AFRAID AS IF SOMETHING AWFUL MIGHT HAPPEN: SEVERAL DAYS
4. TROUBLE RELAXING: NOT AT ALL
1. FEELING NERVOUS, ANXIOUS, OR ON EDGE: NOT AT ALL
3. WORRYING TOO MUCH ABOUT DIFFERENT THINGS: SEVERAL DAYS
GAD7 TOTAL SCORE: 3
GAD7 TOTAL SCORE: 3
6. BECOMING EASILY ANNOYED OR IRRITABLE: NOT AT ALL
2. NOT BEING ABLE TO STOP OR CONTROL WORRYING: SEVERAL DAYS
7. FEELING AFRAID AS IF SOMETHING AWFUL MIGHT HAPPEN: SEVERAL DAYS

## 2019-03-19 ASSESSMENT — PATIENT HEALTH QUESTIONNAIRE - PHQ9
SUM OF ALL RESPONSES TO PHQ QUESTIONS 1-9: 4
10. IF YOU CHECKED OFF ANY PROBLEMS, HOW DIFFICULT HAVE THESE PROBLEMS MADE IT FOR YOU TO DO YOUR WORK, TAKE CARE OF THINGS AT HOME, OR GET ALONG WITH OTHER PEOPLE: NOT DIFFICULT AT ALL
SUM OF ALL RESPONSES TO PHQ QUESTIONS 1-9: 4

## 2019-03-19 ASSESSMENT — MIFFLIN-ST. JEOR: SCORE: 881.95

## 2019-03-19 NOTE — TELEPHONE ENCOUNTER
"Requested Prescriptions   Pending Prescriptions Disp Refills     levothyroxine (SYNTHROID/LEVOTHROID) 75 MCG tablet [Pharmacy Med Name: LEVOTHYROXIN 75MCG  TAB] 36 tablet 0     Sig:  TAKE 1 TABLET BY MOUTH ON MONDAY, WEDNESDAY, AND FRIDAY    Thyroid Protocol Passed - 3/19/2019 12:06 PM       Passed - Patient is 12 years or older       Passed - Recent (12 mo) or future (30 days) visit within the authorizing provider's specialty    Patient had office visit in the last 12 months or has a visit in the next 30 days with authorizing provider or within the authorizing provider's specialty.  See \"Patient Info\" tab in inbasket, or \"Choose Columns\" in Meds & Orders section of the refill encounter.             Passed - Medication is active on med list       Passed - Normal TSH on file in past 12 months    Recent Labs   Lab Test 05/04/18  1104   TSH 0.85             Passed - No active pregnancy on record    If patient is pregnant or has had a positive pregnancy test, please check TSH.         Passed - No positive pregnancy test in past 12 months    If patient is pregnant or has had a positive pregnancy test, please check TSH.          Last Written Prescription Date:  10/16/18  Last Fill Quantity: 36,  # refills: 1   Last office visit: 3/19/2019 with prescribing provider:     Future Office Visit:      "

## 2019-03-19 NOTE — NURSING NOTE
"Chief Complaint   Patient presents with     Depression     Pain       Initial /60 (BP Location: Right arm, Patient Position: Chair, Cuff Size: Adult Regular)   Pulse 78   Temp 97.2  F (36.2  C) (Tympanic)   Resp 14   Ht 1.492 m (4' 10.75\")   Wt 51.5 kg (113 lb 9.6 oz)   SpO2 98%   BMI 23.14 kg/m   Estimated body mass index is 23.14 kg/m  as calculated from the following:    Height as of this encounter: 1.492 m (4' 10.75\").    Weight as of this encounter: 51.5 kg (113 lb 9.6 oz).    Patient presents to the clinic using No DME    Health Maintenance that is potentially due pending provider review:  NONE    n/a    Is there anyone who you would like to be able to receive your results? No  If yes have patient fill out [facility]    "

## 2019-03-19 NOTE — PROGRESS NOTES
SUBJECTIVE:   Nighat Gupta is a 83 year old female who presents to clinic today for the following health issues:      Chronic Pain Follow-Up       Type / Location of Pain: low back  Analgesia/pain control:       Recent changes:  same      Overall control: Tolerable with discomfort  Activity level/function:      Daily activities:  Can do most things most days, with some rest    Work:  not applicable  Adverse effects:  No  Adherance    Taking medication as directed?  Yes    Participating in other treatments: no -   Risk Factors:    Sleep:  Good    Mood/anxiety:  controlled    Recent family or social stressors:  none noted    Other aggravating factors: none  PHQ-9 SCORE 12/15/2017 8/16/2018 3/19/2019   PHQ-9 Total Score - - -   PHQ-9 Total Score MyChart 7 (Mild depression) 6 (Mild depression) 4 (Minimal depression)   PHQ-9 Total Score 7 6 4     PRECIOUS-7 SCORE 12/15/2017 8/16/2018 3/19/2019   Total Score - - -   Total Score 2 (minimal anxiety) 10 (moderate anxiety) 3 (minimal anxiety)   Total Score 2 10 3     Encounter-Level CSA - 12/11/2018:    Controlled Substance Agreement - Scan on 12/27/2018  8:55 AM: CONTROLLED SUBSTANCE AGREEMENT - 11/11/18 (below)       Encounter-Level CSA - 12/02/2016:    Controlled Substance Agreement - Scan on 12/8/2016  1:38 PM: CONTROLLED SUBSTANCE AGREEMENT 12/02/2016 (below)       Encounter-Level CSA - 04/04/2016:    Controlled Substance Agreement - Scan on 4/8/2016 10:44 AM: CONTROLLED SUBSTANCE AGREEMENT 04/04/2016 (below)       Patient-Level CSA:    There are no patient-level csa.         Amount of exercise or physical activity: None    Problems taking medications regularly: No    Medication side effects: none    Diet: regular (no restrictions)    Hypothyroidism Follow-up      Since last visit, patient describes the following symptoms: Weight stable, no hair loss, no skin changes, no constipation, no loose stools           Depression Followup  PHQ-9 ([employer]) 3/19/2019   Cathie Montaño  interest or pleasure in doing things 1   2.  Feeling down, depressed, or hopeless 2   3.  Trouble falling or staying asleep, or sleeping too much 0   4.  Feeling tired or having little energy 1   5.  Poor appetite or overeating 0   6.  Feeling bad about yourself 0   7.  Trouble concentrating 0   8.  Moving slowly or restless 0   9.  Suicidal or self-harm thoughts 0   PHQ-9 Total Score 4   Difficulty at work, home, or with people      PRECIOUS-7   [employer], 2002; Used with Permission) 3/19/2019   1. Feeling nervous, anxious, or on edge 0   2. Not being able to stop or control worrying 1   3. Worrying too much about different things 1   4. Trouble relaxing 0   5. Being so restless that it is hard to sit still 0   6. Becoming easily annoyed or irritable 0   7. Feeling afraid, as if something awful might happen 1   PRECIOUS-7 Total Score 3       Status since last visit: Worsened     See PHQ-9 for current symptoms.  Other associated symptoms: None    Complicating factors:   Significant life event:  No   Current substance abuse:  None  Anxiety or Panic symptoms:  No    PHQ 12/15/2017 8/16/2018 3/19/2019   PHQ-9 Total Score 7 6 4   Q9: Suicide Ideation Not at all Not at all Not at all     In the past two weeks have you had thoughts of suicide or self-harm?  No.    Do you have concerns about your personal safety or the safety of others?   No  PHQ-9  English  PHQ-9   Any Language  Suicide Assessment Five-step Evaluation and Treatment (SAFE-T)    Problem list and histories reviewed & adjusted, as indicated.  Additional history: as documented    Labs reviewed in EPIC    Reviewed and updated as needed this visit by clinical staff  Tobacco  Allergies  Meds  Problems  Med Hx  Surg Hx  Fam Hx  Soc Hx        Reviewed and updated as needed this visit by Provider  Tobacco  Allergies  Meds  Problems  Med Hx  Surg Hx  Fam Hx         ROS:  Constitutional, HEENT, cardiovascular, pulmonary, gi and gu systems are negative, except as  "otherwise noted.    OBJECTIVE:                                                    /60 (BP Location: Right arm, Patient Position: Chair, Cuff Size: Adult Regular)   Pulse 78   Temp 97.2  F (36.2  C) (Tympanic)   Resp 14   Ht 1.492 m (4' 10.75\")   Wt 51.5 kg (113 lb 9.6 oz)   SpO2 98%   BMI 23.14 kg/m     Body mass index is 23.14 kg/m .  GENERAL APPEARANCE: healthy, alert and no distress  NECK: no adenopathy, no asymmetry, masses, or scars and thyroid normal to palpation  RESP: lungs clear to auscultation - no rales, rhonchi or wheezes  CV: regular rates and rhythm, normal S1 S2, no S3 or S4 and no murmur, click or rub  PSYCH: mentation appears normal and affect normal/bright         ASSESSMENT/PLAN:                                                    1. Major depressive disorder, single episode, mild (H)  Did not like the wellbutrin would like to go back to celexa   - citalopram (CELEXA) 40 MG tablet; Take 1 tablet (40 mg) by mouth daily  Dispense: 90 tablet; Refill: 3    2. Chronic left-sided low back pain with left-sided sciatica  Stable no change in treatment plan.   - HYDROcodone-acetaminophen (NORCO) 5-325 MG tablet; Take 1 tablet by mouth 2 times daily as needed for severe pain PRN severe pain  One month supply  Dispense: 50 tablet; Refill: 0    3. Chronic pain syndrome  Stable no change in treatment plan.   - HYDROcodone-acetaminophen (NORCO) 5-325 MG tablet; Take 1 tablet by mouth 2 times daily as needed for severe pain PRN severe pain  One month supply  Dispense: 50 tablet; Refill: 0    4. Hypothyroidism, unspecified type  Adjust meds as indicated by above labs.   - TSH with free T4 reflex      Patient Instructions   Pain meds refilled     Check thyroid today     Stop wellbutrin     Restart the celexa     Risks, benefits, side effects and rationale for treatment plan fully discussed with the patient and understanding expressed.   Nancy Tapia MD  [facility]  "

## 2019-03-20 ASSESSMENT — ANXIETY QUESTIONNAIRES: GAD7 TOTAL SCORE: 3

## 2019-05-10 DIAGNOSIS — E78.5 HYPERLIPIDEMIA LDL GOAL <130: ICD-10-CM

## 2019-05-10 DIAGNOSIS — E03.9 HYPOTHYROIDISM, UNSPECIFIED TYPE: ICD-10-CM

## 2019-05-10 DIAGNOSIS — I10 ESSENTIAL HYPERTENSION, BENIGN: ICD-10-CM

## 2019-05-10 NOTE — TELEPHONE ENCOUNTER
"Requested Prescriptions   Pending Prescriptions Disp Refills     levothyroxine (SYNTHROID/LEVOTHROID) 50 MCG tablet [Pharmacy Med Name: LEVOTHYROXIN 50MCG  TAB] 30 tablet 4     Sig:  TAKE 1 TABLET BY MOUTH ON TUESDAY, THURSDAY, SATURDAY, AND SUNDAY       Thyroid Protocol Failed - 5/10/2019  9:45 AM        Failed - Normal TSH on file in past 12 months     Recent Labs   Lab Test 03/19/19  1039   TSH 0.38*              Passed - Patient is 12 years or older        Passed - Recent (12 mo) or future (30 days) visit within the authorizing provider's specialty     Patient had office visit in the last 12 months or has a visit in the next 30 days with authorizing provider or within the authorizing provider's specialty.  See \"Patient Info\" tab in inbasket, or \"Choose Columns\" in Meds & Orders section of the refill encounter.      Last Written Prescription Date:  4/18/18  Last Fill Quantity: 30,  # refills: 10   Last office visit: 3/19/2019 with prescribing provider:     Future Office Visit:                Passed - Medication is active on med list        Passed - No active pregnancy on record     If patient is pregnant or has had a positive pregnancy test, please check TSH.          Passed - No positive pregnancy test in past 12 months     If patient is pregnant or has had a positive pregnancy test, please check TSH.          simvastatin (ZOCOR) 20 MG tablet [Pharmacy Med Name: SIMVASTATIN 20MG  TAB] 30 tablet 1     Sig: TAKE 1 TABLET BY MOUTH ONCE DAILY AT NIGHT AT BEDTIME       Statins Protocol Failed - 5/10/2019  9:45 AM        Failed - LDL on file in past 12 months     Recent Labs   Lab Test 12/15/17  1149   LDL 84             Passed - No abnormal creatine kinase in past 12 months     No lab results found.             Passed - Recent (12 mo) or future (30 days) visit within the authorizing provider's specialty     Patient had office visit in the last 12 months or has a visit in the next 30 days with authorizing provider or " "within the authorizing provider's specialty.  See \"Patient Info\" tab in inbasket, or \"Choose Columns\" in Meds & Orders section of the refill encounter.      Last Written Prescription Date:  5/4/18  Last Fill Quantity: 30,  # refills: 11   Last office visit: 3/19/2019 with prescribing provider:     Future Office Visit:                Passed - Medication is active on med list        Passed - Patient is age 18 or older        Passed - No active pregnancy on record        Passed - No positive pregnancy test in past 12 months        lisinopril (PRINIVIL/ZESTRIL) 40 MG tablet [Pharmacy Med Name: LISINOPRIL 40MG     TAB] 30 tablet 1     Sig: TAKE 1 TABLET BY MOUTH ONCE DAILY       ACE Inhibitors (Including Combos) Protocol Failed - 5/10/2019  9:45 AM        Failed - Normal serum creatinine on file in past 12 months     Recent Labs   Lab Test 12/15/17  1149   CR 0.90             Failed - Normal serum potassium on file in past 12 months     Recent Labs   Lab Test 12/15/17  1149   POTASSIUM 4.2             Passed - Blood pressure under 140/90 in past 12 months     BP Readings from Last 3 Encounters:   03/19/19 138/60   12/11/18 130/66   09/21/18 120/52                 Passed - Recent (12 mo) or future (30 days) visit within the authorizing provider's specialty     Patient had office visit in the last 12 months or has a visit in the next 30 days with authorizing provider or within the authorizing provider's specialty.  See \"Patient Info\" tab in inbasket, or \"Choose Columns\" in Meds & Orders section of the refill encounter.      Last Written Prescription Date:  5/4/18  Last Fill Quantity: 30,  # refills: 11   Last office visit: 3/19/2019 with prescribing provider:     Future Office Visit:              Passed - Medication is active on med list        Passed - Patient is age 18 or older        Passed - No active pregnancy on record        Passed - No positive pregnancy test within past 12 months          "

## 2019-05-13 RX ORDER — SIMVASTATIN 20 MG
TABLET ORAL
Qty: 30 TABLET | Refills: 1 | Status: SHIPPED | OUTPATIENT
Start: 2019-05-13 | End: 2019-07-19

## 2019-05-13 RX ORDER — LEVOTHYROXINE SODIUM 50 UG/1
TABLET ORAL
Qty: 30 TABLET | Refills: 4 | Status: SHIPPED | OUTPATIENT
Start: 2019-05-13 | End: 2020-01-14

## 2019-05-13 RX ORDER — LISINOPRIL 40 MG/1
TABLET ORAL
Qty: 30 TABLET | Refills: 1 | Status: SHIPPED | OUTPATIENT
Start: 2019-05-13 | End: 2019-07-19

## 2019-05-17 DIAGNOSIS — M54.16 LUMBAR RADICULOPATHY: ICD-10-CM

## 2019-05-17 NOTE — TELEPHONE ENCOUNTER
Requested Prescriptions   Pending Prescriptions Disp Refills     gabapentin (NEURONTIN) 300 MG capsule [Pharmacy Med Name: GABAPENTIN 300MG    CAP] 180 capsule 0     Sig: TAKE 2 CAPSULES BY MOUTH THREE TIMES DAILY       There is no refill protocol information for this order        gabapentin (NEURONTIN) 300 MG capsule  Last Written Prescription Date:  05/14/2018  Last Fill Quantity: 180 capsule,  # refills: 11   Last office visit: 3/19/2019 with prescribing provider:  ANTONY Tapia   Future Office Visit:   Next 5 appointments (look out 90 days)    Jun 18, 2019  8:40 AM CDT  [facility] MD  [facility] [address] [place] [id number]  [phone]           Crystal JOLLY (R) (M)

## 2019-05-20 RX ORDER — GABAPENTIN 300 MG/1
CAPSULE ORAL
Qty: 180 CAPSULE | Refills: 0 | Status: SHIPPED | OUTPATIENT
Start: 2019-05-20 | End: 2019-07-19

## 2019-05-20 NOTE — TELEPHONE ENCOUNTER
Routing refill request to provider for review/approval because:  Drug not on the [facility] refill protocol   Has OV scheduled 6/18/19.    Jennifer SNEED RN

## 2019-06-24 DIAGNOSIS — E03.9 HYPOTHYROIDISM, UNSPECIFIED TYPE: ICD-10-CM

## 2019-06-24 RX ORDER — LEVOTHYROXINE SODIUM 75 UG/1
TABLET ORAL
Qty: 90 TABLET | Refills: 0 | Status: SHIPPED | OUTPATIENT
Start: 2019-06-24 | End: 2020-01-28

## 2019-07-19 ENCOUNTER — OFFICE VISIT (OUTPATIENT)
Dept: FAMILY MEDICINE | Facility: CLINIC | Age: 84
End: 2019-07-19
Payer: COMMERCIAL

## 2019-07-19 VITALS
SYSTOLIC BLOOD PRESSURE: 132 MMHG | HEART RATE: 65 BPM | OXYGEN SATURATION: 98 % | BODY MASS INDEX: 22.01 KG/M2 | DIASTOLIC BLOOD PRESSURE: 62 MMHG | HEIGHT: 59 IN | WEIGHT: 109.2 LBS | RESPIRATION RATE: 16 BRPM | TEMPERATURE: 98.1 F

## 2019-07-19 DIAGNOSIS — M54.42 CHRONIC LEFT-SIDED LOW BACK PAIN WITH LEFT-SIDED SCIATICA: Primary | ICD-10-CM

## 2019-07-19 DIAGNOSIS — M54.16 LUMBAR RADICULOPATHY: ICD-10-CM

## 2019-07-19 DIAGNOSIS — G89.29 CHRONIC LEFT-SIDED LOW BACK PAIN WITH LEFT-SIDED SCIATICA: Primary | ICD-10-CM

## 2019-07-19 DIAGNOSIS — E78.5 HYPERLIPIDEMIA LDL GOAL <130: ICD-10-CM

## 2019-07-19 DIAGNOSIS — I10 ESSENTIAL HYPERTENSION, BENIGN: ICD-10-CM

## 2019-07-19 DIAGNOSIS — Z00.00 MEDICARE ANNUAL WELLNESS VISIT, SUBSEQUENT: ICD-10-CM

## 2019-07-19 DIAGNOSIS — E03.9 HYPOTHYROIDISM, UNSPECIFIED TYPE: ICD-10-CM

## 2019-07-19 DIAGNOSIS — F32.0 MILD MAJOR DEPRESSION (H): ICD-10-CM

## 2019-07-19 DIAGNOSIS — F11.90 CHRONIC, CONTINUOUS USE OF OPIOIDS: ICD-10-CM

## 2019-07-19 DIAGNOSIS — G89.4 CHRONIC PAIN SYNDROME: ICD-10-CM

## 2019-07-19 LAB
ALBUMIN SERPL-MCNC: 3.8 G/DL (ref 3.4–5)
ALP SERPL-CCNC: 54 U/L (ref 40–150)
ALT SERPL W P-5'-P-CCNC: 17 U/L (ref 0–50)
AMPHETAMINES UR QL: NOT DETECTED NG/ML
ANION GAP SERPL CALCULATED.3IONS-SCNC: 5 MMOL/L (ref 3–14)
AST SERPL W P-5'-P-CCNC: 21 U/L (ref 0–45)
BARBITURATES UR QL SCN: NOT DETECTED NG/ML
BENZODIAZ UR QL SCN: NOT DETECTED NG/ML
BILIRUB DIRECT SERPL-MCNC: 0.1 MG/DL (ref 0–0.2)
BILIRUB SERPL-MCNC: 0.3 MG/DL (ref 0.2–1.3)
BUN SERPL-MCNC: 10 MG/DL (ref 7–30)
BUPRENORPHINE UR QL: NOT DETECTED NG/ML
CALCIUM SERPL-MCNC: 9.2 MG/DL (ref 8.5–10.1)
CANNABINOIDS UR QL: NOT DETECTED NG/ML
CHLORIDE SERPL-SCNC: 98 MMOL/L (ref 94–109)
CO2 SERPL-SCNC: 27 MMOL/L (ref 20–32)
COCAINE UR QL SCN: NOT DETECTED NG/ML
CREAT SERPL-MCNC: 0.88 MG/DL (ref 0.52–1.04)
D-METHAMPHET UR QL: NOT DETECTED NG/ML
GFR SERPL CREATININE-BSD FRML MDRD: 61 ML/MIN/{1.73_M2}
GLUCOSE SERPL-MCNC: 90 MG/DL (ref 70–99)
METHADONE UR QL SCN: NOT DETECTED NG/ML
OPIATES UR QL SCN: ABNORMAL NG/ML
OXYCODONE UR QL SCN: NOT DETECTED NG/ML
PCP UR QL SCN: NOT DETECTED NG/ML
POTASSIUM SERPL-SCNC: 4.5 MMOL/L (ref 3.4–5.3)
PROPOXYPH UR QL: NOT DETECTED NG/ML
PROT SERPL-MCNC: 7 G/DL (ref 6.8–8.8)
SODIUM SERPL-SCNC: 130 MMOL/L (ref 133–144)
TRICYCLICS UR QL SCN: NOT DETECTED NG/ML
TSH SERPL DL<=0.005 MIU/L-ACNC: 1.23 MU/L (ref 0.4–4)

## 2019-07-19 PROCEDURE — 80048 BASIC METABOLIC PNL TOTAL CA: CPT | Performed by: FAMILY MEDICINE

## 2019-07-19 PROCEDURE — 80306 DRUG TEST PRSMV INSTRMNT: CPT | Performed by: FAMILY MEDICINE

## 2019-07-19 PROCEDURE — 80076 HEPATIC FUNCTION PANEL: CPT | Performed by: FAMILY MEDICINE

## 2019-07-19 PROCEDURE — 99207 C PAF COMPLETED  NO CHARGE: CPT | Performed by: FAMILY MEDICINE

## 2019-07-19 PROCEDURE — 84443 ASSAY THYROID STIM HORMONE: CPT | Performed by: FAMILY MEDICINE

## 2019-07-19 PROCEDURE — G0439 PPPS, SUBSEQ VISIT: HCPCS | Performed by: FAMILY MEDICINE

## 2019-07-19 PROCEDURE — 99214 OFFICE O/P EST MOD 30 MIN: CPT | Mod: 25 | Performed by: FAMILY MEDICINE

## 2019-07-19 PROCEDURE — 36415 COLL VENOUS BLD VENIPUNCTURE: CPT | Performed by: FAMILY MEDICINE

## 2019-07-19 RX ORDER — HYDROCODONE BITARTRATE AND ACETAMINOPHEN 5; 325 MG/1; MG/1
1 TABLET ORAL 2 TIMES DAILY PRN
Qty: 48 TABLET | Refills: 0 | Status: SHIPPED | OUTPATIENT
Start: 2019-07-20 | End: 2019-07-19

## 2019-07-19 RX ORDER — HYDROCODONE BITARTRATE AND ACETAMINOPHEN 5; 325 MG/1; MG/1
1 TABLET ORAL 2 TIMES DAILY PRN
Qty: 50 TABLET | Refills: 0 | Status: SHIPPED | OUTPATIENT
Start: 2019-08-17 | End: 2019-09-17

## 2019-07-19 RX ORDER — GABAPENTIN 300 MG/1
CAPSULE ORAL
Qty: 180 CAPSULE | Refills: 0 | Status: SHIPPED | OUTPATIENT
Start: 2019-07-19 | End: 2019-09-17

## 2019-07-19 RX ORDER — SIMVASTATIN 20 MG
TABLET ORAL
Qty: 90 TABLET | Refills: 3 | Status: SHIPPED | OUTPATIENT
Start: 2019-07-19 | End: 2020-08-17

## 2019-07-19 RX ORDER — LISINOPRIL 40 MG/1
40 TABLET ORAL DAILY
Qty: 90 TABLET | Refills: 3 | Status: SHIPPED | OUTPATIENT
Start: 2019-07-19 | End: 2020-08-17

## 2019-07-19 RX ORDER — HYDROCODONE BITARTRATE AND ACETAMINOPHEN 5; 325 MG/1; MG/1
1 TABLET ORAL 2 TIMES DAILY PRN
Qty: 50 TABLET | Refills: 0 | Status: SHIPPED | OUTPATIENT
Start: 2019-07-19 | End: 2019-07-19

## 2019-07-19 ASSESSMENT — MIFFLIN-ST. JEOR: SCORE: 861.99

## 2019-07-19 NOTE — PROGRESS NOTES
Subjective     Nighat Gupta is a 83 year old female who presents to clinic today for the following health issues:    HPI   Chronic Pain Follow-Up       Type / Location of Pain: low back  Analgesia/pain control:       Recent changes:  worse      Overall control: Tolerable with discomfort  Activity level/function:      Daily activities:  Able to do all daily activities    Work:  not applicable  Adverse effects:  No  Adherance    Taking medication as directed?  Yes    Participating in other treatments: yes, chiropractor  Risk Factors:    Sleep:  Good    Mood/anxiety:  controlled    Recent family or social stressors:  death in family:  Sister in law passed away    Other aggravating factors: sedentary lifestyle  PHQ-9 SCORE 12/15/2017 8/16/2018 3/19/2019   PHQ-9 Total Score - - -   PHQ-9 Total Score MyChart 7 (Mild depression) 6 (Mild depression) 4 (Minimal depression)   PHQ-9 Total Score 7 6 4     PRECIOUS-7 SCORE 12/15/2017 8/16/2018 3/19/2019   Total Score - - -   Total Score 2 (minimal anxiety) 10 (moderate anxiety) 3 (minimal anxiety)   Total Score 2 10 3     Encounter-Level CSA - 12/11/2018:    Controlled Substance Agreement - Scan on 12/27/2018  8:55 AM: CONTROLLED SUBSTANCE AGREEMENT - 11/11/18 (below)       Encounter-Level CSA - 12/02/2016:    Controlled Substance Agreement - Scan on 12/8/2016  1:38 PM: CONTROLLED SUBSTANCE AGREEMENT 12/02/2016 (below)       Encounter-Level CSA - 04/04/2016:    Controlled Substance Agreement - Scan on 4/8/2016 10:44 AM: CONTROLLED SUBSTANCE AGREEMENT 04/04/2016 (below)       Patient-Level CSA:    There are no patient-level csa.         Amount of exercise or physical activity: None    Problems taking medications regularly: No    Medication side effects: none    Diet: regular (no restrictions)      Annual Wellness Visit    Are you in the first 12 months of your Medicare Part B coverage?  No    Physical Health:    In general, how would you rate your overall physical health?  "good    If you drink alcohol do you typically have >3 drinks per day or >7 drinks per week? No    Do you usually eat at least 4 servings of fruit and vegetables a day, include whole grains & fiber and avoid regularly eating high fat or \"junk\" foods? NO    Needs assistance for the following daily activities: no assistance needed    Which of the following safety concerns are present in your home?  none identified     Hearing impairment: No    In the past 6 months, have you been bothered by leaking of urine? yes    Mental Health:    In general, how would you rate your overall mental or emotional health? good  PHQ-2 Score:      Do you feel safe in your environment? Yes    Do you have a Health Care Directive? Yes: Advance Directive has been received and scanned.    Fall risk:  Fallen 2 or more times in the past year?: No  Any fall with injury in the past year?: No    Cognitive Screenin) Repeat 3 items (Leader, Season, Table)    2) Clock draw: NORMAL  3) 3 item recall: Recalls 3 objects  Results: 3 items recalled: COGNITIVE IMPAIRMENT LESS LIKELY    Mini-CogTM Copyright S H[employer]. Licensed by the author for use in [facility] [employer]; reprinted with permission ([email or web address]). All rights reserved.      Current providers sharing in care for this patient include:   Patient Care Team:  Nancy Tapia MD as PCP - General (Family Practice)  Nancy Tapia MD as Assigned PCP        Do you have sleep apnea, excessive snoring or daytime drowsiness?: yes, daytime drowsiness, snores  Hyperlipidemia Follow-Up      Are you having any of the following symptoms? (Select all that apply)  No complaints of shortness of breath, chest pain or pressure.  No increased sweating or nausea with activity.  No left-sided neck or arm pain.  No complaints of pain in calves when walking 1-2 blocks.    Are you regularly taking any medication or supplement to lower your cholesterol?   Yes- statin    Are you having muscle aches " or other side effects that you think could be caused by your cholesterol lowering medication?  No      Hypertension Follow-up      Do you check your blood pressure regularly outside of the clinic? No     Are you following a low salt diet? No    Are your blood pressures ever more than 140 on the top number (systolic) OR more   than 90 on the bottom number (diastolic), for example 140/90? unsure  Hypothyroidism Follow-up      Since last visit, patient describes the following symptoms: Weight stable, no hair loss, no skin changes, no constipation, no loose stools          Depression Followup    How are you doing with your depression since your last visit? No change    Are you having other symptoms that might be associated with depression? No    Have you had a significant life event?  No     Are you feeling anxious or having panic attacks?   No    Do you have any concerns with your use of alcohol or other drugs? No    Social History     Tobacco Use     Smoking status: Current Every Day Smoker     Packs/day: 0.25     Years: 50.00     Pack years: 12.50     Types: Cigarettes     Smokeless tobacco: Never Used     Tobacco comment: 5-8 cigarettes daily   Substance Use Topics     Alcohol use: No     Drug use: No     PHQ 12/15/2017 8/16/2018 3/19/2019   PHQ-9 Total Score 7 6 4   Q9: Thoughts of better off dead/self-harm past 2 weeks Not at all Not at all Not at all     PRECIOUS-7 SCORE 12/15/2017 8/16/2018 3/19/2019   Total Score - - -   Total Score 2 (minimal anxiety) 10 (moderate anxiety) 3 (minimal anxiety)   Total Score 2 10 3       In the past two weeks have you had thoughts of suicide or self-harm?  No.    Do you have concerns about your personal safety or the safety of others?   No    Suicide Assessment Five-step Evaluation and Treatment (SAFE-T)  Reviewed and updated as needed this visit by Provider  Tobacco  Allergies  Meds  Problems  Med Hx  Surg Hx  Fam Hx         Review of Systems   ROS COMP: Constitutional,  "HEENT, cardiovascular, pulmonary, gi and gu systems are negative, except as otherwise noted.      Objective    /62   Pulse 65   Temp 98.1  F (36.7  C) (Tympanic)   Resp 16   Ht 1.492 m (4' 10.75\")   Wt 49.5 kg (109 lb 3.2 oz)   SpO2 98%   BMI 22.24 kg/m    Body mass index is 22.24 kg/m .  Physical Exam   GENERAL APPEARANCE: healthy, alert and no distress  NECK: no adenopathy, no asymmetry, masses, or scars and thyroid normal to palpation  RESP: lungs clear to auscultation - no rales, rhonchi or wheezes  CV: regular rates and rhythm, normal S1 S2, no S3 or S4 and no murmur, click or rub  MS: extremities normal- no gross deformities noted  PSYCH: mentation appears normal and affect normal/bright    Diagnostic Test Results:  Labs reviewed in Epic        Assessment & Plan     1. Chronic left-sided low back pain with left-sided sciatica  Stable no change in treatment plan.   - HYDROcodone-acetaminophen (NORCO) 5-325 MG tablet; Take 1 tablet by mouth 2 times daily as needed for severe pain PRN severe pain  30 day supply  Dispense: 50 tablet; Refill: 0    2. Chronic pain syndrome  Stable no change in treatment plan.   - Drug Abuse Screen Panel 13, Urine (Pain Care Package)  - HYDROcodone-acetaminophen (NORCO) 5-325 MG tablet; Take 1 tablet by mouth 2 times daily as needed for severe pain PRN severe pain  30 day supply  Dispense: 50 tablet; Refill: 0    3. Chronic, continuous use of opioids  Stable no change in treatment plan.   - HYDROcodone-acetaminophen (NORCO) 5-325 MG tablet; Take 1 tablet by mouth 2 times daily as needed for severe pain PRN severe pain  30 day supply  Dispense: 50 tablet; Refill: 0    4. Lumbar radiculopathy  Stable no change in treatment plan.   - gabapentin (NEURONTIN) 300 MG capsule; TAKE 2 CAPSULES BY MOUTH THREE TIMES DAILY  Dispense: 180 capsule; Refill: 0  - HYDROcodone-acetaminophen (NORCO) 5-325 MG tablet; Take 1 tablet by mouth 2 times daily as needed for severe pain PRN severe " pain  30 day supply  Dispense: 50 tablet; Refill: 0    5. Essential hypertension, benign  Stable no change in treatment plan.   - lisinopril (PRINIVIL/ZESTRIL) 40 MG tablet; Take 1 tablet (40 mg) by mouth daily  Dispense: 90 tablet; Refill: 3  - Hepatic panel  - Basic metabolic panel  - TSH with free T4 reflex    6. Hyperlipidemia LDL goal <130  Stable no change in treatment plan.   - simvastatin (ZOCOR) 20 MG tablet; TAKE 1 TABLET BY MOUTH ONCE DAILY AT NIGHT AT BEDTIME  Dispense: 90 tablet; Refill: 3    7. Medicare annual wellness visit, subsequent  No risk     8. Hypothyroidism, unspecified type  Stable no change in treatment plan.     9. Mild major depression (H)  Stable no change in treatment plan.        Tobacco Cessation:   reports that she has been smoking cigarettes.  She has a 12.50 pack-year smoking history. She has never used smokeless tobacco.  Tobacco Cessation Action Plan: Information offered: Patient not interested at this time        Patient Instructions   reventive Health Recommendations    See your health care provider every year to    Review health changes.     Discuss preventive care.      Review your medicines if your doctor has prescribed any.    You no longer need a yearly Pap test unless you've had an abnormal Pap test in the past 10 years. If you have vaginal symptoms, such as bleeding or discharge, be sure to talk with your provider about a Pap test.    Every 1 to 2 years, have a mammogram.  If you are over 69, talk with your health care provider about whether or not you want to continue having screening mammograms.    Every 10 years, have a colonoscopy. Or, have a yearly FIT test (stool test). These exams will check for colon cancer.     Have a cholesterol test every 5 years, or more often if your doctor advises it.     Have a diabetes test (fasting glucose) every three years. If you are at risk for diabetes, you should have this test more often.     At age 65, have a bone density scan  (DEXA) to check for osteoporosis (brittle bone disease).    Shots:    Get a flu shot each year.    Get a tetanus shot every 10 years.    Talk to your doctor about your pneumonia vaccines. There are now two you should receive - Pneumovax (PPSV 23) and Prevnar (PCV 13).    Talk to your pharmacist about the shingles vaccine.    Talk to your doctor about the hepatitis B vaccine.    Nutrition:     Eat at least 5 servings of fruits and vegetables each day.    Eat whole-grain bread, whole-wheat pasta and brown rice instead of white grains and rice.    Get adequate Calcium and Vitamin D.     Lifestyle    Exercise at least 150 minutes a week (30 minutes a day, 5 days a week). This will help you control your weight and prevent disease.    Limit alcohol to one drink per day.    No smoking.     Wear sunscreen to prevent skin cancer.     See your dentist twice a year for an exam and cleaning.    See your eye doctor every 1 to 2 years to screen for conditions such as glaucoma, macular degeneration and cataracts.    Personalized Prevention Plan  You are due for the preventive services outlined below.  Your care team is available to assist you in scheduling these services.  If you have already completed any of these items, please share that information with your care team to update in your medical record.      Labs today     meds refilled     Need to fill your second pain med filled on August 17, 2019    See me back in 2 months             Return in about 2 months (around 9/19/2019).      Risks, benefits, side effects and rationale for treatment plan fully discussed with the patient and understanding expressed.   Nancy Tapia MD  [facility]

## 2019-07-19 NOTE — PATIENT INSTRUCTIONS
reventive Health Recommendations    See your health care provider every year to    Review health changes.     Discuss preventive care.      Review your medicines if your doctor has prescribed any.    You no longer need a yearly Pap test unless you've had an abnormal Pap test in the past 10 years. If you have vaginal symptoms, such as bleeding or discharge, be sure to talk with your provider about a Pap test.    Every 1 to 2 years, have a mammogram.  If you are over 69, talk with your health care provider about whether or not you want to continue having screening mammograms.    Every 10 years, have a colonoscopy. Or, have a yearly FIT test (stool test). These exams will check for colon cancer.     Have a cholesterol test every 5 years, or more often if your doctor advises it.     Have a diabetes test (fasting glucose) every three years. If you are at risk for diabetes, you should have this test more often.     At age 65, have a bone density scan (DEXA) to check for osteoporosis (brittle bone disease).    Shots:    Get a flu shot each year.    Get a tetanus shot every 10 years.    Talk to your doctor about your pneumonia vaccines. There are now two you should receive - Pneumovax (PPSV 23) and Prevnar (PCV 13).    Talk to your pharmacist about the shingles vaccine.    Talk to your doctor about the hepatitis B vaccine.    Nutrition:     Eat at least 5 servings of fruits and vegetables each day.    Eat whole-grain bread, whole-wheat pasta and brown rice instead of white grains and rice.    Get adequate Calcium and Vitamin D.     Lifestyle    Exercise at least 150 minutes a week (30 minutes a day, 5 days a week). This will help you control your weight and prevent disease.    Limit alcohol to one drink per day.    No smoking.     Wear sunscreen to prevent skin cancer.     See your dentist twice a year for an exam and cleaning.    See your eye doctor every 1 to 2 years to screen for conditions such as glaucoma, macular  degeneration and cataracts.    Personalized Prevention Plan  You are due for the preventive services outlined below.  Your care team is available to assist you in scheduling these services.  If you have already completed any of these items, please share that information with your care team to update in your medical record.      Labs today     meds refilled     Need to fill your second pain med filled on August 17, 2019    See me back in 2 months

## 2019-07-19 NOTE — NURSING NOTE
"Chief Complaint   Patient presents with     Pain       Initial /60 (BP Location: Right arm, Patient Position: Chair, Cuff Size: Adult Regular)   Pulse 65   Temp 98.1  F (36.7  C) (Tympanic)   Resp 16   Ht 1.492 m (4' 10.75\")   Wt 49.5 kg (109 lb 3.2 oz)   SpO2 98%   BMI 22.24 kg/m   Estimated body mass index is 22.24 kg/m  as calculated from the following:    Height as of this encounter: 1.492 m (4' 10.75\").    Weight as of this encounter: 49.5 kg (109 lb 3.2 oz).    Patient presents to the clinic using No DME    Health Maintenance that is potentially due pending provider review:  NONE    n/a    Is there anyone who you would like to be able to receive your results? No  If yes have patient fill out [facility]    "

## 2019-09-17 ENCOUNTER — OFFICE VISIT (OUTPATIENT)
Dept: FAMILY MEDICINE | Facility: CLINIC | Age: 84
End: 2019-09-17
Payer: COMMERCIAL

## 2019-09-17 VITALS
HEART RATE: 66 BPM | HEIGHT: 59 IN | TEMPERATURE: 97.4 F | SYSTOLIC BLOOD PRESSURE: 168 MMHG | RESPIRATION RATE: 20 BRPM | OXYGEN SATURATION: 99 % | BODY MASS INDEX: 21.85 KG/M2 | DIASTOLIC BLOOD PRESSURE: 68 MMHG | WEIGHT: 108.4 LBS

## 2019-09-17 DIAGNOSIS — M54.42 CHRONIC LEFT-SIDED LOW BACK PAIN WITH LEFT-SIDED SCIATICA: ICD-10-CM

## 2019-09-17 DIAGNOSIS — M54.16 LUMBAR RADICULOPATHY: ICD-10-CM

## 2019-09-17 DIAGNOSIS — G89.29 CHRONIC LEFT-SIDED LOW BACK PAIN WITH LEFT-SIDED SCIATICA: ICD-10-CM

## 2019-09-17 DIAGNOSIS — F11.90 CHRONIC, CONTINUOUS USE OF OPIOIDS: ICD-10-CM

## 2019-09-17 DIAGNOSIS — G89.4 CHRONIC PAIN SYNDROME: ICD-10-CM

## 2019-09-17 DIAGNOSIS — I10 BENIGN ESSENTIAL HYPERTENSION: ICD-10-CM

## 2019-09-17 PROCEDURE — 99214 OFFICE O/P EST MOD 30 MIN: CPT | Performed by: FAMILY MEDICINE

## 2019-09-17 RX ORDER — AMLODIPINE BESYLATE 10 MG/1
10 TABLET ORAL DAILY
Qty: 90 TABLET | Refills: 3 | Status: SHIPPED | OUTPATIENT
Start: 2019-09-17 | End: 2020-10-14

## 2019-09-17 RX ORDER — HYDROCODONE BITARTRATE AND ACETAMINOPHEN 5; 325 MG/1; MG/1
1 TABLET ORAL 2 TIMES DAILY PRN
Qty: 46 TABLET | Refills: 0 | Status: SHIPPED | OUTPATIENT
Start: 2019-09-17 | End: 2019-11-15

## 2019-09-17 RX ORDER — GABAPENTIN 300 MG/1
CAPSULE ORAL
Qty: 180 CAPSULE | Refills: 3 | Status: SHIPPED | OUTPATIENT
Start: 2019-09-17 | End: 2019-11-15

## 2019-09-17 RX ORDER — METHYLPREDNISOLONE 4 MG
TABLET, DOSE PACK ORAL
Qty: 21 TABLET | Refills: 0 | Status: SHIPPED | OUTPATIENT
Start: 2019-09-17 | End: 2019-11-15

## 2019-09-17 RX ORDER — HYDROCODONE BITARTRATE AND ACETAMINOPHEN 5; 325 MG/1; MG/1
1 TABLET ORAL 2 TIMES DAILY PRN
Qty: 50 TABLET | Refills: 0 | Status: SHIPPED | OUTPATIENT
Start: 2019-09-17 | End: 2019-09-17

## 2019-09-17 ASSESSMENT — ANXIETY QUESTIONNAIRES
GAD7 TOTAL SCORE: 2
2. NOT BEING ABLE TO STOP OR CONTROL WORRYING: NOT AT ALL
7. FEELING AFRAID AS IF SOMETHING AWFUL MIGHT HAPPEN: SEVERAL DAYS
GAD7 TOTAL SCORE: 2
3. WORRYING TOO MUCH ABOUT DIFFERENT THINGS: SEVERAL DAYS
7. FEELING AFRAID AS IF SOMETHING AWFUL MIGHT HAPPEN: SEVERAL DAYS
1. FEELING NERVOUS, ANXIOUS, OR ON EDGE: NOT AT ALL
5. BEING SO RESTLESS THAT IT IS HARD TO SIT STILL: NOT AT ALL
6. BECOMING EASILY ANNOYED OR IRRITABLE: NOT AT ALL
4. TROUBLE RELAXING: NOT AT ALL
GAD7 TOTAL SCORE: 2

## 2019-09-17 ASSESSMENT — PATIENT HEALTH QUESTIONNAIRE - PHQ9
SUM OF ALL RESPONSES TO PHQ QUESTIONS 1-9: 1
SUM OF ALL RESPONSES TO PHQ QUESTIONS 1-9: 1
10. IF YOU CHECKED OFF ANY PROBLEMS, HOW DIFFICULT HAVE THESE PROBLEMS MADE IT FOR YOU TO DO YOUR WORK, TAKE CARE OF THINGS AT HOME, OR GET ALONG WITH OTHER PEOPLE: NOT DIFFICULT AT ALL

## 2019-09-17 ASSESSMENT — MIFFLIN-ST. JEOR: SCORE: 858.36

## 2019-09-17 ASSESSMENT — PAIN SCALES - GENERAL: PAINLEVEL: MODERATE PAIN (4)

## 2019-09-17 NOTE — NURSING NOTE
"Chief Complaint   Patient presents with     Pain       Initial BP (!) 170/70 (BP Location: Right arm, Patient Position: Sitting, Cuff Size: Adult Regular)   Pulse 66   Temp 97.4  F (36.3  C) (Tympanic)   Resp 20   Ht 1.492 m (4' 10.75\")   Wt 49.2 kg (108 lb 6.4 oz)   SpO2 99%   BMI 22.08 kg/m   Estimated body mass index is 22.08 kg/m  as calculated from the following:    Height as of this encounter: 1.492 m (4' 10.75\").    Weight as of this encounter: 49.2 kg (108 lb 6.4 oz).    Patient presents to the clinic using No DME    Health Maintenance that is potentially due pending provider review:  NONE    n/a    Is there anyone who you would like to be able to receive your results? No  If yes have patient fill out ABHIS[facility]K    Nola Franco CMA    "

## 2019-09-17 NOTE — PROGRESS NOTES
Subjective     Nighat Gupta is a 83 year old female who presents to clinic today for the following health issues:    HPI   Chronic Pain Follow-Up       Type / Location of Pain: Low back   Analgesia/pain control:       Recent changes:  worse      Overall control: Tolerable with discomfort  Activity level/function:      Daily activities:  Able to do light housework, cooking    Work:  not applicable  Adverse effects:  No  Adherance    Taking medication as directed?  Yes    Participating in other treatments: not applicable  Risk Factors:    Sleep:  Good    Mood/anxiety:  controlled    Recent family or social stressors:  none noted    Other aggravating factors: prolonged sitting, prolonged standing and bending  PHQ-9 SCORE 8/16/2018 3/19/2019 9/17/2019   PHQ-9 Total Score - - -   PHQ-9 Total Score MyChart 6 (Mild depression) 4 (Minimal depression) 1 (Minimal depression)   PHQ-9 Total Score 6 4 1     PRECIOUS-7 SCORE 8/16/2018 3/19/2019 9/17/2019   Total Score - - -   Total Score 10 (moderate anxiety) 3 (minimal anxiety) 2 (minimal anxiety)   Total Score 10 3 2     Encounter-Level CSA - 12/11/2018:    Controlled Substance Agreement - Scan on 12/27/2018  8:55 AM: CONTROLLED SUBSTANCE AGREEMENT - 11/11/18 (below)       Encounter-Level CSA - 12/02/2016:    Controlled Substance Agreement - Scan on 12/8/2016  1:38 PM: CONTROLLED SUBSTANCE AGREEMENT 12/02/2016 (below)       Encounter-Level CSA - 04/04/2016:    Controlled Substance Agreement - Scan on 4/8/2016 10:44 AM: CONTROLLED SUBSTANCE AGREEMENT 04/04/2016 (below)       Patient-Level CSA:    There are no patient-level csa.           How many servings of fruits and vegetables do you eat daily?  0-1    On average, how many sweetened beverages do you drink each day (soda, juice, sweet tea, etc)?   0    How many days per week do you miss taking your medication? 0  PHQ-9 ([employer]) 9/17/2019   1.  Little interest or pleasure in doing things Not at all   2.  Feeling down,  "depressed, or hopeless Not at all   3.  Trouble falling or staying asleep, or sleeping too much Not at all   4.  Feeling tired or having little energy Several days   5.  Poor appetite or overeating Not at all   6.  Feeling bad about yourself Not at all   7.  Trouble concentrating Not at all   8.  Moving slowly or restless Not at all   9.  Suicidal or self-harm thoughts Not at all   PHQ-9 via [employer] TOTAL SCORE-----> 1 (Minimal depression)     PRECIOUS-7   [employer], 2002; Used with Permission) 9/17/2019   1. Feeling nervous, anxious, or on edge Not at all   2. Not being able to stop or control worrying Not at all   3. Worrying too much about different things Several days   4. Trouble relaxing Not at all   5. Being so restless that it is hard to sit still Not at all   6. Becoming easily annoyed or irritable Not at all   7. Feeling afraid, as if something awful might happen Several days   PRECIOUS 7 TOTAL SCORE 2 (minimal anxiety)         Hypertension Follow-up      Do you check your blood pressure regularly outside of the clinic? No     Are you following a low salt diet? No    Are your blood pressures ever more than 140 on the top number (systolic) OR more   than 90 on the bottom number (diastolic), for example 140/90? unsure          Reviewed and updated as needed this visit by Provider  Tobacco  Allergies  Meds  Problems  Med Hx  Surg Hx  Fam Hx         Review of Systems   ROS COMP: Constitutional, HEENT, cardiovascular, pulmonary, gi and gu systems are negative, except as otherwise noted.      Objective    BP (!) 170/70 (BP Location: Right arm, Patient Position: Sitting, Cuff Size: Adult Regular)   Pulse 66   Temp 97.4  F (36.3  C) (Tympanic)   Resp 20   Ht 1.492 m (4' 10.75\")   Wt 49.2 kg (108 lb 6.4 oz)   SpO2 99%   BMI 22.08 kg/m    Body mass index is 22.08 kg/m .  Physical Exam   GENERAL APPEARANCE: healthy, alert and no distress  RESP: lungs clear to auscultation - no rales, rhonchi or wheezes  CV: " regular rates and rhythm, normal S1 S2, no S3 or S4 and no murmur, click or rub  MS: extremities normal- no gross deformities noted  PSYCH: mentation appears normal and affect normal/bright    Diagnostic Test Results:  Labs reviewed in Epic        Assessment & Plan     1. Benign essential hypertension  Not well controlled   - amLODIPine (NORVASC) 10 MG tablet; Take 1 tablet (10 mg) by mouth daily  Dispense: 90 tablet; Refill: 3    2. Lumbar radiculopathy  Stable no change in treatment plan.   - gabapentin (NEURONTIN) 300 MG capsule; TAKE 2 CAPSULES BY MOUTH THREE TIMES DAILY  Dispense: 180 capsule; Refill: 3  - HYDROcodone-acetaminophen (NORCO) 5-325 MG tablet; Take 1 tablet by mouth 2 times daily as needed for severe pain PRN severe pain  28 day supply  Dispense: 46 tablet; Refill: 0  - methylPREDNISolone (MEDROL DOSEPAK) 4 MG tablet therapy pack; Follow Package Directions  Dispense: 21 tablet; Refill: 0    3. Chronic left-sided low back pain with left-sided sciatica  Stable no change in treatment plan.   - HYDROcodone-acetaminophen (NORCO) 5-325 MG tablet; Take 1 tablet by mouth 2 times daily as needed for severe pain PRN severe pain  28 day supply  Dispense: 46 tablet; Refill: 0  - methylPREDNISolone (MEDROL DOSEPAK) 4 MG tablet therapy pack; Follow Package Directions  Dispense: 21 tablet; Refill: 0    4. Chronic pain syndrome  Stable no change in treatment plan.   - HYDROcodone-acetaminophen (NORCO) 5-325 MG tablet; Take 1 tablet by mouth 2 times daily as needed for severe pain PRN severe pain  28 day supply  Dispense: 46 tablet; Refill: 0    5. Chronic, continuous use of opioids  Stable no change in treatment plan.   - HYDROcodone-acetaminophen (NORCO) 5-325 MG tablet; Take 1 tablet by mouth 2 times daily as needed for severe pain PRN severe pain  28 day supply  Dispense: 46 tablet; Refill: 0     Tobacco Cessation:   reports that she has been smoking cigarettes.  She has a 12.50 pack-year smoking history. She  has never used smokeless tobacco.  Tobacco Cessation Action Plan: Information offered: Patient not interested at this time        Patient Instructions   Pain meds refilled     Restart the amlodipine for your BP take one daily     Start linseed oil capsules for constipation   May also try benafiber daily     See me back in 2 months           Return in about 2 months (around 11/17/2019).      Risks, benefits, side effects and rationale for treatment plan fully discussed with the patient and understanding expressed.   Nancy Tapia MD  [facility]

## 2019-09-17 NOTE — PATIENT INSTRUCTIONS
Pain meds refilled     Restart the amlodipine for your BP take one daily     Start linseed oil capsules for constipation   May also try benafiber daily     See me back in 2 months

## 2019-09-18 ASSESSMENT — PATIENT HEALTH QUESTIONNAIRE - PHQ9: SUM OF ALL RESPONSES TO PHQ QUESTIONS 1-9: 1

## 2019-09-18 ASSESSMENT — ANXIETY QUESTIONNAIRES: GAD7 TOTAL SCORE: 2

## 2019-09-24 ENCOUNTER — ALLIED HEALTH/NURSE VISIT (OUTPATIENT)
Dept: FAMILY MEDICINE | Facility: CLINIC | Age: 84
End: 2019-09-24
Payer: COMMERCIAL

## 2019-09-24 VITALS — HEART RATE: 76 BPM | SYSTOLIC BLOOD PRESSURE: 132 MMHG | DIASTOLIC BLOOD PRESSURE: 52 MMHG | RESPIRATION RATE: 16 BRPM

## 2019-09-24 DIAGNOSIS — Z01.30 BP CHECK: Primary | ICD-10-CM

## 2019-09-24 PROCEDURE — 99207 ZZC NO CHARGE NURSE ONLY: CPT

## 2019-09-24 NOTE — PROGRESS NOTES
Nighat Gupta is a 83 year old year old patient who comes in today for a Blood Pressure check because of restarting amlodipine 5 mg.  Vital Signs as repeated by /52  Patient is taking medication as prescribed  Patient is tolerating medications well.  Patient is not monitoring Blood Pressure at home.    Current complaints: none and states feels great  Disposition:  patient to continue with the same medication  Teresa Escobar RN

## 2019-10-14 ENCOUNTER — TELEPHONE (OUTPATIENT)
Dept: FAMILY MEDICINE | Facility: CLINIC | Age: 84
End: 2019-10-14

## 2019-10-14 NOTE — TELEPHONE ENCOUNTER
Pt is requesting day early refill for her Vicodin.  Pt said It's a nice day today and wants to pick this up today instead of Tues when it is to rain.  Please call the pharmacy to let them know.  Alexandrea [facility]

## 2019-10-14 NOTE — TELEPHONE ENCOUNTER
Pt called back and would like to go to [facility] in [place] please call when complete.    Kelly Rivas  [facility]

## 2019-11-15 ENCOUNTER — OFFICE VISIT (OUTPATIENT)
Dept: FAMILY MEDICINE | Facility: CLINIC | Age: 84
End: 2019-11-15
Payer: COMMERCIAL

## 2019-11-15 VITALS
BODY MASS INDEX: 21.88 KG/M2 | TEMPERATURE: 97.9 F | SYSTOLIC BLOOD PRESSURE: 106 MMHG | RESPIRATION RATE: 15 BRPM | HEART RATE: 79 BPM | WEIGHT: 107.4 LBS | OXYGEN SATURATION: 98 % | DIASTOLIC BLOOD PRESSURE: 52 MMHG

## 2019-11-15 DIAGNOSIS — M54.42 CHRONIC LEFT-SIDED LOW BACK PAIN WITH LEFT-SIDED SCIATICA: ICD-10-CM

## 2019-11-15 DIAGNOSIS — G89.4 CHRONIC PAIN SYNDROME: ICD-10-CM

## 2019-11-15 DIAGNOSIS — G89.29 CHRONIC LEFT-SIDED LOW BACK PAIN WITH LEFT-SIDED SCIATICA: ICD-10-CM

## 2019-11-15 DIAGNOSIS — M54.16 LUMBAR RADICULOPATHY: ICD-10-CM

## 2019-11-15 DIAGNOSIS — F11.90 CHRONIC, CONTINUOUS USE OF OPIOIDS: ICD-10-CM

## 2019-11-15 PROCEDURE — 99214 OFFICE O/P EST MOD 30 MIN: CPT | Performed by: FAMILY MEDICINE

## 2019-11-15 RX ORDER — HYDROCODONE BITARTRATE AND ACETAMINOPHEN 5; 325 MG/1; MG/1
1 TABLET ORAL 2 TIMES DAILY PRN
Qty: 46 TABLET | Refills: 0 | Status: CANCELLED | OUTPATIENT
Start: 2019-11-15

## 2019-11-15 RX ORDER — GABAPENTIN 300 MG/1
CAPSULE ORAL
Qty: 180 CAPSULE | Refills: 3 | Status: SHIPPED | OUTPATIENT
Start: 2019-11-15 | End: 2020-01-14

## 2019-11-15 RX ORDER — HYDROCODONE BITARTRATE AND ACETAMINOPHEN 5; 325 MG/1; MG/1
1 TABLET ORAL 2 TIMES DAILY PRN
Qty: 50 TABLET | Refills: 0 | Status: SHIPPED | OUTPATIENT
Start: 2019-12-13 | End: 2020-01-14

## 2019-11-15 RX ORDER — HYDROCODONE BITARTRATE AND ACETAMINOPHEN 5; 325 MG/1; MG/1
1 TABLET ORAL 2 TIMES DAILY PRN
Qty: 50 TABLET | Refills: 0 | Status: SHIPPED | OUTPATIENT
Start: 2019-11-15 | End: 2019-11-15

## 2019-11-15 RX ORDER — HYDROCODONE BITARTRATE AND ACETAMINOPHEN 5; 325 MG/1; MG/1
1 TABLET ORAL 2 TIMES DAILY PRN
Qty: 46 TABLET | Refills: 0 | Status: SHIPPED | OUTPATIENT
Start: 2019-11-15 | End: 2019-11-15

## 2019-11-15 NOTE — NURSING NOTE
"Chief Complaint   Patient presents with     Pain       Initial /52 (BP Location: Right arm, Patient Position: Chair, Cuff Size: Adult Regular)   Pulse 79   Temp 97.9  F (36.6  C) (Tympanic)   Resp 15   Wt 48.7 kg (107 lb 6.4 oz)   SpO2 98%   BMI 21.88 kg/m   Estimated body mass index is 21.88 kg/m  as calculated from the following:    Height as of 9/17/19: 1.492 m (4' 10.75\").    Weight as of this encounter: 48.7 kg (107 lb 6.4 oz).    Patient presents to the clinic using No DME    Health Maintenance that is potentially due pending provider review:  NONE    n/a    Is there anyone who you would like to be able to receive your results? No  If yes have patient fill out ABHISHEK    "

## 2019-11-15 NOTE — PROGRESS NOTES
Subjective     Nighat Gupta is a 84 year old female who presents to clinic today for the following health issues:    HPI   Chronic Pain Follow-Up       Type / Location of Pain: low back  Analgesia/pain control:       Recent changes:  same      Overall control: Tolerable with discomfort  Activity level/function:      Daily activities:  Able to do light housework, cooking    Work:  not applicable  Adverse effects:  No  Adherance    Taking medication as directed?  Yes    Participating in other treatments: no -   Risk Factors:    Sleep:  Good    Mood/anxiety:  controlled    Recent family or social stressors:  none noted    Other aggravating factors: sedentary lifestyle  PHQ-9 SCORE 8/16/2018 3/19/2019 9/17/2019   PHQ-9 Total Score - - -   PHQ-9 Total Score MyChart 6 (Mild depression) 4 (Minimal depression) 1 (Minimal depression)   PHQ-9 Total Score 6 4 1     PRECIOUS-7 SCORE 8/16/2018 3/19/2019 9/17/2019   Total Score - - -   Total Score 10 (moderate anxiety) 3 (minimal anxiety) 2 (minimal anxiety)   Total Score 10 3 2     Encounter-Level CSA - 12/11/2018:    Controlled Substance Agreement - Scan on 12/27/2018  8:55 AM: CONTROLLED SUBSTANCE AGREEMENT - 11/11/18     Encounter-Level CSA - 12/02/2016:    Controlled Substance Agreement - Scan on 12/8/2016  1:38 PM: CONTROLLED SUBSTANCE AGREEMENT 12/02/2016     Encounter-Level CSA - 04/04/2016:    Controlled Substance Agreement - Scan on 4/8/2016 10:44 AM: CONTROLLED SUBSTANCE AGREEMENT 04/04/2016     Patient-Level CSA:    There are no patient-level csa.        checked by Nancy Tapia MD November 15, 2019 no concerns    Discussed with pt tapering and she feels she just can not consider this at this time her function is minimal and pain is present even on these medications   She has exhausted all options  I did offer her a taper               Reviewed and updated as needed this visit by Provider  Tobacco  Allergies  Meds  Problems  Med Hx  Surg Hx  Fam Hx          Review of Systems   ROS COMP: Constitutional, HEENT, cardiovascular, pulmonary, gi and gu systems are negative, except as otherwise noted.      Objective    /52 (BP Location: Right arm, Patient Position: Chair, Cuff Size: Adult Regular)   Pulse 79   Temp 97.9  F (36.6  C) (Tympanic)   Resp 15   Wt 48.7 kg (107 lb 6.4 oz)   SpO2 98%   BMI 21.88 kg/m    Body mass index is 21.88 kg/m .  Physical Exam   GENERAL APPEARANCE: healthy, alert and no distress  ORTHO: Lumber/Thoracic Spine Exam: Tender:  left para lumbar muscles, right para lumbar muscles  Non-tender:    Range of Motion:  Minimal due to pain   Strength:  unable to heel walk, unable to toe walk  Special tests:  negative straight leg raises    Hip Exam: Hip ROM full    PSYCH: mentation appears normal and affect normal/bright    Diagnostic Test Results:  Labs reviewed in Epic        Assessment & Plan     1. Chronic left-sided low back pain with left-sided sciatica  Stable no change in treatment plan.   - HYDROcodone-acetaminophen (NORCO) 5-325 MG tablet; Take 1 tablet by mouth 2 times daily as needed for severe pain PRN severe pain  30 day supply  Dispense: 50 tablet; Refill: 0    2. Chronic pain syndrome  Stable no change in treatment plan.   - HYDROcodone-acetaminophen (NORCO) 5-325 MG tablet; Take 1 tablet by mouth 2 times daily as needed for severe pain PRN severe pain  30 day supply  Dispense: 50 tablet; Refill: 0    3. Chronic, continuous use of opioids  Stable no change in treatment plan.   - HYDROcodone-acetaminophen (NORCO) 5-325 MG tablet; Take 1 tablet by mouth 2 times daily as needed for severe pain PRN severe pain  30 day supply  Dispense: 50 tablet; Refill: 0  - lactulose (CEPHULAC) 20 GM packet; Take 1 packet (20 g) by mouth 3 times daily  Dispense: 90 packet; Refill: 3    4. Lumbar radiculopathy  Stable no change in treatment plan.   - gabapentin (NEURONTIN) 300 MG capsule; TAKE 2 CAPSULES BY MOUTH THREE TIMES DAILY  Dispense: 180  capsule; Refill: 3  - HYDROcodone-acetaminophen (NORCO) 5-325 MG tablet; Take 1 tablet by mouth 2 times daily as needed for severe pain PRN severe pain  30 day supply  Dispense: 50 tablet; Refill: 0     Tobacco Cessation:   reports that she has been smoking cigarettes. She has a 12.50 pack-year smoking history. She has never used smokeless tobacco.  Tobacco Cessation Action Plan: Information offered: Patient not interested at this time        Patient Instructions   See me back in 2 months     Try lactulose for bowels      Return in about 2 months (around 1/15/2020).      Risks, benefits, side effects and rationale for treatment plan fully discussed with the patient and understanding expressed.   Nancy Tapia MD  [facility]

## 2019-11-18 ENCOUNTER — TELEPHONE (OUTPATIENT)
Dept: FAMILY MEDICINE | Facility: CLINIC | Age: 84
End: 2019-11-18

## 2019-11-18 NOTE — TELEPHONE ENCOUNTER
Prior Authorization Retail Medication Request    Medication/Dose: Kristalose 20mg  ICD code (if different than what is on RX):  Chronic, continuous use of opioids [F11.90]   Previously Tried and Failed:  n/a  Rationale:   Pt takes opiods that cause constipation    Insurance Name:  teddy  Insurance ID:  [id number]      Pharmacy Information (if different than what is on RX)  Name:  [facility]  Phone:

## 2019-11-18 NOTE — TELEPHONE ENCOUNTER
PA Initiation    Medication: Kristalose 20gm halle  Insurance Company: [employer] - Phone [phone] Fax [phone]  Pharmacy Filling the Rx: [facility] [address] [place] [address]  Filling Pharmacy Phone: [phone]  Filling Pharmacy Fax:    Start Date: 11/18/2019    Central Prior Authorization Team   Phone: [phone]

## 2019-11-18 NOTE — TELEPHONE ENCOUNTER
Prior Authorization Approval    Authorization Effective Date: 10/19/2019  Authorization Expiration Date: 11/17/2020  Medication: Kristalose 20gm halle  Approved Dose/Quantity: 90  Reference #:   [id number]  Insurance Company: [employer] - Phone [phone] Fax [phone]  Which Pharmacy is filling the prescription (Not needed for infusion/clinic administered): [facility] [address] [place] [address]  Pharmacy Notified: Yes  Patient Notified:  **Instructed pharmacy to notify patient when script is ready to /ship.**

## 2019-12-16 ENCOUNTER — TELEPHONE (OUTPATIENT)
Dept: FAMILY MEDICINE | Facility: CLINIC | Age: 84
End: 2019-12-16

## 2019-12-16 DIAGNOSIS — F32.0 MAJOR DEPRESSIVE DISORDER, SINGLE EPISODE, MILD (H): ICD-10-CM

## 2019-12-16 RX ORDER — CITALOPRAM HYDROBROMIDE 40 MG/1
40 TABLET ORAL DAILY
Qty: 90 TABLET | Refills: 3 | Status: SHIPPED | OUTPATIENT
Start: 2019-12-16 | End: 2020-09-18

## 2019-12-16 NOTE — TELEPHONE ENCOUNTER
Was on Celexa 40 mg and was working great, she stopped it in the summer but now with the cold and winter and andre coming she is feeling sad and wants to restart. Okay for her to start back at 40 mg or does she have to wean up?

## 2019-12-16 NOTE — TELEPHONE ENCOUNTER
I sent the 40 mg over to the pharmacy but please call her and have her start at 20mg daily for one week and then up to 40mg

## 2019-12-16 NOTE — TELEPHONE ENCOUNTER
Reason for call:  Patient reporting a symptom    Symptom or request: Pt is calling asking for some depression medication she had been on in the past. Pt requesting to see Dr. Tapia. Pt is feeling depressed. This started last month. Pt does not feel suicidal but cries alot she said.     Phone Number patient can be reached at:  Home number on file [phone] (home)    Best Time:  Any Time      Can we leave a detailed message on this number:  YES    Call taken on 12/16/2019 at 8:31 AM by Alexandrea Canales

## 2020-01-14 ENCOUNTER — OFFICE VISIT (OUTPATIENT)
Dept: FAMILY MEDICINE | Facility: CLINIC | Age: 85
End: 2020-01-14
Payer: COMMERCIAL

## 2020-01-14 VITALS
SYSTOLIC BLOOD PRESSURE: 138 MMHG | WEIGHT: 104 LBS | OXYGEN SATURATION: 98 % | HEART RATE: 69 BPM | RESPIRATION RATE: 16 BRPM | HEIGHT: 59 IN | BODY MASS INDEX: 20.96 KG/M2 | DIASTOLIC BLOOD PRESSURE: 60 MMHG | TEMPERATURE: 98.1 F

## 2020-01-14 DIAGNOSIS — E03.9 HYPOTHYROIDISM, UNSPECIFIED TYPE: ICD-10-CM

## 2020-01-14 DIAGNOSIS — F32.0 MILD MAJOR DEPRESSION (H): ICD-10-CM

## 2020-01-14 DIAGNOSIS — G89.29 CHRONIC LEFT-SIDED LOW BACK PAIN WITH LEFT-SIDED SCIATICA: ICD-10-CM

## 2020-01-14 DIAGNOSIS — M54.16 LUMBAR RADICULOPATHY: ICD-10-CM

## 2020-01-14 DIAGNOSIS — M54.42 CHRONIC LEFT-SIDED LOW BACK PAIN WITH LEFT-SIDED SCIATICA: ICD-10-CM

## 2020-01-14 DIAGNOSIS — G89.4 CHRONIC PAIN SYNDROME: ICD-10-CM

## 2020-01-14 DIAGNOSIS — F11.90 CHRONIC, CONTINUOUS USE OF OPIOIDS: ICD-10-CM

## 2020-01-14 PROCEDURE — 99214 OFFICE O/P EST MOD 30 MIN: CPT | Performed by: FAMILY MEDICINE

## 2020-01-14 RX ORDER — LEVOTHYROXINE SODIUM 50 UG/1
TABLET ORAL
Qty: 30 TABLET | Refills: 4 | Status: SHIPPED | OUTPATIENT
Start: 2020-01-14 | End: 2020-08-18

## 2020-01-14 RX ORDER — HYDROCODONE BITARTRATE AND ACETAMINOPHEN 5; 325 MG/1; MG/1
1 TABLET ORAL 2 TIMES DAILY PRN
Qty: 48 TABLET | Refills: 0 | Status: SHIPPED | OUTPATIENT
Start: 2020-01-14 | End: 2020-01-14

## 2020-01-14 RX ORDER — HYDROCODONE BITARTRATE AND ACETAMINOPHEN 5; 325 MG/1; MG/1
1 TABLET ORAL 2 TIMES DAILY PRN
Qty: 50 TABLET | Refills: 0 | Status: SHIPPED | OUTPATIENT
Start: 2020-01-14 | End: 2020-03-06

## 2020-01-14 RX ORDER — GABAPENTIN 300 MG/1
CAPSULE ORAL
Qty: 180 CAPSULE | Refills: 3 | Status: SHIPPED | OUTPATIENT
Start: 2020-01-14 | End: 2020-05-22

## 2020-01-14 ASSESSMENT — MIFFLIN-ST. JEOR: SCORE: 833.4

## 2020-01-14 NOTE — PROGRESS NOTES
Subjective     Nighat Gupta is a 84 year old female who presents to clinic today for the following health issues:    HPI   Chronic Pain Follow-Up       Type / Location of Pain: low back  Analgesia/pain control:       Recent changes:  same      Overall control: Tolerable with discomfort  Activity level/function:      Daily activities:  Able to do light housework, cooking    Work:  not applicable  Adverse effects:  No  Adherance    Taking medication as directed?  Yes    Participating in other treatments: no -   Risk Factors:    Sleep:  Good    Mood/anxiety:  controlled    Recent family or social stressors:  none noted    Other aggravating factors: none  PHQ-9 SCORE 8/16/2018 3/19/2019 9/17/2019   PHQ-9 Total Score - - -   PHQ-9 Total Score MyChart 6 (Mild depression) 4 (Minimal depression) 1 (Minimal depression)   PHQ-9 Total Score 6 4 1     PRECIOUS-7 SCORE 8/16/2018 3/19/2019 9/17/2019   Total Score - - -   Total Score 10 (moderate anxiety) 3 (minimal anxiety) 2 (minimal anxiety)   Total Score 10 3 2     Encounter-Level CSA - 12/11/2018:    Controlled Substance Agreement - Scan on 12/27/2018  8:55 AM: CONTROLLED SUBSTANCE AGREEMENT - 11/11/18     Encounter-Level CSA - 12/02/2016:    Controlled Substance Agreement - Scan on 12/8/2016  1:38 PM: CONTROLLED SUBSTANCE AGREEMENT 12/02/2016     Encounter-Level CSA - 04/04/2016:    Controlled Substance Agreement - Scan on 4/8/2016 10:44 AM: CONTROLLED SUBSTANCE AGREEMENT 04/04/2016     Patient-Level CSA:    There are no patient-level csa.               Depression Followup    How are you doing with your depression since your last visit? No change    Are you having other symptoms that might be associated with depression? No    Have you had a significant life event?  No     Are you feeling anxious or having panic attacks?   No    Do you have any concerns with your use of alcohol or other drugs? No    Social History     Tobacco Use     Smoking status: Current Every Day Smoker  "    Packs/day: 0.25     Years: 50.00     Pack years: 12.50     Types: Cigarettes     Smokeless tobacco: Never Used     Tobacco comment: 5-8 cigarettes daily   Substance Use Topics     Alcohol use: No     Drug use: No     PHQ 8/16/2018 3/19/2019 9/17/2019   PHQ-9 Total Score 6 4 1   Q9: Thoughts of better off dead/self-harm past 2 weeks Not at all Not at all Not at all     PRECIOUS-7 SCORE 8/16/2018 3/19/2019 9/17/2019   Total Score - - -   Total Score 10 (moderate anxiety) 3 (minimal anxiety) 2 (minimal anxiety)   Total Score 10 3 2       In the past two weeks have you had thoughts of suicide or self-harm?  No.    Do you have concerns about your personal safety or the safety of others?   No    Suicide Assessment Five-step Evaluation and Treatment (SAFE-T)    Hypothyroidism Follow-up      Since last visit, patient describes the following symptoms: Weight stable, no hair loss, no skin changes, no constipation, no loose stools    Reviewed and updated as needed this visit by Provider  Tobacco  Allergies  Meds  Problems  Med Hx  Surg Hx  Fam Hx         Review of Systems   ROS COMP: Constitutional, HEENT, cardiovascular, pulmonary, gi and gu systems are negative, except as otherwise noted.      Objective    /60 (BP Location: Right arm, Patient Position: Chair, Cuff Size: Adult Regular)   Pulse 69   Temp 98.1  F (36.7  C) (Tympanic)   Resp 16   Ht 1.492 m (4' 10.75\")   Wt 47.2 kg (104 lb)   SpO2 98%   BMI 21.18 kg/m    Body mass index is 21.18 kg/m .  Physical Exam   GENERAL APPEARANCE: healthy, alert and no distress  RESP: lungs clear to auscultation - no rales, rhonchi or wheezes  CV: regular rates and rhythm, normal S1 S2, no S3 or S4 and no murmur, click or rub  PSYCH: mentation appears normal and affect normal/bright    Diagnostic Test Results:  Labs reviewed in Epic        Assessment & Plan     1. Chronic pain syndrome  Stable no change in treatment plan.   - HYDROcodone-acetaminophen (NORCO) 5-325 MG " tablet; Take 1 tablet by mouth 2 times daily as needed for severe pain PRN severe pain 30 day supply  Dispense: 50 tablet; Refill: 0    2. Hypothyroidism, unspecified type  Stable no change in treatment plan.   - levothyroxine (SYNTHROID/LEVOTHROID) 50 MCG tablet; TAKE 1 TABLET BY MOUTH ON TUESDAY, THURSDAY, SATURDAY, AND SUNDAY  Dispense: 30 tablet; Refill: 4    3. Chronic left-sided low back pain with left-sided sciatica    - HYDROcodone-acetaminophen (NORCO) 5-325 MG tablet; Take 1 tablet by mouth 2 times daily as needed for severe pain PRN severe pain 30 day supply  Dispense: 50 tablet; Refill: 0    4. Chronic, continuous use of opioids    - HYDROcodone-acetaminophen (NORCO) 5-325 MG tablet; Take 1 tablet by mouth 2 times daily as needed for severe pain PRN severe pain 30 day supply  Dispense: 50 tablet; Refill: 0    5. Lumbar radiculopathy    - gabapentin (NEURONTIN) 300 MG capsule; TAKE 2 CAPSULES BY MOUTH THREE TIMES DAILY  Dispense: 180 capsule; Refill: 3  - HYDROcodone-acetaminophen (NORCO) 5-325 MG tablet; Take 1 tablet by mouth 2 times daily as needed for severe pain PRN severe pain 30 day supply  Dispense: 50 tablet; Refill: 0    6. Mild major depression (H)  Stable no change in treatment plan.          Patient Instructions   Med refilled           Return in about 2 months (around 3/14/2020).    Risks, benefits, side effects and rationale for treatment plan fully discussed with the patient and understanding expressed.   Nancy Tapia MD  [facility]

## 2020-01-14 NOTE — NURSING NOTE
"Chief Complaint   Patient presents with     Pain       Initial /60 (BP Location: Right arm, Patient Position: Chair, Cuff Size: Adult Regular)   Pulse 69   Temp 98.1  F (36.7  C) (Tympanic)   Resp 16   Ht 1.492 m (4' 10.75\")   Wt 47.2 kg (104 lb)   SpO2 98%   BMI 21.18 kg/m   Estimated body mass index is 21.18 kg/m  as calculated from the following:    Height as of this encounter: 1.492 m (4' 10.75\").    Weight as of this encounter: 47.2 kg (104 lb).    Patient presents to the clinic using No DME    Health Maintenance that is potentially due pending provider review:  NONE    n/a    Is there anyone who you would like to be able to receive your results? No  If yes have patient fill out ABHISHEK    "

## 2020-01-28 DIAGNOSIS — E03.9 HYPOTHYROIDISM, UNSPECIFIED TYPE: ICD-10-CM

## 2020-01-28 RX ORDER — LEVOTHYROXINE SODIUM 75 UG/1
TABLET ORAL
Qty: 36 TABLET | Refills: 1 | Status: SHIPPED | OUTPATIENT
Start: 2020-01-28 | End: 2020-07-07

## 2020-01-28 NOTE — TELEPHONE ENCOUNTER
Prescription approved per [facility] Refill Protocol.  
The patient is a 41y Female complaining of seizures.

## 2020-03-06 ENCOUNTER — OFFICE VISIT (OUTPATIENT)
Dept: FAMILY MEDICINE | Facility: CLINIC | Age: 85
End: 2020-03-06
Payer: COMMERCIAL

## 2020-03-06 VITALS
WEIGHT: 104.2 LBS | BODY MASS INDEX: 21.23 KG/M2 | SYSTOLIC BLOOD PRESSURE: 124 MMHG | TEMPERATURE: 98 F | DIASTOLIC BLOOD PRESSURE: 60 MMHG | OXYGEN SATURATION: 98 % | HEART RATE: 67 BPM | RESPIRATION RATE: 16 BRPM

## 2020-03-06 DIAGNOSIS — I10 ESSENTIAL HYPERTENSION, BENIGN: ICD-10-CM

## 2020-03-06 DIAGNOSIS — G89.29 CHRONIC LEFT-SIDED LOW BACK PAIN WITH LEFT-SIDED SCIATICA: ICD-10-CM

## 2020-03-06 DIAGNOSIS — G89.4 CHRONIC PAIN SYNDROME: Primary | ICD-10-CM

## 2020-03-06 DIAGNOSIS — F32.0 MILD MAJOR DEPRESSION (H): ICD-10-CM

## 2020-03-06 DIAGNOSIS — M54.16 LUMBAR RADICULOPATHY: ICD-10-CM

## 2020-03-06 DIAGNOSIS — M54.42 CHRONIC LEFT-SIDED LOW BACK PAIN WITH LEFT-SIDED SCIATICA: ICD-10-CM

## 2020-03-06 DIAGNOSIS — F11.90 CHRONIC, CONTINUOUS USE OF OPIOIDS: ICD-10-CM

## 2020-03-06 PROCEDURE — 99214 OFFICE O/P EST MOD 30 MIN: CPT | Performed by: FAMILY MEDICINE

## 2020-03-06 RX ORDER — HYDROCODONE BITARTRATE AND ACETAMINOPHEN 5; 325 MG/1; MG/1
1 TABLET ORAL 2 TIMES DAILY PRN
Qty: 50 TABLET | Refills: 0 | Status: SHIPPED | OUTPATIENT
Start: 2020-03-06 | End: 2020-04-23

## 2020-03-06 RX ORDER — BUPROPION HYDROCHLORIDE 150 MG/1
150 TABLET ORAL EVERY MORNING
Qty: 90 TABLET | Refills: 3 | Status: SHIPPED | OUTPATIENT
Start: 2020-03-06 | End: 2021-01-13

## 2020-03-06 ASSESSMENT — ANXIETY QUESTIONNAIRES
6. BECOMING EASILY ANNOYED OR IRRITABLE: NOT AT ALL
7. FEELING AFRAID AS IF SOMETHING AWFUL MIGHT HAPPEN: NEARLY EVERY DAY
1. FEELING NERVOUS, ANXIOUS, OR ON EDGE: NEARLY EVERY DAY
GAD7 TOTAL SCORE: 12
3. WORRYING TOO MUCH ABOUT DIFFERENT THINGS: NEARLY EVERY DAY
2. NOT BEING ABLE TO STOP OR CONTROL WORRYING: NEARLY EVERY DAY
5. BEING SO RESTLESS THAT IT IS HARD TO SIT STILL: NOT AT ALL

## 2020-03-06 ASSESSMENT — PATIENT HEALTH QUESTIONNAIRE - PHQ9
5. POOR APPETITE OR OVEREATING: NOT AT ALL
SUM OF ALL RESPONSES TO PHQ QUESTIONS 1-9: 8

## 2020-03-06 NOTE — PROGRESS NOTES
Subjective     Nighat Gupta is a 84 year old female who presents to clinic today for the following health issues:    HPI   Chronic Pain Follow-Up    Where in your body do you have pain? Low back  How has your pain affected your ability to work? Not applicable  Which of these pain treatments have you tried since your last clinic visit?None  How well are you sleeping? Good  How has your mood been since your last visit? Slightly worse  Have you had a significant life event? No  Other aggravating factors: sedentary lifestyle  Taking medication as directed? Yes    PHQ-9 SCORE 3/19/2019 9/17/2019 3/6/2020   PHQ-9 Total Score - - -   PHQ-9 Total Score MyChart 4 (Minimal depression) 1 (Minimal depression) -   PHQ-9 Total Score 4 1 8     PRECIOUS-7 SCORE 3/19/2019 9/17/2019 3/6/2020   Total Score - - -   Total Score 3 (minimal anxiety) 2 (minimal anxiety) -   Total Score 3 2 12     No flowsheet data found.  Encounter-Level CSA - 12/11/2018:    Controlled Substance Agreement - Scan on 12/27/2018  8:55 AM: CONTROLLED SUBSTANCE AGREEMENT - 11/11/18     Encounter-Level CSA - 12/02/2016:    Controlled Substance Agreement - Scan on 12/8/2016  1:38 PM: CONTROLLED SUBSTANCE AGREEMENT 12/02/2016     Encounter-Level CSA - 04/04/2016:    Controlled Substance Agreement - Scan on 4/8/2016 10:44 AM: CONTROLLED SUBSTANCE AGREEMENT 04/04/2016     Patient-Level CSA:    There are no patient-level csa.           Hypertension Follow-up      Do you check your blood pressure regularly outside of the clinic? No     Are you following a low salt diet? Yes    Are your blood pressures ever more than 140 on the top number (systolic) OR more   than 90 on the bottom number (diastolic), for example 140/90? No    Depression Followup    How are you doing with your depression since your last visit? Worsened in the last several months     Not able to get out of bed     No energy or motivation     Mood is low    No anxiety     NOT suicidal just feels like she  wants to lay in bed all day     Are you having other symptoms that might be associated with depression? No    Have you had a significant life event?  No     Are you feeling anxious or having panic attacks?   No    Do you have any concerns with your use of alcohol or other drugs? No    Social History     Tobacco Use     Smoking status: Current Every Day Smoker     Packs/day: 0.25     Years: 50.00     Pack years: 12.50     Types: Cigarettes     Smokeless tobacco: Never Used     Tobacco comment: 5-8 cigarettes daily   Substance Use Topics     Alcohol use: No     Drug use: No     PHQ 3/19/2019 9/17/2019 3/6/2020   PHQ-9 Total Score 4 1 8   Q9: Thoughts of better off dead/self-harm past 2 weeks Not at all Not at all Not at all     PRECIOUS-7 SCORE 3/19/2019 9/17/2019 3/6/2020   Total Score - - -   Total Score 3 (minimal anxiety) 2 (minimal anxiety) -   Total Score 3 2 12       In the past two weeks have you had thoughts of suicide or self-harm?  No.    Do you have concerns about your personal safety or the safety of others?   No    Suicide Assessment Five-step Evaluation and Treatment (SAFE-T)            Reviewed and updated as needed this visit by Provider  Tobacco  Allergies  Meds  Problems  Med Hx  Surg Hx  Fam Hx         Review of Systems   ROS COMP: Constitutional, HEENT, cardiovascular, pulmonary, gi and gu systems are negative, except as otherwise noted.      Objective    /60   Pulse 67   Temp 98  F (36.7  C) (Tympanic)   Resp 16   Wt 47.3 kg (104 lb 3.2 oz)   SpO2 98%   BMI 21.23 kg/m    Body mass index is 21.23 kg/m .  Physical Exam   GENERAL APPEARANCE: healthy, alert and no distress  NECK: no adenopathy, no asymmetry, masses, or scars and thyroid normal to palpation  RESP: lungs clear to auscultation - no rales, rhonchi or wheezes  CV: regular rates and rhythm, normal S1 S2, no S3 or S4 and no murmur, click or rub  PSYCH: mentation appears normal and affect flat    Diagnostic Test Results:  Labs  reviewed in Epic        Assessment & Plan     1. Chronic pain syndrome  Stable no change in treatment plan.   - HYDROcodone-acetaminophen (NORCO) 5-325 MG tablet; Take 1 tablet by mouth 2 times daily as needed for severe pain PRN severe pain 30 day supply  Dispense: 50 tablet; Refill: 0    2. Chronic left-sided low back pain with left-sided sciatica  Stable no change in treatment plan.   - HYDROcodone-acetaminophen (NORCO) 5-325 MG tablet; Take 1 tablet by mouth 2 times daily as needed for severe pain PRN severe pain 30 day supply  Dispense: 50 tablet; Refill: 0    3. Chronic, continuous use of opioids  Stable no change in treatment plan.   - HYDROcodone-acetaminophen (NORCO) 5-325 MG tablet; Take 1 tablet by mouth 2 times daily as needed for severe pain PRN severe pain 30 day supply  Dispense: 50 tablet; Refill: 0    4. Lumbar radiculopathy  Stable no change in treatment plan.   - HYDROcodone-acetaminophen (NORCO) 5-325 MG tablet; Take 1 tablet by mouth 2 times daily as needed for severe pain PRN severe pain 30 day supply  Dispense: 50 tablet; Refill: 0    5. Essential hypertension, benign  Stable no change in treatment plan.     6. Mild major depression (H)  Not well controlled add the below and consider wean from celexa if helpful   - buPROPion (WELLBUTRIN XL) 150 MG 24 hr tablet; Take 1 tablet (150 mg) by mouth every morning  Dispense: 90 tablet; Refill: 3       Patient Instructions   Stay on the celexa for now     Add wellbutrin one tab in the morning      Return in about 1 month (around 4/6/2020).      Risks, benefits, side effects and rationale for treatment plan fully discussed with the patient and understanding expressed.   Nancy Tapia MD  [facility]

## 2020-03-06 NOTE — NURSING NOTE
"Chief Complaint   Patient presents with     Pain       Initial BP (!) 152/60 (BP Location: Right arm, Patient Position: Chair, Cuff Size: Adult Regular)   Pulse 67   Temp 98  F (36.7  C) (Tympanic)   Resp 16   Wt 47.3 kg (104 lb 3.2 oz)   SpO2 98%   BMI 21.23 kg/m   Estimated body mass index is 21.23 kg/m  as calculated from the following:    Height as of 1/14/20: 1.492 m (4' 10.75\").    Weight as of this encounter: 47.3 kg (104 lb 3.2 oz).    Patient presents to the clinic using No DME    Health Maintenance that is potentially due pending provider review:  NONE    n/a    Is there anyone who you would like to be able to receive your results? No  If yes have patient fill out ABHISHEK    "

## 2020-03-06 NOTE — LETTER
[facility]  03/06/20    Patient: Nighat Gupta  YOB: 1935  Medical Record Number: [id number]  CSN: [id number]                                                                              Non-opioid Controlled Substance Agreement    I understand that my care provider has prescribed a controlled substance to help manage my condition(s). I am taking this medicine to help me function or work. I know this is strong medicine, and that it can cause serious side effects. Controlled substances can be sedating, addicting and may cause a dependency on the drug. They can affect my ability to drive or think, and cause depression. They need to be taken exactly as prescribed. Combining controlled substances with certain medicines or chemicals (such as cocaine, sedatives and tranquilizers, sleeping pills, meth) can be dangerous or even fatal. Also, if I stop controlled substances suddenly, I may have severe withdrawal symptoms.  If not helpful, I may be asked to stop them.    The risks, benefits, and side effects of these medicine(s) were explained to me. I agree that:    1. I will take part in other treatments as advised by my care team. This may be psychiatry or counseling, physical therapy, behavioral therapy, group treatment or a referral to a pain clinic. I will reduce or stop my medicine when my care team tells me to do so.  2. I will take my medicines as prescribed. I will not change the dose or schedule unless my care team tells me to. There will be no refills if I  run out early.   I may be contactedwithout warning and asked to complete a urine drug test or pill count at any time.   3. I will keep all my appointments, and understand this is part of the monitoring of controlled substances. My care team may require an office visit for EVERY controlled substance refill. If I miss appointments or don t follow instructions, my care team may stop my medicine.  4. I will not ask other providers  to prescribe controlled substances, and I will not accept controlled substances from other people. If I need another prescribed controlled substance for a new reason, I will tell my care team within 1 business day.  5. I will use one pharmacy to fill all of my controlled substance prescriptions, and it is up to me to make sure that I do not run out of my medicines on weekends or holidays. If my care team is willing to refill my controlled substance prescription without a visit, I must request refills only during office hours, refills may take up to 3 days to process, and it may take up to 5 to 7 days for my medicine to be mailed and ready at my pharmacy. Prescriptions will not be mailed anywhere except my pharmacy.    6. I am responsible for my prescriptions. If the medicine/prescription is lost or stolen, it will not be replaced. I also agree not to share controlled substance medicines with anyone.              [facility]  03/06/20  Patient:  Nighat Gupta  YOB: 1935  Medical Record Number: [id number]  CSN: [id number]    7. I agree to not use ANY illegal or recreational drugs. This includes marijuana, cocaine, bath salts or other drugs. I agree not to use alcohol unless my care team says I may. I agree to give urine samples whenever asked. If I don t give a urine sample, the care team may stop my medicine.    8. If I enroll in the [place] Medical Marijuana program, I will tell my care team. I will also sign an agreement to share my medical records with my care team.    9. I will bring in my list of medicines (or my medicine bottles) each time I come to the clinic.   10. I will tell my care team right away if I become pregnant or have a new medical problem treated outside of my regular clinic.  11. I understand that this medicine can affect my thinking and judgment. It may be unsafe for me to drive, use machinery and do dangerous tasks. I will not do any of these things until I  know how the medicine affects me. If my dose changes, I will wait to see how it affects me. I will contact my care team if I have concerns about medicine side effects.    I understand that if I do not follow any of the conditions above, my prescriptions or treatment may be stopped.      I agree that my provider, clinic care team, and pharmacy may work with any city, state or federal law enforcement agency that investigates the misuse, sale, or other diversion of my controlled medicine. I will allow my provider to discuss my care with or share a copy of this agreement with any other treating provider, pharmacy or emergency room where I receive care. I agree to give up (waive) any right of privacy or confidentiality with respect to these consents.   I have read this agreement and have asked questions about anything I did not understand.    ____________________________________________________    ____________  ________  Patient signature                                                         Date      Time    ____________________________________________________     ____________  ________  Witness                                                          Date      Time    ____________________________________________________  Provider signature

## 2020-03-07 ASSESSMENT — ANXIETY QUESTIONNAIRES: GAD7 TOTAL SCORE: 12

## 2020-04-14 DIAGNOSIS — F11.90 CHRONIC, CONTINUOUS USE OF OPIOIDS: ICD-10-CM

## 2020-04-14 DIAGNOSIS — G89.29 CHRONIC LEFT-SIDED LOW BACK PAIN WITH LEFT-SIDED SCIATICA: ICD-10-CM

## 2020-04-14 DIAGNOSIS — M54.42 CHRONIC LEFT-SIDED LOW BACK PAIN WITH LEFT-SIDED SCIATICA: ICD-10-CM

## 2020-04-14 DIAGNOSIS — G89.4 CHRONIC PAIN SYNDROME: ICD-10-CM

## 2020-04-14 DIAGNOSIS — M54.16 LUMBAR RADICULOPATHY: ICD-10-CM

## 2020-04-14 NOTE — TELEPHONE ENCOUNTER
HYDROcodone-acetaminophen (NORCO) 5-325 MG tablet   Last Written Prescription Date:  3/6/20  Last Fill Quantity: 50,  # refills: 0   Last office visit: 3/6/2020 with prescribing provider:  liv   Future Office Visit:   Next 5 appointments (look out 90 days)    Apr 24, 2020  9:40 AM CDT  [facility] [address] [place] [id number] [phone]         Requested Prescriptions   Pending Prescriptions Disp Refills     HYDROcodone-acetaminophen (NORCO) 5-325 MG tablet 50 tablet 0     Sig: Take 1 tablet by mouth 2 times daily as needed for severe pain PRN severe pain 30 day supply       There is no refill protocol information for this order

## 2020-04-15 RX ORDER — HYDROCODONE BITARTRATE AND ACETAMINOPHEN 5; 325 MG/1; MG/1
1 TABLET ORAL 2 TIMES DAILY PRN
Qty: 50 TABLET | Refills: 0 | Status: CANCELLED | OUTPATIENT
Start: 2020-04-15

## 2020-04-15 NOTE — TELEPHONE ENCOUNTER
[place] reviewed:  reviewed- no concerns    [place]  [email or web address]  PRESCRIPTIONS   Total Prescriptions: 15   Total Private Pay: 0     Fill Date ID Written Drug Qty Days Prescriber Rx # Pharmacy Refill Daily Dose * Pymt Type    03/06/2020  1  03/06/2020  Hydrocodone-Acetamin 5-325 Mg    50.00 30 Pa Riki  [id number]  [facility] ([id number]  0/0 8.33 [place]   02/11/2020  1  01/14/2020  Hydrocodone-Acetamin 5-325 Mg    50.00 30 Pa Riki  [id number]  [facility] ([id number])  0/0 8.33 [place]   01/14/2020  1  01/14/2020  Gabapentin 300 Mg Capsule    180.00 30 Pa Riki  [id number]  [facility] ([id number][facility]  0/3  [place]   01/14/2020  1  01/14/2020  Hydrocodone-Acetamin 5-325 Mg    48.00 28 Pa Riki  [id number]  [facility] ([id number])  0/0 8.57 [place]   12/13/2019  1  11/15/2019  Hydrocodone-Acetamin 5-325 Mg    50.00 30 Pa Riki  [id number]  [facility] ([id number])  0/0 8.33 [place]   11/15/2019  1  11/15/2019  Gabapentin 300 Mg Capsule    180.00 30 Pa Riki  [id number]  [facility] ([id number][facility]  0/3  [place]   11/15/2019  1  11/15/2019  Hydrocodone-Acetamin 5-325 Mg    46.00 28 Pa Riki  [id number]  [facility] ([id number])  0/0 8.21 [place]   10/14/2019  1  09/17/2019  Hydrocodone-Acetamin 5-325 Mg    50.00 30 Pa Riki  [id number]  [facility] ([id number])  0/0 8.33 [place]   09/17/2019  1  09/17/2019  Gabapentin 300 Mg Capsule    180.00 30 Pa Riki  [id number]  [facility] ([id number][place]  0/3  [place]   09/17/2019  1  09/17/2019  Hydrocodone-Acetamin 5-325 Mg    46.00 28 Pa Riki  [id number]  [facility] ([id number])  0/0 8.21 [place]   07/20/2019  1  07/20/2019  Hydrocodone-Acetamin 5-325 Mg    48.00 28 Pa Riki  [id number]  [facility] ([id number][place]  0/0 8.57 [place]   07/19/2019  1  07/19/2019  Gabapentin 300 Mg Capsule    180.00 30 Pa [facility]iki  [id number]  [facility] ([id number][place]  [place]   05/20/2019  1  05/20/2019  Gabapentin 300 Mg Capsule    180.00 30 Pa Riki  [id number]  [facility] ([id number][place]  0/0  [place]   05/17/2019  1  03/19/2019  Hydrocodone-Acetamin 5-325 Mg    50.00 30 Pa Riki  [id number]   [facility] ([id number])  0/0 8.33 [place]   04/16/2019  1  03/19/2019  Hydrocodone-Acetamin 5-325 Mg    50.00 30 Pa Riki  [id number]  [facility] ([id number])  0/0 8.33 [place]

## 2020-04-20 NOTE — TELEPHONE ENCOUNTER
Called and left message for patient to call clinic back. She needs to schedule a phone or video visit for medication refills.    Julisa Clark MA

## 2020-04-23 ENCOUNTER — VIRTUAL VISIT (OUTPATIENT)
Dept: FAMILY MEDICINE | Facility: CLINIC | Age: 85
End: 2020-04-23
Payer: COMMERCIAL

## 2020-04-23 DIAGNOSIS — F11.90 CHRONIC, CONTINUOUS USE OF OPIOIDS: ICD-10-CM

## 2020-04-23 DIAGNOSIS — G89.29 CHRONIC LEFT-SIDED LOW BACK PAIN WITH LEFT-SIDED SCIATICA: ICD-10-CM

## 2020-04-23 DIAGNOSIS — M54.42 CHRONIC LEFT-SIDED LOW BACK PAIN WITH LEFT-SIDED SCIATICA: ICD-10-CM

## 2020-04-23 DIAGNOSIS — M54.16 LUMBAR RADICULOPATHY: ICD-10-CM

## 2020-04-23 DIAGNOSIS — G89.4 CHRONIC PAIN SYNDROME: ICD-10-CM

## 2020-04-23 PROCEDURE — 99214 OFFICE O/P EST MOD 30 MIN: CPT | Mod: 95 | Performed by: FAMILY MEDICINE

## 2020-04-23 RX ORDER — HYDROCODONE BITARTRATE AND ACETAMINOPHEN 5; 325 MG/1; MG/1
1 TABLET ORAL 2 TIMES DAILY PRN
Qty: 50 TABLET | Refills: 0 | Status: SHIPPED | OUTPATIENT
Start: 2020-04-23 | End: 2020-05-22

## 2020-04-23 NOTE — PROGRESS NOTES
"Left message for patient to call back.  Helen Maya CMA      Nighat Gupta is a 84 year old female who is being evaluated via a billable telephone visit.      The patient has been notified of following:     \"This telephone visit will be conducted via a call between you and your physician/provider. We have found that certain health care needs can be provided without the need for a physical exam.  This service lets us provide the care you need with a short phone conversation.  If a prescription is necessary we can send it directly to your pharmacy.  If lab work is needed we can place an order for that and you can then stop by our lab to have the test done at a later time.    Telephone visits are billed at different rates depending on your insurance coverage. During this emergency period, for some insurers they may be billed the same as an in-person visit.  Please reach out to your insurance provider with any questions.    If during the course of the call the physician/provider feels a telephone visit is not appropriate, you will not be charged for this service.\"    Patient has given verbal consent for Telephone visit?  Yes    How would you like to obtain your AVS? Declines AVS    Subjective     Nighat Gupta is a 84 year old female who presents to clinic today for the following health issues:    HPI  Chronic Pain Follow-Up    Where in your body do you have pain? Low back  How has your pain affected your ability to work? Not applicable  Which of these pain treatments have you tried since your last clinic visit? Other: none  How well are you sleeping? Good  How has your mood been since your last visit? Better  Have you had a significant life event? No  Other aggravating factors: sedentary lifestyle  Taking medication as directed? Yes      She did make her last Rx last much longer as she has not been active due to COVID  Last fill was 3/6  She likes to be active so mood is not the best right now   But she is " managing       PHQ-9 SCORE 3/19/2019 9/17/2019 3/6/2020   PHQ-9 Total Score - - -   PHQ-9 Total Score MyChart 4 (Minimal depression) 1 (Minimal depression) -   PHQ-9 Total Score 4 1 8     PRECIOUS-7 SCORE 3/19/2019 9/17/2019 3/6/2020   Total Score - - -   Total Score 3 (minimal anxiety) 2 (minimal anxiety) -   Total Score 3 2 12     No flowsheet data found.  Encounter-Level CSA - 12/11/2018:    Controlled Substance Agreement - Scan on 12/27/2018  8:55 AM: CONTROLLED SUBSTANCE AGREEMENT - 11/11/18     Encounter-Level CSA - 12/02/2016:    Controlled Substance Agreement - Scan on 12/8/2016  1:38 PM: CONTROLLED SUBSTANCE AGREEMENT 12/02/2016     Encounter-Level CSA - 04/04/2016:    Controlled Substance Agreement - Scan on 4/8/2016 10:44 AM: CONTROLLED SUBSTANCE AGREEMENT 04/04/2016     Patient-Level CSA:    Controlled Substance Agreement - Non - Opioid - Scan on 3/12/2020 11:46 AM: NON-OPIOID CONTROLLED SUBSTANCE AGREEMENT  Controlled Substance Agreement - Opioid - Scan on 3/6/2020  9:44 AM: FOR 3-6-20 VISIT  Controlled Substance Agreement - Opioid - Scan on 3/6/2020  9:41 AM                          Reviewed and updated as needed this visit by Provider  Tobacco  Allergies  Meds  Problems  Med Hx  Surg Hx  Fam Hx         Review of Systems   ROS COMP: Constitutional, HEENT, cardiovascular, pulmonary, gi and gu systems are negative, except as otherwise noted.       Objective   Reported vitals:  There were no vitals taken for this visit.   healthy, alert and no distress  PSYCH: Alert and oriented times 3; coherent speech, normal   rate and volume, able to articulate logical thoughts, able   to abstract reason, no tangential thoughts, no hallucinations   or delusions  Her affect is normal  RESP: No cough, no audible wheezing, able to talk in full sentences  Remainder of exam unable to be completed due to telephone visits    Diagnostic Test Results:  Labs reviewed in Epic        Assessment/Plan:    1. chronic pain  syndrome  Stable no change in treatment plan.   - HYDROcodone-acetaminophen (NORCO) 5-325 MG tablet; Take 1 tablet by mouth 2 times daily as needed for severe pain PRN severe pain 30 day supply  Dispense: 50 tablet; Refill: 0    2. Chronic, continuous use of opioids    - HYDROcodone-acetaminophen (NORCO) 5-325 MG tablet; Take 1 tablet by mouth 2 times daily as needed for severe pain PRN severe pain 30 day supply  Dispense: 50 tablet; Refill: 0    3. Lumbar radiculopathy  Stable no change in treatment plan. No progression of symptoms   - HYDROcodone-acetaminophen (NORCO) 5-325 MG tablet; Take 1 tablet by mouth 2 times daily as needed for severe pain PRN severe pain 30 day supply  Dispense: 50 tablet; Refill: 0  Risks, benefits, side effects and rationale for treatment plan fully discussed with the patient and understanding expressed.     Patient Instructions   Stay on current medications     Pain pills refilled for one month    Recheck with me via phone in one month 5/22 at 10:20 am           Return in about 29 days (around 5/22/2020).      Phone call duration:  22 minutes    Nancy Tapia MD

## 2020-04-23 NOTE — PATIENT INSTRUCTIONS
Stay on current medications     Pain pills refilled for one month    Recheck with me via phone in one month 5/22 at 10:20 am

## 2020-05-22 ENCOUNTER — VIRTUAL VISIT (OUTPATIENT)
Dept: FAMILY MEDICINE | Facility: CLINIC | Age: 85
End: 2020-05-22
Payer: COMMERCIAL

## 2020-05-22 DIAGNOSIS — M54.16 LUMBAR RADICULOPATHY: ICD-10-CM

## 2020-05-22 DIAGNOSIS — G89.4 CHRONIC PAIN SYNDROME: ICD-10-CM

## 2020-05-22 DIAGNOSIS — F11.90 CHRONIC, CONTINUOUS USE OF OPIOIDS: ICD-10-CM

## 2020-05-22 DIAGNOSIS — G89.29 CHRONIC LEFT-SIDED LOW BACK PAIN WITH LEFT-SIDED SCIATICA: ICD-10-CM

## 2020-05-22 DIAGNOSIS — M54.42 CHRONIC LEFT-SIDED LOW BACK PAIN WITH LEFT-SIDED SCIATICA: ICD-10-CM

## 2020-05-22 PROCEDURE — 99214 OFFICE O/P EST MOD 30 MIN: CPT | Mod: 95 | Performed by: FAMILY MEDICINE

## 2020-05-22 RX ORDER — GABAPENTIN 300 MG/1
CAPSULE ORAL
Qty: 180 CAPSULE | Refills: 3 | Status: SHIPPED | OUTPATIENT
Start: 2020-05-22 | End: 2020-08-31

## 2020-05-22 RX ORDER — HYDROCODONE BITARTRATE AND ACETAMINOPHEN 5; 325 MG/1; MG/1
1 TABLET ORAL 2 TIMES DAILY PRN
Qty: 50 TABLET | Refills: 0 | Status: SHIPPED | OUTPATIENT
Start: 2020-05-22 | End: 2020-07-17

## 2020-05-22 NOTE — PROGRESS NOTES
"Nighat Gputa is a 84 year old female who is being evaluated via a billable telephone visit.      The patient has been notified of following:     \"This telephone visit will be conducted via a call between you and your physician/provider. We have found that certain health care needs can be provided without the need for a physical exam.  This service lets us provide the care you need with a short phone conversation.  If a prescription is necessary we can send it directly to your pharmacy.  If lab work is needed we can place an order for that and you can then stop by our lab to have the test done at a later time.    Telephone visits are billed at different rates depending on your insurance coverage. During this emergency period, for some insurers they may be billed the same as an in-person visit.  Please reach out to your insurance provider with any questions.    If during the course of the call the physician/provider feels a telephone visit is not appropriate, you will not be charged for this service.\"    Patient has given verbal consent for Telephone visit?  Yes    What phone number would you like to be contacted at? [phone]    How would you like to obtain your AVS? Declined     Subjective     Nighat Gupta is a 84 year old female who presents via phone visit today for the following health issues:    HPI  Chronic/Recurring Back Pain Follow Up      Where is your back pain located? (Select all that apply) low back Lt>Rt    How would you describe your back pain?  burning, dull ache, sharp and shooting    Where does your back pain spread? the left  knee    Since your last clinic visit for back pain, how has your pain changed? unchanged    Does your back pain interfere with your job? Not applicable    Since your last visit, have you tried any new treatment? No     She is doing well and thinks current script may last 2 months   She is going to try and make it stretch  She will call for telephone visit when due " for refill       How many servings of fruits and vegetables do you eat daily?  0-1    On average, how many sweetened beverages do you drink each day (Examples: soda, juice, sweet tea, etc.  Do NOT count diet or artificially sweetened beverages)?   0    How many days per week do you exercise enough to make your heart beat faster? 3 or less    How many minutes a day do you exercise enough to make your heart beat faster? 10 - 19    How many days per week do you miss taking your medication? 0                 Reviewed and updated as needed this visit by Provider  Tobacco  Allergies  Meds  Problems  Med Hx  Surg Hx  Fam Hx         Review of Systems   Constitutional, HEENT, cardiovascular, pulmonary, gi and gu systems are negative, except as otherwise noted.       Objective   Reported vitals:  There were no vitals taken for this visit.   healthy, alert and no distress  PSYCH: Alert and oriented times 3; coherent speech, normal   rate and volume, able to articulate logical thoughts, able   to abstract reason, no tangential thoughts, no hallucinations   or delusions  Her affect is normal  RESP: No cough, no audible wheezing, able to talk in full sentences  Remainder of exam unable to be completed due to telephone visits    Diagnostic Test Results:  Labs reviewed in Epic        Assessment/Plan:  1. Lumbar radiculopathy  Stable no change in treatment plan.   - gabapentin (NEURONTIN) 300 MG capsule; TAKE 2 CAPSULES BY MOUTH THREE TIMES DAILY  Dispense: 180 capsule; Refill: 3  - HYDROcodone-acetaminophen (NORCO) 5-325 MG tablet; Take 1 tablet by mouth 2 times daily as needed for severe pain PRN severe pain 30 day supply  Dispense: 50 tablet; Refill: 0    2. Chronic left-sided low back pain with left-sided sciatica  Stable no change in treatment plan.    - HYDROcodone-acetaminophen (NORCO) 5-325 MG tablet; Take 1 tablet by mouth 2 times daily as needed for severe pain PRN severe pain 30 day supply  Dispense: 50 tablet;  Refill: 0    3. Chronic pain syndrome  Stable no change in treatment plan.   - HYDROcodone-acetaminophen (NORCO) 5-325 MG tablet; Take 1 tablet by mouth 2 times daily as needed for severe pain PRN severe pain 30 day supply  Dispense: 50 tablet; Refill: 0    4. Chronic, continuous use of opioids  Stable no change in treatment plan.   - HYDROcodone-acetaminophen (NORCO) 5-325 MG tablet; Take 1 tablet by mouth 2 times daily as needed for severe pain PRN severe pain 30 day supply  Dispense: 50 tablet; Refill: 0    Return in about 2 months (around 7/22/2020) for pain med recheck .      Phone call duration:  15 minutes    Nancy Tapia MD

## 2020-07-17 ENCOUNTER — VIRTUAL VISIT (OUTPATIENT)
Dept: FAMILY MEDICINE | Facility: CLINIC | Age: 85
End: 2020-07-17
Payer: COMMERCIAL

## 2020-07-17 DIAGNOSIS — G89.4 CHRONIC PAIN SYNDROME: ICD-10-CM

## 2020-07-17 DIAGNOSIS — M54.16 LUMBAR RADICULOPATHY: ICD-10-CM

## 2020-07-17 DIAGNOSIS — F11.90 CHRONIC, CONTINUOUS USE OF OPIOIDS: ICD-10-CM

## 2020-07-17 DIAGNOSIS — E03.9 HYPOTHYROIDISM, UNSPECIFIED TYPE: ICD-10-CM

## 2020-07-17 DIAGNOSIS — M54.42 CHRONIC LEFT-SIDED LOW BACK PAIN WITH LEFT-SIDED SCIATICA: ICD-10-CM

## 2020-07-17 DIAGNOSIS — G89.29 CHRONIC LEFT-SIDED LOW BACK PAIN WITH LEFT-SIDED SCIATICA: ICD-10-CM

## 2020-07-17 PROCEDURE — 99214 OFFICE O/P EST MOD 30 MIN: CPT | Mod: 95 | Performed by: FAMILY MEDICINE

## 2020-07-17 RX ORDER — HYDROCODONE BITARTRATE AND ACETAMINOPHEN 5; 325 MG/1; MG/1
1 TABLET ORAL 2 TIMES DAILY PRN
Qty: 50 TABLET | Refills: 0 | Status: SHIPPED | OUTPATIENT
Start: 2020-07-17 | End: 2020-08-31

## 2020-07-17 RX ORDER — LEVOTHYROXINE SODIUM 75 UG/1
TABLET ORAL
Qty: 60 TABLET | Refills: 0 | Status: SHIPPED | OUTPATIENT
Start: 2020-07-17 | End: 2020-10-26

## 2020-07-17 NOTE — PROGRESS NOTES
"Nighat Gupta is a 84 year old female who is being evaluated via a billable telephone visit.      The patient has been notified of following:     \"This telephone visit will be conducted via a call between you and your physician/provider. We have found that certain health care needs can be provided without the need for a physical exam.  This service lets us provide the care you need with a short phone conversation.  If a prescription is necessary we can send it directly to your pharmacy.  If lab work is needed we can place an order for that and you can then stop by our lab to have the test done at a later time.    Telephone visits are billed at different rates depending on your insurance coverage. During this emergency period, for some insurers they may be billed the same as an in-person visit.  Please reach out to your insurance provider with any questions.    If during the course of the call the physician/provider feels a telephone visit is not appropriate, you will not be charged for this service.\"    Patient has given verbal consent for Telephone visit?  Yes    What phone number would you like to be contacted at? [phone]    How would you like to obtain your AVS? Mail a copy    Subjective     Nighat Gupta is a 84 year old female who presents via phone visit today for the following health issues:    HPI    Chronic Pain Follow-Up      Where is your back pain located? (Select all that apply) low back Lt>Rt    How would you describe your back pain?  burning, dull ache, sharp and shooting    Where does your back pain spread? the left  knee    Since your last clinic visit for back pain, how has your pain changed? unchanged    Does your back pain interfere with your job? Not applicable    Since your last visit, have you tried any new treatment? No     PHQ-9 SCORE 3/19/2019 9/17/2019 3/6/2020   PHQ-9 Total Score - - -   PHQ-9 Total Score MyChart 4 (Minimal depression) 1 (Minimal depression) -   PHQ-9 Total " Score 4 1 8     PRECIOUS-7 SCORE 3/19/2019 9/17/2019 3/6/2020   Total Score - - -   Total Score 3 (minimal anxiety) 2 (minimal anxiety) -   Total Score 3 2 12     No flowsheet data found.  Encounter-Level CSA - 12/11/2018:    Controlled Substance Agreement - Scan on 12/27/2018  8:55 AM: CONTROLLED SUBSTANCE AGREEMENT - 11/11/18     Encounter-Level CSA - 12/02/2016:    Controlled Substance Agreement - Scan on 12/8/2016  1:38 PM: CONTROLLED SUBSTANCE AGREEMENT 12/02/2016     Encounter-Level CSA - 04/04/2016:    Controlled Substance Agreement - Scan on 4/8/2016 10:44 AM: CONTROLLED SUBSTANCE AGREEMENT 04/04/2016     Patient-Level CSA:    Controlled Substance Agreement - Non - Opioid - Scan on 3/12/2020 11:46 AM: NON-OPIOID CONTROLLED SUBSTANCE AGREEMENT  Controlled Substance Agreement - Opioid - Scan on 3/6/2020  9:44 AM: FOR 3-6-20 VISIT  Controlled Substance Agreement - Opioid - Scan on 3/6/2020  9:41 AM            Hypothyroidism Follow-up      Since last visit, patient describes the following symptoms: Weight stable, no hair loss, no skin changes, no constipation, no loose stools                Reviewed and updated as needed this visit by Provider  Tobacco  Allergies  Meds  Problems  Med Hx  Surg Hx  Fam Hx         Review of Systems   Constitutional, HEENT, cardiovascular, pulmonary, gi and gu systems are negative, except as otherwise noted.       Objective   Reported vitals:  There were no vitals taken for this visit.   healthy, alert and no distress  PSYCH: Alert and oriented times 3; coherent speech, normal   rate and volume, able to articulate logical thoughts, able   to abstract reason, no tangential thoughts, no hallucinations   or delusions  Her affect is normal  RESP: No cough, no audible wheezing, able to talk in full sentences  Remainder of exam unable to be completed due to telephone visits    Diagnostic Test Results:  Labs reviewed in Epic        Assessment/Plan:    1. Hypothyroidism, unspecified  type  Stable no change in treatment plan.   - levothyroxine (SYNTHROID/LEVOTHROID) 75 MCG tablet; TAKE ONE TABLET BY MOUTH ONCE DAILY MONDAY, WEDNESDAY & FRIDAY  Dispense: 60 tablet; Refill: 0    2. Chronic left-sided low back pain with left-sided sciatica  Stable no change in treatment plan.   - HYDROcodone-acetaminophen (NORCO) 5-325 MG tablet; Take 1 tablet by mouth 2 times daily as needed for severe pain PRN severe pain 30 day supply  Dispense: 50 tablet; Refill: 0    3. Chronic pain syndrome  Stable no change in treatment plan.   - HYDROcodone-acetaminophen (NORCO) 5-325 MG tablet; Take 1 tablet by mouth 2 times daily as needed for severe pain PRN severe pain 30 day supply  Dispense: 50 tablet; Refill: 0    4. Chronic, continuous use of opioids  Stable no change in treatment plan.   - HYDROcodone-acetaminophen (NORCO) 5-325 MG tablet; Take 1 tablet by mouth 2 times daily as needed for severe pain PRN severe pain 30 day supply  Dispense: 50 tablet; Refill: 0    5. Lumbar radiculopathy  Stable no change in treatment plan.   - HYDROcodone-acetaminophen (NORCO) 5-325 MG tablet; Take 1 tablet by mouth 2 times daily as needed for severe pain PRN severe pain 30 day supply  Dispense: 50 tablet; Refill: 0    Return in about 3 months (around 10/17/2020) for chronic pain follow up via video.      Phone call duration:  25 minutes    Nancy Tapia MD

## 2020-08-13 DIAGNOSIS — I10 ESSENTIAL HYPERTENSION, BENIGN: ICD-10-CM

## 2020-08-13 DIAGNOSIS — E78.5 HYPERLIPIDEMIA LDL GOAL <130: ICD-10-CM

## 2020-08-17 DIAGNOSIS — E03.9 HYPOTHYROIDISM, UNSPECIFIED TYPE: ICD-10-CM

## 2020-08-17 RX ORDER — LISINOPRIL 40 MG/1
TABLET ORAL
Qty: 30 TABLET | Refills: 0 | Status: SHIPPED | OUTPATIENT
Start: 2020-08-17 | End: 2020-09-18

## 2020-08-17 RX ORDER — SIMVASTATIN 20 MG
TABLET ORAL
Qty: 30 TABLET | Refills: 0 | Status: SHIPPED | OUTPATIENT
Start: 2020-08-17 | End: 2020-09-18

## 2020-08-17 NOTE — TELEPHONE ENCOUNTER
Medication is being filled for 1 time refill only due to:  Needs labs and labs/virtual visit.         RICHARD Dupont

## 2020-08-18 RX ORDER — LEVOTHYROXINE SODIUM 50 UG/1
TABLET ORAL
Qty: 30 TABLET | Refills: 0 | Status: SHIPPED | OUTPATIENT
Start: 2020-08-18 | End: 2020-10-14

## 2020-08-31 ENCOUNTER — VIRTUAL VISIT (OUTPATIENT)
Dept: FAMILY MEDICINE | Facility: CLINIC | Age: 85
End: 2020-08-31
Payer: COMMERCIAL

## 2020-08-31 DIAGNOSIS — M54.42 CHRONIC LEFT-SIDED LOW BACK PAIN WITH LEFT-SIDED SCIATICA: ICD-10-CM

## 2020-08-31 DIAGNOSIS — E03.9 HYPOTHYROIDISM, UNSPECIFIED TYPE: Primary | ICD-10-CM

## 2020-08-31 DIAGNOSIS — G89.29 CHRONIC LEFT-SIDED LOW BACK PAIN WITH LEFT-SIDED SCIATICA: ICD-10-CM

## 2020-08-31 DIAGNOSIS — M54.16 LUMBAR RADICULOPATHY: ICD-10-CM

## 2020-08-31 DIAGNOSIS — G89.4 CHRONIC PAIN SYNDROME: ICD-10-CM

## 2020-08-31 DIAGNOSIS — F11.90 CHRONIC, CONTINUOUS USE OF OPIOIDS: ICD-10-CM

## 2020-08-31 PROCEDURE — 99214 OFFICE O/P EST MOD 30 MIN: CPT | Mod: 95 | Performed by: FAMILY MEDICINE

## 2020-08-31 RX ORDER — GABAPENTIN 300 MG/1
CAPSULE ORAL
Qty: 180 CAPSULE | Refills: 3 | Status: SHIPPED | OUTPATIENT
Start: 2020-08-31 | End: 2020-10-14

## 2020-08-31 RX ORDER — HYDROCODONE BITARTRATE AND ACETAMINOPHEN 5; 325 MG/1; MG/1
1 TABLET ORAL 2 TIMES DAILY PRN
Qty: 50 TABLET | Refills: 0 | Status: SHIPPED | OUTPATIENT
Start: 2020-08-31 | End: 2020-10-05

## 2020-08-31 NOTE — PROGRESS NOTES
"Nighat Gupta is a 84 year old female who is being evaluated via a billable telephone visit.      The patient has been notified of following:     \"This telephone visit will be conducted via a call between you and your physician/provider. We have found that certain health care needs can be provided without the need for a physical exam.  This service lets us provide the care you need with a short phone conversation.  If a prescription is necessary we can send it directly to your pharmacy.  If lab work is needed we can place an order for that and you can then stop by our lab to have the test done at a later time.    Telephone visits are billed at different rates depending on your insurance coverage. During this emergency period, for some insurers they may be billed the same as an in-person visit.  Please reach out to your insurance provider with any questions.    If during the course of the call the physician/provider feels a telephone visit is not appropriate, you will not be charged for this service.\"    Patient has given verbal consent for Telephone visit?  Yes    What phone number would you like to be contacted at? [phone]    How would you like to obtain your AVS? Mail a copy    Subjective     Nighat Gupta is a 84 year old female who presents via phone visit today for the following health issues:    HPI    Chronic Pain Follow-Up    Knows you would like to see her     Where in your body do you have pain? Back  How has your pain affected your ability to work? Unable to work  How well are you sleeping? Good  How has your mood been since your last visit? Better  Have you had a significant life event? No  Other aggravating factors: none  Taking medication as directed? Yes    PHQ-9 SCORE 3/19/2019 9/17/2019 3/6/2020   PHQ-9 Total Score - - -   PHQ-9 Total Score MyChart 4 (Minimal depression) 1 (Minimal depression) -   PHQ-9 Total Score 4 1 8     PRECIOUS-7 SCORE 3/19/2019 9/17/2019 3/6/2020   Total Score - - - "   Total Score 3 (minimal anxiety) 2 (minimal anxiety) -   Total Score 3 2 12     No flowsheet data found.  Encounter-Level CSA - 12/11/2018:    Controlled Substance Agreement - Scan on 12/27/2018  8:55 AM: CONTROLLED SUBSTANCE AGREEMENT - 11/11/18     Encounter-Level CSA - 12/02/2016:    Controlled Substance Agreement - Scan on 12/8/2016  1:38 PM: CONTROLLED SUBSTANCE AGREEMENT 12/02/2016     Encounter-Level CSA - 04/04/2016:    Controlled Substance Agreement - Scan on 4/8/2016 10:44 AM: CONTROLLED SUBSTANCE AGREEMENT 04/04/2016     Patient-Level CSA:    Controlled Substance Agreement - Non - Opioid - Scan on 3/12/2020 11:46 AM: NON-OPIOID CONTROLLED SUBSTANCE AGREEMENT  Controlled Substance Agreement - Opioid - Scan on 3/6/2020  9:44 AM: FOR 3-6-20 VISIT  Controlled Substance Agreement - Opioid - Scan on 3/6/2020  9:41 AM         How many servings of fruits and vegetables do you eat daily?  0-1    On average, how many sweetened beverages do you drink each day (Examples: soda, juice, sweet tea, etc.  Do NOT count diet or artificially sweetened beverages)?   0    How many days per week do you exercise enough to make your heart beat faster? 3 or less    How many minutes a day do you exercise enough to make your heart beat faster? 10 - 19    How many days per week do you miss taking your medication? 0     reviewed     07/17/2020  2   07/17/2020  Hydrocodone-Acetamin 5-325 MG  50.00 30 Pa Riki   [id number]   [facility] ([id number])   0  8.33 MME  Medic[place]   [place]   05/22/2020  2   05/22/2020  Hydrocodone-Acetamin 5-325 MG  50.00 30 Pa Riki   [id number]   [facility]om ([id number])   0  8.33             Hypothyroidism Follow-up      Since last visit, patient describes the following symptoms: Weight stable, no hair loss, no skin changes, no constipation, no loose stools  Very tired and no ambition   She says this has been coming on over months     May be mood she is not sure       Review of Systems   Constitutional, HEENT, cardiovascular, pulmonary, gi and  gu systems are negative, except as otherwise noted.       Objective          Vitals:  No vitals were obtained today due to virtual visit.    healthy, alert and no distress  PSYCH: Alert and oriented times 3; coherent speech, normal   rate and volume, able to articulate logical thoughts, able   to abstract reason, no tangential thoughts, no hallucinations   or delusions  Her affect is normal  RESP: No cough, no audible wheezing, able to talk in full sentences  Remainder of exam unable to be completed due to telephone visits            Assessment/Plan:    Assessment & Plan     Chronic left-sided low back pain with left-sided sciatica  Stable no change in treatment plan.   - HYDROcodone-acetaminophen (NORCO) 5-325 MG tablet; Take 1 tablet by mouth 2 times daily as needed for severe pain PRN severe pain 30 day supply    Chronic pain syndrome  Stable no change in treatment plan.   - HYDROcodone-acetaminophen (NORCO) 5-325 MG tablet; Take 1 tablet by mouth 2 times daily as needed for severe pain PRN severe pain 30 day supply    Chronic, continuous use of opioids  Stable no change in treatment plan.   - HYDROcodone-acetaminophen (NORCO) 5-325 MG tablet; Take 1 tablet by mouth 2 times daily as needed for severe pain PRN severe pain 30 day supply    Lumbar radiculopathy  Stable no change in treatment plan.   - HYDROcodone-acetaminophen (NORCO) 5-325 MG tablet; Take 1 tablet by mouth 2 times daily as needed for severe pain PRN severe pain 30 day supply  - gabapentin (NEURONTIN) 300 MG capsule; TAKE 2 CAPSULES BY MOUTH THREE TIMES DAILY    Hypothyroidism, unspecified type  Adjust meds as indicated by above labs.   - TSH with free T4 reflex; Future       Patient Instructions   Need to check thyroid and be sure that is ok     See me this month so if thyroid is not out of control we can assess the fatigue further         Return in about 2 weeks (around 9/14/2020) for in person visit to evaluate fatigue .    Nancy Tapia,  MD  [facility]    Phone call duration:  23 minutes

## 2020-08-31 NOTE — PATIENT INSTRUCTIONS
Need to check thyroid and be sure that is ok     See me this month so if thyroid is not out of control we can assess the fatigue further

## 2020-09-06 ENCOUNTER — APPOINTMENT (OUTPATIENT)
Dept: GENERAL RADIOLOGY | Facility: CLINIC | Age: 85
DRG: 193 | End: 2020-09-06
Attending: FAMILY MEDICINE
Payer: COMMERCIAL

## 2020-09-06 ENCOUNTER — HOSPITAL ENCOUNTER (INPATIENT)
Facility: CLINIC | Age: 85
LOS: 1 days | Discharge: HOME | DRG: 193 | End: 2020-09-09
Attending: FAMILY MEDICINE | Admitting: FAMILY MEDICINE
Payer: COMMERCIAL

## 2020-09-06 DIAGNOSIS — F17.210 CIGARETTE SMOKER: ICD-10-CM

## 2020-09-06 DIAGNOSIS — Z20.828 EXPOSURE TO SARS-ASSOCIATED CORONAVIRUS: ICD-10-CM

## 2020-09-06 DIAGNOSIS — J18.9 PNEUMONIA OF RIGHT LOWER LOBE DUE TO INFECTIOUS ORGANISM: ICD-10-CM

## 2020-09-06 DIAGNOSIS — D64.9 ANEMIA, UNSPECIFIED TYPE: Primary | ICD-10-CM

## 2020-09-06 PROBLEM — D72.829 LEUKOCYTOSIS: Status: ACTIVE | Noted: 2020-09-06

## 2020-09-06 PROBLEM — Z20.822 SUSPECTED COVID-19 VIRUS INFECTION: Status: ACTIVE | Noted: 2020-09-06

## 2020-09-06 LAB
ALBUMIN SERPL-MCNC: 2.1 G/DL (ref 3.4–5)
ALP SERPL-CCNC: 145 U/L (ref 40–150)
ALT SERPL W P-5'-P-CCNC: 15 U/L (ref 0–50)
ANION GAP SERPL CALCULATED.3IONS-SCNC: 4 MMOL/L (ref 3–14)
AST SERPL W P-5'-P-CCNC: 16 U/L (ref 0–45)
BASOPHILS # BLD AUTO: 0.1 10E9/L (ref 0–0.2)
BASOPHILS NFR BLD AUTO: 0.3 %
BILIRUB SERPL-MCNC: 0.3 MG/DL (ref 0.2–1.3)
BUN SERPL-MCNC: 24 MG/DL (ref 7–30)
CALCIUM SERPL-MCNC: 8.9 MG/DL (ref 8.5–10.1)
CHLORIDE SERPL-SCNC: 103 MMOL/L (ref 94–109)
CO2 SERPL-SCNC: 29 MMOL/L (ref 20–32)
CREAT SERPL-MCNC: 1.05 MG/DL (ref 0.52–1.04)
CRP SERPL-MCNC: 375 MG/L (ref 0–8)
DIFFERENTIAL METHOD BLD: ABNORMAL
EOSINOPHIL # BLD AUTO: 0.2 10E9/L (ref 0–0.7)
EOSINOPHIL NFR BLD AUTO: 0.6 %
ERYTHROCYTE [DISTWIDTH] IN BLOOD BY AUTOMATED COUNT: 15.5 % (ref 10–15)
GFR SERPL CREATININE-BSD FRML MDRD: 49 ML/MIN/{1.73_M2}
GLUCOSE SERPL-MCNC: 83 MG/DL (ref 70–99)
HCT VFR BLD AUTO: 26 % (ref 35–47)
HGB BLD-MCNC: 8.2 G/DL (ref 11.7–15.7)
IMM GRANULOCYTES # BLD: 1.2 10E9/L (ref 0–0.4)
IMM GRANULOCYTES NFR BLD: 4.1 %
LACTATE BLD-SCNC: 1.1 MMOL/L (ref 0.7–2)
LIPASE SERPL-CCNC: 45 U/L (ref 73–393)
LYMPHOCYTES # BLD AUTO: 1.1 10E9/L (ref 0.8–5.3)
LYMPHOCYTES NFR BLD AUTO: 3.7 %
MCH RBC QN AUTO: 27.2 PG (ref 26.5–33)
MCHC RBC AUTO-ENTMCNC: 31.5 G/DL (ref 31.5–36.5)
MCV RBC AUTO: 86 FL (ref 78–100)
MONOCYTES # BLD AUTO: 1.9 10E9/L (ref 0–1.3)
MONOCYTES NFR BLD AUTO: 6.6 %
NEUTROPHILS # BLD AUTO: 24.5 10E9/L (ref 1.6–8.3)
NEUTROPHILS NFR BLD AUTO: 84.7 %
NRBC # BLD AUTO: 0.1 10*3/UL
NRBC BLD AUTO-RTO: 0 /100
PLATELET # BLD AUTO: 572 10E9/L (ref 150–450)
POTASSIUM SERPL-SCNC: 4.7 MMOL/L (ref 3.4–5.3)
PROT SERPL-MCNC: 6.5 G/DL (ref 6.8–8.8)
RBC # BLD AUTO: 3.01 10E12/L (ref 3.8–5.2)
SARS-COV-2 RNA SPEC QL NAA+PROBE: NORMAL
SODIUM SERPL-SCNC: 136 MMOL/L (ref 133–144)
SPECIMEN SOURCE: NORMAL
TROPONIN I SERPL-MCNC: <0.015 UG/L (ref 0–0.04)
WBC # BLD AUTO: 28.9 10E9/L (ref 4–11)

## 2020-09-06 PROCEDURE — C9803 HOPD COVID-19 SPEC COLLECT: HCPCS | Performed by: FAMILY MEDICINE

## 2020-09-06 PROCEDURE — 96366 THER/PROPH/DIAG IV INF ADDON: CPT | Performed by: FAMILY MEDICINE

## 2020-09-06 PROCEDURE — 84484 ASSAY OF TROPONIN QUANT: CPT | Performed by: FAMILY MEDICINE

## 2020-09-06 PROCEDURE — 84145 PROCALCITONIN (PCT): CPT | Performed by: FAMILY MEDICINE

## 2020-09-06 PROCEDURE — 93005 ELECTROCARDIOGRAM TRACING: CPT | Performed by: FAMILY MEDICINE

## 2020-09-06 PROCEDURE — 99220 ZZC INITIAL OBSERVATION CARE,LEVL III: CPT | Performed by: FAMILY MEDICINE

## 2020-09-06 PROCEDURE — 25000128 H RX IP 250 OP 636: Performed by: FAMILY MEDICINE

## 2020-09-06 PROCEDURE — 71045 X-RAY EXAM CHEST 1 VIEW: CPT

## 2020-09-06 PROCEDURE — 99285 EMERGENCY DEPT VISIT HI MDM: CPT | Mod: 25 | Performed by: FAMILY MEDICINE

## 2020-09-06 PROCEDURE — 25800030 ZZH RX IP 258 OP 636: Performed by: FAMILY MEDICINE

## 2020-09-06 PROCEDURE — G0378 HOSPITAL OBSERVATION PER HR: HCPCS

## 2020-09-06 PROCEDURE — 80053 COMPREHEN METABOLIC PANEL: CPT | Performed by: FAMILY MEDICINE

## 2020-09-06 PROCEDURE — 86140 C-REACTIVE PROTEIN: CPT | Performed by: FAMILY MEDICINE

## 2020-09-06 PROCEDURE — 96361 HYDRATE IV INFUSION ADD-ON: CPT

## 2020-09-06 PROCEDURE — 83605 ASSAY OF LACTIC ACID: CPT | Performed by: FAMILY MEDICINE

## 2020-09-06 PROCEDURE — U0003 INFECTIOUS AGENT DETECTION BY NUCLEIC ACID (DNA OR RNA); SEVERE ACUTE RESPIRATORY SYNDROME CORONAVIRUS 2 (SARS-COV-2) (CORONAVIRUS DISEASE [COVID-19]), AMPLIFIED PROBE TECHNIQUE, MAKING USE OF HIGH THROUGHPUT TECHNOLOGIES AS DESCRIBED BY CMS-2020-01-R: HCPCS | Performed by: FAMILY MEDICINE

## 2020-09-06 PROCEDURE — 83690 ASSAY OF LIPASE: CPT | Performed by: FAMILY MEDICINE

## 2020-09-06 PROCEDURE — 25000132 ZZH RX MED GY IP 250 OP 250 PS 637: Performed by: FAMILY MEDICINE

## 2020-09-06 PROCEDURE — 96367 TX/PROPH/DG ADDL SEQ IV INF: CPT | Performed by: FAMILY MEDICINE

## 2020-09-06 PROCEDURE — 85025 COMPLETE CBC W/AUTO DIFF WBC: CPT | Performed by: FAMILY MEDICINE

## 2020-09-06 PROCEDURE — 96365 THER/PROPH/DIAG IV INF INIT: CPT | Performed by: FAMILY MEDICINE

## 2020-09-06 RX ORDER — HYDROMORPHONE HYDROCHLORIDE 1 MG/ML
.3-.5 INJECTION, SOLUTION INTRAMUSCULAR; INTRAVENOUS; SUBCUTANEOUS
Status: DISCONTINUED | OUTPATIENT
Start: 2020-09-06 | End: 2020-09-09 | Stop reason: HOSPADM

## 2020-09-06 RX ORDER — ONDANSETRON 4 MG/1
4 TABLET, ORALLY DISINTEGRATING ORAL EVERY 6 HOURS PRN
Status: DISCONTINUED | OUTPATIENT
Start: 2020-09-06 | End: 2020-09-09 | Stop reason: HOSPADM

## 2020-09-06 RX ORDER — NALOXONE HYDROCHLORIDE 0.4 MG/ML
.1-.4 INJECTION, SOLUTION INTRAMUSCULAR; INTRAVENOUS; SUBCUTANEOUS
Status: DISCONTINUED | OUTPATIENT
Start: 2020-09-06 | End: 2020-09-06

## 2020-09-06 RX ORDER — NALOXONE HYDROCHLORIDE 0.4 MG/ML
.1-.4 INJECTION, SOLUTION INTRAMUSCULAR; INTRAVENOUS; SUBCUTANEOUS
Status: DISCONTINUED | OUTPATIENT
Start: 2020-09-06 | End: 2020-09-09 | Stop reason: HOSPADM

## 2020-09-06 RX ORDER — CEFTRIAXONE SODIUM 1 G/50ML
1 INJECTION, SOLUTION INTRAVENOUS ONCE
Status: COMPLETED | OUTPATIENT
Start: 2020-09-06 | End: 2020-09-06

## 2020-09-06 RX ORDER — CEFTRIAXONE SODIUM 1 G/50ML
1 INJECTION, SOLUTION INTRAVENOUS EVERY 24 HOURS
Status: DISCONTINUED | OUTPATIENT
Start: 2020-09-07 | End: 2020-09-09 | Stop reason: HOSPADM

## 2020-09-06 RX ORDER — AMLODIPINE BESYLATE 10 MG/1
10 TABLET ORAL DAILY
Status: DISCONTINUED | OUTPATIENT
Start: 2020-09-07 | End: 2020-09-09 | Stop reason: HOSPADM

## 2020-09-06 RX ORDER — SODIUM CHLORIDE 9 MG/ML
INJECTION, SOLUTION INTRAVENOUS CONTINUOUS
Status: DISCONTINUED | OUTPATIENT
Start: 2020-09-06 | End: 2020-09-07

## 2020-09-06 RX ORDER — ACETAMINOPHEN 325 MG/1
650 TABLET ORAL EVERY 4 HOURS PRN
Status: DISCONTINUED | OUTPATIENT
Start: 2020-09-06 | End: 2020-09-09 | Stop reason: HOSPADM

## 2020-09-06 RX ORDER — LACTULOSE 10 G/15ML
20 SOLUTION ORAL 3 TIMES DAILY
Status: DISCONTINUED | OUTPATIENT
Start: 2020-09-06 | End: 2020-09-07

## 2020-09-06 RX ORDER — ONDANSETRON 4 MG/1
4 TABLET, ORALLY DISINTEGRATING ORAL EVERY 6 HOURS PRN
Status: DISCONTINUED | OUTPATIENT
Start: 2020-09-06 | End: 2020-09-06

## 2020-09-06 RX ORDER — LEVOTHYROXINE SODIUM 50 UG/1
50 TABLET ORAL
Status: DISCONTINUED | OUTPATIENT
Start: 2020-09-08 | End: 2020-09-09 | Stop reason: HOSPADM

## 2020-09-06 RX ORDER — SODIUM CHLORIDE, SODIUM LACTATE, POTASSIUM CHLORIDE, CALCIUM CHLORIDE 600; 310; 30; 20 MG/100ML; MG/100ML; MG/100ML; MG/100ML
INJECTION, SOLUTION INTRAVENOUS CONTINUOUS
Status: DISCONTINUED | OUTPATIENT
Start: 2020-09-06 | End: 2020-09-07 | Stop reason: ALTCHOICE

## 2020-09-06 RX ORDER — GABAPENTIN 300 MG/1
600 CAPSULE ORAL 3 TIMES DAILY
Status: DISCONTINUED | OUTPATIENT
Start: 2020-09-06 | End: 2020-09-09 | Stop reason: HOSPADM

## 2020-09-06 RX ORDER — CEFTRIAXONE SODIUM 1 G/50ML
1 INJECTION, SOLUTION INTRAVENOUS EVERY 24 HOURS
Status: DISCONTINUED | OUTPATIENT
Start: 2020-09-06 | End: 2020-09-06

## 2020-09-06 RX ORDER — ONDANSETRON 2 MG/ML
4 INJECTION INTRAMUSCULAR; INTRAVENOUS EVERY 6 HOURS PRN
Status: DISCONTINUED | OUTPATIENT
Start: 2020-09-06 | End: 2020-09-09 | Stop reason: HOSPADM

## 2020-09-06 RX ORDER — BUPROPION HYDROCHLORIDE 150 MG/1
150 TABLET ORAL EVERY MORNING
Status: DISCONTINUED | OUTPATIENT
Start: 2020-09-07 | End: 2020-09-09 | Stop reason: HOSPADM

## 2020-09-06 RX ORDER — ONDANSETRON 2 MG/ML
4 INJECTION INTRAMUSCULAR; INTRAVENOUS EVERY 6 HOURS PRN
Status: DISCONTINUED | OUTPATIENT
Start: 2020-09-06 | End: 2020-09-06

## 2020-09-06 RX ORDER — NICOTINE 21 MG/24HR
1 PATCH, TRANSDERMAL 24 HOURS TRANSDERMAL EVERY 24 HOURS
Status: DISCONTINUED | OUTPATIENT
Start: 2020-09-06 | End: 2020-09-09 | Stop reason: HOSPADM

## 2020-09-06 RX ORDER — ACETAMINOPHEN 650 MG/1
650 SUPPOSITORY RECTAL EVERY 4 HOURS PRN
Status: DISCONTINUED | OUTPATIENT
Start: 2020-09-06 | End: 2020-09-09 | Stop reason: HOSPADM

## 2020-09-06 RX ORDER — LEVOTHYROXINE SODIUM 75 UG/1
75 TABLET ORAL
Status: DISCONTINUED | OUTPATIENT
Start: 2020-09-07 | End: 2020-09-09 | Stop reason: HOSPADM

## 2020-09-06 RX ORDER — SIMVASTATIN 20 MG
20 TABLET ORAL EVERY MORNING
Status: DISCONTINUED | OUTPATIENT
Start: 2020-09-07 | End: 2020-09-09 | Stop reason: HOSPADM

## 2020-09-06 RX ADMIN — SODIUM CHLORIDE: 9 INJECTION, SOLUTION INTRAVENOUS at 20:42

## 2020-09-06 RX ADMIN — GABAPENTIN 600 MG: 300 CAPSULE ORAL at 20:57

## 2020-09-06 RX ADMIN — SODIUM CHLORIDE, POTASSIUM CHLORIDE, SODIUM LACTATE AND CALCIUM CHLORIDE 1000 ML: 600; 310; 30; 20 INJECTION, SOLUTION INTRAVENOUS at 16:27

## 2020-09-06 RX ADMIN — AZITHROMYCIN 500 MG: 500 INJECTION, POWDER, LYOPHILIZED, FOR SOLUTION INTRAVENOUS at 16:27

## 2020-09-06 RX ADMIN — CEFTRIAXONE SODIUM 1 G: 1 INJECTION, SOLUTION INTRAVENOUS at 15:54

## 2020-09-06 RX ADMIN — LACTULOSE 20 G: 20 SOLUTION ORAL at 20:57

## 2020-09-06 RX ADMIN — ACETAMINOPHEN 650 MG: 325 TABLET, FILM COATED ORAL at 19:14

## 2020-09-06 ASSESSMENT — ENCOUNTER SYMPTOMS
UNEXPECTED WEIGHT CHANGE: 0
MUSCULOSKELETAL NEGATIVE: 1
CHILLS: 0
FATIGUE: 0
COUGH: 1
GASTROINTESTINAL NEGATIVE: 1
SHORTNESS OF BREATH: 1
APPETITE CHANGE: 0
EYES NEGATIVE: 1
ENDOCRINE NEGATIVE: 1
ALLERGIC/IMMUNOLOGIC NEGATIVE: 1
FEVER: 1
PSYCHIATRIC NEGATIVE: 1
HEMATOLOGIC/LYMPHATIC NEGATIVE: 1
DIAPHORESIS: 0
NEUROLOGICAL NEGATIVE: 1
ACTIVITY CHANGE: 1

## 2020-09-06 ASSESSMENT — MIFFLIN-ST. JEOR: SCORE: 864.75

## 2020-09-06 NOTE — ED NOTES
"Patient has  Greig to Observation  order. Patient has been given the Observation brochure -  What does Observation mean to me.\"  Patient has been given the opportunity to ask questions about observation status and their plan of care.      Teofilo Crouch RN    "

## 2020-09-06 NOTE — H&P
[facility]    History and Physical  Hospital Medicine       Date of Admission:  9/6/2020  Date of Service: 9/6/2020     Assessment & Plan   Nighat Gupta is a 84 year old female who presents on 9/6/2020 with dry cough, intermittent chest pain, shortness of breath and admitted with pneumonia    Principal Problem:    Pneumonia of right lower lobe due to infectious organism  Patient presented with cough, chest pain, shortness of breath and sore throat.  Labs showed leukocytosis at 28.9, anemia with hemoglobin at 8.2.  Chest x-ray showed right lung base consolidation with small right pleural effusion suspicious for pneumonia.  Patient was started on ceftriaxone and azithromycin  Plan:  -Continue ceftriaxone azithromycin  -Will obtain procalcitonin  -Follow-up on COVID-19 PCR  -Continue IV fluid with  mL /h  - Continue to monitor   Active Problems:      Anemia  Hemoglobin is 8.2 on admission  Last hemoglobin was 11 2 years ago  Patient denies any bleeding.  Particularly blood in stool or melena.  Last colonoscopy was 08/29/2007 showed - Diverticulosis, The examination was otherwise normal.   Patient is currently stable with no bleeding source  ?  Anemia could be related to chronic blood loss due to diverticulosis versus bone marrow suppression in the setting of infection  Plan:  -Continue to monitor hemoglobin  -Consider GI follow-up for colonoscopy    Leukocytosis  See problem 1      Hypothyroidism  Chronic stable problem  Continue home levothyroxine    Tobacco abuse  Offered nicotine patch    Hyperlipidemia LDL goal <130  Chronic stable problem  Continue home statin    Essential hypertension, benign  Chronic problem  Continue Norvasc 10 mg  Hold lisinopril for now, resume on discharge      Chronic pain syndrome  Continue home gabapentin        Mild major depression (H)  Resume home Wellbutrin    Suspected COVID-19 virus infection  This patient was evaluated during a global  COVID-19 pandemic, which necessitated consideration that the patient might be at risk for infection with the SARS-CoV-2 virus that causes COVID-19  COVID-19 PCR in process     Diet: regular  DVT Prophylaxis: Pneumatic Compression Devices  Castle Catheter: not present  Code Status: DNR  Lines: peripheral IV line    Disposition Plan   Expected discharge: Tomorrow, recommended to prior living arrangement once antibiotic plan established, hemoglobin stable, safe disposition plan/ TCU bed available and SIRS/Sepsis treated.  Entered: Carlos Lares MD 09/06/2020, 9:29 PM     Status: Patient is appropriate for Observation   Carlos Lares MD        The patient's care was discussed with the Patient.and Dr. polanco    Primary Care Physician   Nancy Tapia [phone]    History is obtained from the patient,  and review of old records via the EMR.    History of Present Illness   Nighat Gupta is a 84 year old female with past medical history of hypertension, hypothyroidism, chronic pain syndrome, depression now presents on 9/6/2020 with dry cough for 2 weeks.  Patient lives alone, with no sick contact.  Other associated symptoms include intermittent chest pain for 2 weeks, shortness of breath sore throat and headache.  Chest pain described as nonradiating, substernal discomfort, she rates her pain as 1/10.  Patient states her symptoms have been getting worse over the past few days.  In the ED,  patient was found to have leukocytosis at 28.9, anemia with hemoglobin at 8.2.  Of note hemoglobin was at 11 -  2 years ago, patient denies any bleeding, no blood in stool or melena.  Chest x-ray showed right lung base consolidation with small right pleural effusion suspicious for pneumonia.  Patient denies any diaphoresis, palpitations, fever, chills,, blurred vision, abdominal pain, dysuria, hematuria, sick contact, exposure to COVID-19 patient.  In the ED patient was started on ceftriaxone and azithromycin for concern  of pneumonia.  She was given 1 L LR bolus followed by LR infusion 125 mL/h.  Patient was interviewed and examined at bedside  Review of Systems   The 10 point Review of Systems is negative other than noted in the HPI or here.patient reports headache, chest pain , shortness of breath, sore throat, dry cough, dizziness,     Past Medical History    Reviewed PMH  Past Medical History:   Diagnosis Date     DEPRESSION W/ANXIETY 1980's     Essential hypertension, benign      Pure hypercholesterolemia      Unspecified hypothyroidism      Patient Active Problem List    Diagnosis Date Noted     Pneumonia of right lower lobe due to infectious organism 09/06/2020     Priority: High     Anemia 09/06/2020     Priority: High     Leukocytosis 09/06/2020     Priority: High     Pneumonia 09/06/2020     Priority: Medium     Chronic, continuous use of opioids 07/19/2019     Priority: Medium     Chronic pain syndrome 01/07/2016     Priority: Medium     Patient is followed by Nancy Tapia MD for ongoing prescription of pain medication.  All refills should be approved by this provider, or covering partner.    Chronic back pain   Medication(s): 40.   Maximum quantity per month: [place]   Clinic visit frequency required: Q 3 months     Controlled substance agreement:  Encounter-Level CSA - 4/4/16:               Controlled Substance Agreement - Scan on 4/8/2016 10:44 AM : CONTROLLED SUBSTANCE AGREEMENT 04/04/2016 (below)            Pain Clinic evaluation in the past: Yes    DIRE Total Score(s):  No flowsheet data found.    Last [facility] website verification:  December 2, 2016    [email or web address]         Chronic low back pain 09/16/2013     Priority: Medium     Lumbar radiculopathy 09/16/2013     Priority: Medium     Health Care Home 12/19/2012     Priority: Medium     Chasidy Winchester RN-PHN  FPA / TRUDY [facility]   [phone]    DX V65.8 REPLACED WITH 49245 HEALTH CARE HOME (04/08/2013)       Advanced  directives, counseling/discussion 11/21/2011     Priority: Medium     Advance Care Planning:   Receipt of ACP document:  Received: Health Care Directive which was witnessed or notarized on 3/13/13.  Document not previously scanned.  Validation form completed and sent with document to be scanned.  Code Status needs to be updated to reflect choices in most recent ACP document. Orders placed.  Confirmed/documented designated decision maker(s). See permanent comments section of demographics in clinical tab. View document(s) and details by clicking on code status.   Added by Felisha Elizabeth on 3/26/2013.               Essential hypertension, benign 11/21/2011     Priority: Medium     Hyperlipidemia LDL goal <130 10/31/2010     Priority: Medium     Osteopenia 11/18/2008     Priority: Medium     Tobacco abuse 09/08/2008     Priority: Medium     Hypothyroidism 11/04/2005     Priority: Medium     Problem list name updated by automated process. Provider to review       Mild major depression (H) 11/04/2005     Priority: Low        Past Surgical History   Reviewed PSH  Past Surgical History:   Procedure Laterality Date     CATARACT IOL, RT/LT  2009    Cataract IOL RT/LT     INJECT EPIDURAL LUMBAR  4/2/2012    Procedure:INJECT EPIDURAL LUMBAR; MORENA with Flouro--; Surgeon:GENERIC ANESTHESIA PROVIDER; Location:[place] OR     INJECT EPIDURAL LUMBAR  6/4/2012    Procedure:INJECT EPIDURAL LUMBAR; MORENA with Flouro--; Surgeon:GENERIC ANESTHESIA PROVIDER; Location:[facility] OR     INJECT EPIDURAL LUMBAR  6/18/2012    Procedure: INJECT EPIDURAL LUMBAR;  MORENA-Dr. Tapia;  Surgeon: Provider, Generic Anesthesia;  Location: [facility] OR     SURGICAL HISTORY OF -   04/2004    Oopherectomy - (R)        Prior to Admission Medications   Prior to Admission Medications   Prescriptions Last Dose Informant Patient Reported? Taking?   ASPIRIN 81 MG OR TABS 9/6/2020 at am Self Yes Yes   Sig: Take 81 mg by mouth daily    CALCIUM 500 + D 500-200 MG-IU OR TABS  9/6/2020 at am Self Yes Yes   Sig: Take 1 tablet by mouth daily    HYDROcodone-acetaminophen (NORCO) 5-325 MG tablet 9/6/2020 at am Self No Yes   Sig: Take 1 tablet by mouth 2 times daily as needed for severe pain PRN severe pain 30 day supply   MULTIVITAMIN TABS   OR 9/6/2020 at am Self Yes Yes   Sig: Take 1 tablet by mouth daily    OMEGA 3 OR 9/6/2020 at am Self Yes Yes   Sig: Take 1 capsule by mouth daily    amLODIPine (NORVASC) 10 MG tablet 9/6/2020 at am Self No Yes   Sig: Take 1 tablet (10 mg) by mouth daily   buPROPion (WELLBUTRIN XL) 150 MG 24 hr tablet 9/6/2020 at am Self No Yes   Sig: Take 1 tablet (150 mg) by mouth every morning   citalopram (CELEXA) 40 MG tablet 9/6/2020 at am Self No Yes   Sig: Take 1 tablet (40 mg) by mouth daily   gabapentin (NEURONTIN) 300 MG capsule 9/6/2020 at am Self No Yes   Sig: TAKE 2 CAPSULES BY MOUTH THREE TIMES DAILY   Patient taking differently: Take 600 mg by mouth 3 times daily TAKE 2 CAPSULES BY MOUTH THREE TIMES DAILY   lactulose (CEPHULAC) 20 GM packet 9/6/2020 at am Self No Yes   Sig: Take 1 packet (20 g) by mouth 3 times daily   levothyroxine (SYNTHROID/LEVOTHROID) 50 MCG tablet 9/6/2020 at am Self No Yes   Sig: TAKE ONE TABLET BY MOUTH EVERY TUESDAY, THURSDAY, SATURDAY AND SUNDAY   levothyroxine (SYNTHROID/LEVOTHROID) 75 MCG tablet 9/6/2020 at am Self No Yes   Sig: TAKE ONE TABLET BY MOUTH ONCE DAILY MONDAY, WEDNESDAY & FRIDAY   lisinopril (ZESTRIL) 40 MG tablet 9/6/2020 at am Self No Yes   Sig: TAKE ONE TABLET BY MOUTH ONCE DAILY   Patient taking differently: Take 40 mg by mouth daily    simvastatin (ZOCOR) 20 MG tablet 9/6/2020 at am Self No Yes   Sig: TAKE ONE TABLET BY MOUTH AT BEDTIME   Patient taking differently: Take 20 mg by mouth every morning       Facility-Administered Medications: None     Allergies   Allergies   Allergen Reactions     No Known Drug Allergy        Family History    Family History   Problem Relation Age of Onset     Heart Disease Mother   "    Lipids Mother      Hypertension Mother      Lipids Father      Hypertension Father      Hypertension Sister      Lipids Sister      Depression Daughter      Heart Disease Sister      Hypertension Sister      Depression Daughter    reviewed family history     Social History   Social History     Socioeconomic History     Marital status:      Spouse name: Not on file     Number of children: Not on file     Years of education: Not on file     Highest education level: Not on file   Occupational History     Not on file   Social Needs     Financial resource strain: Not on file     Food insecurity     Worry: Not on file     Inability: Not on file     Transportation needs     Medical: Not on file     Non-medical: Not on file   Tobacco Use     Smoking status: Current Every Day Smoker     Packs/day: 0.25     Years: 50.00     Pack years: 12.50     Types: Cigarettes     Smokeless tobacco: Never Used     Tobacco comment: 5-8 cigarettes daily   Substance and Sexual Activity     Alcohol use: No     Drug use: No     Sexual activity: Not Currently   Lifestyle     Physical activity     Days per week: Not on file     Minutes per session: Not on file     Stress: Not on file   Relationships     Social connections     Talks on phone: Not on file     Gets together: Not on file     Attends Orthodoxy service: Not on file     Active member of club or organization: Not on file     Attends meetings of clubs or organizations: Not on file     Relationship status: Not on file     Intimate partner violence     Fear of current or ex partner: Not on file     Emotionally abused: Not on file     Physically abused: Not on file     Forced sexual activity: Not on file   Other Topics Concern     Parent/sibling w/ CABG, MI or angioplasty before 65F 55M? No   Social History Narrative     Not on file   reviewed social history     Physical Exam   BP (!) 147/45   Pulse 93   Temp 100.5  F (38.1  C) (Oral)   Resp 20   Ht 1.473 m (4' 10\")   Wt 51.5 " kg (113 lb 8.6 oz)   SpO2 94%   BMI 23.73 kg/m       Weight: 113 lbs 8.59 oz Body mass index is 23.73 kg/m .     Constitutional: Alert, oriented, cooperative, no apparent distress, appears nontoxic,  Eyes: Eyes are clear, pupils are reactive.  HENT: not examined   Lymph/Hematologic: No epitrochlear, axillary, anterior or posterior cervical, or supraclavicular lymphadenopathy is appreciated.  Cardiovascular: systolic murmur , Regular rate and rhythm, normal S1 and S2, normal. Good peripheral pulses in wrists bilaterally. No lower extremity edema.  Respiratory: rhonchi cleared with coughing   GI: Soft, non-tender  Genitourinary: Deferred  Musculoskeletal: Normal muscle bulk and tone.  Skin: Warm and dry, no rashes.   Neurologic: Neck supple. Cranial nerves are grossly intact.  is symmetric.     Data   Data reviewed today:   Recent Labs   Lab 09/06/20 2018 09/06/20  1415   WBC  --  28.9*   HGB  --  8.2*   MCV  --  86   PLT  --  572*   NA  --  136   POTASSIUM  --  4.7   CHLORIDE  --  103   CO2  --  29   BUN  --  24   CR  --  1.05*   ANIONGAP  --  4   KODI  --  8.9   GLC  --  83   ALBUMIN  --  2.1*   PROTTOTAL  --  6.5*   BILITOTAL  --  0.3   ALKPHOS  --  145   ALT  --  15   AST  --  16   LIPASE  --  45*   TROPI <0.015  --        Recent Results (from the past 24 hour(s))   XR Chest Port 1 View    Narrative    CHEST PORTABLE ONE VIEW September 6, 2020 2:59 PM     HISTORY: Short of breath and cough.    COMPARISON: Chest x-ray 11/13/2014.      Impression    IMPRESSION: Portable chest. Patient is rotated to the right. Heart  appears normal in size. Right lung base consolidation is suspicious  for pneumonia. Probable small right pleural effusion as well. Left  lung is clear. No pneumothorax or left pleural effusion.    SANDHYA STACY MD       I personally reviewed the EKG tracing showing normal sinus rhythm

## 2020-09-06 NOTE — ED PROVIDER NOTES
History     Chief Complaint   Patient presents with     Cough     sob with activity, dizzy last night     Shortness of Breath   Cough, congestion, shortness of breath  HPI  Nighat Gupta is a 84 year old female, past medical history is significant for chronic pain syndrome on opioids, chronic low back pain, lumbar radiculopathy, hypertension, hyperlipidemia, osteopenia, tobacco abuse, depression, hypothyroidism, presents to the emergency department concerns of cough, congestion, shortness of breath.  History is obtained from the patient who states that she began with a congested sounding cough approximately 2 perhaps 3 weeks ago, progressively more easily fatigable, mild shortness of breath on exertion, no chills or sweats.  Some muscle aches, increased low back pain for which she uses Vicodin per pain contract.  She lives alone in her own apartment, very cautious about avoiding potential COVID type exposures and she has no specific concerns in that regard today.  She notes no change in taste or any loss of sense of smell.  No nausea or vomiting.  No change in bowel habits.  No change in voiding concerns recently.    Allergies:  Allergies   Allergen Reactions     No Known Drug Allergy        Problem List:    Patient Active Problem List    Diagnosis Date Noted     Chronic, continuous use of opioids 07/19/2019     Priority: Medium     Chronic pain syndrome 01/07/2016     Priority: Medium     Patient is followed by Nancy Tapia MD for ongoing prescription of pain medication.  All refills should be approved by this provider, or covering partner.    Chronic back pain   Medication(s): 40.   Maximum quantity per month: [place]   Clinic visit frequency required: Q 3 months     Controlled substance agreement:  Encounter-Level CSA - 4/4/16:               Controlled Substance Agreement - Scan on 4/8/2016 10:44 AM : CONTROLLED SUBSTANCE AGREEMENT 04/04/2016 (below)            Pain Clinic evaluation in the past:  Yes    DIRE Total Score(s):  No flowsheet data found.    Last [facility] website verification:  December 2, 2016    [email or web address]         Chronic low back pain 09/16/2013     Priority: Medium     Lumbar radiculopathy 09/16/2013     Priority: Medium     Health Care Home 12/19/2012     Priority: Medium     Chasidy Ranulfo, RN-PHN  [facility]   [phone]    DX V65.8 REPLACED WITH 22228 HEALTH CARE HOME (04/08/2013)       Advanced directives, counseling/discussion 11/21/2011     Priority: Medium     Advance Care Planning:   Receipt of ACP document:  Received: Health Care Directive which was witnessed or notarized on 3/13/13.  Document not previously scanned.  Validation form completed and sent with document to be scanned.  Code Status needs to be updated to reflect choices in most recent ACP document. Orders placed.  Confirmed/documented designated decision maker(s). See permanent comments section of demographics in clinical tab. View document(s) and details by clicking on code status.   Added by Felisha Elizabeth on 3/26/2013.               Essential hypertension, benign 11/21/2011     Priority: Medium     HYPERLIPIDEMIA LDL GOAL <130 10/31/2010     Priority: Medium     Osteopenia 11/18/2008     Priority: Medium     Tobacco abuse 09/08/2008     Priority: Medium     Mild major depression (H) 11/04/2005     Priority: Medium     Hypothyroidism 11/04/2005     Priority: Medium     Problem list name updated by automated process. Provider to review          Past Medical History:    Past Medical History:   Diagnosis Date     DEPRESSION W/ANXIETY 1980's     Essential hypertension, benign      Pure hypercholesterolemia      Unspecified hypothyroidism        Past Surgical History:    Past Surgical History:   Procedure Laterality Date     CATARACT IOL, RT/LT  2009    Cataract IOL RT/LT     INJECT EPIDURAL LUMBAR  4/2/2012    Procedure:INJECT EPIDURAL LUMBAR; MORENA with Flouro--;  Surgeon:GENERIC ANESTHESIA PROVIDER; Location:[place] OR     INJECT EPIDURAL LUMBAR  6/4/2012    Procedure:INJECT EPIDURAL LUMBAR; MORENA with Flouro--; Surgeon:GENERIC ANESTHESIA PROVIDER; Location:[place] OR     INJECT EPIDURAL LUMBAR  6/18/2012    Procedure: INJECT EPIDURAL LUMBAR;  MORENA-Dr. Tapia;  Surgeon: Provider, Generic Anesthesia;  Location: [place] OR     SURGICAL HISTORY OF -   04/2004    Oopherectomy - (R)       Family History:    Family History   Problem Relation Age of Onset     Heart Disease Mother      Lipids Mother      Hypertension Mother      Lipids Father      Hypertension Father      Hypertension Sister      Lipids Sister      Depression Daughter      Heart Disease Sister      Hypertension Sister      Depression Daughter        Social History:  Marital Status:   [2]  Social History     Tobacco Use     Smoking status: Current Every Day Smoker     Packs/day: 0.25     Years: 50.00     Pack years: 12.50     Types: Cigarettes     Smokeless tobacco: Never Used     Tobacco comment: 5-8 cigarettes daily   Substance Use Topics     Alcohol use: No     Drug use: No        Medications:    amLODIPine (NORVASC) 10 MG tablet  ASPIRIN 81 MG OR TABS  buPROPion (WELLBUTRIN XL) 150 MG 24 hr tablet  CALCIUM 500 + D 500-200 MG-IU OR TABS  citalopram (CELEXA) 40 MG tablet  gabapentin (NEURONTIN) 300 MG capsule  HYDROcodone-acetaminophen (NORCO) 5-325 MG tablet  lactulose (CEPHULAC) 20 GM packet  levothyroxine (SYNTHROID/LEVOTHROID) 50 MCG tablet  levothyroxine (SYNTHROID/LEVOTHROID) 75 MCG tablet  lisinopril (ZESTRIL) 40 MG tablet  MULTIVITAMIN TABS   OR  OMEGA 3 OR  simvastatin (ZOCOR) 20 MG tablet          Review of Systems   Constitutional: Positive for activity change and fever. Negative for appetite change, chills, diaphoresis, fatigue and unexpected weight change.   HENT: Negative.    Eyes: Negative.    Respiratory: Positive for cough and shortness of breath.    Cardiovascular: Positive for chest pain.         Chest pain with coughing   Gastrointestinal: Negative.    Endocrine: Negative.    Genitourinary: Negative.    Musculoskeletal: Negative.    Skin: Negative.    Allergic/Immunologic: Negative.    Neurological: Negative.    Hematological: Negative.    Psychiatric/Behavioral: Negative.        Physical Exam   BP: (!) 143/56  Pulse: 83  Temp: 97  F (36.1  C)  Resp: 16  Weight: 48.1 kg (106 lb)  SpO2: 97 %      Physical Exam  Vitals signs and nursing note reviewed.   Constitutional:       General: She is not in acute distress.     Appearance: Normal appearance. She is not ill-appearing.   HENT:      Head: Normocephalic and atraumatic.      Right Ear: Tympanic membrane normal.      Left Ear: Tympanic membrane normal.      Nose: Nose normal.      Mouth/Throat:      Mouth: Mucous membranes are moist.      Pharynx: Oropharynx is clear.   Eyes:      Extraocular Movements: Extraocular movements intact.      Conjunctiva/sclera: Conjunctivae normal.      Pupils: Pupils are equal, round, and reactive to light.   Neck:      Musculoskeletal: Normal range of motion and neck supple.   Cardiovascular:      Rate and Rhythm: Normal rate and regular rhythm.      Pulses: Normal pulses.      Heart sounds: Normal heart sounds.   Pulmonary:      Comments: Inspiratory crackles that do not clear with deep inspiration at the right base.  No other adventitious sounds  Abdominal:      General: Bowel sounds are normal.      Palpations: Abdomen is soft.   Musculoskeletal: Normal range of motion.   Skin:     General: Skin is warm and dry.      Capillary Refill: Capillary refill takes less than 2 seconds.   Neurological:      General: No focal deficit present.      Mental Status: She is alert and oriented to person, place, and time.   Psychiatric:         Mood and Affect: Mood normal.         Behavior: Behavior normal.         ED Course        Procedures               Critical Care time:  none               Results for orders placed or performed during  the hospital encounter of 09/06/20 (from the past 24 hour(s))   CBC with platelets differential   Result Value Ref Range    WBC 28.9 (H) 4.0 - 11.0 10e9/L    RBC Count 3.01 (L) 3.8 - 5.2 10e12/L    Hemoglobin 8.2 (L) 11.7 - 15.7 g/dL    Hematocrit 26.0 (L) 35.0 - 47.0 %    MCV 86 78 - 100 fl    MCH 27.2 26.5 - 33.0 pg    MCHC 31.5 31.5 - 36.5 g/dL    RDW 15.5 (H) 10.0 - 15.0 %    Platelet Count 572 (H) 150 - 450 10e9/L    Diff Method Automated Method     % Neutrophils 84.7 %    % Lymphocytes 3.7 %    % Monocytes 6.6 %    % Eosinophils 0.6 %    % Basophils 0.3 %    % Immature Granulocytes 4.1 %    Nucleated RBCs 0 0 /100    Absolute Neutrophil 24.5 (H) 1.6 - 8.3 10e9/L    Absolute Lymphocytes 1.1 0.8 - 5.3 10e9/L    Absolute Monocytes 1.9 (H) 0.0 - 1.3 10e9/L    Absolute Eosinophils 0.2 0.0 - 0.7 10e9/L    Absolute Basophils 0.1 0.0 - 0.2 10e9/L    Abs Immature Granulocytes 1.2 (H) 0 - 0.4 10e9/L    Absolute Nucleated RBC 0.1    CRP inflammation   Result Value Ref Range    CRP Inflammation 375.0 (H) 0.0 - 8.0 mg/L   Comprehensive metabolic panel   Result Value Ref Range    Sodium 136 133 - 144 mmol/L    Potassium 4.7 3.4 - 5.3 mmol/L    Chloride 103 94 - 109 mmol/L    Carbon Dioxide 29 20 - 32 mmol/L    Anion Gap 4 3 - 14 mmol/L    Glucose 83 70 - 99 mg/dL    Urea Nitrogen 24 7 - 30 mg/dL    Creatinine 1.05 (H) 0.52 - 1.04 mg/dL    GFR Estimate 49 (L) >60 mL/min/[1.73_m2]    GFR Estimate If Black 57 (L) >60 mL/min/[1.73_m2]    Calcium 8.9 8.5 - 10.1 mg/dL    Bilirubin Total 0.3 0.2 - 1.3 mg/dL    Albumin 2.1 (L) 3.4 - 5.0 g/dL    Protein Total 6.5 (L) 6.8 - 8.8 g/dL    Alkaline Phosphatase 145 40 - 150 U/L    ALT 15 0 - 50 U/L    AST 16 0 - 45 U/L   Lipase   Result Value Ref Range    Lipase 45 (L) 73 - 393 U/L   Lactic acid whole blood   Result Value Ref Range    Lactic Acid 1.1 0.7 - 2.0 mmol/L   XR Chest Port 1 View    Narrative    CHEST PORTABLE ONE VIEW September 6, 2020 2:59 PM     HISTORY: Short of breath and  cough.    COMPARISON: Chest x-ray 11/13/2014.      Impression    IMPRESSION: Portable chest. Patient is rotated to the right. Heart  appears normal in size. Right lung base consolidation is suspicious  for pneumonia. Probable small right pleural effusion as well. Left  lung is clear. No pneumothorax or left pleural effusion.    SANDHYA STACY MD   5:53 PM  After discussing disposition plan with the patient for admission to hospital to observation status I spoke with the on-call hospitalist Dr. Carlos Lares this patient's admission.  He agreed to accept her care upon admission to their service.  I placed transition orders in the emergency department.    Medications   lactated ringers BOLUS 1,000 mL (1,000 mLs Intravenous New Bag 9/6/20 1627)     Followed by   lactated ringers infusion (has no administration in time range)   cefTRIAXone in d5w (ROCEPHIN) intermittent infusion 1 g (0 g Intravenous Stopped 9/6/20 1627)   azithromycin (ZITHROMAX) 500 mg in sodium chloride 0.9 % 250 mL intermittent infusion (500 mg Intravenous New Bag 9/6/20 1627)       Assessments & Plan (with Medical Decision Making)   84-year-old female past medical history reviewed as above who presents the emergency department concerns of cough congestion shortness of breath as discussed in the HPI.  Heart rate at rest is in the 90s, the patient is not hypotensive, she is afebrile and maintains oxygen saturations around 91-93% on room air.  There is no sensory muscle use there is no respiratory distress.  She is comfortable.  Chest auscultation is consistent with right lower lobe pneumonia.  Imaging with one view portable chest is consistent with this.  Market leukocytosis, anemia.  Given the patient's comorbidities, anemia especially, borderline tachycardia but acceptable oxygen saturations without oxygen I think it would be reasonable to admit her here for observation overnight I discussed this as well as the potential for disposition to home which  I would be less in favor of with the patient.  She is in agreement with the plan for admission and the patient was discussed with the hospitalist team and transition orders placed by myself in the emergency department.      Disclaimer: This note consists of symbols derived from keyboarding, dictation and/or voice recognition software. As a result, there may be errors in the script that have gone undetected. Please consider this when interpreting information found in this chart.    I have reviewed the nursing notes.    I have reviewed the findings, diagnosis, plan and need for follow up with the patient.       New Prescriptions    No medications on file       Final diagnoses:   Pneumonia of right lower lobe due to infectious organism       9/6/2020   [facility] EMERGENCY DEPARTMENT     Tal Donovan MD  09/06/20 [id number]

## 2020-09-06 NOTE — ED NOTES
Pt presents with 2 weeks of cough and increasing SOA/activity intol. Pt states she has felt dizzy today, no CP. Pt denies fever. States no one in her apartment is ill with COVID that she knows, of and pt states she lives alone. No hx of CHF or lung disease. No acute resp distress. Pt denies dizziness at this time. Currently in NSR.

## 2020-09-07 PROBLEM — R19.5 OCCULT BLOOD POSITIVE STOOL: Status: ACTIVE | Noted: 2020-09-07

## 2020-09-07 LAB
ABO + RH BLD: NORMAL
ABO + RH BLD: NORMAL
ANION GAP SERPL CALCULATED.3IONS-SCNC: 5 MMOL/L (ref 3–14)
BASOPHILS # BLD AUTO: 0.1 10E9/L (ref 0–0.2)
BASOPHILS NFR BLD AUTO: 0.3 %
BUN SERPL-MCNC: 15 MG/DL (ref 7–30)
CALCIUM SERPL-MCNC: 8.3 MG/DL (ref 8.5–10.1)
CHLORIDE SERPL-SCNC: 110 MMOL/L (ref 94–109)
CO2 SERPL-SCNC: 26 MMOL/L (ref 20–32)
CREAT SERPL-MCNC: 0.8 MG/DL (ref 0.52–1.04)
DIFFERENTIAL METHOD BLD: ABNORMAL
EOSINOPHIL # BLD AUTO: 0.1 10E9/L (ref 0–0.7)
EOSINOPHIL NFR BLD AUTO: 0.4 %
ERYTHROCYTE [DISTWIDTH] IN BLOOD BY AUTOMATED COUNT: 15.5 % (ref 10–15)
FERRITIN SERPL-MCNC: 171 NG/ML (ref 8–252)
GFR SERPL CREATININE-BSD FRML MDRD: 69 ML/MIN/{1.73_M2}
GLUCOSE SERPL-MCNC: 99 MG/DL (ref 70–99)
HCT VFR BLD AUTO: 24.5 % (ref 35–47)
HEMOCCULT STL QL: POSITIVE
HGB BLD-MCNC: 7.8 G/DL (ref 11.7–15.7)
HGB BLD-MCNC: 7.9 G/DL (ref 11.7–15.7)
IMM GRANULOCYTES # BLD: 1.3 10E9/L (ref 0–0.4)
IMM GRANULOCYTES NFR BLD: 4.7 %
IRON SATN MFR SERPL: 6 % (ref 15–46)
IRON SERPL-MCNC: 14 UG/DL (ref 35–180)
LABORATORY COMMENT REPORT: NORMAL
LYMPHOCYTES # BLD AUTO: 0.9 10E9/L (ref 0.8–5.3)
LYMPHOCYTES NFR BLD AUTO: 3.3 %
MCH RBC QN AUTO: 27.8 PG (ref 26.5–33)
MCHC RBC AUTO-ENTMCNC: 32.2 G/DL (ref 31.5–36.5)
MCV RBC AUTO: 86 FL (ref 78–100)
MONOCYTES # BLD AUTO: 1.9 10E9/L (ref 0–1.3)
MONOCYTES NFR BLD AUTO: 7 %
NEUTROPHILS # BLD AUTO: 22.5 10E9/L (ref 1.6–8.3)
NEUTROPHILS NFR BLD AUTO: 84.3 %
NRBC # BLD AUTO: 0 10*3/UL
NRBC BLD AUTO-RTO: 0 /100
PLATELET # BLD AUTO: 474 10E9/L (ref 150–450)
POTASSIUM SERPL-SCNC: 4.6 MMOL/L (ref 3.4–5.3)
PROCALCITONIN SERPL-MCNC: 0.62 NG/ML
RBC # BLD AUTO: 2.84 10E12/L (ref 3.8–5.2)
SARS-COV-2 RNA SPEC QL NAA+PROBE: NEGATIVE
SODIUM SERPL-SCNC: 141 MMOL/L (ref 133–144)
SPECIMEN EXP DATE BLD: NORMAL
SPECIMEN SOURCE: NORMAL
TIBC SERPL-MCNC: 244 UG/DL (ref 240–430)
WBC # BLD AUTO: 26.7 10E9/L (ref 4–11)

## 2020-09-07 PROCEDURE — 25000132 ZZH RX MED GY IP 250 OP 250 PS 637: Performed by: INTERNAL MEDICINE

## 2020-09-07 PROCEDURE — 96376 TX/PRO/DX INJ SAME DRUG ADON: CPT

## 2020-09-07 PROCEDURE — 82728 ASSAY OF FERRITIN: CPT | Performed by: INTERNAL MEDICINE

## 2020-09-07 PROCEDURE — 25800030 ZZH RX IP 258 OP 636: Performed by: FAMILY MEDICINE

## 2020-09-07 PROCEDURE — 83540 ASSAY OF IRON: CPT | Performed by: INTERNAL MEDICINE

## 2020-09-07 PROCEDURE — 85018 HEMOGLOBIN: CPT | Performed by: INTERNAL MEDICINE

## 2020-09-07 PROCEDURE — 85025 COMPLETE CBC W/AUTO DIFF WBC: CPT | Performed by: FAMILY MEDICINE

## 2020-09-07 PROCEDURE — 36415 COLL VENOUS BLD VENIPUNCTURE: CPT | Performed by: FAMILY MEDICINE

## 2020-09-07 PROCEDURE — G0378 HOSPITAL OBSERVATION PER HR: HCPCS

## 2020-09-07 PROCEDURE — 82272 OCCULT BLD FECES 1-3 TESTS: CPT | Performed by: FAMILY MEDICINE

## 2020-09-07 PROCEDURE — 96361 HYDRATE IV INFUSION ADD-ON: CPT

## 2020-09-07 PROCEDURE — 25000128 H RX IP 250 OP 636: Performed by: FAMILY MEDICINE

## 2020-09-07 PROCEDURE — 80048 BASIC METABOLIC PNL TOTAL CA: CPT | Performed by: FAMILY MEDICINE

## 2020-09-07 PROCEDURE — 36415 COLL VENOUS BLD VENIPUNCTURE: CPT | Performed by: INTERNAL MEDICINE

## 2020-09-07 PROCEDURE — 83550 IRON BINDING TEST: CPT | Performed by: INTERNAL MEDICINE

## 2020-09-07 PROCEDURE — 25000132 ZZH RX MED GY IP 250 OP 250 PS 637: Performed by: FAMILY MEDICINE

## 2020-09-07 PROCEDURE — 86901 BLOOD TYPING SEROLOGIC RH(D): CPT | Performed by: INTERNAL MEDICINE

## 2020-09-07 PROCEDURE — 86900 BLOOD TYPING SEROLOGIC ABO: CPT | Performed by: INTERNAL MEDICINE

## 2020-09-07 PROCEDURE — 99225 ZZC SUBSEQUENT OBSERVATION CARE,LEVEL II: CPT | Performed by: INTERNAL MEDICINE

## 2020-09-07 RX ORDER — GUAIFENESIN/DEXTROMETHORPHAN 100-10MG/5
5 SYRUP ORAL EVERY 4 HOURS PRN
Status: DISCONTINUED | OUTPATIENT
Start: 2020-09-07 | End: 2020-09-09 | Stop reason: HOSPADM

## 2020-09-07 RX ORDER — PANTOPRAZOLE SODIUM 20 MG/1
40 TABLET, DELAYED RELEASE ORAL
Status: DISCONTINUED | OUTPATIENT
Start: 2020-09-07 | End: 2020-09-09 | Stop reason: HOSPADM

## 2020-09-07 RX ORDER — BENZONATATE 100 MG/1
100 CAPSULE ORAL 3 TIMES DAILY PRN
Status: DISCONTINUED | OUTPATIENT
Start: 2020-09-07 | End: 2020-09-09 | Stop reason: HOSPADM

## 2020-09-07 RX ORDER — LISINOPRIL 40 MG/1
40 TABLET ORAL DAILY
Status: DISCONTINUED | OUTPATIENT
Start: 2020-09-07 | End: 2020-09-09 | Stop reason: HOSPADM

## 2020-09-07 RX ADMIN — GABAPENTIN 600 MG: 300 CAPSULE ORAL at 19:45

## 2020-09-07 RX ADMIN — PANTOPRAZOLE SODIUM 40 MG: 20 TABLET, DELAYED RELEASE ORAL at 12:16

## 2020-09-07 RX ADMIN — SODIUM CHLORIDE: 9 INJECTION, SOLUTION INTRAVENOUS at 12:53

## 2020-09-07 RX ADMIN — AZITHROMYCIN 500 MG: 500 INJECTION, POWDER, LYOPHILIZED, FOR SOLUTION INTRAVENOUS at 17:34

## 2020-09-07 RX ADMIN — LISINOPRIL 40 MG: 40 TABLET ORAL at 17:55

## 2020-09-07 RX ADMIN — GABAPENTIN 600 MG: 300 CAPSULE ORAL at 08:22

## 2020-09-07 RX ADMIN — SIMVASTATIN 20 MG: 20 TABLET, FILM COATED ORAL at 08:22

## 2020-09-07 RX ADMIN — CEFTRIAXONE SODIUM 1 G: 1 INJECTION, SOLUTION INTRAVENOUS at 16:54

## 2020-09-07 RX ADMIN — ACETAMINOPHEN 650 MG: 325 TABLET, FILM COATED ORAL at 08:24

## 2020-09-07 RX ADMIN — BUPROPION HYDROCHLORIDE 150 MG: 150 TABLET, FILM COATED, EXTENDED RELEASE ORAL at 08:22

## 2020-09-07 RX ADMIN — GABAPENTIN 600 MG: 300 CAPSULE ORAL at 15:13

## 2020-09-07 RX ADMIN — LEVOTHYROXINE SODIUM 75 MCG: 75 TABLET ORAL at 04:56

## 2020-09-07 RX ADMIN — AMLODIPINE BESYLATE 10 MG: 10 TABLET ORAL at 08:23

## 2020-09-07 RX ADMIN — SODIUM CHLORIDE: 9 INJECTION, SOLUTION INTRAVENOUS at 04:48

## 2020-09-07 NOTE — PROGRESS NOTES
"[place] [facility] [place]    Patient admitted to room 2314 at approximately 1845 via cart from emergency room. Patient was accompanied by transport tech.     Verbal SBAR report received from RN prior to patient arrival.     Patient ambulated to bed with one assist. Patient alert and oriented X 3. The patient is not having any pain. 0-10 Pain Scale: 2. Admission vital signs: Blood pressure (!) 147/45, pulse 93, temperature 100.5  F (38.1  C), temperature source Oral, resp. rate 20, height 1.473 m (4' 10\"), weight 51.5 kg (113 lb 8.6 oz), SpO2 93 %, not currently breastfeeding. Patient was oriented to plan of care, call light, bed controls, tv, telephone, bathroom and visiting hours.     Risk Assessment    The following safety risks were identified during admission: fall. Yellow risk band applied: YES.     Skin Initial Assessment    This writer admitted this patient and completed a full skin assessment and Misael score in the Adult PCS flowsheet. Appropriate interventions initiated as needed.     Secondary skin check - deferred at this time due to covid r/o.         Education    Patient has a [place] to Observation order: Yes  Observation education completed and documented: Yes      Eva Bains RN    "

## 2020-09-07 NOTE — PROGRESS NOTES
COVID test came back negative- MD paged with update. Awaiting to see if patient can come off precautions.  Gaby Cline RN BSN

## 2020-09-07 NOTE — PLAN OF CARE
Problem: Gas Exchange Impaired  Goal: Optimal Gas Exchange  Outcome: No Change     Declined nicotine patch.  Continuous pulse oximetry in place.  No stool sample collected as patient reports their baseline is a BM every 2-3 days.  O2 sat 88-92% on room air.  1 L oxygen via nasal cannula applied, now satting > 92%.  Lung sounds clear posteriorly.  Frequent/nonproductive cough.

## 2020-09-07 NOTE — PLAN OF CARE
Patient is alert & oriented x 4.  Had a headache this morning that resolved after tylenol given.  Patient is on oxygen 2 LPM via NC with oxygen saturation 92-99 %.  Room air trial patients oxygen saturation was 88 % at rest.  Patient has been afebrile.  Has a non-productive cough.

## 2020-09-07 NOTE — PROGRESS NOTES
Pt had a smear of BM when wiping pt reported hemorrhoid bleeding some.  Assess rectum and just slight blood noted on external hemorrhoid.

## 2020-09-07 NOTE — PROGRESS NOTES
[facility]    Medicine Progress Note - Hospitalist Service       Date of Admission:  9/6/2020  Assessment & Plan The email address for your [employer] account has been updated     Nighat Gupta is a 84 year old female who presents on 9/6/2020 with dry cough, intermittent chest pain, shortness of breath and admitted with pneumonia        Pneumonia of right lower lobe due to infectious organism  Mild hypoxemic respiratory fsilure  Patient presented with cough, chest pain, shortness of breath and sore throat.  Labs showed leukocytosis at 28.9 (with premature forms). Chest x-ray showed right lung base consolidation and/or right pleural effusion suspicious for pneumonia. Covid 19 negative. Procalcitonin 0.62. Patient was started on ceftriaxone and azithromycin    Afebrile. 02 Room air trial patients oxygen saturation was 88 % at rest  -Continue ceftriaxone azithromycin Day 2  -Continue IV fluid with  mL /h       Normocytic  Anemia  Hemoglobin is 8.2 on admission. Last hemoglobin was 11 2 years ago. Patient denies any bleeding, particularly blood in stool or melena, however she was noted to have a hemorroid with some blood on it by RN. Occult blood testing positive. Last colonoscopy 08/29/2007 showed dverticulosis, examination was otherwise normal. Iron Sat% 6, Ferritin 171 consistent with anemia of chronic disease or iron deficiency with acute pneumonia    Patient is currently stable with no clear bleeding source  -Continue to monitor hemoglobin  -Consider GI follow-up for colonoscopy. Patient states that if she had colon cancer she would not want to do anything  - Started proton pump inhibitor         Hypothyroidism  Chronic stable problem  -Continue home levothyroxine      Tobacco abuse  Offered nicotine patch      Hyperlipidemia LDL goal <130  Chronic stable problem  - Continue home statin      Essential hypertension, benign  Chronic problem. BPs mildly elevated  - Continued Norvasc  10 mg  - Resume lisinopril       Chronic pain syndrome  - Continue home gabapentin      Mild major depression (H)  - Resume home Wellbutrin         Diet: Regular Diet Adult    DVT Prophylaxis: Low Risk/Ambulatory with no VTE prophylaxis indicated  Castle Catheter: not present  Code Status: DNR/DNI         Disposition Plan   Expected discharge: 1-2 days, recommended to prior living arrangement once antibiotic plan established, hemoglobin stable and O2 use less than 1 liters/minute.  Entered: Jeremy Rodrigues MD 09/07/2020, 4:11 PM       The patient's care was discussed with the Bedside Nurse and Patient.    Jeremy Rodrigues MD  Hospitalist Service  [facility]    ______________________________________________________________________    Interval History   Still coughing  Urinating alot    Data reviewed today: I reviewed all medications, new labs and imaging results over the last 24 hours. I personally reviewed the chest x-ray image(s) showing RLL infiltrate vs plef.    Physical Exam   Vital Signs: Temp: 98.5  F (36.9  C) Temp src: Oral BP: (!) 150/53 Pulse: 93   Resp: 18 SpO2: 96 % O2 Device: Nasal cannula Oxygen Delivery: 2 LPM  Weight: 113 lbs 8.59 oz  Constitutional: awake, alert, cooperative, no apparent distress, and appears stated age    Data   Component      Latest Ref Rng & Units 9/6/2020 9/7/2020   Iron      35 - 180 ug/dL  14 (L)   Iron Binding Cap      240 - 430 ug/dL  244   Iron Saturation Index      15 - 46 %  6 (L)   Procalcitonin      ng/ml 0.62    Ferritin      8 - 252 ng/mL  171       CMP  Recent Labs   Lab 09/07/20  0601 09/06/20  1415    136   POTASSIUM 4.6 4.7   CHLORIDE 110* 103   CO2 26 29   ANIONGAP 5 4   GLC 99 83   BUN 15 24   CR 0.80 1.05*   GFRESTIMATED 69 49*   GFRESTBLACK 80 57*   KODI 8.3* 8.9   PROTTOTAL  --  6.5*   ALBUMIN  --  2.1*   BILITOTAL  --  0.3   ALKPHOS  --  145   AST  --  16   ALT  --  15     CBC  Recent Labs   Lab 09/07/20  1506 09/07/20  0601  09/06/20  1415   WBC  --  26.7* 28.9*   RBC  --  2.84* 3.01*   HGB 7.8* 7.9* 8.2*   HCT  --  24.5* 26.0*   MCV  --  86 86   MCH  --  27.8 27.2   MCHC  --  32.2 31.5   RDW  --  15.5* 15.5*   PLT  --  474* 572*     Results for orders placed or performed during the hospital encounter of 09/06/20   XR Chest Port 1 View    Narrative    CHEST PORTABLE ONE VIEW September 6, 2020 2:59 PM     HISTORY: Short of breath and cough.    COMPARISON: Chest x-ray 11/13/2014.      Impression    IMPRESSION: Portable chest. Patient is rotated to the right. Heart  appears normal in size. Right lung base consolidation is suspicious  for pneumonia. Probable small right pleural effusion as well. Left  lung is clear. No pneumothorax or left pleural effusion.    SANDHYA STACY MD

## 2020-09-08 ENCOUNTER — APPOINTMENT (OUTPATIENT)
Dept: PHYSICAL THERAPY | Facility: CLINIC | Age: 85
DRG: 193 | End: 2020-09-08
Payer: COMMERCIAL

## 2020-09-08 PROBLEM — R09.02 HYPOXEMIA: Status: ACTIVE | Noted: 2020-09-08

## 2020-09-08 LAB
ALBUMIN SERPL-MCNC: 1.7 G/DL (ref 3.4–5)
ALP SERPL-CCNC: 138 U/L (ref 40–150)
ALT SERPL W P-5'-P-CCNC: 14 U/L (ref 0–50)
ANION GAP SERPL CALCULATED.3IONS-SCNC: 7 MMOL/L (ref 3–14)
AST SERPL W P-5'-P-CCNC: 16 U/L (ref 0–45)
BILIRUB SERPL-MCNC: 0.2 MG/DL (ref 0.2–1.3)
BUN SERPL-MCNC: 10 MG/DL (ref 7–30)
CALCIUM SERPL-MCNC: 8.9 MG/DL (ref 8.5–10.1)
CHLORIDE SERPL-SCNC: 107 MMOL/L (ref 94–109)
CO2 SERPL-SCNC: 25 MMOL/L (ref 20–32)
CREAT SERPL-MCNC: 0.82 MG/DL (ref 0.52–1.04)
ERYTHROCYTE [DISTWIDTH] IN BLOOD BY AUTOMATED COUNT: 15.6 % (ref 10–15)
GFR SERPL CREATININE-BSD FRML MDRD: 66 ML/MIN/{1.73_M2}
GLUCOSE SERPL-MCNC: 99 MG/DL (ref 70–99)
HCT VFR BLD AUTO: 25.1 % (ref 35–47)
HGB BLD-MCNC: 8.1 G/DL (ref 11.7–15.7)
INR PPP: 1.29 (ref 0.86–1.14)
MCH RBC QN AUTO: 27.6 PG (ref 26.5–33)
MCHC RBC AUTO-ENTMCNC: 32.3 G/DL (ref 31.5–36.5)
MCV RBC AUTO: 86 FL (ref 78–100)
PLATELET # BLD AUTO: 531 10E9/L (ref 150–450)
POTASSIUM SERPL-SCNC: 4.1 MMOL/L (ref 3.4–5.3)
PROT SERPL-MCNC: 5.9 G/DL (ref 6.8–8.8)
RBC # BLD AUTO: 2.93 10E12/L (ref 3.8–5.2)
SODIUM SERPL-SCNC: 139 MMOL/L (ref 133–144)
TSH SERPL DL<=0.005 MIU/L-ACNC: 2.97 MU/L (ref 0.4–4)
WBC # BLD AUTO: 21.1 10E9/L (ref 4–11)

## 2020-09-08 PROCEDURE — 25000128 H RX IP 250 OP 636: Performed by: FAMILY MEDICINE

## 2020-09-08 PROCEDURE — 36415 COLL VENOUS BLD VENIPUNCTURE: CPT | Performed by: INTERNAL MEDICINE

## 2020-09-08 PROCEDURE — 12000000 ZZH R&B MED SURG/OB

## 2020-09-08 PROCEDURE — 25000132 ZZH RX MED GY IP 250 OP 250 PS 637: Performed by: INTERNAL MEDICINE

## 2020-09-08 PROCEDURE — 99207 ZZC CDG-CODE CATEGORY CHANGED: CPT | Performed by: INTERNAL MEDICINE

## 2020-09-08 PROCEDURE — G0378 HOSPITAL OBSERVATION PER HR: HCPCS

## 2020-09-08 PROCEDURE — 80053 COMPREHEN METABOLIC PANEL: CPT | Performed by: INTERNAL MEDICINE

## 2020-09-08 PROCEDURE — 84443 ASSAY THYROID STIM HORMONE: CPT | Performed by: INTERNAL MEDICINE

## 2020-09-08 PROCEDURE — 25800030 ZZH RX IP 258 OP 636: Performed by: FAMILY MEDICINE

## 2020-09-08 PROCEDURE — 99232 SBSQ HOSP IP/OBS MODERATE 35: CPT | Performed by: INTERNAL MEDICINE

## 2020-09-08 PROCEDURE — 25000132 ZZH RX MED GY IP 250 OP 250 PS 637: Performed by: FAMILY MEDICINE

## 2020-09-08 PROCEDURE — 85027 COMPLETE CBC AUTOMATED: CPT | Performed by: INTERNAL MEDICINE

## 2020-09-08 PROCEDURE — 97161 PT EVAL LOW COMPLEX 20 MIN: CPT | Mod: GP | Performed by: PHYSICAL THERAPIST

## 2020-09-08 PROCEDURE — 85610 PROTHROMBIN TIME: CPT | Performed by: INTERNAL MEDICINE

## 2020-09-08 RX ADMIN — GABAPENTIN 600 MG: 300 CAPSULE ORAL at 14:44

## 2020-09-08 RX ADMIN — LISINOPRIL 40 MG: 40 TABLET ORAL at 08:31

## 2020-09-08 RX ADMIN — GABAPENTIN 600 MG: 300 CAPSULE ORAL at 08:31

## 2020-09-08 RX ADMIN — GABAPENTIN 300 MG: 300 CAPSULE ORAL at 14:46

## 2020-09-08 RX ADMIN — AMLODIPINE BESYLATE 10 MG: 10 TABLET ORAL at 08:31

## 2020-09-08 RX ADMIN — SIMVASTATIN 20 MG: 20 TABLET, FILM COATED ORAL at 08:31

## 2020-09-08 RX ADMIN — BENZONATATE 100 MG: 100 CAPSULE ORAL at 15:09

## 2020-09-08 RX ADMIN — AZITHROMYCIN 500 MG: 500 INJECTION, POWDER, LYOPHILIZED, FOR SOLUTION INTRAVENOUS at 17:10

## 2020-09-08 RX ADMIN — PANTOPRAZOLE SODIUM 40 MG: 20 TABLET, DELAYED RELEASE ORAL at 06:21

## 2020-09-08 RX ADMIN — CEFTRIAXONE SODIUM 1 G: 1 INJECTION, SOLUTION INTRAVENOUS at 16:17

## 2020-09-08 RX ADMIN — BUPROPION HYDROCHLORIDE 150 MG: 150 TABLET, FILM COATED, EXTENDED RELEASE ORAL at 08:32

## 2020-09-08 RX ADMIN — LEVOTHYROXINE SODIUM 50 MCG: 50 TABLET ORAL at 06:21

## 2020-09-08 RX ADMIN — GABAPENTIN 600 MG: 300 CAPSULE ORAL at 21:26

## 2020-09-08 RX ADMIN — GUAIFENESIN AND DEXTROMETHORPHAN 5 ML: 100; 10 SYRUP ORAL at 16:24

## 2020-09-08 ASSESSMENT — ACTIVITIES OF DAILY LIVING (ADL)
ADLS_ACUITY_SCORE: 20
ADLS_ACUITY_SCORE: 20

## 2020-09-08 NOTE — PLAN OF CARE
OT: Per discussion with physical therapy no IP OT needs identified. Will discontinue orders at this time.

## 2020-09-08 NOTE — PLAN OF CARE
Patient states feeling better this am, cough is less harsh and not as frequent.  Up frequently to void but states this is very normal for her.  Incontinent each time up to void.  Denies any pain with cough.  Continues on 2 LPM/NC thru the night.  Plan of care continues.

## 2020-09-08 NOTE — PLAN OF CARE
Discharge Planner PT   Patient plan for discharge: Home- resides in [facility] apartment    Current status: Eval completed- full note to follow.  Pt up ambulating in her room w/ no device; good balance; able to ambulate 300 feet x1 with RW/  SBA using 2 LO2, SPo2 92% post amb    Barriers to return to prior living situation: medical stability     Recommendations for discharge: home  If remain hospitalized, pt to continue ambulating w/ nursing staff 3-4x/ daily in hallway in preparation for discahrge    Rationale for recommendations: Pt at or near baseline w/ mobility- ambulating in room distances w/ no device, using a RW for longer community distances.        Entered by: Abby Gonzalez 09/08/2020 9:13 AM

## 2020-09-08 NOTE — UTILIZATION REVIEW
Admission Status; Secondary Review Determination   Admission date:  9/6/2020  2:02 PM    Under the authority of the Utilization Management Committee, the utilization review process indicated a secondary review on the above patient. The review outcome is based on review of the medical records, discussions with staff, and applying clinical experience noted on the date of the review.     (x) Inpatient Status Appropriate - This patient's medical care is consistent with medical management for inpatient care and reasonable inpatient medical practice.     RATIONALE FOR DETERMINATION   84-year-old female with hypothyroidism, tobacco dependence, hyperlipidemia, hypertension, and chronic pain was admitted on 9/6 with shortness of breath and fever.  Found to have community-acquired pneumonia.  Started on ceftriaxone and azithromycin.  She remains hypoxic requiring supplemental oxygen.  This patient is elderly, complex, high risk for rapid clinical deterioration, will require a greater than 3 midnight hospitalization and is appropriate for inpatient status.  The severity of illness, intensity of service provided, expected LOS and risk for adverse outcome make the care complex, high risk and appropriate for hospital admission.    At the time of admission with the information available to the attending physician more than 2 nights Hospital complex care was anticipated, based on patient risk of adverse outcome if treated as outpatient and complex care required.  Inpatient admission is appropriate based on the Medicare guidelines.      The information on this document is developed by the utilization review team in order for the business office to ensure compliance. This only denotes the appropriateness of proper admission status and does not reflect the quality of care rendered.   The definitions of Inpatient Status and Observation Status used in making the determination above are those provided in the CMS Coverage Manual, Chapter  1 and Chapter 6, section 70.4.     Sincerely,   Cole Gonzalez DO MPH   Physician Advisor  Utilization Review  [facility]

## 2020-09-08 NOTE — PLAN OF CARE
Nighat ambulates well in room.  Pt calls for assistance as needed.  Tolerating food and fluids.  VSS.  Trial of decreasing oxygen.  This writer turned of while pt was sleeping-alarmed x1 then went right back up.  Oxygen remains off.  Able to sleep/no complaints of pain.

## 2020-09-08 NOTE — PROGRESS NOTES
09/08/20 0800   Quick Adds   Type of Visit Initial PT Evaluation   Living Environment   Lives With alone   Living Arrangements apartment  (SR apartment)   Functional Level Prior   Ambulation 0-->independent   Transferring 0-->independent   Toileting 1-->assistive equipment   Bathing 1-->assistive equipment   Communication 0-->understands/communicates without difficulty   Swallowing 0-->swallows foods/liquids without difficulty   Cognition 0 - no cognition issues reported   Fall history within last six months no   Prior Functional Level Comment PLOF- Pt indep. w/ ambulation  with no device ; uses a 4ww or cart for longer community distances due to Low back issues    General Information   Onset of Illness/Injury or Date of Surgery - Date 09/07/20   Patient/Family Goals Statement Return home- does not feel the need for home care services   Pertinent History of Current Problem (include personal factors and/or comorbidities that impact the POC) 84 year old female, past medical history is significant for chronic pain syndrome on opioids, chronic low back pain, lumbar radiculopathy, hypertension, hyperlipidemia, osteopenia, tobacco abuse, depression, hypothyroidism, presents to the emergency department concerns of cough, congestion, shortness of breath; admitted to the hospital w/ pneumonia    Referring Physician  -- Dr Rodrigues   General Observations Pt alert, pleasant- Pt states she is feeling better; but continues w/ cough   Cognitive Status Examination   Level of Consciousness alert   Follows Commands and Answers Questions able to follow multistep instructions   Personal Safety and Judgment intact   Pain Assessment   Patient Currently in Pain Yes, see Vital Sign flowsheet  (Chronic LBP)   Posture    Posture Comments Pt mildly flexed foward   Range of Motion (ROM)   ROM Comment WFL    Strength   Strength Comments WFL    Transfer Skills   Transfer Comments Indep   Gait   Gait Comments Pt up ambulating in her room w/ no  "device; good balance; able to ambulate 300 feet x1 with RW/  SBA using 2 LO2, SPo2 92% post amb; to continue amb  w/ nursing staff if remains hospitalized 3-4x/dauly w/ RW in hallway    Balance   Balance Comments Good dynamic standing balance   Sensory Examination   Sensory Perception no deficits were identified   Clinical Impression   Criteria for Skilled Therapeutic Intervention evaluation only;no problems identified which require skilled intervention;current level of function same as previous level of function   Clinical Presentation Stable/Uncomplicated   Clinical Presentation Rationale clinical judgement   Clinical Decision Making (Complexity) Low complexity   Therapy Frequency Daily   Anticipated Discharge Disposition Home   Risk & Benefits of therapy have been explained Yes   Patient, Family & other staff in agreement with plan of care Yes   [facility] AM-PAC  \"6 Clicks\" V.2 Basic Mobility Inpatient Short Form   1. Turning from your back to your side while in a flat bed without using bedrails? 4 - None   2. Moving from lying on your back to sitting on the side of a flat bed without using bedrails? 4 - None   3. Moving to and from a bed to a chair (including a wheelchair)? 4 - None   4. Standing up from a chair using your arms (e.g., wheelchair, or bedside chair)? 4 - None   5. To walk in hospital room? 4 - None   6. Climbing 3-5 steps with a railing? 4 - None  (clinical judgement, NT)   Basic Mobility Raw Score (Score out of 24.Lower scores equate to lower levels of function) 24   Total Evaluation Time   Total Evaluation Time (Minutes) 25     "

## 2020-09-08 NOTE — PROGRESS NOTES
[facility]    Medicine Progress Note - Hospitalist Service       Date of Admission:  9/6/2020  Assessment & Plan The email address for your MyChart account has been updated     The email address for your [employer] account has been updated     Nighat Gupta is a 84 year old female who presents on 9/6/2020 with dry cough, intermittent chest pain, shortness of breath and admitted with pneumonia        Pneumonia of right lower lobe due to infectious organism  Mild hypoxemic respiratory fsilure  Patient presented with cough, chest pain, shortness of breath and sore throat.  Labs showed leukocytosis at 28.9 (with premature forms). Chest x-ray showed right lung base consolidation and/or right pleural effusion suspicious for pneumonia. Covid 19 negative. Procalcitonin 0.62. Patient was started on ceftriaxone and azithromycin    Afebrile. On O2 2 LPM  - Wean O2 if able  -Continue ceftriaxone azithromycin Day 3       Normocytic  Anemia  Occult blood testing positive  Hemoglobin is 8.2 on admission. Last hemoglobin was 11 two years ago. Patient denies any bleeding, particularly blood in stool or melena, however she was noted to have a hemorroid with some blood on it by RN.  Last colonoscopy 08/29/2007 showed dverticulosis, examination was otherwise normal. Iron Sat% 6, Ferritin 171 consistent with anemia of chronic disease or iron deficiency with acute pneumonia    Patient is stable with no clear bleeding source  -Continue to monitor hemoglobin  -Consider GI follow-up for colonoscopy. Patient states that if she had colon cancer she would not want to do anything. Will defer to PCP as long as stable.   - Started proton pump inhibitor   - Recheck iron levels after pneumonia treated      Hypothyroidism  Chronic stable problem  -Continue home levothyroxine  - Check TSH      Tobacco abuse  Offered nicotine patch      Hyperlipidemia LDL goal <130  Chronic stable problem  - Continued home statin       Essential hypertension, benign  Chronic problem. BPs mildly elevated  - Continued Norvasc 10 mg  - Resumed lisinopril       Chronic pain syndrome  - Continued home gabapentin      Mild major depression (H)  - Resumed home Wellbutrin           Diet: Regular Diet Adult    DVT Prophylaxis: Low Risk/Ambulatory with no VTE prophylaxis indicated  Castle Catheter: not present  Code Status: DNR/DNI         Disposition Plan   Expected discharge: Tomorrow, recommended to prior living arrangement once O2 use less than 1 liters/minute.  Entered: Jeremy Rodrigues MD 09/08/2020, 12:18 PM       The patient's care was discussed with the Bedside Nurse and Patient.    Jeremy Rodrigues MD  Hospitalist Service  [facility]    ______________________________________________________________________    Interval History   Feels okay, weak, not sure she can manage at home  Cough stable    Data reviewed today: I reviewed all medications, new labs and imaging results over the last 24 hours. I personally reviewed no images or EKG's today.    Physical Exam   Vital Signs: Temp: 98.7  F (37.1  C) Temp src: Oral BP: 134/56 Pulse: 93   Resp: 18 SpO2: 96 % O2 Device: Nasal cannula Oxygen Delivery: 2 LPM  Weight: 113 lbs 8.59 oz  Constitutional: awake, alert, cooperative, no apparent distress, and appears stated age  Respiratory: No increased work of breathing, good air exchange, few ronhi right lower lobe     Data      Lab Results   Component Value Date    TSH 1.23 07/19/2019         CMP  Recent Labs   Lab 09/08/20  0535 09/07/20  0601 09/06/20  1415    141 136   POTASSIUM 4.1 4.6 4.7   CHLORIDE 107 110* 103   CO2 25 26 29   ANIONGAP 7 5 4   GLC 99 99 83   BUN 10 15 24   CR 0.82 0.80 1.05*   GFRESTIMATED 66 69 49*   GFRESTBLACK 77 80 57*   KODI 8.9 8.3* 8.9   PROTTOTAL 5.9*  --  6.5*   ALBUMIN 1.7*  --  2.1*   BILITOTAL 0.2  --  0.3   ALKPHOS 138  --  145   AST 16  --  16   ALT 14  --  15     CBC  Recent Labs   Lab  09/08/20  0535 09/07/20  1506 09/07/20  0601 09/06/20  1415   WBC 21.1*  --  26.7* 28.9*   RBC 2.93*  --  2.84* 3.01*   HGB 8.1* 7.8* 7.9* 8.2*   HCT 25.1*  --  24.5* 26.0*   MCV 86  --  86 86   MCH 27.6  --  27.8 27.2   MCHC 32.3  --  32.2 31.5   RDW 15.6*  --  15.5* 15.5*   *  --  474* 572*     INR  Recent Labs   Lab 09/08/20 0535   INR 1.29*       Recent Labs   Lab 09/08/20  0535 09/07/20  0601 09/06/20  1415   GLC 99 99 83     Unresulted Labs Ordered in the Past 30 Days of this Admission     No orders found from 8/7/2020 to 9/7/2020.

## 2020-09-09 VITALS
BODY MASS INDEX: 23.83 KG/M2 | HEIGHT: 58 IN | OXYGEN SATURATION: 92 % | DIASTOLIC BLOOD PRESSURE: 60 MMHG | HEART RATE: 81 BPM | RESPIRATION RATE: 16 BRPM | TEMPERATURE: 97.4 F | WEIGHT: 113.54 LBS | SYSTOLIC BLOOD PRESSURE: 143 MMHG

## 2020-09-09 LAB
ALBUMIN SERPL-MCNC: 1.7 G/DL (ref 3.4–5)
ALP SERPL-CCNC: 130 U/L (ref 40–150)
ALT SERPL W P-5'-P-CCNC: 37 U/L (ref 0–50)
ANION GAP SERPL CALCULATED.3IONS-SCNC: 7 MMOL/L (ref 3–14)
AST SERPL W P-5'-P-CCNC: 64 U/L (ref 0–45)
BILIRUB SERPL-MCNC: 0.2 MG/DL (ref 0.2–1.3)
BUN SERPL-MCNC: 14 MG/DL (ref 7–30)
CALCIUM SERPL-MCNC: 9 MG/DL (ref 8.5–10.1)
CHLORIDE SERPL-SCNC: 107 MMOL/L (ref 94–109)
CO2 SERPL-SCNC: 25 MMOL/L (ref 20–32)
CREAT SERPL-MCNC: 0.73 MG/DL (ref 0.52–1.04)
ERYTHROCYTE [DISTWIDTH] IN BLOOD BY AUTOMATED COUNT: 15.5 % (ref 10–15)
GFR SERPL CREATININE-BSD FRML MDRD: 76 ML/MIN/{1.73_M2}
GLUCOSE SERPL-MCNC: 98 MG/DL (ref 70–99)
HCT VFR BLD AUTO: 26.6 % (ref 35–47)
HGB BLD-MCNC: 8.6 G/DL (ref 11.7–15.7)
MCH RBC QN AUTO: 27.6 PG (ref 26.5–33)
MCHC RBC AUTO-ENTMCNC: 32.3 G/DL (ref 31.5–36.5)
MCV RBC AUTO: 85 FL (ref 78–100)
PLATELET # BLD AUTO: 573 10E9/L (ref 150–450)
POTASSIUM SERPL-SCNC: 4.2 MMOL/L (ref 3.4–5.3)
PROT SERPL-MCNC: 6.2 G/DL (ref 6.8–8.8)
RBC # BLD AUTO: 3.12 10E12/L (ref 3.8–5.2)
SODIUM SERPL-SCNC: 139 MMOL/L (ref 133–144)
WBC # BLD AUTO: 16.4 10E9/L (ref 4–11)

## 2020-09-09 PROCEDURE — 80053 COMPREHEN METABOLIC PANEL: CPT | Performed by: INTERNAL MEDICINE

## 2020-09-09 PROCEDURE — 25000132 ZZH RX MED GY IP 250 OP 250 PS 637: Performed by: FAMILY MEDICINE

## 2020-09-09 PROCEDURE — 36415 COLL VENOUS BLD VENIPUNCTURE: CPT | Performed by: INTERNAL MEDICINE

## 2020-09-09 PROCEDURE — 99238 HOSP IP/OBS DSCHRG MGMT 30/<: CPT | Performed by: INTERNAL MEDICINE

## 2020-09-09 PROCEDURE — 85027 COMPLETE CBC AUTOMATED: CPT | Performed by: INTERNAL MEDICINE

## 2020-09-09 PROCEDURE — 25000132 ZZH RX MED GY IP 250 OP 250 PS 637: Performed by: INTERNAL MEDICINE

## 2020-09-09 RX ORDER — AZITHROMYCIN 250 MG/1
250 TABLET, FILM COATED ORAL DAILY
Qty: 4 TABLET | Refills: 0 | Status: SHIPPED | OUTPATIENT
Start: 2020-09-09 | End: 2020-09-13

## 2020-09-09 RX ORDER — PANTOPRAZOLE SODIUM 40 MG/1
40 TABLET, DELAYED RELEASE ORAL
Qty: 30 TABLET | Refills: 1 | Status: SHIPPED | OUTPATIENT
Start: 2020-09-10 | End: 2020-10-14

## 2020-09-09 RX ORDER — BENZONATATE 100 MG/1
100 CAPSULE ORAL 3 TIMES DAILY PRN
Qty: 30 CAPSULE | Refills: 1 | Status: SHIPPED | OUTPATIENT
Start: 2020-09-09 | End: 2021-11-03

## 2020-09-09 RX ORDER — HYDROCODONE BITARTRATE AND ACETAMINOPHEN 5; 325 MG/1; MG/1
1-2 TABLET ORAL EVERY 6 HOURS PRN
Status: DISCONTINUED | OUTPATIENT
Start: 2020-09-09 | End: 2020-09-09 | Stop reason: HOSPADM

## 2020-09-09 RX ADMIN — BENZONATATE 100 MG: 100 CAPSULE ORAL at 07:01

## 2020-09-09 RX ADMIN — PANTOPRAZOLE SODIUM 40 MG: 20 TABLET, DELAYED RELEASE ORAL at 06:57

## 2020-09-09 RX ADMIN — AMLODIPINE BESYLATE 10 MG: 10 TABLET ORAL at 08:28

## 2020-09-09 RX ADMIN — BUPROPION HYDROCHLORIDE 150 MG: 150 TABLET, FILM COATED, EXTENDED RELEASE ORAL at 08:28

## 2020-09-09 RX ADMIN — LEVOTHYROXINE SODIUM 75 MCG: 75 TABLET ORAL at 06:57

## 2020-09-09 RX ADMIN — GABAPENTIN 600 MG: 300 CAPSULE ORAL at 08:28

## 2020-09-09 RX ADMIN — SIMVASTATIN 20 MG: 20 TABLET, FILM COATED ORAL at 08:28

## 2020-09-09 RX ADMIN — LISINOPRIL 40 MG: 40 TABLET ORAL at 08:28

## 2020-09-09 ASSESSMENT — ACTIVITIES OF DAILY LIVING (ADL)
ADLS_ACUITY_SCORE: 20

## 2020-09-09 ASSESSMENT — PAIN DESCRIPTION - DESCRIPTORS: DESCRIPTORS: ACHING

## 2020-09-09 NOTE — PLAN OF CARE
Pt has been on room air this shift with sat's 91-92%. Denied any SOB. Denied pain. Saline locked. Afebrile. VSS.

## 2020-09-09 NOTE — DISCHARGE SUMMARY
[facility]  Hospitalist Discharge Summary      Date of Admission:  9/6/2020  Date of Discharge:  9/9/2020  Discharging Provider: Jeremy Rodrigues MD      Discharge Diagnoses   Principal Problem:    Pneumonia of right lower lobe due to infectious organism (9/6/2020)  Active Problems:    Chronic pain syndrome (1/7/2016)    Mild major depression (H) (11/4/2005)    Tobacco abuse (9/8/2008)    Hyperlipidemia LDL goal <130 (10/31/2010)    Essential hypertension, benign (11/21/2011)    Anemia (9/6/2020)    Suspected COVID-19 virus infection (9/6/2020)    Occult blood positive stool (9/7/2020)    Hypoxemia (9/8/2020)    Hypothyroidism (11/4/2005)      Follow-ups Needed After Discharge   Follow-up Appointments     Follow-up and recommended labs and tests       Follow up with primary care provider, Nancy Tapia, within 7 days   for hospital follow- up.             Unresulted Labs Ordered in the Past 30 Days of this Admission     No orders found from 8/7/2020 to 9/7/2020.          Discharge Disposition   Discharged to home  Condition at discharge: Good      Hospital Course     Nighat Gupta is a 84 year old female who presents on 9/6/2020 with dry cough, intermittent chest pain, shortness of breath and admitted with pneumonia        Pneumonia of right lower lobe due to infectious organism  Mild hypoxemic respiratory fsilure  Patient presented with cough, chest pain, shortness of breath and sore throat.  Labs showed leukocytosis at 28.9 (with premature forms). Chest x-ray showed right lung base consolidation and/or right pleural effusion suspicious for pneumonia. Covid 19 negative. Procalcitonin 0.62. Patient was started on ceftriaxone and azithromycin    Afebrile. Treated with O2 transiently  -Discharge with azithromycin for another 4 days  - Repeat CBC in 2-4 weeks  - Repeat CXR in 6-8 weeks       Normocytic  Anemia  Occult blood testing positive  Hemoglobin is 8.2 on admission. Last  hemoglobin was 11 two years ago. Patient denies any bleeding, particularly blood in stool or melena, however she was noted to have a hemorroid with some blood on it by RN.  Last colonoscopy 08/29/2007 showed dverticulosis, examination was otherwise normal. Iron Sat% 6, Ferritin 171 consistent with anemia of chronic disease or iron deficiency with acute pneumonia    Patient stable with no clear bleeding source  -Consider GI follow-up for colonoscopy. Patient states that if she had colon cancer she would not want to do anything. Will defer to PCP as long as stable.   - Started proton pump inhibitor   - Recheck iron levels after pneumonia treated      Hypothyroidism  Chronic stable problem. TSH normal.   -Continue home levothyroxine      Tobacco abuse  Offered nicotine patch      Hyperlipidemia LDL goal <130  Chronic stable problem  - Continued home statin      Essential hypertension, benign  Chronic problem. BPs mildly elevated  - Continued Norvasc 10 mg  - Resumed lisinopril       Chronic pain syndrome  - Continued home gabapentin      Mild major depression (H)  - Resumed home Wellbutrin          Consultations This Hospital Stay   PHYSICAL THERAPY ADULT IP CONSULT  OCCUPATIONAL THERAPY ADULT IP CONSULT  CARE TRANSITION RN/SW IP CONSULT    Code Status   No CPR- Do NOT Intubate    Time Spent on this Encounter   I, Jeremy Rodrigues MD, personally saw the patient today and spent less than or equal to 30 minutes discharging this patient.       Jeremy Rodrigues MD  [facility]  ______________________________________________________________________    Physical Exam   Vital Signs: Temp: 97.4  F (36.3  C) Temp src: Oral BP: (!) 143/60 Pulse: 81   Resp: 16 SpO2: 92 % O2 Device: None (Room air) Oxygen Delivery: 1 LPM  Weight: 113 lbs 8.59 oz  Constitutional: awake, alert, cooperative, no apparent distress, and appears stated age       Primary Care Physician   Nancy Maya Rehlucy    Discharge Orders       Reason for your hospital stay    Pneumonia     Follow-up and recommended labs and tests     Follow up with primary care provider, Nancy Tapia, within 7 days for hospital follow- up.     Activity    Your activity upon discharge: activity as tolerated     Diet    Follow this diet upon discharge: Orders Placed This Encounter      Regular Diet Adult       Significant Results and Procedures     Results for orders placed or performed during the hospital encounter of 09/06/20   XR Chest Port 1 View    Narrative    CHEST PORTABLE ONE VIEW September 6, 2020 2:59 PM     HISTORY: Short of breath and cough.    COMPARISON: Chest x-ray 11/13/2014.      Impression    IMPRESSION: Portable chest. Patient is rotated to the right. Heart  appears normal in size. Right lung base consolidation is suspicious  for pneumonia. Probable small right pleural effusion as well. Left  lung is clear. No pneumothorax or left pleural effusion.    SANDHYA STACY MD     CMP  Recent Labs   Lab 09/09/20  0554 09/08/20  0535 09/07/20  0601 09/06/20  1415    139 141 136   POTASSIUM 4.2 4.1 4.6 4.7   CHLORIDE 107 107 110* 103   CO2 25 25 26 29   ANIONGAP 7 7 5 4   GLC 98 99 99 83   BUN 14 10 15 24   CR 0.73 0.82 0.80 1.05*   GFRESTIMATED 76 66 69 49*   GFRESTBLACK 88 77 80 57*   KODI 9.0 8.9 8.3* 8.9   PROTTOTAL 6.2* 5.9*  --  6.5*   ALBUMIN 1.7* 1.7*  --  2.1*   BILITOTAL 0.2 0.2  --  0.3   ALKPHOS 130 138  --  145   AST 64* 16  --  16   ALT 37 14  --  15     CBC  Recent Labs   Lab 09/09/20  0554 09/08/20  0535 09/07/20  1506 09/07/20  0601 09/06/20  1415   WBC 16.4* 21.1*  --  26.7* 28.9*   RBC 3.12* 2.93*  --  2.84* 3.01*   HGB 8.6* 8.1* 7.8* 7.9* 8.2*   HCT 26.6* 25.1*  --  24.5* 26.0*   MCV 85 86  --  86 86   MCH 27.6 27.6  --  27.8 27.2   MCHC 32.3 32.3  --  32.2 31.5   RDW 15.5* 15.6*  --  15.5* 15.5*   * 531*  --  474* 572*     INR  Recent Labs   Lab 09/08/20  0535   INR 1.29*     Lab Results   Component Value Date    TSH 2.97  09/08/2020        Discharge Medications   Current Discharge Medication List      START taking these medications    Details   azithromycin (ZITHROMAX Z-JESSENIA) 250 MG tablet Take 1 tablet (250 mg) by mouth daily for 4 days  Qty: 4 tablet, Refills: 0    Associated Diagnoses: Pneumonia of right lower lobe due to infectious organism      benzonatate (TESSALON) 100 MG capsule Take 1 capsule (100 mg) by mouth 3 times daily as needed for cough  Qty: 30 capsule, Refills: 1    Associated Diagnoses: Pneumonia of right lower lobe due to infectious organism      pantoprazole (PROTONIX) 40 MG EC tablet Take 1 tablet (40 mg) by mouth every morning (before breakfast)  Qty: 30 tablet, Refills: 1    Associated Diagnoses: Anemia, unspecified type         CONTINUE these medications which have NOT CHANGED    Details   amLODIPine (NORVASC) 10 MG tablet Take 1 tablet (10 mg) by mouth daily  Qty: 90 tablet, Refills: 3    Associated Diagnoses: Benign essential hypertension      ASPIRIN 81 MG OR TABS Take 81 mg by mouth daily   Qty: 100, Refills: 0    Associated Diagnoses: Essential hypertension, benign; Other disorders of lipoid metabolism      buPROPion (WELLBUTRIN XL) 150 MG 24 hr tablet Take 1 tablet (150 mg) by mouth every morning  Qty: 90 tablet, Refills: 3    Associated Diagnoses: Mild major depression (H)      CALCIUM 500 + D 500-200 MG-IU OR TABS Take 1 tablet by mouth daily       citalopram (CELEXA) 40 MG tablet Take 1 tablet (40 mg) by mouth daily  Qty: 90 tablet, Refills: 3    Associated Diagnoses: Major depressive disorder, single episode, mild (H)      gabapentin (NEURONTIN) 300 MG capsule TAKE 2 CAPSULES BY MOUTH THREE TIMES DAILY  Qty: 180 capsule, Refills: 3    Associated Diagnoses: Lumbar radiculopathy      HYDROcodone-acetaminophen (NORCO) 5-325 MG tablet Take 1 tablet by mouth 2 times daily as needed for severe pain PRN severe pain 30 day supply  Qty: 50 tablet, Refills: 0    Associated Diagnoses: Chronic left-sided low  back pain with left-sided sciatica; Chronic pain syndrome; Chronic, continuous use of opioids; Lumbar radiculopathy      lactulose (CEPHULAC) 20 GM packet Take 1 packet (20 g) by mouth 3 times daily  Qty: 90 packet, Refills: 3    Associated Diagnoses: Chronic, continuous use of opioids      !! levothyroxine (SYNTHROID/LEVOTHROID) 50 MCG tablet TAKE ONE TABLET BY MOUTH EVERY TUESDAY, THURSDAY, SATURDAY AND SUNDAY  Qty: 30 tablet, Refills: 0    Comments: Needs thyroid labs now  Associated Diagnoses: Hypothyroidism, unspecified type      !! levothyroxine (SYNTHROID/LEVOTHROID) 75 MCG tablet TAKE ONE TABLET BY MOUTH ONCE DAILY MONDAY, WEDNESDAY & FRIDAY  Qty: 60 tablet, Refills: 0    Associated Diagnoses: Hypothyroidism, unspecified type      lisinopril (ZESTRIL) 40 MG tablet TAKE ONE TABLET BY MOUTH ONCE DAILY  Qty: 30 tablet, Refills: 0    Comments: Needs labs.  Associated Diagnoses: Essential hypertension, benign      MULTIVITAMIN TABS   OR Take 1 tablet by mouth daily   Refills: 0      OMEGA 3 OR Take 1 capsule by mouth daily       simvastatin (ZOCOR) 20 MG tablet TAKE ONE TABLET BY MOUTH AT BEDTIME  Qty: 30 tablet, Refills: 0    Comments: Needs fasting labs and virtual appointment for medication review.  Associated Diagnoses: Hyperlipidemia LDL goal <130       !! - Potential duplicate medications found. Please discuss with provider.        Allergies   Allergies   Allergen Reactions     No Known Drug Allergy

## 2020-09-09 NOTE — PLAN OF CARE
[facility] DISCHARGE NOTE    Patient discharged to home at 11:45 AM via wheel chair. Accompanied by daughter and staff. Discharge instructions reviewed with patient, opportunity offered to ask questions. Prescriptions filled and sent with patient upon discharge. All belongings sent with patient.    Kate Drummond RN

## 2020-09-09 NOTE — PLAN OF CARE
[facility] DISCHARGE NOTE    Patient discharged to home at 11:45 AM via wheel chair. Accompanied by daughter and staff. Discharge instructions reviewed with patient and daughter, opportunity offered to ask questions. Prescriptions filled and sent with patient upon discharge. All belongings sent with patient.    Deidre Gonzalez RN

## 2020-09-10 ENCOUNTER — TELEPHONE (OUTPATIENT)
Dept: FAMILY MEDICINE | Facility: CLINIC | Age: 85
End: 2020-09-10

## 2020-09-10 NOTE — TELEPHONE ENCOUNTER
ED/UC/IP follow up phone call:    RN please call to follow up.    Number of ED visits in past 12 months = 1  [facility]

## 2020-09-11 NOTE — TELEPHONE ENCOUNTER
ED / Discharge Outreach Protocol    Patient Contact    Attempt # 2    Was call answered?  No.  Left message on voicemail with information to call me back.    Jennifer SNEED RN, BSN

## 2020-09-14 DIAGNOSIS — E03.9 HYPOTHYROIDISM, UNSPECIFIED TYPE: ICD-10-CM

## 2020-09-14 LAB
T4 FREE SERPL-MCNC: 1.19 NG/DL (ref 0.76–1.46)
TSH SERPL DL<=0.005 MIU/L-ACNC: 15.09 MU/L (ref 0.4–4)

## 2020-09-14 PROCEDURE — 36415 COLL VENOUS BLD VENIPUNCTURE: CPT | Performed by: FAMILY MEDICINE

## 2020-09-14 PROCEDURE — 84443 ASSAY THYROID STIM HORMONE: CPT | Performed by: FAMILY MEDICINE

## 2020-09-14 PROCEDURE — 84439 ASSAY OF FREE THYROXINE: CPT | Performed by: FAMILY MEDICINE

## 2020-09-18 ENCOUNTER — OFFICE VISIT (OUTPATIENT)
Dept: FAMILY MEDICINE | Facility: CLINIC | Age: 85
End: 2020-09-18
Payer: COMMERCIAL

## 2020-09-18 VITALS
HEART RATE: 60 BPM | SYSTOLIC BLOOD PRESSURE: 138 MMHG | BODY MASS INDEX: 20.98 KG/M2 | DIASTOLIC BLOOD PRESSURE: 68 MMHG | TEMPERATURE: 96.7 F | WEIGHT: 100.4 LBS

## 2020-09-18 DIAGNOSIS — J18.9 PNEUMONIA DUE TO INFECTIOUS ORGANISM, UNSPECIFIED LATERALITY, UNSPECIFIED PART OF LUNG: ICD-10-CM

## 2020-09-18 DIAGNOSIS — Z23 NEED FOR PROPHYLACTIC VACCINATION AND INOCULATION AGAINST INFLUENZA: Primary | ICD-10-CM

## 2020-09-18 DIAGNOSIS — E78.5 HYPERLIPIDEMIA LDL GOAL <130: ICD-10-CM

## 2020-09-18 DIAGNOSIS — D64.9 ANEMIA, UNSPECIFIED TYPE: ICD-10-CM

## 2020-09-18 DIAGNOSIS — I10 ESSENTIAL HYPERTENSION, BENIGN: ICD-10-CM

## 2020-09-18 DIAGNOSIS — E03.9 HYPOTHYROIDISM, UNSPECIFIED TYPE: Chronic | ICD-10-CM

## 2020-09-18 LAB
ALBUMIN SERPL-MCNC: 3 G/DL (ref 3.4–5)
ALP SERPL-CCNC: 96 U/L (ref 40–150)
ALT SERPL W P-5'-P-CCNC: 22 U/L (ref 0–50)
ANION GAP SERPL CALCULATED.3IONS-SCNC: 6 MMOL/L (ref 3–14)
AST SERPL W P-5'-P-CCNC: 19 U/L (ref 0–45)
BASOPHILS # BLD AUTO: 0.1 10E9/L (ref 0–0.2)
BASOPHILS NFR BLD AUTO: 2.1 %
BILIRUB SERPL-MCNC: 0.5 MG/DL (ref 0.2–1.3)
BUN SERPL-MCNC: 13 MG/DL (ref 7–30)
CALCIUM SERPL-MCNC: 9.4 MG/DL (ref 8.5–10.1)
CHLORIDE SERPL-SCNC: 106 MMOL/L (ref 94–109)
CO2 SERPL-SCNC: 27 MMOL/L (ref 20–32)
CREAT SERPL-MCNC: 0.97 MG/DL (ref 0.52–1.04)
DIFFERENTIAL METHOD BLD: ABNORMAL
EOSINOPHIL # BLD AUTO: 0.3 10E9/L (ref 0–0.7)
EOSINOPHIL NFR BLD AUTO: 4.2 %
ERYTHROCYTE [DISTWIDTH] IN BLOOD BY AUTOMATED COUNT: 16.9 % (ref 10–15)
FERRITIN SERPL-MCNC: 76 NG/ML (ref 8–252)
GFR SERPL CREATININE-BSD FRML MDRD: 54 ML/MIN/{1.73_M2}
GLUCOSE SERPL-MCNC: 90 MG/DL (ref 70–99)
HCT VFR BLD AUTO: 29.3 % (ref 35–47)
HGB BLD-MCNC: 9.2 G/DL (ref 11.7–15.7)
IMM GRANULOCYTES # BLD: 0 10E9/L (ref 0–0.4)
IMM GRANULOCYTES NFR BLD: 0.2 %
LYMPHOCYTES # BLD AUTO: 1.6 10E9/L (ref 0.8–5.3)
LYMPHOCYTES NFR BLD AUTO: 25 %
MCH RBC QN AUTO: 28 PG (ref 26.5–33)
MCHC RBC AUTO-ENTMCNC: 31.4 G/DL (ref 31.5–36.5)
MCV RBC AUTO: 89 FL (ref 78–100)
MONOCYTES # BLD AUTO: 0.5 10E9/L (ref 0–1.3)
MONOCYTES NFR BLD AUTO: 8.1 %
NEUTROPHILS # BLD AUTO: 3.8 10E9/L (ref 1.6–8.3)
NEUTROPHILS NFR BLD AUTO: 60.4 %
NRBC # BLD AUTO: 0 10*3/UL
NRBC BLD AUTO-RTO: 0 /100
PLATELET # BLD AUTO: 902 10E9/L (ref 150–450)
POTASSIUM SERPL-SCNC: 5.4 MMOL/L (ref 3.4–5.3)
PROT SERPL-MCNC: 7.2 G/DL (ref 6.8–8.8)
RBC # BLD AUTO: 3.29 10E12/L (ref 3.8–5.2)
SODIUM SERPL-SCNC: 139 MMOL/L (ref 133–144)
T4 FREE SERPL-MCNC: 1.17 NG/DL (ref 0.76–1.46)
TSH SERPL DL<=0.005 MIU/L-ACNC: 6.57 MU/L (ref 0.4–4)
WBC # BLD AUTO: 6.2 10E9/L (ref 4–11)

## 2020-09-18 PROCEDURE — 84443 ASSAY THYROID STIM HORMONE: CPT | Performed by: FAMILY MEDICINE

## 2020-09-18 PROCEDURE — G0008 ADMIN INFLUENZA VIRUS VAC: HCPCS | Performed by: FAMILY MEDICINE

## 2020-09-18 PROCEDURE — 80053 COMPREHEN METABOLIC PANEL: CPT | Performed by: FAMILY MEDICINE

## 2020-09-18 PROCEDURE — 90662 IIV NO PRSV INCREASED AG IM: CPT | Performed by: FAMILY MEDICINE

## 2020-09-18 PROCEDURE — 99495 TRANSJ CARE MGMT MOD F2F 14D: CPT | Performed by: FAMILY MEDICINE

## 2020-09-18 PROCEDURE — 85025 COMPLETE CBC W/AUTO DIFF WBC: CPT | Performed by: FAMILY MEDICINE

## 2020-09-18 PROCEDURE — 36415 COLL VENOUS BLD VENIPUNCTURE: CPT | Performed by: FAMILY MEDICINE

## 2020-09-18 PROCEDURE — 82728 ASSAY OF FERRITIN: CPT | Performed by: FAMILY MEDICINE

## 2020-09-18 PROCEDURE — 84439 ASSAY OF FREE THYROXINE: CPT | Performed by: FAMILY MEDICINE

## 2020-09-18 RX ORDER — SIMVASTATIN 20 MG
TABLET ORAL
Qty: 30 TABLET | Refills: 0 | Status: SHIPPED | OUTPATIENT
Start: 2020-09-18 | End: 2020-10-19

## 2020-09-18 RX ORDER — LISINOPRIL 40 MG/1
40 TABLET ORAL DAILY
Qty: 30 TABLET | Refills: 0 | Status: SHIPPED | OUTPATIENT
Start: 2020-09-18 | End: 2020-10-20

## 2020-09-18 NOTE — PROGRESS NOTES
Subjective     Nighat Gupta is a 84 year old female who presents to clinic today for the following health issues:    HPI     Hypertension Follow-up      Do you check your blood pressure regularly outside of the clinic? No     Are you following a low salt diet? Yes    Are your blood pressures ever more than 140 on the top number (systolic) OR more   than 90 on the bottom number (diastolic), for example 140/90? No    Depression Followup    How are you doing with your depression since your last visit? No change.  Would like to discuss options.  Doesn't feel like has changed or helping    Are you having other symptoms that might be associated with depression? No    Have you had a significant life event?  No     Are you feeling anxious or having panic attacks?   No    Do you have any concerns with your use of alcohol or other drugs? No    Social History     Tobacco Use     Smoking status: Current Every Day Smoker     Packs/day: 0.25     Years: 50.00     Pack years: 12.50     Types: Cigarettes     Smokeless tobacco: Never Used     Tobacco comment: 5-8 cigarettes daily   Substance Use Topics     Alcohol use: No     Drug use: No     PHQ 3/19/2019 9/17/2019 3/6/2020   PHQ-9 Total Score 4 1 8   Q9: Thoughts of better off dead/self-harm past 2 weeks Not at all Not at all Not at all     PRECIOUS-7 SCORE 3/19/2019 9/17/2019 3/6/2020   Total Score - - -   Total Score 3 (minimal anxiety) 2 (minimal anxiety) -   Total Score 3 2 12       In the past two weeks have you had thoughts of suicide or self-harm?  No.    Do you have concerns about your personal safety or the safety of others?   No    Suicide Assessment Five-step Evaluation and Treatment (SAFE-T)      Hospital Follow-up Visit:    Hospital/Nursing Home/IP Rehab Facility: [facility], [place]  Date of Admission: 9/6/2020   Date of Discharge: 9/9/2020  Reason(s) for Admission: Pneumonia       Was your hospitalization related to COVID-19? No   Problems taking medications  regularly:  None  Medication changes since discharge: None  Problems adhering to non-medication therapy:  None    Summary of hospitalization:  [facility] discharge summary reviewed  Diagnostic Tests/Treatments reviewed.  Follow up needed: none  Other Healthcare Providers Involved in Patient s Care:         None  Update since discharge: improved. Post Discharge Medication Reconciliation: discharge medications reconciled and changed, per note/orders.  Plan of care communicated with patient and family          Pt feels much better   No SOA no cough she has improved energy     Anemia found during hosp thought GI source pt declines scopes does not want to look further into this   She denies bleeding         Review of Systems   Constitutional, HEENT, cardiovascular, pulmonary, gi and gu systems are negative, except as otherwise noted.      Objective    /68   Pulse 60   Temp 96.7  F (35.9  C) (Tympanic)   Wt 45.5 kg (100 lb 6.4 oz)   BMI 20.98 kg/m    Body mass index is 20.98 kg/m .  Physical Exam   GENERAL APPEARANCE: healthy, alert and no distress  NECK: no adenopathy, no asymmetry, masses, or scars and thyroid normal to palpation  RESP: lungs clear to auscultation - no rales, rhonchi or wheezes  CV: regular rates and rhythm, normal S1 S2, no S3 or S4 and no murmur, click or rub  ABDOMEN: soft, nontender, without hepatosplenomegaly or masses and bowel sounds normal  MS: extremities normal- no gross deformities noted  PSYCH: mentation appears normal and affect normal/bright            Assessment & Plan     Pneumonia due to infectious organism, unspecified laterality, unspecified part of lung  Improved   Will repeat xray in 1 month    Anemia, unspecified type  Pt again declines work up   - CBC with platelets differential  - Ferritin    Essential hypertension, benign  Stable no change in treatment plan.   - lisinopril (ZESTRIL) 40 MG tablet; Take 1 tablet (40 mg) by mouth daily  - Comprehensive metabolic  panel    Hypothyroidism, unspecified type  Adjust meds as indicated by above labs.   - TSH with free T4 reflex  - T4 free  - TSH with free T4 reflex; Future    Hyperlipidemia LDL goal <130   Stable no change in treatment plan.   - simvastatin (ZOCOR) 20 MG tablet; TAKE ONE TABLET BY MOUTH AT BEDTIME    Need for prophylactic vaccination and inoculation against influenza    - FLUZONE HIGH DOSE 65+  [13715]  - ADMIN INFLUENZA (For MEDICARE Patients ONLY) []       Patient Instructions   Labs today     Start taking one can of boost every afternoon     Flu shot today     See me back in one month      I will call with results and we will determine next steps    Stay on medication for stomach               Return in about 1 month (around 10/18/2020).    Nancy Tapia MD  [facility]

## 2020-09-18 NOTE — NURSING NOTE
"Injectable Influenza Immunization Documentation    1.  Has the patient received the information for the injectable influenza vaccine? YES     2. Is the patient 6 months of age or older? YES     3. Does the patient have any of the following contraindications?         Severe allergy to eggs? No     Severe allergic reaction to previous influenza vaccines? No   Severe allergy to latex? No       History of Guillain-Marietta syndrome? No     Currently have a temperature greater than 100.4F? No        4.  Severely egg allergic patients should have flu vaccine eligibility assessed by an MD, RN, or pharmacist, and those who received flu vaccine should be observed for 15 min by an MD, RN, Pharmacist, Medical Technician, or member of clinic staff.\": YES    5. Latex-allergic patients should be given latex-free influenza vaccine Yes. Please reference the Vaccine latex table to determine if your clinic s product is latex-containing.       Vaccination given by Fazal Ferro M.A.          "

## 2020-09-18 NOTE — NURSING NOTE
"Chief Complaint   Patient presents with     Depression       Initial BP (!) 146/52 (BP Location: Right arm, Patient Position: Chair, Cuff Size: Adult Regular)   Pulse 60   Temp 96.7  F (35.9  C) (Tympanic)   Wt 45.5 kg (100 lb 6.4 oz)   BMI 20.98 kg/m   Estimated body mass index is 20.98 kg/m  as calculated from the following:    Height as of 9/6/20: 1.473 m (4' 10\").    Weight as of this encounter: 45.5 kg (100 lb 6.4 oz).    Patient presents to the clinic using No DME    Health Maintenance that is potentially due pending provider review:  Flu shot    Possibly completing today per provider review.    Is there anyone who you would like to be able to receive your results? No  If yes have patient fill out [facility]  Fazal Ferro M.A.      "

## 2020-09-18 NOTE — PATIENT INSTRUCTIONS
Labs today     Start taking one can of boost every afternoon     Flu shot today     See me back in one month      I will call with results and we will determine next steps    Stay on medication for stomach

## 2020-09-21 ENCOUNTER — TELEPHONE (OUTPATIENT)
Dept: FAMILY MEDICINE | Facility: CLINIC | Age: 85
End: 2020-09-21

## 2020-09-21 DIAGNOSIS — I10 ESSENTIAL HYPERTENSION, BENIGN: Primary | ICD-10-CM

## 2020-09-21 DIAGNOSIS — D64.9 ANEMIA, UNSPECIFIED TYPE: ICD-10-CM

## 2020-09-21 NOTE — TELEPHONE ENCOUNTER
Lab orders completed per Dr. Tapia on 9-.    Daughter to schedule per note below.    RICHARD Dupont

## 2020-09-21 NOTE — TELEPHONE ENCOUNTER
Reason for Call:  Other     Daughter called and info given =- she will make an appt for mom - please place lab orders       Needs to repeat chem 8 and CBC on Monday or Tuesday of next week   Hemoglobin better and K+ a bit high       Phone Number Patient can be reached at: Daughter Jenn - [phone] no need to call - onlt if there are questions    Best Time:     Can we leave a detailed message on this number? YES    Call taken on 9/21/2020 at 3:31 PM by Reyna Matos

## 2020-09-29 DIAGNOSIS — I10 ESSENTIAL HYPERTENSION, BENIGN: ICD-10-CM

## 2020-09-29 DIAGNOSIS — E03.9 HYPOTHYROIDISM, UNSPECIFIED TYPE: Chronic | ICD-10-CM

## 2020-09-29 DIAGNOSIS — D64.9 ANEMIA, UNSPECIFIED TYPE: ICD-10-CM

## 2020-09-29 LAB
ALBUMIN SERPL-MCNC: 3.5 G/DL (ref 3.4–5)
ALP SERPL-CCNC: 70 U/L (ref 40–150)
ALT SERPL W P-5'-P-CCNC: 20 U/L (ref 0–50)
ANION GAP SERPL CALCULATED.3IONS-SCNC: 5 MMOL/L (ref 3–14)
AST SERPL W P-5'-P-CCNC: 29 U/L (ref 0–45)
BASOPHILS # BLD AUTO: 0.1 10E9/L (ref 0–0.2)
BASOPHILS NFR BLD AUTO: 1.7 %
BILIRUB SERPL-MCNC: 0.5 MG/DL (ref 0.2–1.3)
BUN SERPL-MCNC: 24 MG/DL (ref 7–30)
CALCIUM SERPL-MCNC: 9.3 MG/DL (ref 8.5–10.1)
CHLORIDE SERPL-SCNC: 104 MMOL/L (ref 94–109)
CO2 SERPL-SCNC: 25 MMOL/L (ref 20–32)
CREAT SERPL-MCNC: 1.09 MG/DL (ref 0.52–1.04)
DIFFERENTIAL METHOD BLD: ABNORMAL
EOSINOPHIL # BLD AUTO: 0.2 10E9/L (ref 0–0.7)
EOSINOPHIL NFR BLD AUTO: 3.5 %
ERYTHROCYTE [DISTWIDTH] IN BLOOD BY AUTOMATED COUNT: 16.6 % (ref 10–15)
GFR SERPL CREATININE-BSD FRML MDRD: 47 ML/MIN/{1.73_M2}
GLUCOSE SERPL-MCNC: 87 MG/DL (ref 70–99)
HCT VFR BLD AUTO: 29.1 % (ref 35–47)
HGB BLD-MCNC: 9.4 G/DL (ref 11.7–15.7)
LYMPHOCYTES # BLD AUTO: 1.9 10E9/L (ref 0.8–5.3)
LYMPHOCYTES NFR BLD AUTO: 29 %
MCH RBC QN AUTO: 27.5 PG (ref 26.5–33)
MCHC RBC AUTO-ENTMCNC: 32.3 G/DL (ref 31.5–36.5)
MCV RBC AUTO: 85 FL (ref 78–100)
MONOCYTES # BLD AUTO: 0.5 10E9/L (ref 0–1.3)
MONOCYTES NFR BLD AUTO: 8.3 %
NEUTROPHILS # BLD AUTO: 3.8 10E9/L (ref 1.6–8.3)
NEUTROPHILS NFR BLD AUTO: 57.5 %
PLATELET # BLD AUTO: 472 10E9/L (ref 150–450)
POTASSIUM SERPL-SCNC: 4.9 MMOL/L (ref 3.4–5.3)
PROT SERPL-MCNC: 7.2 G/DL (ref 6.8–8.8)
RBC # BLD AUTO: 3.42 10E12/L (ref 3.8–5.2)
SODIUM SERPL-SCNC: 134 MMOL/L (ref 133–144)
TSH SERPL DL<=0.005 MIU/L-ACNC: 1.4 MU/L (ref 0.4–4)
WBC # BLD AUTO: 6.5 10E9/L (ref 4–11)

## 2020-09-29 PROCEDURE — 84443 ASSAY THYROID STIM HORMONE: CPT | Performed by: FAMILY MEDICINE

## 2020-09-29 PROCEDURE — 36415 COLL VENOUS BLD VENIPUNCTURE: CPT | Performed by: FAMILY MEDICINE

## 2020-09-29 PROCEDURE — 85025 COMPLETE CBC W/AUTO DIFF WBC: CPT | Performed by: FAMILY MEDICINE

## 2020-09-29 PROCEDURE — 80053 COMPREHEN METABOLIC PANEL: CPT | Performed by: FAMILY MEDICINE

## 2020-10-05 DIAGNOSIS — M54.42 CHRONIC LEFT-SIDED LOW BACK PAIN WITH LEFT-SIDED SCIATICA: ICD-10-CM

## 2020-10-05 DIAGNOSIS — F11.90 CHRONIC, CONTINUOUS USE OF OPIOIDS: ICD-10-CM

## 2020-10-05 DIAGNOSIS — G89.4 CHRONIC PAIN SYNDROME: ICD-10-CM

## 2020-10-05 DIAGNOSIS — M54.16 LUMBAR RADICULOPATHY: ICD-10-CM

## 2020-10-05 DIAGNOSIS — G89.29 CHRONIC LEFT-SIDED LOW BACK PAIN WITH LEFT-SIDED SCIATICA: ICD-10-CM

## 2020-10-06 RX ORDER — HYDROCODONE BITARTRATE AND ACETAMINOPHEN 5; 325 MG/1; MG/1
1 TABLET ORAL 2 TIMES DAILY PRN
Qty: 50 TABLET | Refills: 0 | Status: SHIPPED | OUTPATIENT
Start: 2020-10-06 | End: 2020-11-06

## 2020-10-06 NOTE — TELEPHONE ENCOUNTER
Has appointment scheduled for 10/14/20.    Requested Prescriptions   Pending Prescriptions Disp Refills     HYDROcodone-acetaminophen (NORCO) 5-325 MG tablet       Sig: Take 1 tablet by mouth 2 times daily as needed for severe pain PRN severe pain 30 day supply       There is no refill protocol information for this order

## 2020-10-14 ENCOUNTER — OFFICE VISIT (OUTPATIENT)
Dept: FAMILY MEDICINE | Facility: CLINIC | Age: 85
End: 2020-10-14
Payer: COMMERCIAL

## 2020-10-14 VITALS
SYSTOLIC BLOOD PRESSURE: 138 MMHG | OXYGEN SATURATION: 97 % | BODY MASS INDEX: 20.92 KG/M2 | TEMPERATURE: 97.9 F | WEIGHT: 103.8 LBS | HEART RATE: 77 BPM | HEIGHT: 59 IN | DIASTOLIC BLOOD PRESSURE: 70 MMHG

## 2020-10-14 DIAGNOSIS — Z00.00 MEDICARE ANNUAL WELLNESS VISIT, SUBSEQUENT: Primary | ICD-10-CM

## 2020-10-14 DIAGNOSIS — M54.16 LUMBAR RADICULOPATHY: ICD-10-CM

## 2020-10-14 DIAGNOSIS — G89.4 CHRONIC PAIN SYNDROME: Chronic | ICD-10-CM

## 2020-10-14 DIAGNOSIS — M54.42 CHRONIC LEFT-SIDED LOW BACK PAIN WITH LEFT-SIDED SCIATICA: ICD-10-CM

## 2020-10-14 DIAGNOSIS — I10 BENIGN ESSENTIAL HYPERTENSION: ICD-10-CM

## 2020-10-14 DIAGNOSIS — E03.9 HYPOTHYROIDISM, UNSPECIFIED TYPE: ICD-10-CM

## 2020-10-14 DIAGNOSIS — D64.9 ANEMIA, UNSPECIFIED TYPE: ICD-10-CM

## 2020-10-14 DIAGNOSIS — G89.29 CHRONIC LEFT-SIDED LOW BACK PAIN WITH LEFT-SIDED SCIATICA: ICD-10-CM

## 2020-10-14 LAB
ANION GAP SERPL CALCULATED.3IONS-SCNC: 3 MMOL/L (ref 3–14)
BASOPHILS # BLD AUTO: 0.1 10E9/L (ref 0–0.2)
BASOPHILS NFR BLD AUTO: 1.3 %
BUN SERPL-MCNC: 12 MG/DL (ref 7–30)
CALCIUM SERPL-MCNC: 9 MG/DL (ref 8.5–10.1)
CHLORIDE SERPL-SCNC: 105 MMOL/L (ref 94–109)
CO2 SERPL-SCNC: 27 MMOL/L (ref 20–32)
CREAT SERPL-MCNC: 0.94 MG/DL (ref 0.52–1.04)
DIFFERENTIAL METHOD BLD: ABNORMAL
EOSINOPHIL # BLD AUTO: 0.2 10E9/L (ref 0–0.7)
EOSINOPHIL NFR BLD AUTO: 2.8 %
ERYTHROCYTE [DISTWIDTH] IN BLOOD BY AUTOMATED COUNT: 16.5 % (ref 10–15)
GFR SERPL CREATININE-BSD FRML MDRD: 56 ML/MIN/{1.73_M2}
GLUCOSE SERPL-MCNC: 92 MG/DL (ref 70–99)
HCT VFR BLD AUTO: 30.4 % (ref 35–47)
HGB BLD-MCNC: 9.6 G/DL (ref 11.7–15.7)
LYMPHOCYTES # BLD AUTO: 1.2 10E9/L (ref 0.8–5.3)
LYMPHOCYTES NFR BLD AUTO: 22.7 %
MCH RBC QN AUTO: 27 PG (ref 26.5–33)
MCHC RBC AUTO-ENTMCNC: 31.6 G/DL (ref 31.5–36.5)
MCV RBC AUTO: 86 FL (ref 78–100)
MONOCYTES # BLD AUTO: 0.4 10E9/L (ref 0–1.3)
MONOCYTES NFR BLD AUTO: 8.1 %
NEUTROPHILS # BLD AUTO: 3.5 10E9/L (ref 1.6–8.3)
NEUTROPHILS NFR BLD AUTO: 65.1 %
PLATELET # BLD AUTO: 390 10E9/L (ref 150–450)
POTASSIUM SERPL-SCNC: 4.7 MMOL/L (ref 3.4–5.3)
RBC # BLD AUTO: 3.55 10E12/L (ref 3.8–5.2)
SODIUM SERPL-SCNC: 135 MMOL/L (ref 133–144)
WBC # BLD AUTO: 5.4 10E9/L (ref 4–11)

## 2020-10-14 PROCEDURE — 99214 OFFICE O/P EST MOD 30 MIN: CPT | Mod: 25 | Performed by: FAMILY MEDICINE

## 2020-10-14 PROCEDURE — 99397 PER PM REEVAL EST PAT 65+ YR: CPT | Performed by: FAMILY MEDICINE

## 2020-10-14 PROCEDURE — 80048 BASIC METABOLIC PNL TOTAL CA: CPT | Performed by: FAMILY MEDICINE

## 2020-10-14 PROCEDURE — 36415 COLL VENOUS BLD VENIPUNCTURE: CPT | Performed by: FAMILY MEDICINE

## 2020-10-14 PROCEDURE — 85025 COMPLETE CBC W/AUTO DIFF WBC: CPT | Performed by: FAMILY MEDICINE

## 2020-10-14 RX ORDER — AMLODIPINE BESYLATE 10 MG/1
10 TABLET ORAL DAILY
Qty: 90 TABLET | Refills: 3 | Status: SHIPPED | OUTPATIENT
Start: 2020-10-14 | End: 2021-04-13

## 2020-10-14 RX ORDER — GABAPENTIN 300 MG/1
CAPSULE ORAL
Qty: 180 CAPSULE | Refills: 3 | COMMUNITY
Start: 2020-10-14 | End: 2020-12-10

## 2020-10-14 RX ORDER — LEVOTHYROXINE SODIUM 50 UG/1
TABLET ORAL
Qty: 90 TABLET | Refills: 3 | Status: SHIPPED | OUTPATIENT
Start: 2020-10-14 | End: 2021-11-03

## 2020-10-14 RX ORDER — PANTOPRAZOLE SODIUM 40 MG/1
40 TABLET, DELAYED RELEASE ORAL
Qty: 90 TABLET | Refills: 3 | Status: SHIPPED | OUTPATIENT
Start: 2020-10-14 | End: 2021-11-03

## 2020-10-14 ASSESSMENT — PAIN SCALES - GENERAL: PAINLEVEL: EXTREME PAIN (8)

## 2020-10-14 ASSESSMENT — ANXIETY QUESTIONNAIRES
GAD7 TOTAL SCORE: 4
GAD7 TOTAL SCORE: 4
2. NOT BEING ABLE TO STOP OR CONTROL WORRYING: SEVERAL DAYS
3. WORRYING TOO MUCH ABOUT DIFFERENT THINGS: SEVERAL DAYS
1. FEELING NERVOUS, ANXIOUS, OR ON EDGE: NOT AT ALL
GAD7 TOTAL SCORE: 4
4. TROUBLE RELAXING: NOT AT ALL
6. BECOMING EASILY ANNOYED OR IRRITABLE: SEVERAL DAYS
5. BEING SO RESTLESS THAT IT IS HARD TO SIT STILL: NOT AT ALL
7. FEELING AFRAID AS IF SOMETHING AWFUL MIGHT HAPPEN: SEVERAL DAYS
7. FEELING AFRAID AS IF SOMETHING AWFUL MIGHT HAPPEN: SEVERAL DAYS

## 2020-10-14 ASSESSMENT — MIFFLIN-ST. JEOR: SCORE: 827.49

## 2020-10-14 ASSESSMENT — PATIENT HEALTH QUESTIONNAIRE - PHQ9
SUM OF ALL RESPONSES TO PHQ QUESTIONS 1-9: 1
10. IF YOU CHECKED OFF ANY PROBLEMS, HOW DIFFICULT HAVE THESE PROBLEMS MADE IT FOR YOU TO DO YOUR WORK, TAKE CARE OF THINGS AT HOME, OR GET ALONG WITH OTHER PEOPLE: NOT DIFFICULT AT ALL
SUM OF ALL RESPONSES TO PHQ QUESTIONS 1-9: 1

## 2020-10-14 NOTE — PATIENT INSTRUCTIONS
Continue with supplement every afternoon     Change gabapentin to 1 tab in the am and 1 tab at noon and 2 at bedtime     Labs today     One more month on 40mg of PPI then if all is good go down to 20 mg

## 2020-10-14 NOTE — PROGRESS NOTES
Subjective     Nighat Gupta is a 85 year old female who presents to clinic today for the following health issues:    HPI         Chronic Pain Follow-Up    Where in your body do you have pain? Low back   How has your pain affected your ability to work? Not applicable  Which of these pain treatments have you tried since your last clinic visit? Other: none  How well are you sleeping? Good  How has your mood been since your last visit? About the same  Have you had a significant life event? No  Other aggravating factors: sedentary lifestyle  Taking medication as directed? Yes    PHQ-9 SCORE 9/17/2019 3/6/2020 10/14/2020   PHQ-9 Total Score - - -   PHQ-9 Total Score MyChart 1 (Minimal depression) - 1 (Minimal depression)   PHQ-9 Total Score 1 8 1     PRECIOUS-7 SCORE 9/17/2019 3/6/2020 10/14/2020   Total Score - - -   Total Score 2 (minimal anxiety) - 4 (minimal anxiety)   Total Score 2 12 4     No flowsheet data found.  Encounter-Level CSA - 12/11/2018:    Controlled Substance Agreement - Scan on 12/27/2018  8:55 AM: CONTROLLED SUBSTANCE AGREEMENT - 11/11/18     Encounter-Level CSA - 12/02/2016:    Controlled Substance Agreement - Scan on 12/8/2016  1:38 PM: CONTROLLED SUBSTANCE AGREEMENT 12/02/2016     Encounter-Level CSA - 04/04/2016:    Controlled Substance Agreement - Scan on 4/8/2016 10:44 AM: CONTROLLED SUBSTANCE AGREEMENT 04/04/2016     Patient-Level CSA:    Controlled Substance Agreement - Non - Opioid - Scan on 3/12/2020 11:46 AM: NON-OPIOID CONTROLLED SUBSTANCE AGREEMENT  Controlled Substance Agreement - Opioid - Scan on 3/6/2020  9:44 AM: FOR 3-6-20 VISIT  Controlled Substance Agreement - Opioid - Scan on 3/6/2020  9:41 AM         How many servings of fruits and vegetables do you eat daily?  0-1    On average, how many sweetened beverages do you drink each day (Examples: soda, juice, sweet tea, etc.  Do NOT count diet or artificially sweetened beverages)?   0    How many days per week do you exercise enough to  "make your heart beat faster? 3 or less    How many minutes a day do you exercise enough to make your heart beat faster? 9 or less    How many days per week do you miss taking your medication? 0    Annual Wellness Visit    Patient has been advised of split billing requirements and indicates understanding: Yes     Are you in the first 12 months of your Medicare Part B coverage?  No    Physical Health:    In general, how would you rate your overall physical health? fair    Outside of work, how many days during the week do you exercise?none    Outside of work, approximately how many minutes a day do you exercise?not applicable    If you drink alcohol do you typically have >3 drinks per day or >7 drinks per week? No    Do you usually eat at least 4 servings of fruit and vegetables a day, include whole grains & fiber and avoid regularly eating high fat or \"junk\" foods? NO    Do you have any problems taking medications regularly? No    Do you have any side effects from medications? none    Needs assistance for the following daily activities: no assistance needed    Which of the following safety concerns are present in your home?  none identified     Hearing impairment: Yes, Need to ask people to speak up or repeat themselves.    In the past 6 months, have you been bothered by leaking of urine? yes    Mental Health:    In general, how would you rate your overall mental or emotional health? good  PHQ-2 Score:      Do you feel safe in your environment? Yes    Have you ever done Advance Care Planning? (For example, a Health Directive, POLST, or a discussion with a medical provider or your loved ones about your wishes)? Yes, advance care planning is on file.    Fall risk:  Fallen 2 or more times in the past year?: No  Any fall with injury in the past year?: No    Cognitive Screenin) Repeat 3 items (Leader, Season, Table)    2) Clock draw: NORMAL  3) 3 item recall: Recalls 3 objects  Results: 3 items recalled: COGNITIVE " "IMPAIRMENT LESS LIKELY    Mini-CogTM Copyright S [employer]enny. Licensed by the author for use in [facility]; reprinted with permission ([email or web address]). All rights reserved.      Do you have sleep apnea, excessive snoring or daytime drowsiness?: yes    Current providers sharing in care for this patient include:   Patient Care Team:  Nancy Tapia MD as PCP - General (Family Practice)  Nancy Tapia MD as Assigned PCP    Patient has been advised of split billing requirements and indicates understanding: Yes  Hypertension Follow-up      Do you check your blood pressure regularly outside of the clinic? No     Are you following a low salt diet? No    Are your blood pressures ever more than 140 on the top number (systolic) OR more   than 90 on the bottom number (diastolic), for example 140/90? Unsure      Hypothyroidism Follow-up      Since last visit, patient describes the following symptoms: Weight stable, no hair loss, no skin changes, no constipation, no loose stools    Pt has chronic constipation     ANEMIA   Start Fe supplement   This is getting better   She was in hosp for ?GI bleed she is not wanting any evaluation  She is on PPI and no sxs at this time       Review of Systems   Constitutional, HEENT, cardiovascular, pulmonary, gi and gu systems are negative, except as otherwise noted.      Objective    /70   Pulse 77   Temp 97.9  F (36.6  C) (Tympanic)   Ht 1.492 m (4' 10.75\")   Wt 47.1 kg (103 lb 12.8 oz)   SpO2 97%   BMI 21.14 kg/m    Body mass index is 21.14 kg/m .  Physical Exam   GENERAL APPEARANCE: healthy, alert and no distress  RESP: lungs clear to auscultation - no rales, rhonchi or wheezes  CV: regular rates and rhythm, normal S1 S2, no S3 or S4 and no murmur, click or rub  PSYCH: mentation appears normal and affect normal/bright            Assessment & Plan     Anemia, unspecified type  Suspect GI bleed improving   - pantoprazole (PROTONIX) 40 MG EC tablet; Take 1 " tablet (40 mg) by mouth every morning (before breakfast)  - CBC with platelets differential    Benign essential hypertension  Stable no change in treatment plan.   - amLODIPine (NORVASC) 10 MG tablet; Take 1 tablet (10 mg) by mouth daily  - Basic metabolic panel    Hypothyroidism, unspecified type  Stable no change in treatment plan.   - levothyroxine (SYNTHROID/LEVOTHROID) 50 MCG tablet; TAKE ONE TABLET BY MOUTH EVERY TUESDAY, THURSDAY, SATURDAY AND SUNDAY    Medicare annual wellness visit, subsequent  Risk of poor nutrition     Chronic pain syndrome  Back is really bothering her   Change gabapentin to 300mg am and noon and 600mg at night     Chronic left-sided low back pain with left-sided sciatica  See above        Tobacco Cessation:   reports that she has been smoking cigarettes. She has a 12.50 pack-year smoking history. She has never used smokeless tobacco.  Tobacco Cessation Action Plan: Information offered: Patient not interested at this time         Patient Instructions   Continue with supplement every afternoon     Change gabapentin to 1 tab in the am and 1 tab at noon and 2 at bedtime     Labs today     One more month on 40mg of PPI then if all is good go down to 20 mg       Return in about 3 months (around 1/14/2021) for using a telephone visit.      Risks, benefits, side effects and rationale for treatment plan fully discussed with the patient and understanding expressed.   Nancy Tapia MD  [facility]

## 2020-10-15 ASSESSMENT — ANXIETY QUESTIONNAIRES: GAD7 TOTAL SCORE: 4

## 2020-10-20 DIAGNOSIS — I10 ESSENTIAL HYPERTENSION, BENIGN: ICD-10-CM

## 2020-10-20 RX ORDER — LISINOPRIL 40 MG/1
40 TABLET ORAL DAILY
Qty: 90 TABLET | Refills: 1 | Status: SHIPPED | OUTPATIENT
Start: 2020-10-20 | End: 2021-04-13

## 2020-10-26 DIAGNOSIS — E03.9 HYPOTHYROIDISM, UNSPECIFIED TYPE: ICD-10-CM

## 2020-10-26 RX ORDER — LEVOTHYROXINE SODIUM 75 UG/1
TABLET ORAL
Qty: 36 TABLET | Refills: 0 | Status: SHIPPED | OUTPATIENT
Start: 2020-10-26 | End: 2021-01-11

## 2020-11-02 ENCOUNTER — OFFICE VISIT (OUTPATIENT)
Dept: FAMILY MEDICINE | Facility: CLINIC | Age: 85
End: 2020-11-02
Payer: COMMERCIAL

## 2020-11-02 VITALS
BODY MASS INDEX: 21.37 KG/M2 | HEIGHT: 59 IN | SYSTOLIC BLOOD PRESSURE: 138 MMHG | RESPIRATION RATE: 18 BRPM | TEMPERATURE: 97.6 F | WEIGHT: 106 LBS | HEART RATE: 104 BPM | DIASTOLIC BLOOD PRESSURE: 59 MMHG

## 2020-11-02 DIAGNOSIS — H61.23 BILATERAL IMPACTED CERUMEN: Primary | ICD-10-CM

## 2020-11-02 PROCEDURE — 99213 OFFICE O/P EST LOW 20 MIN: CPT | Performed by: NURSE PRACTITIONER

## 2020-11-02 ASSESSMENT — MIFFLIN-ST. JEOR: SCORE: 837.47

## 2020-11-02 NOTE — PATIENT INSTRUCTIONS
Patient Education       Earwax, Home Treatment    Everyone produces earwax from the lining of the ear canal. It serves to lubricate and protect the ear. The wax that forms in the canal naturally moves toward the outside of the ear and falls out. Sometimes the ear canal may contain too much wax. This can cause a blockage and loss of hearing. Directions are given below for home treatment.  Home care  If your doctor has advised you to remove a wax blockage yourself, follow these directions:    Unless a medicine was prescribed, you may use an over-the-counter product made for clearing earwax. These contain carbamide peroxide. Lie down with the blocked ear facing upward. Apply one dropper full of medicine and wait a few minutes. Grasp the outer ear and wiggle it to help the solution enter the canal.    Lean over a sink or basin with the blocked ear facing downward. Use a bulb syringe filled with warm (not hot or cold) water to rinse the ear several times. Use gentle pressure only.    If you are having trouble draining the water out of your ear canal, put a few drops of rubbing alcohol (isopropyl alcohol) into the ear canal. This will help remove the remaining water.    Repeat this procedure once a day for up to three days, or until your hearing is back to normal. Do not use this treatment for more than three days in a row.  Don ts    Don t use cold water to rinse the ear. This will make you dizzy.    Don t perform this procedure if you have an ear infection.    Don t perform this procedure if you have a ruptured eardrum.    Don t use cotton swabs, matches, hairpins, keys, or other objects to  clean  the ear canal. This can cause infection of the ear canal or rupture the eardrum. Because of their size and shape, cotton swabs can push earwax deeper into the ear canal instead of removing it.  Follow-up care  Follow up with your health care provider if you are not improving after three cleaning attempts, or as  advised.  When to seek medical advice  Call your health care provider right away if any of these occur:    Worsening ear pain    Fever of 101 F (38.3 C) or higher, or as directed by your health care provider    Hearing does not return to normal after three days of treatment    Fluid drainage or bleeding from the ear canal    Swelling, redness, or tenderness of the outer ear    Headache, neck pain, or stiff neck    3555-3784 [employer]. [address] [place] [address], [place] [id number]. All rights reserved. This information is not intended as a substitute for professional medical care. Always follow your healthcare professional's instructions.

## 2020-11-02 NOTE — PROGRESS NOTES
"Subjective     Nighat Gupta is a 85 year old female who presents to clinic today for the following health issues:    HPI         Patient was told that she needs to get the wax removed from her ears before she can get hearing aids.         Review of Systems   Constitutional, HEENT, cardiovascular, pulmonary, GI, , musculoskeletal, neuro, skin, endocrine and psych systems are negative, except as otherwise noted.      Objective    /59   Pulse 104   Temp 97.6  F (36.4  C) (Tympanic)   Resp 18   Ht 1.492 m (4' 10.75\")   Wt 48.1 kg (106 lb)   BMI 21.59 kg/m    There is no height or weight on file to calculate BMI.  Physical Exam   GENERAL: healthy, alert and no distress  EYES: Eyes grossly normal to inspection, PERRL and conjunctivae and sclerae normal  HENT: ear canals and TM's normal post removal of Cerumen, nose and mouth without ulcers or lesions  RESP: lungs clear to auscultation - no rales, rhonchi or wheezes  CV: regular rate and rhythm, normal S1 S2, no S3 or S4, no murmur, click or rub, no peripheral edema and peripheral pulses strong  MS: no gross musculoskeletal defects noted, no edema  SKIN: no suspicious lesions or rashes  NEURO: Normal strength and tone, mentation intact and speech normal  PSYCH: mentation appears normal, affect normal/bright    No results found for this or any previous visit (from the past 24 hour(s)).        Assessment & Plan     Nighat was seen today for ear wax.    Diagnoses and all orders for this visit:    Bilateral impacted cerumen            See Patient Instructions    Return in about 1 week (around 11/9/2020), or if symptoms worsen or fail to improve.    Leydi Arrieta, APRN CNP  [facility]    "

## 2020-11-30 ENCOUNTER — TELEPHONE (OUTPATIENT)
Dept: FAMILY MEDICINE | Facility: CLINIC | Age: 85
End: 2020-11-30

## 2020-11-30 NOTE — TELEPHONE ENCOUNTER
Reason for call:  Patient reporting a symptom    Symptom or request: Cough x 2 weeks.      Phone Number patient can be reached at:  Home number on file [phone] (home)    Best Time:  Any Time      Can we leave a detailed message on this number:  YES       Call taken on 11/30/2020 at 8:58 AM by Alexandrea Canales

## 2020-11-30 NOTE — TELEPHONE ENCOUNTER
Cough is productive at times but mostly nagging and dry, her temp is in the 100's. advised virtual visit for Covid screening.

## 2020-12-01 ENCOUNTER — VIRTUAL VISIT (OUTPATIENT)
Dept: FAMILY MEDICINE | Facility: CLINIC | Age: 85
End: 2020-12-01
Payer: COMMERCIAL

## 2020-12-01 ENCOUNTER — ALLIED HEALTH/NURSE VISIT (OUTPATIENT)
Dept: FAMILY MEDICINE | Facility: CLINIC | Age: 85
End: 2020-12-01
Payer: COMMERCIAL

## 2020-12-01 DIAGNOSIS — R05.9 COUGH: Primary | ICD-10-CM

## 2020-12-01 DIAGNOSIS — R05.9 COUGH: ICD-10-CM

## 2020-12-01 PROCEDURE — U0003 INFECTIOUS AGENT DETECTION BY NUCLEIC ACID (DNA OR RNA); SEVERE ACUTE RESPIRATORY SYNDROME CORONAVIRUS 2 (SARS-COV-2) (CORONAVIRUS DISEASE [COVID-19]), AMPLIFIED PROBE TECHNIQUE, MAKING USE OF HIGH THROUGHPUT TECHNOLOGIES AS DESCRIBED BY CMS-2020-01-R: HCPCS | Performed by: PHYSICIAN ASSISTANT

## 2020-12-01 PROCEDURE — 99441 PR PHYSICIAN TELEPHONE EVALUATION 5-10 MIN: CPT | Mod: 95 | Performed by: PHYSICIAN ASSISTANT

## 2020-12-01 PROCEDURE — 99207 PR NO CHARGE NURSE ONLY: CPT

## 2020-12-01 NOTE — PATIENT INSTRUCTIONS
Instructions for Patients  It is recommended that you have a test for coronavirus (COVID-19). This illness can cause fever, cough and trouble breathing. Many people get a mild case and get better on their own. Some people can get very sick.     Please follow these steps:    1. We will call to schedule your test.  2. A member of our care team will ask you some questions. Then, they will use a swab to collect samples from your nose and throat.     Our testing team will send you your test results.    How can I protect others?    Stay home and away from others (self-isolate) until:    You ve had no fever--and no medicine that reduces fever--for 1 full day (24 hours). And      Your other symptoms have resolved (gotten better). For example, your cough or breathing has improved. And     At least 10 days have passed since your symptoms started.    Stay at least 6 feet away from others. (If someone will drive you to your test, stay in the backseat, as far away from the  as you can.)     Don t go to work, school or anywhere else. When it s time for your test, go straight to the testing site. Don t make any stops on the way there or back.     Wash your hands and face often. Use soap and water.     Cover your mouth and nose with a mask, tissue or washcloth.     Don t touch anyone. No hugging, kissing or handshakes.    How can I take care of myself?    1. Get lots of rest. Drink extra fluids (unless a doctor has told you not to).     2. Take Tylenol (acetaminophen) for fever or pain. If you have liver or kidney problems, ask your family doctor if it's okay to take Tylenol.     Adults can take either:     650 mg (two 325 mg pills) every 4 to 6 hours, or     1,000 mg (two 500 mg pills) every 8 hours as needed.     Note: Don't take more than 3,000 mg in one day.   Acetaminophen is found in many medicines (both prescribed and over-the-counter medicines). Read all labels to be sure you don't take too much.   For children, check  the Tylenol bottle for the right dose. The dose is based on  the child's age or weight.    3. If you have other health problems (like cancer, heart failure, an organ transplant or severe kidney disease): Call your specialty clinic if you don't feel better in the next 2 days.    4. Know when to call 911: If your breathing is so bad that it keeps you from doing normal activities, call 911 or go to the emergency room. Tell them that you've been staying home and may have COVID-19.      Thank you for taking steps to prevent the spread of this virus.  o Limit your contact with others.  o Wear a simple mask to cover your cough.  o Wash your hands well and often.  o If you need medical care, go to [email or web address] or contact your health care provider.     For more about COVID-19 and caring for yourself at home, visit the CDC website at https://www.cdc.gov/coronavirus/2019-ncov/about/steps-when-sick.html.     To learn about care at [facility], please go to [email or web address].     [facility] clinical trials (COVID-19 research studies): [email or web address]-clinical-trials.    Below are the COVID-19 hotlines at the [facility] ([facility]). Interpreters are available.     For health questions: Call [phone] or 1-313.486.6962 (7 a.m. to 7 p.m.)    For questions about schools and childcare: Call [phone] or 1-877.585.2862 (7 a.m. to 7 p.m.)

## 2020-12-01 NOTE — PROGRESS NOTES
"Nighat Gupta is a 85 year old female who is being evaluated via a billable telephone visit.      The patient has been notified of following:     \"This telephone visit will be conducted via a call between you and your physician/provider. We have found that certain health care needs can be provided without the need for a physical exam.  This service lets us provide the care you need with a short phone conversation.  If a prescription is necessary we can send it directly to your pharmacy.  If lab work is needed we can place an order for that and you can then stop by our lab to have the test done at a later time.    Telephone visits are billed at different rates depending on your insurance coverage. During this emergency period, for some insurers they may be billed the same as an in-person visit.  Please reach out to your insurance provider with any questions.    If during the course of the call the physician/provider feels a telephone visit is not appropriate, you will not be charged for this service.\"    Patient has given verbal consent for Telephone visit?  Yes    What phone number would you like to be contacted at? [phone]    How would you like to obtain your AVS? Chris Dias     Nighat Gupta is a 85 year old female who presents via phone visit today for the following health issues:    HPI  Concern for COVID-19  About how many days ago did these symptoms start? 1.5weeks  Is this your first visit for this illness? Yes  In the 14 days before your symptoms started, have you had close contact with someone with COVID-19 (Coronavirus)? No  Do you have a fever or chills? Yes, the highest temperature was 100  Are you having new or worsening difficulty breathing? No  Do you have new or worsening cough? Yes, I am coughing up mucus. She can not actually get the mucus all the way up   Have you had any new or unexplained body aches? No    Have you experienced any of the following NEW " symptoms?    Headache: No    Sore throat: No    Loss of taste or smell: No    Chest pain: No    Diarrhea: No    Rash: No  What treatments have you tried?Patches to help her stop smoking, robitussin, mucinex   Who do you live with? Self   Are you, or a household member, a healthcare worker or a ? No  Do you live in a nursing home, group home, or shelter? Senior apartment building  Do you have a way to get food/medications if quarantined? Yes, I have a friend or family member who can help me.    Review of Systems   Constitutional, HEENT, cardiovascular, pulmonary, gi and gu systems are negative, except as otherwise noted.     Objective   Vitals - Patient Reported  Temperature (Patient Reported): 100  F (37.8  C)  healthy, alert and no distress  PSYCH: Alert and oriented times 3; coherent speech, normal   rate and volume, able to articulate logical thoughts, able   to abstract reason, no tangential thoughts, no hallucinations   or delusions  Her affect is normal  RESP: No cough, no audible wheezing, able to talk in full sentences  Remainder of exam unable to be completed due to telephone visits      Assessment & Plan   Cough  Pt with 9 days of URI symptoms consistent with Covid-19. No red flag signs or symptoms today. Able to talk in full sentences. Pt is largely asymptomatic except for her cough. However she lives in a congregate setting and which would benefit from early detection of a case since there are many higher risk individuals in the same apartment complex.  Covid-19 PCR testing ordered. Discussed prognosis, self-isolation and supportive treatment of their symptoms. Answered all questions. Call us prn for any new, changing or worsening symptoms. Warning signs and symptoms discussed on when to present to the ER.   - Symptomatic COVID-19 Virus (Coronavirus) by PCR; Future    Return in about 1 week (around 12/8/2020), or if symptoms worsen or fail to improve, for Video Visit.    Vel Evangelista,  [facility]    Phone call duration:  8 minutes

## 2020-12-03 LAB
SARS-COV-2 RNA SPEC QL NAA+PROBE: NOT DETECTED
SPECIMEN SOURCE: NORMAL

## 2021-01-09 DIAGNOSIS — F11.90 CHRONIC, CONTINUOUS USE OF OPIOIDS: ICD-10-CM

## 2021-01-09 DIAGNOSIS — G89.29 CHRONIC LEFT-SIDED LOW BACK PAIN WITH LEFT-SIDED SCIATICA: ICD-10-CM

## 2021-01-09 DIAGNOSIS — M54.16 LUMBAR RADICULOPATHY: ICD-10-CM

## 2021-01-09 DIAGNOSIS — M54.42 CHRONIC LEFT-SIDED LOW BACK PAIN WITH LEFT-SIDED SCIATICA: ICD-10-CM

## 2021-01-09 DIAGNOSIS — G89.4 CHRONIC PAIN SYNDROME: ICD-10-CM

## 2021-01-09 DIAGNOSIS — E03.9 HYPOTHYROIDISM, UNSPECIFIED TYPE: ICD-10-CM

## 2021-01-11 RX ORDER — LEVOTHYROXINE SODIUM 75 UG/1
TABLET ORAL
Qty: 36 TABLET | Refills: 1 | Status: SHIPPED | OUTPATIENT
Start: 2021-01-11 | End: 2021-07-23

## 2021-01-11 RX ORDER — HYDROCODONE BITARTRATE AND ACETAMINOPHEN 5; 325 MG/1; MG/1
TABLET ORAL
Qty: 50 TABLET | Refills: 0 | Status: SHIPPED | OUTPATIENT
Start: 2021-01-11 | End: 2021-01-13

## 2021-01-11 NOTE — TELEPHONE ENCOUNTER
Requested Prescriptions   Pending Prescriptions Disp Refills     HYDROcodone-acetaminophen (NORCO) 5-325 MG tablet [Pharmacy Med Name: HYDROCODONE/APAP 5-325MG TAB] 50 tablet 0     Sig: TAKE ONE TABLET BY MOUTH TWICE A DAY AS NEEDED FOR SEVERE PAIN (30 DAY SUPPLY)       There is no refill protocol information for this order      Last Written Prescription Date:  12/10/20  Last Fill Quantity: 50,  # refills: 0   Last office visit: 11/2/2020 with prescribing provider:     Future Office Visit:

## 2021-01-13 ENCOUNTER — VIRTUAL VISIT (OUTPATIENT)
Dept: FAMILY MEDICINE | Facility: CLINIC | Age: 86
End: 2021-01-13
Payer: COMMERCIAL

## 2021-01-13 DIAGNOSIS — M54.16 LUMBAR RADICULOPATHY: ICD-10-CM

## 2021-01-13 DIAGNOSIS — F11.90 CHRONIC, CONTINUOUS USE OF OPIOIDS: ICD-10-CM

## 2021-01-13 DIAGNOSIS — F32.0 MILD MAJOR DEPRESSION (H): ICD-10-CM

## 2021-01-13 DIAGNOSIS — M54.42 CHRONIC LEFT-SIDED LOW BACK PAIN WITH LEFT-SIDED SCIATICA: ICD-10-CM

## 2021-01-13 DIAGNOSIS — G89.29 CHRONIC LEFT-SIDED LOW BACK PAIN WITH LEFT-SIDED SCIATICA: ICD-10-CM

## 2021-01-13 DIAGNOSIS — G89.4 CHRONIC PAIN SYNDROME: ICD-10-CM

## 2021-01-13 PROCEDURE — 99441 PR PHYSICIAN TELEPHONE EVALUATION 5-10 MIN: CPT | Mod: 95 | Performed by: FAMILY MEDICINE

## 2021-01-13 RX ORDER — HYDROCODONE BITARTRATE AND ACETAMINOPHEN 5; 325 MG/1; MG/1
TABLET ORAL
Qty: 50 TABLET | Refills: 0 | Status: SHIPPED | OUTPATIENT
Start: 2021-02-10 | End: 2021-03-05

## 2021-01-13 RX ORDER — BUPROPION HYDROCHLORIDE 150 MG/1
150 TABLET ORAL EVERY MORNING
Qty: 90 TABLET | Refills: 3 | Status: CANCELLED | OUTPATIENT
Start: 2021-01-13

## 2021-01-13 ASSESSMENT — ANXIETY QUESTIONNAIRES
5. BEING SO RESTLESS THAT IT IS HARD TO SIT STILL: NOT AT ALL
7. FEELING AFRAID AS IF SOMETHING AWFUL MIGHT HAPPEN: NOT AT ALL
GAD7 TOTAL SCORE: 2
2. NOT BEING ABLE TO STOP OR CONTROL WORRYING: SEVERAL DAYS
3. WORRYING TOO MUCH ABOUT DIFFERENT THINGS: NOT AT ALL
1. FEELING NERVOUS, ANXIOUS, OR ON EDGE: SEVERAL DAYS
6. BECOMING EASILY ANNOYED OR IRRITABLE: NOT AT ALL

## 2021-01-13 ASSESSMENT — PATIENT HEALTH QUESTIONNAIRE - PHQ9
5. POOR APPETITE OR OVEREATING: NOT AT ALL
SUM OF ALL RESPONSES TO PHQ QUESTIONS 1-9: 1

## 2021-01-13 NOTE — PATIENT INSTRUCTIONS
No medications changes   norco refilled   Pt sees me every 3 months for these refills   In person visit in April   Will need fasting lipids at that time

## 2021-01-13 NOTE — PROGRESS NOTES
Nighat is a 85 year old who is being evaluated via a billable telephone visit.      What phone number would you like to be contacted at? Rylie # [phone]  How would you like to obtain your AVS? MyChart  Assessment & Plan     Mild major depression (H)  Stable no change in treatment plan.     Chronic left-sided low back pain with left-sided sciatica  Stable no change in treatment plan.   - HYDROcodone-acetaminophen (NORCO) 5-325 MG tablet; TAKE ONE TABLET BY MOUTH TWICE A DAY AS NEEDED FOR SEVERE PAIN (30 DAY SUPPLY)    Chronic pain syndrome  Stable no change in treatment plan.   - HYDROcodone-acetaminophen (NORCO) 5-325 MG tablet; TAKE ONE TABLET BY MOUTH TWICE A DAY AS NEEDED FOR SEVERE PAIN (30 DAY SUPPLY)    Chronic, continuous use of opioids  Stable no change in treatment plan.   - HYDROcodone-acetaminophen (NORCO) 5-325 MG tablet; TAKE ONE TABLET BY MOUTH TWICE A DAY AS NEEDED FOR SEVERE PAIN (30 DAY SUPPLY)    Lumbar radiculopathy  Stable no change in treatment plan.   - HYDROcodone-acetaminophen (NORCO) 5-325 MG tablet; TAKE ONE TABLET BY MOUTH TWICE A DAY AS NEEDED FOR SEVERE PAIN (30 DAY SUPPLY)                         Patient Instructions   No medications changes   norco refilled   Pt sees me every 3 months for these refills   In person visit in April   Will need fasting lipids at that time       Return in about 3 months (around 4/13/2021) for using an in person visit, with me.    Nancy Tapia MD  [facility]    Subjective     Nighat is a 85 year old who presents to clinic today for the following health issues     HPI       Chronic Pain Follow-Up    Where in your body do you have pain? Low back sciatic pain - uses pain med in the morning, depending on her activity is when she takes Norco  How has your pain affected your ability to work? Not applicable  Which of these pain treatments have you tried since your last clinic visit? None  How well are you sleeping?  Good  How has your mood been since your last visit? Better  Have you had a significant life event? No  Other aggravating factors: Bending is painful, prolonged walking  Taking medication as directed? Yes    PHQ-9 SCORE 3/6/2020 10/14/2020 1/13/2021   PHQ-9 Total Score - - -   PHQ-9 Total Score MyChart - 1 (Minimal depression) -   PHQ-9 Total Score 8 1 1     PRECIOUS-7 SCORE 3/6/2020 10/14/2020 1/13/2021   Total Score - - -   Total Score - 4 (minimal anxiety) -   Total Score 12 4 2     No flowsheet data found.  Encounter-Level CSA - 12/11/2018:    Controlled Substance Agreement - Scan on 12/27/2018  8:55 AM: CONTROLLED SUBSTANCE AGREEMENT - 11/11/18     Encounter-Level CSA - 12/02/2016:    Controlled Substance Agreement - Scan on 12/8/2016  1:38 PM: CONTROLLED SUBSTANCE AGREEMENT 12/02/2016     Encounter-Level CSA - 04/04/2016:    Controlled Substance Agreement - Scan on 4/8/2016 10:44 AM: CONTROLLED SUBSTANCE AGREEMENT 04/04/2016     Patient-Level CSA:    Controlled Substance Agreement - Non - Opioid - Scan on 3/12/2020 11:46 AM: NON-OPIOID CONTROLLED SUBSTANCE AGREEMENT  Controlled Substance Agreement - Opioid - Scan on 3/6/2020  9:44 AM: FOR 3-6-20 VISIT  Controlled Substance Agreement - Opioid - Scan on 3/6/2020  9:41 AM             Review of Systems   Constitutional, HEENT, cardiovascular, pulmonary, gi and gu systems are negative, except as otherwise noted.      Objective           Vitals:  No vitals were obtained today due to virtual visit.    Physical Exam   healthy, alert and no distress  PSYCH: Alert and oriented times 3; coherent speech, normal   rate and volume, able to articulate logical thoughts, able   to abstract reason, no tangential thoughts, no hallucinations   or delusions  Her affect is normal  RESP: No cough, no audible wheezing, able to talk in full sentences  Remainder of exam unable to be completed due to telephone visits                Phone call duration: 15 minutes

## 2021-01-14 ASSESSMENT — ANXIETY QUESTIONNAIRES: GAD7 TOTAL SCORE: 2

## 2021-02-04 ENCOUNTER — TELEPHONE (OUTPATIENT)
Dept: FAMILY MEDICINE | Facility: CLINIC | Age: 86
End: 2021-02-04

## 2021-02-04 DIAGNOSIS — M54.42 CHRONIC LEFT-SIDED LOW BACK PAIN WITH LEFT-SIDED SCIATICA: ICD-10-CM

## 2021-02-04 DIAGNOSIS — G89.4 CHRONIC PAIN SYNDROME: ICD-10-CM

## 2021-02-04 DIAGNOSIS — M54.16 LUMBAR RADICULOPATHY: ICD-10-CM

## 2021-02-04 DIAGNOSIS — F11.90 CHRONIC, CONTINUOUS USE OF OPIOIDS: ICD-10-CM

## 2021-02-04 DIAGNOSIS — G89.29 CHRONIC LEFT-SIDED LOW BACK PAIN WITH LEFT-SIDED SCIATICA: ICD-10-CM

## 2021-02-04 NOTE — TELEPHONE ENCOUNTER
Please call pt and find out why this is needed. I should see her in the office for next refill. Please set up that appt.and triage regarding what is happening with her pain

## 2021-02-04 NOTE — TELEPHONE ENCOUNTER
Patient notified, she is scheduled for appointment on 2-17-21. Pharmacy is called to give verbal order for early refill on 2-5-21.    RICHARD Dupont

## 2021-02-04 NOTE — TELEPHONE ENCOUNTER
I would like to see her sooner if she is using more Norco. I would prefer she stick to 2 a day I think I told her two daily was max not three   She needs to see me sooner to discuss this  I will fill early one time only

## 2021-02-04 NOTE — TELEPHONE ENCOUNTER
Reason for Call:  Other prescription    Detailed comments: Pt is asking for Early  refill on her Generic Norco. Pt has 3 tabs left. Please Advise    Phone Number Patient can be reached at: Home number on file [phone] (home)    Best Time: Any Time      Can we leave a detailed message on this number? YES    Call taken on 2/4/2021 at 11:01 AM by Alexandrea Canales

## 2021-02-04 NOTE — TELEPHONE ENCOUNTER
Dr. Tapia,    Patient called back and reports that she has been taking 3 tablets of Norco over the last few days and says she thought you were ok with her doing this, says her back has been more painful.    Patient made appointment on 4-13-21 as thought she was suppose to be seen in 3 months, is this ok to wait or does patient need appointment sooner?      Ok to refill [place] early? Patient has 4 tablets left.      RICHARD Dupont

## 2021-03-01 ENCOUNTER — IMMUNIZATION (OUTPATIENT)
Dept: FAMILY MEDICINE | Facility: CLINIC | Age: 86
End: 2021-03-01
Payer: COMMERCIAL

## 2021-03-01 PROCEDURE — 0001A PR COVID VAC PFIZER DIL RECON 30 MCG/0.3 ML IM: CPT

## 2021-03-01 PROCEDURE — 91300 PR COVID VAC PFIZER DIL RECON 30 MCG/0.3 ML IM: CPT

## 2021-03-22 ENCOUNTER — IMMUNIZATION (OUTPATIENT)
Dept: FAMILY MEDICINE | Facility: CLINIC | Age: 86
End: 2021-03-22
Attending: FAMILY MEDICINE
Payer: COMMERCIAL

## 2021-03-22 PROCEDURE — 91300 PR COVID VAC PFIZER DIL RECON 30 MCG/0.3 ML IM: CPT

## 2021-03-22 PROCEDURE — 0002A PR COVID VAC PFIZER DIL RECON 30 MCG/0.3 ML IM: CPT

## 2021-04-13 ENCOUNTER — OFFICE VISIT (OUTPATIENT)
Dept: FAMILY MEDICINE | Facility: CLINIC | Age: 86
End: 2021-04-13
Payer: COMMERCIAL

## 2021-04-13 VITALS
WEIGHT: 99 LBS | BODY MASS INDEX: 19.96 KG/M2 | HEIGHT: 59 IN | TEMPERATURE: 97.7 F | HEART RATE: 68 BPM | SYSTOLIC BLOOD PRESSURE: 128 MMHG | DIASTOLIC BLOOD PRESSURE: 74 MMHG | RESPIRATION RATE: 18 BRPM

## 2021-04-13 DIAGNOSIS — M54.42 CHRONIC LEFT-SIDED LOW BACK PAIN WITH LEFT-SIDED SCIATICA: ICD-10-CM

## 2021-04-13 DIAGNOSIS — G89.29 CHRONIC LEFT-SIDED LOW BACK PAIN WITH LEFT-SIDED SCIATICA: ICD-10-CM

## 2021-04-13 DIAGNOSIS — G89.4 CHRONIC PAIN SYNDROME: Primary | Chronic | ICD-10-CM

## 2021-04-13 DIAGNOSIS — I10 ESSENTIAL HYPERTENSION, BENIGN: ICD-10-CM

## 2021-04-13 PROCEDURE — 80307 DRUG TEST PRSMV CHEM ANLYZR: CPT | Performed by: FAMILY MEDICINE

## 2021-04-13 PROCEDURE — 99214 OFFICE O/P EST MOD 30 MIN: CPT | Performed by: FAMILY MEDICINE

## 2021-04-13 RX ORDER — AMLODIPINE BESYLATE 10 MG/1
10 TABLET ORAL DAILY
Qty: 90 TABLET | Refills: 3 | Status: SHIPPED | OUTPATIENT
Start: 2021-04-13 | End: 2022-04-05

## 2021-04-13 RX ORDER — LISINOPRIL 40 MG/1
40 TABLET ORAL DAILY
Qty: 90 TABLET | Refills: 1 | Status: SHIPPED | OUTPATIENT
Start: 2021-04-13 | End: 2021-11-03

## 2021-04-13 ASSESSMENT — MIFFLIN-ST. JEOR: SCORE: 805.72

## 2021-04-13 NOTE — LETTER
Opioid / Opioid Plus Controlled Substance Agreement    This is an agreement between you and your provider about the safe and appropriate use of controlled substance/opioids prescribed by your care team. Controlled substances are medicines that can cause physical and mental dependence (abuse).    There are strict laws about having and using these medicines. We here at [facility] are committing to working with you in your efforts to get better. To support you in this work, we ll help you schedule regular office appointments for medicine refills. If we must cancel or change your appointment for any reason, we ll make sure you have enough medicine to last until your next appointment.     As a Provider, I will:    Listen carefully to your concerns and treat you with respect.     Recommend a treatment plan that I believe is in your best interest. This plan may involve therapies other than opioid pain medication.     Talk with you often about the possible benefits, and the risk of harm of any medicine that we prescribe for you.     Provide a plan on how to taper (discontinue or go off) using this medicine if the decision is made to stop its use.    As a Patient, I understand that opioid(s):     Are a controlled substance prescribed by my care team to help me function or work and manage my condition(s).     Are strong medicines and can cause serious side effects such as:    Drowsiness, which can seriously affect my driving ability    A lower breathing rate, enough to cause death    Harm to my thinking ability     Depression     Abuse of and addiction to this medicine    Need to be taken exactly as prescribed. Combining opioids with certain medicines or chemicals (such as illegal drugs, sedatives, sleeping pills, and benzodiazepines) can be dangerous or even fatal. If I stop opioids suddenly, I may have severe withdrawal symptoms.    Do not work for all types of pain nor for all patients. If they re not helpful, I may  be asked to stop them.        The risks, benefits and side effects of these medicine(s) were explained to me. I agree that:  1. I will take part in other treatments as advised by my care team. This may be psychiatry or counseling, physical therapy, behavioral therapy, group treatment or a referral to a specialist.     2. I will keep all my appointments. I understand that this is part of the monitoring of opioids. My care team may require an office visit for EVERY opioid/controlled substance refill. If I miss appointments or don t follow instructions, my care team may stop my medicine.    3. I will take my medicines as prescribed. I will not change the dose or schedule unless my care team tells me to. There will be no refills if I run out early.     4. I may be asked to come to the clinic and complete a urine drug test or complete a pill count at any time. If I don t give a urine sample or participate in a pill count, the care team may stop my medicine.    5. I will only receive prescriptions from this clinic for chronic pain. If I am treated by another provider for acute pain issues, I will tell them that I am taking opioid pain medication for chronic pain and that I have a treatment agreement with this provider. I will inform my [facility] care team within one business day if I am given a prescription for any pain medication by another healthcare provider. My [facility] care team can contact other providers and pharmacists about my use of any medicines.    6. It is up to me to make sure that I don t run out of my medicines on weekends or holidays. If my care team is willing to refill my opioid prescription without a visit, I must request refills only during office hours. Refills may take up to 3 business days to process. I will use one pharmacy to fill all my opioid and other controlled substance prescriptions. I will notify the clinic about any changes to my insurance or medication  availability.    7. I am responsible for my prescriptions. If the medicine/prescription is lost, stolen or destroyed, it will not be replaced. I also agree not to share controlled substance medicines with anyone.    8. I am aware I should not use any illegal or recreational drugs. I agree not to drink alcohol unless my care team says I can.       9. If I enroll in the [place] [facility] Cann[facility] program, I will tell my care team prior to my next refill.     10. I will tell my care team right away if I become pregnant, have a new medical problem treated outside of my regular clinic, or have a change in my medications.    11. I understand that this medicine can affect my thinking, judgment and reaction time. Alcohol and drugs affect the brain and body, which can affect the safety of my driving. Being under the influence of alcohol or drugs can affect my decision-making, behaviors, personal safety, and the safety of others. Driving while impaired (DWI) can occur if a person is driving, operating, or in physical control of a car, motorcycle, boat, snowmobile, ATV, motorbike, off-road vehicle, or any other motor vehicle ([place] Statute 169A.20). I understand the risk if I choose to drive or operate any vehicle or machinery.    I understand that if I do not follow any of the conditions above, my prescriptions or treatment may be stopped or changed.          Opioids  What You Need to Know    What are opioids?   Opioids are pain medicines that must be prescribed by a doctor. They are also known as narcotics.     Examples are:   1. morphine (MS Contin, Britt)  2. oxycodone (Oxycontin)  3. oxycodone and acetaminophen (Percocet)  4. hydrocodone and acetaminophen (Vicodin, Norco)   5. fentanyl patch (Duragesic)   6. hydromorphone (Dilaudid)   7. methadone  8. codeine (Tylenol #3)     What do opioids do well?   Opioids are best for severe short-term pain such as after a surgery or injury. They may work well for cancer pain. They may  help some people with long-lasting (chronic) pain.     What do opioids NOT do well?   Opioids never get rid of pain entirely, and they don t work well for most patients with chronic pain. Opioids don t reduce swelling, one of the causes of pain.                                    Other ways to manage chronic pain and improve function include:       Treat the health problem that may be causing pain    Anti-inflammation medicines, which reduce swelling and tenderness, such as ibuprofen (Advil, Motrin) or naproxen (Aleve)    Acetaminophen (Tylenol)    Antidepressants and anti-seizure medicines, especially for nerve pain    Topical treatments such as patches or creams    Injections or nerve blocks    Chiropractic or osteopathic treatment    Acupuncture, massage, deep breathing, meditation, visual imagery, aromatherapy    Use heat or ice at the pain site    Physical therapy     Exercise    Stop smoking    Take part in therapy       Risks and side effects     Talk to your doctor before you start or decide to keep taking opioids. Possible side effects include:      Lowering your breathing rate enough to cause death    Overdose, including death, especially if taking higher than prescribed doses    Worse depression symptoms; less pleasure in things you usually enjoy    Feeling tired or sluggish    Slower thoughts or cloudy thinking    Being more sensitive to pain over time; pain is harder to control    Trouble sleeping or restless sleep    Changes in hormone levels (for example, less testosterone)    Changes in sex drive or ability to have sex    Constipation    Unsafe driving    Itching and sweating    Dizziness    Nausea, throwing up and dry mouth    What else should I know about opioids?    Opioids may lead to dependence, tolerance, or addiction.      Dependence means that if you stop or reduce the medicine too quickly, you will have withdrawal symptoms. These include loose poop (diarrhea), jitters, flu-like symptoms,  nervousness and tremors. Dependence is not the same as addiction.                       Tolerance means needing higher doses over time to get the same effect. This may increase the chance of serious side effects.      Addiction is when people improperly use a substance that harms their body, their mind or their relations with others. Use of opiates can cause a relapse of addiction if you have a history of drug or alcohol abuse.      People who have used opioids for a long time may have a lower quality of life, worse depression, higher levels of pain and more visits to doctors.    You can overdose on opioids. Take these steps to lower your risk of overdose:    1. Recognize the signs:  Signs of overdose include decrease or loss of consciousness (blackout), slowed breathing, trouble waking up and blue lips. If someone is worried about overdose, they should call 911.    2. Talk to your doctor about Narcan (naloxone).   If you are at risk for overdose, you may be given a prescription for Narcan. This medicine very quickly reverses the effects of opioids.   If you overdose, a friend or family member can give you Narcan while waiting for the ambulance. They need to know the signs of overdose and how to give Narcan.     3. Don't use alcohol or street drugs.   Taking them with opioids can cause death.    4. Do not take any of these medicines unless your doctor says it s OK. Taking these with opioids can cause death:    Benzodiazepines, such as lorazepam (Ativan), alprazolam (Xanax) or diazepam (Valium)    Muscle relaxers, such as cyclobenzaprine (Flexeril)    Sleeping pills like zolpidem (Ambien)     Other opioids      How to keep you and other people safe while taking opioids:    1. Never share your opioids with others.  Opioid medicines are regulated by the Drug Enforcement Agency ([place]). Selling or sharing medications is a criminal act.    2. Be sure to store opioids in a secure place, locked up if possible. Young children  can easily swallow them and overdose.    3. When you are traveling with your medicines, keep them in the original bottles. If you use a pill box, be sure you also carry a copy of your medicine list from your clinic or pharmacy.    4. Safe disposal of opioids    Most pharmacies have places to get rid of medicine, called disposal kiosks. Medicine disposal options are also available in every [facility]. Search your county and  medication disposal  to find more options. You can find more details at:  [email or web address]     I agree that my provider, clinic care team, and pharmacy may work with any city, state or federal law enforcement agency that investigates the misuse, sale, or other diversion of my controlled medicine. I will allow my provider to discuss my care with, or share a copy of, this agreement with any other treating provider, pharmacy or emergency room where I receive care.    I have read this agreement and have asked questions about anything I did not understand.    _______________________________________________________  Patient Signature - Nighat Gupta _____________________                   Date     _______________________________________________________  Provider Signature - Nancy Tapia MD   _____________________                   Date     _______________________________________________________  Witness Signature (required if provider not present while patient signing)   _____________________                   Date

## 2021-04-13 NOTE — PROGRESS NOTES
Assessment & Plan     Chronic pain syndrome  Stable no change in treatment plan.   - Drug Confirmation Panel Urine with Creat (Care Map)    Chronic left-sided low back pain with left-sided sciatica  Stable no change in treatment plan.   - Drug Confirmation Panel Urine with Creat (Care Map)    Essential hypertension, benign  Stable no change in treatment plan.   - lisinopril (ZESTRIL) 40 MG tablet; Take 1 tablet (40 mg) by mouth daily  - amLODIPine (NORVASC) 10 MG tablet; Take 1 tablet (10 mg) by mouth daily             Patient Instructions   Leave a urine sample     meds refilled           Return in about 3 months (around 7/13/2021) for using a telephone visit, with me.    Nancy Tapia MD  [facility]    Arun Disla is a 85 year old who presents for the following health issues     HPI     Hyperlipidemia Follow-Up      Are you regularly taking any medication or supplement to lower your cholesterol?   Yes- simvastatin    Are you having muscle aches or other side effects that you think could be caused by your cholesterol lowering medication?  No    Hypertension Follow-up      Do you check your blood pressure regularly outside of the clinic? No     Are you following a low salt diet? No    Are your blood pressures ever more than 140 on the top number (systolic) OR more   than 90 on the bottom number (diastolic), for example 140/90? No    Chronic Pain Follow-Up    Where in your body do you have pain? back  How has your pain affected your ability to work? Not applicable  Which of these pain treatments have you tried since your last clinic visit? None  How well are you sleeping? Fair  How has your mood been since your last visit? About the same  Have you had a significant life event? No  Other aggravating factors: none  Taking medication as directed? Yes    PHQ-9 SCORE 3/6/2020 10/14/2020 1/13/2021   PHQ-9 Total Score - - -   PHQ-9 Total Score MyChart - 1 (Minimal depression)  "-   PHQ-9 Total Score 8 1 1     PRECIOUS-7 SCORE 3/6/2020 10/14/2020 1/13/2021   Total Score - - -   Total Score - 4 (minimal anxiety) -   Total Score 12 4 2     No flowsheet data found.  Encounter-Level CSA - 12/11/2018:    Controlled Substance Agreement - Scan on 12/27/2018  8:55 AM: CONTROLLED SUBSTANCE AGREEMENT - 11/11/18     Encounter-Level CSA - 12/02/2016:    Controlled Substance Agreement - Scan on 12/8/2016  1:38 PM: CONTROLLED SUBSTANCE AGREEMENT 12/02/2016     Encounter-Level CSA - 04/04/2016:    Controlled Substance Agreement - Scan on 4/8/2016 10:44 AM: CONTROLLED SUBSTANCE AGREEMENT 04/04/2016     Patient-Level CSA:    Controlled Substance Agreement - Non - Opioid - Scan on 3/12/2020 11:46 AM: NON-OPIOID CONTROLLED SUBSTANCE AGREEMENT  Controlled Substance Agreement - Opioid - Scan on 3/6/2020  9:44 AM: FOR 3-6-20 VISIT  Controlled Substance Agreement - Opioid - Scan on 3/6/2020  9:41 AM           Review of Systems   Constitutional, HEENT, cardiovascular, pulmonary, gi and gu systems are negative, except as otherwise noted.      Objective    /74   Pulse 68   Temp 97.7  F (36.5  C) (Tympanic)   Resp 18   Ht 1.492 m (4' 10.75\")   Wt 44.9 kg (99 lb)   BMI 20.17 kg/m    Body mass index is 20.17 kg/m .  Physical Exam   GENERAL APPEARANCE: healthy, alert and no distress  RESP: lungs clear to auscultation - no rales, rhonchi or wheezes  CV: regular rates and rhythm, normal S1 S2, no S3 or S4 and no murmur, click or rub  MS: extremities normal- no gross deformities noted  PSYCH: mentation appears normal and affect normal/bright                "

## 2021-04-15 LAB
CREAT UR-MCNC: 35 MG/DL
DHC UR CFM-MCNC: 98 NG/ML
DHC/CREAT UR: 280 NG/MG{CREAT}
GABAPENTIN UR QL CFM: PRESENT
HYDROCODONE UR CFM-MCNC: 367 NG/ML
HYDROCODONE/CREAT UR: 1049 NG/MG{CREAT}
HYDROMORPHONE UR CFM-MCNC: 300 NG/ML
HYDROMORPHONE/CREAT UR: 857 NG/MG{CREAT}
RPT COMMENT: ABNORMAL

## 2021-07-06 DIAGNOSIS — M54.42 CHRONIC LEFT-SIDED LOW BACK PAIN WITH LEFT-SIDED SCIATICA: ICD-10-CM

## 2021-07-06 DIAGNOSIS — G89.29 CHRONIC LEFT-SIDED LOW BACK PAIN WITH LEFT-SIDED SCIATICA: ICD-10-CM

## 2021-07-06 DIAGNOSIS — G89.4 CHRONIC PAIN SYNDROME: ICD-10-CM

## 2021-07-06 DIAGNOSIS — F11.90 CHRONIC, CONTINUOUS USE OF OPIOIDS: ICD-10-CM

## 2021-07-06 DIAGNOSIS — M54.16 LUMBAR RADICULOPATHY: ICD-10-CM

## 2021-07-06 RX ORDER — HYDROCODONE BITARTRATE AND ACETAMINOPHEN 5; 325 MG/1; MG/1
TABLET ORAL
Qty: 50 TABLET | Refills: 0 | Status: SHIPPED | OUTPATIENT
Start: 2021-07-06 | End: 2021-07-13

## 2021-07-13 ENCOUNTER — VIRTUAL VISIT (OUTPATIENT)
Dept: FAMILY MEDICINE | Facility: CLINIC | Age: 86
End: 2021-07-13
Payer: COMMERCIAL

## 2021-07-13 DIAGNOSIS — G89.4 CHRONIC PAIN SYNDROME: ICD-10-CM

## 2021-07-13 DIAGNOSIS — G89.29 CHRONIC LEFT-SIDED LOW BACK PAIN WITH LEFT-SIDED SCIATICA: Primary | ICD-10-CM

## 2021-07-13 DIAGNOSIS — M54.16 LUMBAR RADICULOPATHY: ICD-10-CM

## 2021-07-13 DIAGNOSIS — F11.90 CHRONIC, CONTINUOUS USE OF OPIOIDS: ICD-10-CM

## 2021-07-13 DIAGNOSIS — M54.42 CHRONIC LEFT-SIDED LOW BACK PAIN WITH LEFT-SIDED SCIATICA: Primary | ICD-10-CM

## 2021-07-13 DIAGNOSIS — E03.9 HYPOTHYROIDISM, UNSPECIFIED TYPE: Chronic | ICD-10-CM

## 2021-07-13 DIAGNOSIS — F32.0 MILD MAJOR DEPRESSION (H): Chronic | ICD-10-CM

## 2021-07-13 PROCEDURE — 99443 PR PHYSICIAN TELEPHONE EVALUATION 21-30 MIN: CPT | Mod: 95 | Performed by: FAMILY MEDICINE

## 2021-07-13 RX ORDER — HYDROCODONE BITARTRATE AND ACETAMINOPHEN 5; 325 MG/1; MG/1
TABLET ORAL
Qty: 50 TABLET | Refills: 0 | Status: SHIPPED | OUTPATIENT
Start: 2021-08-06 | End: 2021-09-09

## 2021-07-13 ASSESSMENT — PATIENT HEALTH QUESTIONNAIRE - PHQ9: SUM OF ALL RESPONSES TO PHQ QUESTIONS 1-9: 5

## 2021-07-13 NOTE — PATIENT INSTRUCTIONS
Pain meds refilled     Take gabapentin as prescribed to get ahead of pain     Lab appt for tsh to be sure thyroid is adequately replaced

## 2021-07-13 NOTE — PROGRESS NOTES
Nighat is a 85 year old who is being evaluated via a billable telephone visit.      What phone number would you like to be contacted at? [phone]   How would you like to obtain your AVS? Chris   Assessment & Plan     Chronic left-sided low back pain with left-sided sciatica  Fair control   Has good and bad days  Not really taking th gabapentin as Rxd  She will start to do this as it does help with flares   - HYDROcodone-acetaminophen (NORCO) 5-325 MG tablet; TAKE ONE TABLET BY MOUTH TWICE A DAY AS NEEDED FOR SEVERE PAIN (30 DAY SUPPLY)    Chronic pain syndrome  As above   - HYDROcodone-acetaminophen (NORCO) 5-325 MG tablet; TAKE ONE TABLET BY MOUTH TWICE A DAY AS NEEDED FOR SEVERE PAIN (30 DAY SUPPLY)    Chronic, continuous use of opioids  As above   - HYDROcodone-acetaminophen (NORCO) 5-325 MG tablet; TAKE ONE TABLET BY MOUTH TWICE A DAY AS NEEDED FOR SEVERE PAIN (30 DAY SUPPLY)    Lumbar radiculopathy  As above   - HYDROcodone-acetaminophen (NORCO) 5-325 MG tablet; TAKE ONE TABLET BY MOUTH TWICE A DAY AS NEEDED FOR SEVERE PAIN (30 DAY SUPPLY)    Mild major depression (H)  Stable no change in treatment plan.     Hypothyroidism, unspecified type  Adjust meds as indicated by above labs.   - TSH with free T4 reflex; Future             Depression Screening Follow Up    PHQ 7/13/2021   PHQ-9 Total Score 5   Q9: Thoughts of better off dead/self-harm past 2 weeks Several days     No plan just tired of pain             Follow Up    Follow Up Actions Taken  Patient declined referral.    Discussed the following ways the patient can remain in a safe environment:  be around others  Patient Instructions   Pain meds refilled     Take gabapentin as prescribed to get ahead of pain     Lab appt for tsh to be sure thyroid is adequately replaced       Return in about 3 months (around 10/13/2021) for using an in person visit, with me.    Nancy Tapia MD  [facility]    Subjective   Nighat  is a 85 year old who presents for the following health issues  accompanied by her daughter:    HPI     Chronic/Recurring Back Pain Follow Up      Where is your back pain located? (Select all that apply) low back left    How would you describe your back pain?  burning, dull ache, sharp, shooting and stabbing    Where does your back pain spread? the left buttock    Since your last clinic visit for back pain, how has your pain changed? always present, but gets better and worse    Does your back pain interfere with your job? Not applicable    Since your last visit, have you tried any new treatment? No          Depression Followup    How are you doing with your depression since your last visit? Good and bad days depends on pain     Are you having other symptoms that might be associated with depression? No    Have you had a significant life event?  No     Are you feeling anxious or having panic attacks?   No    Do you have any concerns with your use of alcohol or other drugs? No    Social History     Tobacco Use     Smoking status: Current Every Day Smoker     Packs/day: 0.25     Years: 50.00     Pack years: 12.50     Types: Cigarettes     Smokeless tobacco: Never Used     Tobacco comment: 5-8 cigarettes daily   Substance Use Topics     Alcohol use: No     Drug use: No     PHQ 10/14/2020 1/13/2021 7/13/2021   PHQ-9 Total Score 1 1 5   Q9: Thoughts of better off dead/self-harm past 2 weeks Not at all Not at all Several days     PRECIOUS-7 SCORE 3/6/2020 10/14/2020 1/13/2021   Total Score - - -   Total Score - 4 (minimal anxiety) -   Total Score 12 4 2     Last PHQ-9 7/13/2021   1.  Little interest or pleasure in doing things 0   2.  Feeling down, depressed, or hopeless 1   3.  Trouble falling or staying asleep, or sleeping too much 0   4.  Feeling tired or having little energy 3   5.  Poor appetite or overeating 0   6.  Feeling bad about yourself 0   7.  Trouble concentrating 0   8.  Moving slowly or restless 0   Q9: Thoughts  of better off dead/self-harm past 2 weeks 1   See above    PHQ-9 Total Score 5   Difficulty at work, home, or with people -       Suicide Assessment Five-step Evaluation and Treatment (SAFE-T)    Hypothyroidism Follow-up      Since last visit, patient describes the following symptoms: Weight stable, no hair loss, no skin changes, no constipation, no loose stools            Review of Systems   Constitutional, HEENT, cardiovascular, pulmonary, gi and gu systems are negative, except as otherwise noted.      Objective           Vitals:  No vitals were obtained today due to virtual visit.    Physical Exam   healthy, alert and no distress  PSYCH: Alert and oriented times 3; coherent speech, normal   rate and volume, able to articulate logical thoughts, able   to abstract reason, no tangential thoughts, no hallucinations   or delusions  Her affect is normal  RESP: No cough, no audible wheezing, able to talk in full sentences  Remainder of exam unable to be completed due to telephone visits                Phone call duration: 24 minutes

## 2021-07-22 DIAGNOSIS — E03.9 HYPOTHYROIDISM, UNSPECIFIED TYPE: ICD-10-CM

## 2021-07-23 RX ORDER — LEVOTHYROXINE SODIUM 75 UG/1
TABLET ORAL
Qty: 36 TABLET | Refills: 1 | Status: SHIPPED | OUTPATIENT
Start: 2021-07-23 | End: 2021-11-03

## 2021-09-07 DIAGNOSIS — G89.29 CHRONIC LEFT-SIDED LOW BACK PAIN WITH LEFT-SIDED SCIATICA: ICD-10-CM

## 2021-09-07 DIAGNOSIS — M54.42 CHRONIC LEFT-SIDED LOW BACK PAIN WITH LEFT-SIDED SCIATICA: ICD-10-CM

## 2021-09-07 DIAGNOSIS — G89.4 CHRONIC PAIN SYNDROME: ICD-10-CM

## 2021-09-07 DIAGNOSIS — M54.16 LUMBAR RADICULOPATHY: ICD-10-CM

## 2021-09-07 DIAGNOSIS — F11.90 CHRONIC, CONTINUOUS USE OF OPIOIDS: ICD-10-CM

## 2021-09-09 RX ORDER — HYDROCODONE BITARTRATE AND ACETAMINOPHEN 5; 325 MG/1; MG/1
TABLET ORAL
Qty: 50 TABLET | Refills: 0 | Status: SHIPPED | OUTPATIENT
Start: 2021-09-09 | End: 2021-10-08

## 2021-09-09 NOTE — TELEPHONE ENCOUNTER
Requested Prescriptions   Pending Prescriptions Disp Refills     HYDROcodone-acetaminophen (NORCO) 5-325 MG tablet [Pharmacy Med Name: HYDROCODONE/APAP 5-325MG TAB] 50 tablet 0     Sig: TAKE ONE TABLET BY MOUTH TWICE A DAY AS NEEDED FOR SEVERE PAIN (30 DAY SUPPLY) 8/6/50FILL ON OR AFTER 8/6/202150       There is no refill protocol information for this order        Last Written Prescription Date:  8/6/21  Last Fill Quantity: 50,  # refills: 0   Last office visit: 4/13/2021 with prescribing provider:     Future Office Visit:

## 2021-09-12 ENCOUNTER — HEALTH MAINTENANCE LETTER (OUTPATIENT)
Age: 86
End: 2021-09-12

## 2021-10-04 DIAGNOSIS — M54.16 LUMBAR RADICULOPATHY: ICD-10-CM

## 2021-10-04 DIAGNOSIS — F11.90 CHRONIC, CONTINUOUS USE OF OPIOIDS: ICD-10-CM

## 2021-10-04 DIAGNOSIS — G89.4 CHRONIC PAIN SYNDROME: ICD-10-CM

## 2021-10-04 DIAGNOSIS — G89.29 CHRONIC LEFT-SIDED LOW BACK PAIN WITH LEFT-SIDED SCIATICA: ICD-10-CM

## 2021-10-04 DIAGNOSIS — M54.42 CHRONIC LEFT-SIDED LOW BACK PAIN WITH LEFT-SIDED SCIATICA: ICD-10-CM

## 2021-10-06 NOTE — TELEPHONE ENCOUNTER
Requested Prescriptions   Pending Prescriptions Disp Refills    HYDROcodone-acetaminophen (NORCO) 5-325 MG tablet [Pharmacy Med Name: HYDROCODONE/APAP 5-325MG TAB] 50 tablet 0     Sig: TAKE ONE TABLET BY MOUTH TWICE A DAY AS NEEDED FOR SEVERE PAIN (30 DAY SUPPLY)        There is no refill protocol information for this order        gabapentin (NEURONTIN) 300 MG capsule [Pharmacy Med Name: GABAPENTIN 300MG CAPS] 180 capsule 3     Sig: TAKE ONE CAPSULE BY MOUTH EVERY MORNING AND TAKE ONE CAPSULE BY MOUTH AT NOON AND TAKE TWO CAPSULES BY MOUTH AT BEDTIME        There is no refill protocol information for this order         Vilma SNEED RN

## 2021-10-06 NOTE — TELEPHONE ENCOUNTER
Call pt needs to set up appt this needs to be every 6 months last in April   If she gets appt I will fill

## 2021-10-07 DIAGNOSIS — M54.42 CHRONIC LEFT-SIDED LOW BACK PAIN WITH LEFT-SIDED SCIATICA: ICD-10-CM

## 2021-10-07 DIAGNOSIS — G89.4 CHRONIC PAIN SYNDROME: ICD-10-CM

## 2021-10-07 DIAGNOSIS — M54.16 LUMBAR RADICULOPATHY: ICD-10-CM

## 2021-10-07 DIAGNOSIS — G89.29 CHRONIC LEFT-SIDED LOW BACK PAIN WITH LEFT-SIDED SCIATICA: ICD-10-CM

## 2021-10-07 DIAGNOSIS — F11.90 CHRONIC, CONTINUOUS USE OF OPIOIDS: ICD-10-CM

## 2021-10-07 RX ORDER — HYDROCODONE BITARTRATE AND ACETAMINOPHEN 5; 325 MG/1; MG/1
TABLET ORAL
Qty: 50 TABLET | Refills: 0 | Status: CANCELLED | OUTPATIENT
Start: 2021-10-07

## 2021-10-07 RX ORDER — GABAPENTIN 300 MG/1
CAPSULE ORAL
Qty: 180 CAPSULE | Refills: 3 | Status: CANCELLED | OUTPATIENT
Start: 2021-10-07

## 2021-10-08 ENCOUNTER — ALLIED HEALTH/NURSE VISIT (OUTPATIENT)
Dept: FAMILY MEDICINE | Facility: CLINIC | Age: 86
End: 2021-10-08
Payer: COMMERCIAL

## 2021-10-08 DIAGNOSIS — Z76.0 ENCOUNTER FOR MEDICATION REFILL: Primary | ICD-10-CM

## 2021-10-08 PROCEDURE — 99207 PR NO CHARGE NURSE ONLY: CPT

## 2021-10-08 RX ORDER — GABAPENTIN 300 MG/1
CAPSULE ORAL
Qty: 180 CAPSULE | Refills: 3 | Status: SHIPPED | OUTPATIENT
Start: 2021-10-08 | End: 2022-10-21

## 2021-10-08 RX ORDER — HYDROCODONE BITARTRATE AND ACETAMINOPHEN 5; 325 MG/1; MG/1
TABLET ORAL
Qty: 50 TABLET | Refills: 0 | Status: SHIPPED | OUTPATIENT
Start: 2021-10-08 | End: 2021-11-03

## 2021-10-08 NOTE — TELEPHONE ENCOUNTER
Pt at clinic requesting refills today.  Advised needs appt- scheduled 11-03-21.  Forwarded to PCP.  JEAN PIERRE Mckeon RN

## 2021-10-08 NOTE — PROGRESS NOTES
See 10-04-21 refill encounter.  Appt scheduled. Encounter forwarded to PCP for refill auth.  JEAN PIERRE Mckeon RN

## 2021-11-03 ENCOUNTER — OFFICE VISIT (OUTPATIENT)
Dept: FAMILY MEDICINE | Facility: CLINIC | Age: 86
End: 2021-11-03
Payer: COMMERCIAL

## 2021-11-03 VITALS
BODY MASS INDEX: 19.11 KG/M2 | RESPIRATION RATE: 12 BRPM | OXYGEN SATURATION: 99 % | TEMPERATURE: 97 F | WEIGHT: 93.8 LBS | DIASTOLIC BLOOD PRESSURE: 65 MMHG | SYSTOLIC BLOOD PRESSURE: 120 MMHG | HEART RATE: 62 BPM

## 2021-11-03 DIAGNOSIS — I10 ESSENTIAL HYPERTENSION, BENIGN: ICD-10-CM

## 2021-11-03 DIAGNOSIS — E78.5 HYPERLIPIDEMIA LDL GOAL <130: ICD-10-CM

## 2021-11-03 DIAGNOSIS — M54.42 CHRONIC LEFT-SIDED LOW BACK PAIN WITH LEFT-SIDED SCIATICA: Primary | ICD-10-CM

## 2021-11-03 DIAGNOSIS — Z23 NEED FOR PROPHYLACTIC VACCINATION AND INOCULATION AGAINST INFLUENZA: ICD-10-CM

## 2021-11-03 DIAGNOSIS — E03.9 HYPOTHYROIDISM, UNSPECIFIED TYPE: ICD-10-CM

## 2021-11-03 DIAGNOSIS — M54.16 LUMBAR RADICULOPATHY: ICD-10-CM

## 2021-11-03 DIAGNOSIS — F11.90 CHRONIC, CONTINUOUS USE OF OPIOIDS: ICD-10-CM

## 2021-11-03 DIAGNOSIS — G89.29 CHRONIC LEFT-SIDED LOW BACK PAIN WITH LEFT-SIDED SCIATICA: Primary | ICD-10-CM

## 2021-11-03 DIAGNOSIS — G89.4 CHRONIC PAIN SYNDROME: ICD-10-CM

## 2021-11-03 LAB
ALBUMIN SERPL-MCNC: 3.8 G/DL (ref 3.4–5)
ALP SERPL-CCNC: 41 U/L (ref 40–150)
ALT SERPL W P-5'-P-CCNC: 21 U/L (ref 0–50)
ANION GAP SERPL CALCULATED.3IONS-SCNC: 6 MMOL/L (ref 3–14)
AST SERPL W P-5'-P-CCNC: 21 U/L (ref 0–45)
BILIRUB SERPL-MCNC: 0.5 MG/DL (ref 0.2–1.3)
BUN SERPL-MCNC: 12 MG/DL (ref 7–30)
CALCIUM SERPL-MCNC: 9.1 MG/DL (ref 8.5–10.1)
CHLORIDE BLD-SCNC: 105 MMOL/L (ref 94–109)
CHOLEST SERPL-MCNC: 222 MG/DL
CO2 SERPL-SCNC: 26 MMOL/L (ref 20–32)
CREAT SERPL-MCNC: 0.84 MG/DL (ref 0.52–1.04)
FASTING STATUS PATIENT QL REPORTED: NO
GFR SERPL CREATININE-BSD FRML MDRD: 64 ML/MIN/1.73M2
GLUCOSE BLD-MCNC: 83 MG/DL (ref 70–99)
HDLC SERPL-MCNC: 122 MG/DL
LDLC SERPL CALC-MCNC: 84 MG/DL
NONHDLC SERPL-MCNC: 100 MG/DL
POTASSIUM BLD-SCNC: 4.2 MMOL/L (ref 3.4–5.3)
PROT SERPL-MCNC: 7.2 G/DL (ref 6.8–8.8)
SODIUM SERPL-SCNC: 137 MMOL/L (ref 133–144)
T4 FREE SERPL-MCNC: 1.01 NG/DL (ref 0.76–1.46)
TRIGL SERPL-MCNC: 78 MG/DL
TSH SERPL DL<=0.005 MIU/L-ACNC: 16.53 MU/L (ref 0.4–4)

## 2021-11-03 PROCEDURE — G0008 ADMIN INFLUENZA VIRUS VAC: HCPCS | Performed by: FAMILY MEDICINE

## 2021-11-03 PROCEDURE — 90662 IIV NO PRSV INCREASED AG IM: CPT | Performed by: FAMILY MEDICINE

## 2021-11-03 PROCEDURE — 80061 LIPID PANEL: CPT | Performed by: FAMILY MEDICINE

## 2021-11-03 PROCEDURE — 84443 ASSAY THYROID STIM HORMONE: CPT | Performed by: FAMILY MEDICINE

## 2021-11-03 PROCEDURE — 99214 OFFICE O/P EST MOD 30 MIN: CPT | Mod: 25 | Performed by: FAMILY MEDICINE

## 2021-11-03 PROCEDURE — 84439 ASSAY OF FREE THYROXINE: CPT | Performed by: FAMILY MEDICINE

## 2021-11-03 PROCEDURE — 36415 COLL VENOUS BLD VENIPUNCTURE: CPT | Performed by: FAMILY MEDICINE

## 2021-11-03 PROCEDURE — 80053 COMPREHEN METABOLIC PANEL: CPT | Performed by: FAMILY MEDICINE

## 2021-11-03 RX ORDER — PANTOPRAZOLE SODIUM 40 MG/1
40 TABLET, DELAYED RELEASE ORAL
Qty: 90 TABLET | Refills: 3 | Status: SHIPPED | OUTPATIENT
Start: 2021-11-03 | End: 2024-01-01 | Stop reason: ALTCHOICE

## 2021-11-03 RX ORDER — HYDROCODONE BITARTRATE AND ACETAMINOPHEN 5; 325 MG/1; MG/1
TABLET ORAL
Qty: 50 TABLET | Refills: 0 | Status: CANCELLED | OUTPATIENT
Start: 2021-11-03

## 2021-11-03 RX ORDER — LISINOPRIL 40 MG/1
40 TABLET ORAL DAILY
Qty: 90 TABLET | Refills: 3 | Status: SHIPPED | OUTPATIENT
Start: 2021-11-03 | End: 2022-07-08

## 2021-11-03 RX ORDER — SIMVASTATIN 20 MG
20 TABLET ORAL AT BEDTIME
Qty: 90 TABLET | Refills: 3 | Status: SHIPPED | OUTPATIENT
Start: 2021-11-03 | End: 2022-07-08

## 2021-11-03 RX ORDER — LEVOTHYROXINE SODIUM 50 UG/1
TABLET ORAL
Qty: 90 TABLET | Refills: 3 | Status: SHIPPED | OUTPATIENT
Start: 2021-11-03 | End: 2021-11-11

## 2021-11-03 RX ORDER — HYDROCODONE BITARTRATE AND ACETAMINOPHEN 5; 325 MG/1; MG/1
TABLET ORAL
Qty: 50 TABLET | Refills: 0 | Status: SHIPPED | OUTPATIENT
Start: 2021-11-03 | End: 2021-12-06

## 2021-11-03 RX ORDER — LEVOTHYROXINE SODIUM 75 UG/1
TABLET ORAL
Qty: 90 TABLET | Refills: 3 | Status: SHIPPED | OUTPATIENT
Start: 2021-11-03 | End: 2021-11-11

## 2021-11-03 ASSESSMENT — PAIN SCALES - GENERAL: PAINLEVEL: MODERATE PAIN (5)

## 2021-11-03 NOTE — PROGRESS NOTES
Assessment & Plan     Chronic left-sided low back pain with left-sided sciatica  Stable no change in treatment plan.   - HYDROcodone-acetaminophen (NORCO) 5-325 MG tablet; TAKE ONE TABLET BY MOUTH TWICE A DAY AS NEEDED FOR SEVERE PAIN (30 DAY SUPPLY)    Chronic pain syndrome  Stable no change in treatment plan.   - HYDROcodone-acetaminophen (NORCO) 5-325 MG tablet; TAKE ONE TABLET BY MOUTH TWICE A DAY AS NEEDED FOR SEVERE PAIN (30 DAY SUPPLY)    Chronic, continuous use of opioids  Stable no change in treatment plan.   - HYDROcodone-acetaminophen (NORCO) 5-325 MG tablet; TAKE ONE TABLET BY MOUTH TWICE A DAY AS NEEDED FOR SEVERE PAIN (30 DAY SUPPLY)    Lumbar radiculopathy  Stable no change in treatment plan.   - HYDROcodone-acetaminophen (NORCO) 5-325 MG tablet; TAKE ONE TABLET BY MOUTH TWICE A DAY AS NEEDED FOR SEVERE PAIN (30 DAY SUPPLY)    Hypothyroidism, unspecified type  Adjust meds as indicated by above labs.   - levothyroxine (SYNTHROID/LEVOTHROID) 50 MCG tablet; TAKE ONE TABLET BY MOUTH EVERY TUESDAY, THURSDAY, SATURDAY AND SUNDAY  - levothyroxine (SYNTHROID/LEVOTHROID) 75 MCG tablet; Take one tablet by mouth once daily Monday Wednesday and Fridays  - TSH with free T4 reflex; Future  - TSH with free T4 reflex    Essential hypertension, benign  Stable no change in treatment plan.   - lisinopril (ZESTRIL) 40 MG tablet; Take 1 tablet (40 mg) by mouth daily  - Lipid panel reflex to direct LDL Fasting; Future  - Comprehensive metabolic panel; Future  - Comprehensive metabolic panel  - Lipid panel reflex to direct LDL Fasting    Hyperlipidemia LDL goal <130  Adjust meds as indicated by above labs.   - simvastatin (ZOCOR) 20 MG tablet; Take 1 tablet (20 mg) by mouth At Bedtime    Need for prophylactic vaccination and inoculation against influenza    - INFLUENZA, QUAD, HIGH DOSE, PF, 65YR + (FLUZONE HD)  - ADMIN INFLUENZA (For MEDICARE Patients ONLY) []             Tobacco Cessation:   reports that she has  been smoking cigarettes. She has a 12.50 pack-year smoking history. She has never used smokeless tobacco.  Tobacco Cessation Action Plan: Information offered: Patient not interested at this time        Return in about 3 months (around 2/3/2022) for using a telephone visit.    Nancy Tapia MD  [facility]    Arun Disla is a 86 year old who presents for the following health issues     HPI     Hypertension Follow-up      Do you check your blood pressure regularly outside of the clinic? No     Are you following a low salt diet? Yes    Are your blood pressures ever more than 140 on the top number (systolic) OR more   than 90 on the bottom number (diastolic), for example 140/90?     Hypothyroidism Follow-up      Since last visit, patient describes the following symptoms: Weight stable, no hair loss, no skin changes, no constipation, no loose stools      Pain History:  When did you first notice your pain? - More than 6 weeks   Have you seen this provider for your pain in the past?   Yes   Where in your body do you have pain? Low back  Are you seeing anyone else for your pain? No    PHQ-9 SCORE 10/14/2020 1/13/2021 7/13/2021   PHQ-9 Total Score - - -   PHQ-9 Total Score MyChart 1 (Minimal depression) - -   PHQ-9 Total Score 1 1 5       PRECIOUS-7 SCORE 3/6/2020 10/14/2020 1/13/2021   Total Score - - -   Total Score - 4 (minimal anxiety) -   Total Score 12 4 2             Chronic Pain Follow Up:    Location of pain: Low back  Analgesia/pain control:    - Recent changes:  no    - Overall control: Comfortably manageable    - Current treatments: Norco twice a daily PRN  Adherence:     - Do you ever take more pain medicine than prescribed? No    - When did you take your last dose of pain medicine?  1 tab at midnight 1/2 tablet at 4am   Adverse effects: No   PDMP Review       Value Time User    State PDMP site checked  Yes 7/13/2021  7:07 AM Nancy Tapia MD        Last CSA  Agreement:   Patient-Level CSA:    Controlled Substance Agreement - Opioid - Scan on 4/13/2021  9:32 AM  Controlled Substance Agreement - Non - Opioid - Scan on 3/12/2020 11:46 AM: NON-OPIOID CONTROLLED SUBSTANCE AGREEMENT  Controlled Substance Agreement - Opioid - Scan on 3/6/2020  9:44 AM: FOR 3-6-20 VISIT  Controlled Substance Agreement - Opioid - Scan on 3/6/2020  9:41 AM       Last UDS: 4/15/2021          Review of Systems   Constitutional, HEENT, cardiovascular, pulmonary, gi and gu systems are negative, except as otherwise noted.      Objective    There were no vitals taken for this visit.  There is no height or weight on file to calculate BMI.  Physical Exam   GENERAL APPEARANCE: healthy, alert and no distress  CV: regular rates and rhythm, normal S1 S2, no S3 or S4 and no murmur, click or rub  MS: extremities normal- no gross deformities noted  PSYCH: mentation appears normal and affect normal/bright

## 2021-11-03 NOTE — NURSING NOTE
"Chief Complaint   Patient presents with     Hypertension     Thyroid Problem     Pain     Chronic pain     BP (!) 150/70   Pulse 62   Temp 97  F (36.1  C) (Tympanic)   Resp 12   Wt 42.5 kg (93 lb 12.8 oz)   SpO2 99%   BMI 19.11 kg/m   Estimated body mass index is 19.11 kg/m  as calculated from the following:    Height as of 4/13/21: 1.492 m (4' 10.75\").    Weight as of this encounter: 42.5 kg (93 lb 12.8 oz).  Patient presents to the clinic using No DME      Health Maintenance that is potentially due pending provider review:    Health Maintenance Due   Topic Date Due     ANNUAL REVIEW OF HM ORDERS  Never done     ZOSTER IMMUNIZATION (1 of 2) Never done     DTAP/TDAP/TD IMMUNIZATION (2 - Td or Tdap) 10/03/2018     INFLUENZA VACCINE (1) 09/01/2021     TSH W/FREE T4 REFLEX  09/29/2021     FALL RISK ASSESSMENT  10/14/2021     MEDICARE ANNUAL WELLNESS VISIT  10/14/2021        n/a        "

## 2021-11-08 ENCOUNTER — APPOINTMENT (OUTPATIENT)
Dept: RADIOLOGY | Facility: HOSPITAL | Age: 86
End: 2021-11-08
Attending: STUDENT IN AN ORGANIZED HEALTH CARE EDUCATION/TRAINING PROGRAM
Payer: COMMERCIAL

## 2021-11-08 ENCOUNTER — APPOINTMENT (OUTPATIENT)
Dept: CT IMAGING | Facility: HOSPITAL | Age: 86
End: 2021-11-08
Attending: STUDENT IN AN ORGANIZED HEALTH CARE EDUCATION/TRAINING PROGRAM
Payer: COMMERCIAL

## 2021-11-08 ENCOUNTER — HOSPITAL ENCOUNTER (EMERGENCY)
Facility: HOSPITAL | Age: 86
Discharge: HOME | End: 2021-11-08
Attending: STUDENT IN AN ORGANIZED HEALTH CARE EDUCATION/TRAINING PROGRAM | Admitting: STUDENT IN AN ORGANIZED HEALTH CARE EDUCATION/TRAINING PROGRAM
Payer: COMMERCIAL

## 2021-11-08 VITALS
RESPIRATION RATE: 16 BRPM | BODY MASS INDEX: 19.52 KG/M2 | WEIGHT: 93 LBS | HEIGHT: 58 IN | SYSTOLIC BLOOD PRESSURE: 157 MMHG | TEMPERATURE: 98.5 F | HEART RATE: 63 BPM | DIASTOLIC BLOOD PRESSURE: 85 MMHG | OXYGEN SATURATION: 96 %

## 2021-11-08 DIAGNOSIS — S09.90XA CLOSED HEAD INJURY, INITIAL ENCOUNTER: ICD-10-CM

## 2021-11-08 DIAGNOSIS — M25.552 HIP PAIN, LEFT: ICD-10-CM
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
## 2021-11-08 PROCEDURE — 99284 EMERGENCY DEPT VISIT MOD MDM: CPT | Mod: 25

## 2021-11-08 PROCEDURE — 73502 X-RAY EXAM HIP UNI 2-3 VIEWS: CPT

## 2021-11-08 PROCEDURE — 250N000013 HC RX MED GY IP 250 OP 250 PS 637: Performed by: STUDENT IN AN ORGANIZED HEALTH CARE EDUCATION/TRAINING PROGRAM

## 2021-11-08 PROCEDURE — 70450 CT HEAD/BRAIN W/O DYE: CPT

## 2021-11-08 RX ORDER — OXYCODONE AND ACETAMINOPHEN 5; 325 MG/1; MG/1
1-2 TABLET ORAL EVERY 4 HOURS PRN
Qty: 6 TABLET | Refills: 0 | Status: SHIPPED | OUTPATIENT
Start: 2021-11-08 | End: 2021-11-09

## 2021-11-08 RX ORDER — OXYCODONE AND ACETAMINOPHEN 5; 325 MG/1; MG/1
1 TABLET ORAL ONCE
Status: COMPLETED | OUTPATIENT
Start: 2021-11-08 | End: 2021-11-08

## 2021-11-08 RX ADMIN — OXYCODONE HYDROCHLORIDE AND ACETAMINOPHEN 1 TABLET: 5; 325 TABLET ORAL at 05:59

## 2021-11-08 ASSESSMENT — MIFFLIN-ST. JEOR: SCORE: 761.6

## 2021-11-08 NOTE — ED NOTES
Patient given discharge instructions. Education provided regarding newly prescribed medications. Verbal understanding of follow up and when to return to ED for worsening symptoms.    Pt stated she is missing her jacket. No jacket found in her room. RN will follow up with [facility].

## 2021-11-08 NOTE — ED TRIAGE NOTES
[facility]. Pt fell in the parking lot this morning at 0400 am when she tripped over a curb. Pt reports left upper leg pain and has a bump on her left forehead. Pt denies blood thinners. No LOC was reported. Pt was ambulatory afterwards per EMS.

## 2021-11-08 NOTE — ED NOTES
Bed: JNED-10  Expected date: 11/8/21  Expected time: 5:31 AM  Means of arrival: Ambulance  Comments:  [facility]  82 F fall  No LOC no thinners

## 2021-11-08 NOTE — ED PROVIDER NOTES
EMERGENCY DEPARTMENT ENCOUNTER       ED Course & Medical Decision Making   5:45 AM I met with the patient, obtained history, performed an initial exam, and discussed options and plan for diagnostics and treatment here in the ED. PPE worn: N95, eye protection, gloves.    Final Impression  86 year old female presents for evaluation after a mechanical fall at home.  Patient went outside to her car to smoke, when she was coming back inside she stepped up over a curb and fell over the curb causing her to fall onto her left side.  Patient has a small hematoma to her left lateral brow, as well as some tenderness over the left hip, though has normal range of motion of the left hip.  No other major bony or traumatic injuries.  Denies loss of consciousness.  Denies being on blood thinners.  Patient is alert, oriented, has some pain with straight leg raise on the left, though this is primarily in the lateral thigh.  No new back pain.  Patient takes hydrocodone once or twice per day as needed, took 1 earlier today though still having some pain thus was given another dose of oxycodone while in the ED.  CT head does not show any traumatic injuries.  Hip x-ray does not show any acute fractures.  Patient has good range of motion, is able to bear weight at bedside, though does have a slight limp due to pain of the left hip, though clinically appears to be laterally in the musculature of the left thigh.  Given negative x-ray and the fact that she is able to bear weight, extremely low clinical suspicion for occult fracture.  Did discuss with patient at bedside that if pain fails to improve in the next few days, at that point it may be reasonable to pursue CT imaging.  Return precautions discussed.  Will discharge home with her daughter, will write an additional #6 tabs of oxycodone to help for intermittent pain as needed over the next few days.    Prior to making a final disposition on this patient the results of patient's tests and  other diagnostic studies were discussed with the patient. All questions were answered. Patient expressed understanding of the plan and was amenable to it.    Medications   oxyCODONE-acetaminophen (PERCOCET) 5-325 MG per tablet 1 tablet (1 tablet Oral Given 11/8/21 0559)       Final Impression     1. Fall, initial encounter    2. Hip pain, left    3. Closed head injury, initial encounter        Chief Complaint     Chief Complaint   Patient presents with     Fall     Leg Pain     Head Injury       [facility]. Pt fell in the parking lot this morning at 0400 am when she tripped over a curb. Pt reports left upper leg pain and has a bump on her left forehead. Pt denies blood thinners. No LOC was reported. Pt was ambulatory afterwards per EMS.       HPI     Nighat Gupta is a 86 year old female with a pertinent medical history of HLD, HTN, and chronic low back pain, who presents for evaluation of a fall.    Patient presents by EMS after a fall in the parking lot at her apartment complex around 4:00 AM this morning (~2 hours ago). Patient is a smoker and states she went outside to smoke and grab something from her car, but lost her balance on the curb as she was in a hurry to get back to her apartment. She fell sideways onto her left hip and did hit her head. No loss of consciousness. Denies any headache or neck pain. Endorses bruising just lateral to her left eye and left hip pain that radiates down her leg. Pain worsened with movement.    Patient endorses chronic back pain, for which she takes Vicodin. She did take one Vicodin after the fall. She denies any back or chest wall pain. Denies any abdominal pain or any other concerns at this time. Of note, she states she takes 1/2 aspirin daily, but denies any blood thinners.    IFito am serving as a scribe to document services personally performed by Dr. Wei Jones MD, based on my observation and the provider's statements to me. IDr. Carvajal  MD Karen attest that Fito Betsy is acting in a scribe capacity, has observed my performance of the services and has documented them in accordance with my direction.    Past Medical History     Past Medical History:   Diagnosis Date     DEPRESSION W/ANXIETY 1980's     Essential hypertension, benign      Pure hypercholesterolemia      Unspecified hypothyroidism      Past Surgical History:   Procedure Laterality Date     CATARACT IOL, RT/LT  2009    Cataract IOL RT/LT     COLONOSCOPY  2007    notable for diverticulosis     GYN SURGERY  04/2004    Oopherectomy - (R)     INJECT EPIDURAL LUMBAR  4/2/2012    Procedure:INJECT EPIDURAL LUMBAR; MORENA with Flouro--; Surgeon:GENERIC ANESTHESIA PROVIDER; Location:[facility] OR     INJECT EPIDURAL LUMBAR  6/4/2012    Procedure:INJECT EPIDURAL LUMBAR; MORENA with Flouro--; Surgeon:GENERIC ANESTHESIA PROVIDER; Location:[facility] OR     INJECT EPIDURAL LUMBAR  6/18/2012    Procedure: INJECT EPIDURAL LUMBAR;  MORENA-Dr. Tapia;  Surgeon: Provider, Generic Anesthesia;  Location: [facility] OR     Family History   Problem Relation Age of Onset     Heart Disease Mother      Lipids Mother      Hypertension Mother      Lipids Father      Hypertension Father      Hypertension Sister      Lipids Sister      Depression Daughter      Heart Disease Sister      Hypertension Sister      Depression Daughter       Social History     Tobacco Use     Smoking status: Current Every Day Smoker     Packs/day: 0.25     Years: 50.00     Pack years: 12.50     Types: Cigarettes     Smokeless tobacco: Never Used     Tobacco comment: 5-8 cigarettes daily   Substance Use Topics     Alcohol use: No     Drug use: No       Relevant past medical, surgical, family and social history as documented above, has been reviewed and discussed with patient. No changes or additions, unless otherwise noted in the HPI.    Current Medications     amLODIPine (NORVASC) 10 MG tablet  ASPIRIN 81 MG OR TABS  CALCIUM 500 + D 500-200 MG-IU  "OR TABS  gabapentin (NEURONTIN) 300 MG capsule  HYDROcodone-acetaminophen (NORCO) 5-325 MG tablet  lactulose (CEPHULAC) 20 GM packet  levothyroxine (SYNTHROID/LEVOTHROID) 50 MCG tablet  levothyroxine (SYNTHROID/LEVOTHROID) 75 MCG tablet  lisinopril (ZESTRIL) 40 MG tablet  MULTIVITAMIN TABS   OR  OMEGA 3 OR  pantoprazole (PROTONIX) 40 MG EC tablet  simvastatin (ZOCOR) 20 MG tablet      Allergies     Allergies   Allergen Reactions     No Known Drug Allergy        Review of Systems     Constitutional: Denies fever  Eyes: Denies visual changes  HENT: Denies neck pain. Endorses head trauma, bruising lateral to left eye.  Respiratory: Denies  shortness of breath    Cardiovascular: Denies chest pain  GI: Denies abdominal pain, nausea, vomiting  : Denies flank pain  Musculoskeletal: Denies any new back pain, chest wall pain. Endorses left hip pain (radiates down leg).  Skin: Denies lacerations or other open wound    Remainder of systems reviewed, unless noted in HPI all others negative.    Physical Exam     BP (!) 173/80   Pulse 64   Temp 98.5  F (36.9  C) (Oral)   Resp 16   Ht 1.473 m (4' 10\")   Wt 42.2 kg (93 lb)   SpO2 96%   Breastfeeding No   BMI 19.44 kg/m    Constitutional: Awake, alert, in no acute distress.   Head: Developing hematoma to left forehead/lateral brow.  No lacerations or open wounds to head.  No palpable depressed skull fractures.  Neck:  No midline neck pain on palpation, no stepoffs palpable.  ENT: Uvula midline, dentition intact. No septal hematoma..   Eyes: PERRL, EOMI, Conjunctiva normal, no subconjunctival hemorrhage.   Respiratory: Respirations even, unlabored. Lungs clear to ascultation bilaterally, in no acute respiratory distress.  Cardiovascular: Regular rate and rhythm.+2 radial and +2 DP pulses, equal bilaterally.  GI: Abdomen soft, non-tender to palpation in all 4 quadrants. No seatbelt sign. No guarding or rebound. No periumbilical, abdominal, or flank " bruising.  Musculoskeletal: Moves all 4 extremities equally, strength symmetrical on bilateral uppers and lowers.  Able to straight leg raise bilaterally, though has some pain in the left lateral thigh with straight leg raise on the left.  No bony tenderness with palpation over the left hip.  Back: No midline tenderness, stepoffs or deformities to Thoracic or Lumbar spine  Integument: Warm, dry.  No lacerations.  Neurologic: Alert & oriented x 3. Speech clear and fluent with normal syntax. Grossly normal motor and sensory function throughout all 4 extremities. No focal deficits noted.   GCS: 15     Motor: 6 (obeys commands)     Verbal: 5 (normal/oriented)     Eyes: 4 (spontaneously)    Imaging             Wei Jones MD  11/08/21 [id number]

## 2021-11-09 ENCOUNTER — TELEPHONE (OUTPATIENT)
Dept: FAMILY MEDICINE | Facility: CLINIC | Age: 86
End: 2021-11-09
Payer: COMMERCIAL

## 2021-11-09 ENCOUNTER — HOSPITAL ENCOUNTER (OUTPATIENT)
Dept: CT IMAGING | Facility: CLINIC | Age: 86
Discharge: HOME | End: 2021-11-09
Attending: PHYSICIAN ASSISTANT | Admitting: PHYSICIAN ASSISTANT
Payer: COMMERCIAL

## 2021-11-09 DIAGNOSIS — M85.852 OSTEOPENIA OF LEFT HIP: ICD-10-CM

## 2021-11-09 DIAGNOSIS — M25.552 HIP PAIN, LEFT: ICD-10-CM

## 2021-11-09 DIAGNOSIS — S72.002A HIP FRACTURE, LEFT, CLOSED, INITIAL ENCOUNTER (H): Primary | ICD-10-CM

## 2021-11-09 DIAGNOSIS — M25.552 HIP PAIN, LEFT: Primary | ICD-10-CM

## 2021-11-09 PROCEDURE — 73700 CT LOWER EXTREMITY W/O DYE: CPT | Mod: LT

## 2021-11-09 RX ORDER — OXYCODONE AND ACETAMINOPHEN 5; 325 MG/1; MG/1
1-2 TABLET ORAL EVERY 4 HOURS PRN
Qty: 10 TABLET | Refills: 0 | Status: ON HOLD | OUTPATIENT
Start: 2021-11-09 | End: 2021-11-13

## 2021-11-09 NOTE — TELEPHONE ENCOUNTER
Ortho referral placed. Rx for Percocet due to pain.     DME (Durable Medical Equipment) Orders and Documentation  Orders Placed This Encounter   Procedures     Commode Chair Order      The patient was assessed and it was determined the patient is in need of the following listed DME Supplies/Equipment. Please complete supporting documentation below to demonstrate medical necessity.      DME All Other Item(s) Documentation    List reason for need and supporting documentation for medical necessity below for each DME item.     1. Left hip fracture.     Vel Evangelista PA-C

## 2021-11-09 NOTE — TELEPHONE ENCOUNTER
Fall yesterday with ED viist- please see note, imaging.  Pt amb with walker, assist of 1 at all times since fall.   Up until fall has been indep in apartment, now requiring 24/7 care.  Using upper extremities to lift self with walker to avoid wt bearing on lt.  Lt hip pain tolerable with Percocet.  Does not notice any hip/ leg swelling or bruising. No external rotation or lengthening noted.   States ER MD mentioned may need further imaging if sx warrant. Dtr requesting further imaging to R/O hip fracture. Does not want to go back through ED if at all possible.   Forwarded to Jun for review in PCP absence today.  JEAN PIERRE Mckeon RN

## 2021-11-09 NOTE — TELEPHONE ENCOUNTER
Reason for Call:  Fall     Detailed comments: Pt had fall 11/8/21 and went to ER- Tests came back normal. The ER Provider said if she continues to have pain with Weight bearing issues she may need a CT. Pt has pain in her Left Hip.     Phone Number Patient can be reached at: Home number on file [phone] (home)    Best Time: Any Time      Can we leave a detailed message on this number? YES    Call taken on 11/9/2021 at 8:55 AM by Alexandrea Canales

## 2021-11-10 ENCOUNTER — TELEPHONE (OUTPATIENT)
Dept: FAMILY MEDICINE | Facility: CLINIC | Age: 86
End: 2021-11-10
Payer: COMMERCIAL

## 2021-11-11 ENCOUNTER — OFFICE VISIT (OUTPATIENT)
Dept: FAMILY MEDICINE | Facility: CLINIC | Age: 86
End: 2021-11-11
Payer: COMMERCIAL

## 2021-11-11 ENCOUNTER — ANESTHESIA EVENT (OUTPATIENT)
Dept: SURGERY | Facility: CLINIC | Age: 86
End: 2021-11-11
Payer: COMMERCIAL

## 2021-11-11 ENCOUNTER — TRANSFERRED RECORDS (OUTPATIENT)
Dept: HEALTH INFORMATION MANAGEMENT | Facility: CLINIC | Age: 86
End: 2021-11-11

## 2021-11-11 VITALS
SYSTOLIC BLOOD PRESSURE: 124 MMHG | HEART RATE: 72 BPM | DIASTOLIC BLOOD PRESSURE: 64 MMHG | WEIGHT: 93 LBS | TEMPERATURE: 97.9 F | BODY MASS INDEX: 19.44 KG/M2

## 2021-11-11 DIAGNOSIS — Z11.52 ENCOUNTER FOR PREOPERATIVE SCREENING LABORATORY TESTING FOR COVID-19 VIRUS: ICD-10-CM

## 2021-11-11 DIAGNOSIS — E78.5 HYPERLIPIDEMIA LDL GOAL <130: ICD-10-CM

## 2021-11-11 DIAGNOSIS — I10 ESSENTIAL HYPERTENSION, BENIGN: ICD-10-CM

## 2021-11-11 DIAGNOSIS — D64.9 ANEMIA, UNSPECIFIED TYPE: ICD-10-CM

## 2021-11-11 DIAGNOSIS — Z01.818 PRE-OPERATIVE GENERAL PHYSICAL EXAMINATION: Primary | ICD-10-CM

## 2021-11-11 DIAGNOSIS — Z01.812 ENCOUNTER FOR PREOPERATIVE SCREENING LABORATORY TESTING FOR COVID-19 VIRUS: Primary | ICD-10-CM

## 2021-11-11 DIAGNOSIS — Z11.52 ENCOUNTER FOR PREOPERATIVE SCREENING LABORATORY TESTING FOR COVID-19 VIRUS: Primary | ICD-10-CM

## 2021-11-11 DIAGNOSIS — S72.002A LEFT DISPLACED FEMORAL NECK FRACTURE (H): ICD-10-CM

## 2021-11-11 DIAGNOSIS — Z01.812 ENCOUNTER FOR PREOPERATIVE SCREENING LABORATORY TESTING FOR COVID-19 VIRUS: ICD-10-CM

## 2021-11-11 DIAGNOSIS — E03.9 HYPOTHYROIDISM, UNSPECIFIED TYPE: ICD-10-CM

## 2021-11-11 LAB
ERYTHROCYTE [DISTWIDTH] IN BLOOD BY AUTOMATED COUNT: 15.5 % (ref 10–15)
HCT VFR BLD AUTO: 32.9 % (ref 35–47)
HGB BLD-MCNC: 11.2 G/DL (ref 11.7–15.7)
MCH RBC QN AUTO: 31.3 PG (ref 26.5–33)
MCHC RBC AUTO-ENTMCNC: 34 G/DL (ref 31.5–36.5)
MCV RBC AUTO: 92 FL (ref 78–100)
PLATELET # BLD AUTO: 295 10E3/UL (ref 150–450)
RBC # BLD AUTO: 3.58 10E6/UL (ref 3.8–5.2)
SARS-COV-2 RNA RESP QL NAA+PROBE: NEGATIVE
WBC # BLD AUTO: 7.3 10E3/UL (ref 4–11)

## 2021-11-11 PROCEDURE — U0005 INFEC AGEN DETEC AMPLI PROBE: HCPCS | Performed by: NURSE PRACTITIONER

## 2021-11-11 PROCEDURE — 36415 COLL VENOUS BLD VENIPUNCTURE: CPT | Performed by: NURSE PRACTITIONER

## 2021-11-11 PROCEDURE — 85027 COMPLETE CBC AUTOMATED: CPT | Performed by: NURSE PRACTITIONER

## 2021-11-11 PROCEDURE — 93000 ELECTROCARDIOGRAM COMPLETE: CPT | Performed by: NURSE PRACTITIONER

## 2021-11-11 PROCEDURE — U0003 INFECTIOUS AGENT DETECTION BY NUCLEIC ACID (DNA OR RNA); SEVERE ACUTE RESPIRATORY SYNDROME CORONAVIRUS 2 (SARS-COV-2) (CORONAVIRUS DISEASE [COVID-19]), AMPLIFIED PROBE TECHNIQUE, MAKING USE OF HIGH THROUGHPUT TECHNOLOGIES AS DESCRIBED BY CMS-2020-01-R: HCPCS | Performed by: NURSE PRACTITIONER

## 2021-11-11 PROCEDURE — 99214 OFFICE O/P EST MOD 30 MIN: CPT | Performed by: NURSE PRACTITIONER

## 2021-11-11 RX ORDER — LEVOTHYROXINE SODIUM 75 UG/1
75 TABLET ORAL DAILY
Qty: 90 TABLET | Refills: 3 | COMMUNITY
Start: 2021-11-11 | End: 2021-12-17

## 2021-11-11 NOTE — PROGRESS NOTES
[facility]  [address] [place] [address]  Phone: [phone]  Fax: [phone]  Primary Provider: Nancy Tapia  Pre-op Performing Provider: VICKY COSTA    PREOPERATIVE EVALUATION:  Today's date: 11/11/2021    Nighat Gupta is a 86 year old female who presents for a preoperative evaluation.    Assessment & Plan     The proposed surgical procedure is considered INTERMEDIATE risk.    Pre-operative general physical examination    Left displaced femoral neck fracture (H)    - Asymptomatic COVID-19 Virus (Coronavirus) by PCR Nose    Hypothyroidism, unspecified type    Anemia, unspecified type  improved  - CBC with platelets; Future  - CBC with platelets    Hyperlipidemia LDL goal <130    - EKG 12-lead complete w/read - Clinics    Essential hypertension, benign    - EKG 12-lead complete w/read - Clinics    Encounter for preoperative screening laboratory testing for COVID-19 virus    - Asymptomatic COVID-19 Virus (Coronavirus) by PCR Nose         Risks and Recommendations:  The patient has the following additional risks and recommendations for perioperative complications:   - Consult Hospitalist / IM to assist with post-op medical management    Medication Instructions:  Patient is to take all scheduled medications on the day of surgery EXCEPT for modifications listed below:   - aspirin: hold now   - ACE/ARB: May be continued on the day of surgery.    - Calcium Channel Blockers: May be continued on the day of surgery.   - Statins: Continue taking on the day of surgery.     RECOMMENDATION:  APPROVAL GIVEN to proceed with proposed procedure, without further diagnostic evaluation.      Subjective     HPI related to upcoming procedure: fall with left hip fracture    Preop Questions 11/11/2021   1. Have you ever had a heart attack or stroke? No   2. Have you ever had surgery on your heart or blood vessels, such as a stent placement, a coronary artery bypass, or  surgery on an artery in your head, neck, heart, or legs? No   3. Do you have chest pain with activity? No   4. Do you have a history of  heart failure? No   5. Do you currently have a cold, bronchitis or symptoms of other infection? No   6. Do you have a cough, shortness of breath, or wheezing? No   7. Do you or anyone in your family have previous history of blood clots? No   8. Do you or does anyone in your family have a serious bleeding problem such as prolonged bleeding following surgeries or cuts? No   9. Have you ever had problems with anemia or been told to take iron pills? YES - in the past   10. Have you had any abnormal blood loss such as black, tarry or bloody stools, or abnormal vaginal bleeding? No   11. Have you ever had a blood transfusion? No   12. Are you willing to have a blood transfusion if it is medically needed before, during, or after your surgery? Yes   13. Have you or any of your relatives ever had problems with anesthesia? No   14. Do you have sleep apnea, excessive snoring or daytime drowsiness? No   15. Do you have any artifical heart valves or other implanted medical devices like a pacemaker, defibrillator, or continuous glucose monitor? No   16. Do you have artificial joints? No   17. Are you allergic to latex? No     Health Care Directive:  Patient has a Health Care Directive on file      Preoperative Review of :   reviewed - controlled substances reflected in medication list.      Status of Chronic Conditions:  See problem list for active medical problems.  Problems all longstanding and stable, except as noted/documented.  See ROS for pertinent symptoms related to these conditions.      Review of Systems  CONSTITUTIONAL: NEGATIVE for fever, chills, change in weight  INTEGUMENTARY/SKIN: NEGATIVE for worrisome rashes, moles or lesions  EYES: NEGATIVE for vision changes or irritation  ENT/MOUTH: NEGATIVE for ear, mouth and throat problems  RESP: NEGATIVE for significant cough or  SOB  CV: NEGATIVE for chest pain, palpitations or peripheral edema  GI: NEGATIVE for nausea, abdominal pain, heartburn, or change in bowel habits  : NEGATIVE for frequency, dysuria, or hematuria  MUSCULOSKELETAL:POSITIVE  for injury to left hip  NEURO: NEGATIVE for weakness, dizziness or paresthesias  ENDOCRINE: NEGATIVE for temperature intolerance, skin/hair changes  HEME: NEGATIVE for bleeding problems  PSYCHIATRIC: NEGATIVE for changes in mood or affect    Patient Active Problem List    Diagnosis Date Noted     Hypoxemia 09/08/2020     Priority: Medium     Occult blood positive stool 09/07/2020     Priority: Medium     Pneumonia of right lower lobe due to infectious organism 09/06/2020     Priority: Medium     Anemia 09/06/2020     Priority: Medium     Suspected COVID-19 virus infection 09/06/2020     Priority: Medium     Chronic pain syndrome 01/07/2016     Priority: Medium     Patient is followed by Nancy Tapia MD for ongoing prescription of pain medication.  All refills should be approved by this provider, or covering partner.    Chronic back pain   Medication(s): 40.   Maximum quantity per month: [place]   Clinic visit frequency required: Q 3 months     Controlled substance agreement:  Encounter-Level CSA - 4/4/16:               Controlled Substance Agreement - Scan on 4/8/2016 10:44 AM : CONTROLLED SUBSTANCE AGREEMENT 04/04/2016 (below)            Pain Clinic evaluation in the past: Yes    DIRE Total Score(s):  No flowsheet data found.    Last [facility] website verification:  December 2, 2016    [email or web address]         Chronic low back pain 09/16/2013     Priority: Medium     Lumbar radiculopathy 09/16/2013     Priority: Medium     [facility] 12/19/2012     Priority: Medium     Chasidy Winchester RN-PHN  FPA / TRUDY [facility]   [phone]    DX V65.8 REPLACED WITH [id number] [facility] CARE HOME (04/08/2013)       Advanced directives, counseling/discussion 11/21/2011      Priority: Medium     Receipt of ACP document:  Received: Health Care Directive which was witnessed or notarized on 3/13/13.  Document not previously scanned.  Validation form completed and sent with document to be scanned.  Code Status needs to be updated to reflect choices in most recent ACP document. Orders placed.  Confirmed/documented designated decision maker(s). See permanent comments section of demographics in clinical tab. View document(s) and details by clicking on code status.   Added by Felisha Elizabeth on 3/26/2013.       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     Priority: Medium     Hypothyroidism 11/04/2005     Priority: Medium      Past Medical History:   Diagnosis Date     DEPRESSION W/ANXIETY 1980's     Essential hypertension, benign      Pure hypercholesterolemia      Unspecified hypothyroidism      Past Surgical History:   Procedure Laterality Date     CATARACT IOL, RT/LT  2009    Cataract IOL RT/LT     COLONOSCOPY  2007    notable for diverticulosis     GYN SURGERY  04/2004    Oopherectomy - (R)     INJECT EPIDURAL LUMBAR  4/2/2012    Procedure:INJECT EPIDURAL LUMBAR; MORENA with Flouro--; Surgeon:GENERIC ANESTHESIA PROVIDER; Location:[place] [place]     INJECT EPIDURAL LUMBAR  6/4/2012    Procedure:INJECT EPIDURAL LUMBAR; MORENA with Rboerto--; Surgeon:GENERIC ANESTHESIA PROVIDER; Location:[facility]     INJECT EPIDURAL LUMBAR  6/18/2012    Procedure: INJECT EPIDURAL LUMBAR;  MORENA-Dr. Tapia;  Surgeon: Provider, Generic Anesthesia;  Location: [facility] OR     Current Outpatient Medications   Medication Sig Dispense Refill     amLODIPine (NORVASC) 10 MG tablet Take 1 tablet (10 mg) by mouth daily 90 tablet 3     ASPIRIN 81 MG OR TABS Take 81 mg by mouth daily  100 0     CALCIUM 500 + D  500-200 MG-IU OR TABS Take 1 tablet by mouth daily        gabapentin (NEURONTIN) 300 MG capsule TAKE ONE CAPSULE BY MOUTH EVERY MORNING AND TAKE ONE CAPSULE BY MOUTH AT NOON AND TAKE TWO CAPSULES BY MOUTH AT BEDTIME 180 capsule 3     HYDROcodone-acetaminophen (NORCO) 5-325 MG tablet TAKE ONE TABLET BY MOUTH TWICE A DAY AS NEEDED FOR SEVERE PAIN (30 DAY SUPPLY) 50 tablet 0     lactulose (CEPHULAC) 20 GM packet Take 1 packet (20 g) by mouth 3 times daily 90 packet 3     levothyroxine (SYNTHROID/LEVOTHROID) 75 MCG tablet Take 1 tablet (75 mcg) by mouth daily 90 tablet 3     lisinopril (ZESTRIL) 40 MG tablet Take 1 tablet (40 mg) by mouth daily 90 tablet 3     MULTIVITAMIN TABS   OR Take 1 tablet by mouth daily   0     OMEGA 3 OR Take 1 capsule by mouth daily        oxyCODONE-acetaminophen (PERCOCET) 5-325 MG tablet Take 1-2 tablets by mouth every 4 hours as needed for pain 10 tablet 0     pantoprazole (PROTONIX) 40 MG EC tablet Take 1 tablet (40 mg) by mouth every morning (before breakfast) 90 tablet 3     simvastatin (ZOCOR) 20 MG tablet Take 1 tablet (20 mg) by mouth At Bedtime (Patient taking differently: Take 20 mg by mouth daily ) 90 tablet 3       Allergies   Allergen Reactions     No Known Drug Allergy         Social History     Tobacco Use     Smoking status: Current Every Day Smoker     Packs/day: 0.25     Years: 50.00     Pack years: 12.50     Types: Cigarettes     Smokeless tobacco: Never Used     Tobacco comment: 5-8 cigarettes daily   Substance Use Topics     Alcohol use: No     History   Drug Use No         Objective     /64 (Cuff Size: Adult Regular)   Pulse 72   Temp 97.9  F (36.6  C) (Tympanic)   Wt 42.2 kg (93 lb)   BMI 19.44 kg/m      Physical Exam    GENERAL APPEARANCE: healthy, alert and no distress     EYES: EOMI, PERRL     HENT: ear canals and TM's normal and nose and mouth without ulcers or lesions     NECK: no adenopathy, no asymmetry, masses, or scars and thyroid normal to  palpation     RESP: lungs clear to auscultation - no rales, rhonchi or wheezes     CV: regular rates and rhythm, normal S1 S2, no S3 or S4, peripheral pulses strong and grade 3/6 systolic murmur     ABDOMEN:  soft, nontender, no HSM or masses and bowel sounds normal     MS: extremities normal- no gross deformities noted, no evidence of inflammation in joints, FROM in all extremities.     SKIN: no suspicious lesions or rashes     NEURO: Normal strength and tone, sensory exam grossly normal, mentation intact and speech normal     PSYCH: mentation appears normal. and affect normal/bright     LYMPHATICS: No cervical adenopathy    Recent Labs   Lab Test 11/03/21  1114 10/14/20  0839 09/29/20  0729 09/09/20  0554 09/08/20  0535   HGB  --  9.6* 9.4*   < > 8.1*   PLT  --  390 472*   < > 531*   INR  --   --   --   --  1.29*    135 134   < > 139   POTASSIUM 4.2 4.7 4.9   < > 4.1   CR 0.84 0.94 1.09*   < > 0.82    < > = values in this interval not displayed.      Diagnostics:  Recent Results (from the past 24 hour(s))   Asymptomatic COVID-19 Virus (Coronavirus) by PCR Nose    Collection Time: 11/11/21 11:44 AM    Specimen: Nose; Swab   Result Value Ref Range    SARS CoV2 PCR Negative Negative, Testing sent to reference lab. Results will be returned via unsolicited result   CBC with platelets    Collection Time: 11/11/21 12:12 PM   Result Value Ref Range    WBC Count 7.3 4.0 - 11.0 10e3/uL    RBC Count 3.58 (L) 3.80 - 5.20 10e6/uL    Hemoglobin 11.2 (L) 11.7 - 15.7 g/dL    Hematocrit 32.9 (L) 35.0 - 47.0 %    MCV 92 78 - 100 fL    MCH 31.3 26.5 - 33.0 pg    MCHC 34.0 31.5 - 36.5 g/dL    RDW 15.5 (H) 10.0 - 15.0 %    Platelet Count 295 150 - 450 10e3/uL      EKG: appears normal, NSR, normal axis, normal intervals, no acute ST/T changes c/w ischemia, no LVH by voltage criteria    Revised Cardiac Risk Index (RCRI):  The patient has the following serious cardiovascular risks for perioperative complications:   - No serious  cardiac risks = 0 points     RCRI Interpretation: 0 points: Class I (very low risk - 0.4% complication rate)           Signed Electronically by: MAX Santacruz CNP  Copy of this evaluation report is provided to requesting physician.

## 2021-11-11 NOTE — H&P (VIEW-ONLY)
[facility]  [address] [place] [id number]  Phone: [phone]  Fax: [phone]  Primary Provider: Nancy Tapia  Pre-op Performing Provider: VICKY COSTA    PREOPERATIVE EVALUATION:  Today's date: 11/11/2021    Nighat uGpta is a 86 year old female who presents for a preoperative evaluation.    Assessment & Plan     The proposed surgical procedure is considered INTERMEDIATE risk.    Pre-operative general physical examination    Left displaced femoral neck fracture (H)    - Asymptomatic COVID-19 Virus (Coronavirus) by PCR Nose    Hypothyroidism, unspecified type    Anemia, unspecified type  improved  - CBC with platelets; Future  - CBC with platelets    Hyperlipidemia LDL goal <130    - EKG 12-lead complete w/read - Clinics    Essential hypertension, benign    - EKG 12-lead complete w/read - Clinics    Encounter for preoperative screening laboratory testing for COVID-19 virus    - Asymptomatic COVID-19 Virus (Coronavirus) by PCR Nose         Risks and Recommendations:  The patient has the following additional risks and recommendations for perioperative complications:   - Consult Hospitalist / IM to assist with post-op medical management    Medication Instructions:  Patient is to take all scheduled medications on the day of surgery EXCEPT for modifications listed below:   - aspirin: hold now   - ACE/ARB: May be continued on the day of surgery.    - Calcium Channel Blockers: May be continued on the day of surgery.   - Statins: Continue taking on the day of surgery.     RECOMMENDATION:  APPROVAL GIVEN to proceed with proposed procedure, without further diagnostic evaluation.      Subjective     HPI related to upcoming procedure: fall with left hip fracture    Preop Questions 11/11/2021   1. Have you ever had a heart attack or stroke? No   2. Have you ever had surgery on your heart or blood vessels, such as a stent placement, a coronary artery bypass, or  surgery on an artery in your head, neck, heart, or legs? No   3. Do you have chest pain with activity? No   4. Do you have a history of  heart failure? No   5. Do you currently have a cold, bronchitis or symptoms of other infection? No   6. Do you have a cough, shortness of breath, or wheezing? No   7. Do you or anyone in your family have previous history of blood clots? No   8. Do you or does anyone in your family have a serious bleeding problem such as prolonged bleeding following surgeries or cuts? No   9. Have you ever had problems with anemia or been told to take iron pills? YES - in the past   10. Have you had any abnormal blood loss such as black, tarry or bloody stools, or abnormal vaginal bleeding? No   11. Have you ever had a blood transfusion? No   12. Are you willing to have a blood transfusion if it is medically needed before, during, or after your surgery? Yes   13. Have you or any of your relatives ever had problems with anesthesia? No   14. Do you have sleep apnea, excessive snoring or daytime drowsiness? No   15. Do you have any artifical heart valves or other implanted medical devices like a pacemaker, defibrillator, or continuous glucose monitor? No   16. Do you have artificial joints? No   17. Are you allergic to latex? No     Health Care Directive:  Patient has a Health Care Directive on file      Preoperative Review of :   reviewed - controlled substances reflected in medication list.      Status of Chronic Conditions:  See problem list for active medical problems.  Problems all longstanding and stable, except as noted/documented.  See ROS for pertinent symptoms related to these conditions.      Review of Systems  CONSTITUTIONAL: NEGATIVE for fever, chills, change in weight  INTEGUMENTARY/SKIN: NEGATIVE for worrisome rashes, moles or lesions  EYES: NEGATIVE for vision changes or irritation  ENT/MOUTH: NEGATIVE for ear, mouth and throat problems  RESP: NEGATIVE for significant cough or  SOB  CV: NEGATIVE for chest pain, palpitations or peripheral edema  GI: NEGATIVE for nausea, abdominal pain, heartburn, or change in bowel habits  : NEGATIVE for frequency, dysuria, or hematuria  MUSCULOSKELETAL:POSITIVE  for injury to left hip  NEURO: NEGATIVE for weakness, dizziness or paresthesias  ENDOCRINE: NEGATIVE for temperature intolerance, skin/hair changes  HEME: NEGATIVE for bleeding problems  PSYCHIATRIC: NEGATIVE for changes in mood or affect    Patient Active Problem List    Diagnosis Date Noted     Hypoxemia 09/08/2020     Priority: Medium     Occult blood positive stool 09/07/2020     Priority: Medium     Pneumonia of right lower lobe due to infectious organism 09/06/2020     Priority: Medium     Anemia 09/06/2020     Priority: Medium     Suspected COVID-19 virus infection 09/06/2020     Priority: Medium     Chronic pain syndrome 01/07/2016     Priority: Medium     Patient is followed by Nancy Tapia MD for ongoing prescription of pain medication.  All refills should be approved by this provider, or covering partner.    Chronic back pain   Medication(s): 40.   Maximum quantity per month: [place]   Clinic visit frequency required: Q 3 months     Controlled substance agreement:  Encounter-Level CSA - 4/4/16:               Controlled Substance Agreement - Scan on 4/8/2016 10:44 AM : CONTROLLED SUBSTANCE AGREEMENT 04/04/2016 (below)            Pain Clinic evaluation in the past: Yes    DIRE Total Score(s):  No flowsheet data found.    Last [facility] website verification:  December 2, 2016    [email or web address]         Chronic low back pain 09/16/2013     Priority: Medium     Lumbar radiculopathy 09/16/2013     Priority: Medium     [facility] 12/19/2012     Priority: Medium     Chasidy Winchester RN-PHN  FPA / TRUDY [facility]   [phone]    DX V65.8 REPLACED WITH [id number] [facility] CARE HOME (04/08/2013)       Advanced directives, counseling/discussion 11/21/2011      Priority: Medium     Receipt of ACP document:  Received: Health Care Directive which was witnessed or notarized on 3/13/13.  Document not previously scanned.  Validation form completed and sent with document to be scanned.  Code Status needs to be updated to reflect choices in most recent ACP document. Orders placed.  Confirmed/documented designated decision maker(s). See permanent comments section of demographics in clinical tab. View document(s) and details by clicking on code status.   Added by Felisha Elizabeth on 3/26/2013.       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     Priority: Medium     Hypothyroidism 11/04/2005     Priority: Medium      Past Medical History:   Diagnosis Date     DEPRESSION W/ANXIETY 1980's     Essential hypertension, benign      Pure hypercholesterolemia      Unspecified hypothyroidism      Past Surgical History:   Procedure Laterality Date     CATARACT IOL, RT/LT  2009    Cataract IOL RT/LT     COLONOSCOPY  2007    notable for diverticulosis     GYN SURGERY  04/2004    Oopherectomy - (R)     INJECT EPIDURAL LUMBAR  4/2/2012    Procedure:INJECT EPIDURAL LUMBAR; MORENA with Flouro--; Surgeon:GENERIC ANESTHESIA PROVIDER; Location:[facility]     INJECT EPIDURAL LUMBAR  6/4/2012    Procedure:INJECT EPIDURAL LUMBAR; MORENA with Roberto--; Surgeon:GENERIC ANESTHESIA PROVIDER; Location:[facility]     INJECT EPIDURAL LUMBAR  6/18/2012    Procedure: INJECT EPIDURAL LUMBAR;  MORENA-Dr. Tapia;  Surgeon: Provider, Generic Anesthesia;  Location: [facility]     Current Outpatient Medications   Medication Sig Dispense Refill     amLODIPine (NORVASC) 10 MG tablet Take 1 tablet (10 mg) by mouth daily 90 tablet 3     ASPIRIN 81 MG OR TABS Take 81 mg by mouth daily  100 0     CALCIUM 500 + D  500-200 MG-IU OR TABS Take 1 tablet by mouth daily        gabapentin (NEURONTIN) 300 MG capsule TAKE ONE CAPSULE BY MOUTH EVERY MORNING AND TAKE ONE CAPSULE BY MOUTH AT NOON AND TAKE TWO CAPSULES BY MOUTH AT BEDTIME 180 capsule 3     HYDROcodone-acetaminophen (NORCO) 5-325 MG tablet TAKE ONE TABLET BY MOUTH TWICE A DAY AS NEEDED FOR SEVERE PAIN (30 DAY SUPPLY) 50 tablet 0     lactulose (CEPHULAC) 20 GM packet Take 1 packet (20 g) by mouth 3 times daily 90 packet 3     levothyroxine (SYNTHROID/LEVOTHROID) 75 MCG tablet Take 1 tablet (75 mcg) by mouth daily 90 tablet 3     lisinopril (ZESTRIL) 40 MG tablet Take 1 tablet (40 mg) by mouth daily 90 tablet 3     MULTIVITAMIN TABS   OR Take 1 tablet by mouth daily   0     OMEGA 3 OR Take 1 capsule by mouth daily        oxyCODONE-acetaminophen (PERCOCET) 5-325 MG tablet Take 1-2 tablets by mouth every 4 hours as needed for pain 10 tablet 0     pantoprazole (PROTONIX) 40 MG EC tablet Take 1 tablet (40 mg) by mouth every morning (before breakfast) 90 tablet 3     simvastatin (ZOCOR) 20 MG tablet Take 1 tablet (20 mg) by mouth At Bedtime (Patient taking differently: Take 20 mg by mouth daily ) 90 tablet 3       Allergies   Allergen Reactions     No Known Drug Allergy         Social History     Tobacco Use     Smoking status: Current Every Day Smoker     Packs/day: 0.25     Years: 50.00     Pack years: 12.50     Types: Cigarettes     Smokeless tobacco: Never Used     Tobacco comment: 5-8 cigarettes daily   Substance Use Topics     Alcohol use: No     History   Drug Use No         Objective     /64 (Cuff Size: Adult Regular)   Pulse 72   Temp 97.9  F (36.6  C) (Tympanic)   Wt 42.2 kg (93 lb)   BMI 19.44 kg/m      Physical Exam    GENERAL APPEARANCE: healthy, alert and no distress     EYES: EOMI, PERRL     HENT: ear canals and TM's normal and nose and mouth without ulcers or lesions     NECK: no adenopathy, no asymmetry, masses, or scars and thyroid normal to  palpation     RESP: lungs clear to auscultation - no rales, rhonchi or wheezes     CV: regular rates and rhythm, normal S1 S2, no S3 or S4, peripheral pulses strong and grade 3/6 systolic murmur     ABDOMEN:  soft, nontender, no HSM or masses and bowel sounds normal     MS: extremities normal- no gross deformities noted, no evidence of inflammation in joints, FROM in all extremities.     SKIN: no suspicious lesions or rashes     NEURO: Normal strength and tone, sensory exam grossly normal, mentation intact and speech normal     PSYCH: mentation appears normal. and affect normal/bright     LYMPHATICS: No cervical adenopathy    Recent Labs   Lab Test 11/03/21  1114 10/14/20  0839 09/29/20  0729 09/09/20  0554 09/08/20  0535   HGB  --  9.6* 9.4*   < > 8.1*   PLT  --  390 472*   < > 531*   INR  --   --   --   --  1.29*    135 134   < > 139   POTASSIUM 4.2 4.7 4.9   < > 4.1   CR 0.84 0.94 1.09*   < > 0.82    < > = values in this interval not displayed.      Diagnostics:  Recent Results (from the past 24 hour(s))   Asymptomatic COVID-19 Virus (Coronavirus) by PCR Nose    Collection Time: 11/11/21 11:44 AM    Specimen: Nose; Swab   Result Value Ref Range    SARS CoV2 PCR Negative Negative, Testing sent to reference lab. Results will be returned via unsolicited result   CBC with platelets    Collection Time: 11/11/21 12:12 PM   Result Value Ref Range    WBC Count 7.3 4.0 - 11.0 10e3/uL    RBC Count 3.58 (L) 3.80 - 5.20 10e6/uL    Hemoglobin 11.2 (L) 11.7 - 15.7 g/dL    Hematocrit 32.9 (L) 35.0 - 47.0 %    MCV 92 78 - 100 fL    MCH 31.3 26.5 - 33.0 pg    MCHC 34.0 31.5 - 36.5 g/dL    RDW 15.5 (H) 10.0 - 15.0 %    Platelet Count 295 150 - 450 10e3/uL      EKG: appears normal, NSR, normal axis, normal intervals, no acute ST/T changes c/w ischemia, no LVH by voltage criteria    Revised Cardiac Risk Index (RCRI):  The patient has the following serious cardiovascular risks for perioperative complications:   - No serious  cardiac risks = 0 points     RCRI Interpretation: 0 points: Class I (very low risk - 0.4% complication rate)           Signed Electronically by: MAX Santacruz CNP  Copy of this evaluation report is provided to requesting physician.

## 2021-11-11 NOTE — PROGRESS NOTES
[facility]  [address] [place] [id number]  Phone: [phone]  Fax: [phone]  Primary Provider: Nancy Tapia  {FV AMB Performing Provider (Optional):384876}    {Provider  Link to PREOP SmartSet  Use this to apply standard patient instructions to AVS; includes medication directions, common orders, guidelines for anemia, warfarin, additional testing   :974052}  PREOPERATIVE EVALUATION:  Today's date: 11/11/2021    Nighat Gupta is a 86 year old female who presents for a preoperative evaluation.    Surgical Information:  Surgery/Procedure: Left hip hemiarthroplasty   Surgery Location: [facility]  Surgeon: Dr Noah Bautista   Surgery Date: 11/12/2021  Time of Surgery: 730  Where patient plans to recover: TBD  Fax number for surgical facility: Note does not need to be faxed, will be available electronically in Epic.    Type of Anesthesia Anticipated: Spinal        Subjective     HPI related to upcoming procedure: fall with hip fracture     Preop Questions 11/11/2021   1. Have you ever had a heart attack or stroke? No   2. Have you ever had surgery on your heart or blood vessels, such as a stent placement, a coronary artery bypass, or surgery on an artery in your head, neck, heart, or legs? No   3. Do you have chest pain with activity? No   4. Do you have a history of  heart failure? No   5. Do you currently have a cold, bronchitis or symptoms of other infection? No   6. Do you have a cough, shortness of breath, or wheezing? No   7. Do you or anyone in your family have previous history of blood clots? No   8. Do you or does anyone in your family have a serious bleeding problem such as prolonged bleeding following surgeries or cuts? No   9. Have you ever had problems with anemia or been told to take iron pills? YES - ***   10. Have you had any abnormal blood loss such as black, tarry or bloody stools, or abnormal vaginal bleeding? No   11. Have you ever had  a blood transfusion? No   12. Are you willing to have a blood transfusion if it is medically needed before, during, or after your surgery? Yes   13. Have you or any of your relatives ever had problems with anesthesia? No   14. Do you have sleep apnea, excessive snoring or daytime drowsiness? No   15. Do you have any artifical heart valves or other implanted medical devices like a pacemaker, defibrillator, or continuous glucose monitor? No   16. Do you have artificial joints? No   17. Are you allergic to latex? No       Health Care Directive:  Patient has a Health Care Directive on file      Preoperative Review of :  {Mnpmpreport:089758}  {Review MNPMP for all patients per [facility] Profile:251939}    {Chronic problem details (Optional) :450113}    Review of Systems  {ROS Preop Choices:760482}    Patient Active Problem List    Diagnosis Date Noted     Hypoxemia 09/08/2020     Priority: Medium     Occult blood positive stool 09/07/2020     Priority: Medium     Pneumonia of right lower lobe due to infectious organism 09/06/2020     Priority: Medium     Anemia 09/06/2020     Priority: Medium     Suspected COVID-19 virus infection 09/06/2020     Priority: Medium     Chronic pain syndrome 01/07/2016     Priority: Medium     Patient is followed by Nancy Tapia MD for ongoing prescription of pain medication.  All refills should be approved by this provider, or covering partner.    Chronic back pain   Medication(s): 40.   Maximum quantity per month: [place]   Clinic visit frequency required: Q 3 months     Controlled substance agreement:  Encounter-Level CSA - 4/4/16:               Controlled Substance Agreement - Scan on 4/8/2016 10:44 AM : CONTROLLED SUBSTANCE AGREEMENT 04/04/2016 (below)            Pain Clinic evaluation in the past: Yes    DIRE Total Score(s):  No flowsheet data found.    Last [facility] website verification:  December 2, 2016    [email or web address]         Chronic low back pain 09/16/2013      Priority: Medium     Lumbar radiculopathy 09/16/2013     Priority: Medium     [facility] 12/19/2012     Priority: Medium     Chasidy Winchester RN-PHN  FPGABINO / TRUD[facility]   [phone]    DX V65.8 REPLACED WITH 56337 HEALTH CARE HOME (04/08/2013)       Advanced directives, counseling/discussion 11/21/2011     Priority: Medium     Receipt of ACP document:  Received: Health Care Directive which was witnessed or notarized on 3/13/13.  Document not previously scanned.  Validation form completed and sent with document to be scanned.  Code Status needs to be updated to reflect choices in most recent ACP document. Orders placed.  Confirmed/documented designated decision maker(s). See permanent comments section of demographics in clinical tab. View document(s) and details by clicking on code status.   Added by Felisha Elizabeth on 3/26/2013.       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     Priority: Medium     Hypothyroidism 11/04/2005     Priority: Medium      Past Medical History:   Diagnosis Date     DEPRESSION W/ANXIETY 1980's     Essential hypertension, benign      Pure hypercholesterolemia      Unspecified hypothyroidism      Past Surgical History:   Procedure Laterality Date     CATARACT IOL, RT/LT  2009    Cataract IOL RT/LT     COLONOSCOPY  2007    notable for diverticulosis     GYN SURGERY  04/2004    Oopherectomy - (R)     INJECT EPIDURAL LUMBAR  4/2/2012    Procedure:INJECT EPIDURAL LUMBAR; MORENA with Flouro--; Surgeon:GENERIC ANESTHESIA PROVIDER; Location:[place]     INJECT EPIDURAL LUMBAR  6/4/2012    Procedure:INJECT EPIDURAL LUMBAR; MORENA with Flouro--; Surgeon:GENERIC ANESTHESIA PROVIDER; Location:[place]     INJECT EPIDURAL LUMBAR  6/18/2012     Procedure: INJECT EPIDURAL LUMBAR;  MORENA-Dr. Tapia;  Surgeon: Provider, Generic Anesthesia;  Location: [place] OR     Current Outpatient Medications   Medication Sig Dispense Refill     amLODIPine (NORVASC) 10 MG tablet Take 1 tablet (10 mg) by mouth daily 90 tablet 3     ASPIRIN 81 MG OR TABS Take 81 mg by mouth daily  100 0     CALCIUM 500 + D 500-200 MG-IU OR TABS Take 1 tablet by mouth daily        gabapentin (NEURONTIN) 300 MG capsule TAKE ONE CAPSULE BY MOUTH EVERY MORNING AND TAKE ONE CAPSULE BY MOUTH AT NOON AND TAKE TWO CAPSULES BY MOUTH AT BEDTIME 180 capsule 3     HYDROcodone-acetaminophen (NORCO) 5-325 MG tablet TAKE ONE TABLET BY MOUTH TWICE A DAY AS NEEDED FOR SEVERE PAIN (30 DAY SUPPLY) 50 tablet 0     lactulose (CEPHULAC) 20 GM packet Take 1 packet (20 g) by mouth 3 times daily 90 packet 3     levothyroxine (SYNTHROID/LEVOTHROID) 75 MCG tablet Take 1 tablet (75 mcg) by mouth daily 90 tablet 3     lisinopril (ZESTRIL) 40 MG tablet Take 1 tablet (40 mg) by mouth daily 90 tablet 3     MULTIVITAMIN TABS   OR Take 1 tablet by mouth daily   0     OMEGA 3 OR Take 1 capsule by mouth daily        oxyCODONE-acetaminophen (PERCOCET) 5-325 MG tablet Take 1-2 tablets by mouth every 4 hours as needed for pain 10 tablet 0     pantoprazole (PROTONIX) 40 MG EC tablet Take 1 tablet (40 mg) by mouth every morning (before breakfast) 90 tablet 3     simvastatin (ZOCOR) 20 MG tablet Take 1 tablet (20 mg) by mouth At Bedtime (Patient taking differently: Take 20 mg by mouth daily ) 90 tablet 3       Allergies   Allergen Reactions     No Known Drug Allergy         Social History     Tobacco Use     Smoking status: Current Every Day Smoker     Packs/day: 0.25     Years: 50.00     Pack years: 12.50     Types: Cigarettes     Smokeless tobacco: Never Used     Tobacco comment: 5-8 cigarettes daily   Substance Use Topics     Alcohol use: No     {FAMILY HISTORY (Optional):862366827}  History   Drug Use No         Objective     /64  "(Cuff Size: Adult Regular)   Pulse 72   Temp 97.9  F (36.6  C) (Tympanic)   Wt 42.2 kg (93 lb)   BMI 19.44 kg/m      Physical Exam  {EXAM Preop Choices:537612}    Recent Labs   Lab Test 21  1212 21  1114 10/14/20  0839 20  0554 20  0535   HGB 11.2*  --  9.6*   < > 8.1*     --  390   < > 531*   INR  --   --   --   --  1.29*   NA  --  137 135   < > 139   POTASSIUM  --  4.2 4.7   < > 4.1   CR  --  0.84 0.94   < > 0.82    < > = values in this interval not displayed.        Diagnostics:  {LABS:493376}   {EK}    Revised Cardiac Risk Index (RCRI):  The patient has the following serious cardiovascular risks for perioperative complications:  {PREOP REVISED CARDIAC RISK INDEX (RCRI) :911593::\" - No serious cardiac risks = 0 points\"}     RCRI Interpretation: {REVISED CARDIAC RISK INTERPRETATION :930864}         Signed Electronically by: MAX Santacruz CNP  Copy of this evaluation report is provided to requesting physician.    {Provider Resources  Preop [facility] Preop Guidelines  Revised Cardiac Risk Index :510187}  "

## 2021-11-11 NOTE — PATIENT INSTRUCTIONS
Hold the aspirin and omega 3 now  Preparing for Your Surgery  Getting started  A nurse will call you to review your health history and instructions. They will give you an arrival time based on your scheduled surgery time.  Please be ready to share the following:    Your doctor's clinic name and phone number    Your medical, surgical and anesthesia history    A list of allergies and sensitivities    A list of medicines, including herbal treatments and over-the-counter drugs    Whether the patient has a legal guardian (ask how to send us the papers in advance)  If you have a child who's having surgery, please ask for a copy of Preparing for Your Child's Surgery.    Preparing for surgery    Within 30 days of surgery: Have a pre-op exam (sometimes called an H&P, or History and Physical). This can be done at a clinic or pre-operative center.  ? If you're having a , you may not need this exam. Talk to your care team    At your pre-op exam, talk to your care team about all medicines you take. If you need to stop any medicines before surgery, ask when to start taking them again.  ? We do this for your safety. Many medicines can make you bleed too much during surgery. Some change how well surgery (anesthesia) drugs work.    Call your insurance company to let them know you're having surgery. (If you don't have insurance, call [phone].)    Call your clinic if there's any change in your health. This includes signs of a cold or flu (sore throat, runny nose, cough, rash, fever). It also includes a scrape or scratch near the surgery site.    If you have questions on the day of surgery, call your hospital or surgery center.  Eating and drinking guidelines  For your safety: Unless your surgeon tells you otherwise, follow the guidelines below.    Eat and drink as usual until 8 hours before surgery. After that, no food or milk.    Drink clear liquids until 2 hours before surgery. These are liquids you can see through,  like water, Gatorade and Propel Water. You may also have black coffee and tea (no cream or milk).    Nothing by mouth within 2 hours of surgery. This includes gum, candy and breath mints.    If you drink, stop drinking alcohol the night before surgery.    If your care team tells you to take medicine on the morning of surgery, it's okay to take it with a sip of water.  Preventing infection    Shower or bathe the night before and morning of your surgery. Follow the instructions your clinic gave you. (If no instructions, use regular soap.)    Don't shave or clip hair near your surgery site. We'll remove the hair if needed.    Don't smoke or vape the morning of surgery. You may chew nicotine gum up to 2 hours before surgery. A nicotine patch is okay.  ? Note: Some surgeries require you to completely quit smoking and nicotine. Check with your surgeon.    Your care team will make every effort to keep you safe from infection. We will:  ? Clean our hands often with soap and water (or an alcohol-based hand rub).  ? Clean the skin at your surgery site with a special soap that kills germs.  ? Give you a special gown to keep you warm. (Cold raises the risk of infection.)  ? Wear special hair covers, masks, gowns and gloves during surgery.  ? Give antibiotic medicine, if prescribed. Not all surgeries need antibiotics.  What to bring on the day of surgery    Photo ID and insurance card    Copy of your health care directive, if you have one    Glasses and hearing aides (bring cases)  ? You can't wear contacts during surgery    Inhaler and eye drops, if you use them (tell us about these when you arrive)    CPAP machine or breathing device, if you use them    A few personal items, if spending the night    If you have . . .  ? A pacemaker or ICD (cardiac defibrillator): Bring the ID card.  ? An implanted stimulator: Bring the remote control.  ? A legal guardian: Bring a copy of the certified (court-stamped) guardianship  papers.  Please remove any jewelry, including body piercings. Leave jewelry and other valuables at home.  If you're going home the day of surgery  Important: If you don't follow the rules below, we must cancel your surgery.     Arrange for someone to drive you home after surgery. You may not drive, take a taxi or take public transportation by yourself (unless you'll have local anesthesia only).    Arrange for a responsible adult to stay with you overnight. If you don't, we may keep you in the hospital overnight, and you may need to pay the costs yourself.  Questions?   If you have any questions for your care team, list them here: _________________________________________________________________________________________________________________________________________________________________________________________________________________________________________________________________________________________________________________________  For informational purposes only. Not to replace the advice of your health care provider. Copyright   2003, 2019 [employer]. All rights reserved. Clinically reviewed by Reny Natarajan MD. [employer] [id number] - REV 4/20.

## 2021-11-12 ENCOUNTER — ANESTHESIA (OUTPATIENT)
Dept: SURGERY | Facility: CLINIC | Age: 86
End: 2021-11-12
Payer: COMMERCIAL

## 2021-11-12 ENCOUNTER — HOSPITAL ENCOUNTER (OUTPATIENT)
Facility: CLINIC | Age: 86
Discharge: HOME | End: 2021-11-14
Attending: ORTHOPAEDIC SURGERY | Admitting: ORTHOPAEDIC SURGERY
Payer: COMMERCIAL

## 2021-11-12 ENCOUNTER — APPOINTMENT (OUTPATIENT)
Dept: RADIOLOGY | Facility: CLINIC | Age: 86
End: 2021-11-12
Attending: ORTHOPAEDIC SURGERY
Payer: COMMERCIAL

## 2021-11-12 DIAGNOSIS — Z96.642 HISTORY OF LEFT HIP HEMIARTHROPLASTY: Primary | ICD-10-CM

## 2021-11-12 PROBLEM — M17.9 OSTEOARTHRITIS, KNEE: Status: ACTIVE | Noted: 2021-11-12

## 2021-11-12 PROCEDURE — 258N000003 HC RX IP 258 OP 636: Performed by: ORTHOPAEDIC SURGERY

## 2021-11-12 PROCEDURE — 999N000141 HC STATISTIC PRE-PROCEDURE NURSING ASSESSMENT: Performed by: ORTHOPAEDIC SURGERY

## 2021-11-12 PROCEDURE — 258N000001 HC RX 258: Performed by: ORTHOPAEDIC SURGERY

## 2021-11-12 PROCEDURE — 250N000011 HC RX IP 250 OP 636: Performed by: PHYSICIAN ASSISTANT

## 2021-11-12 PROCEDURE — 250N000011 HC RX IP 250 OP 636: Performed by: ANESTHESIOLOGY

## 2021-11-12 PROCEDURE — 258N000003 HC RX IP 258 OP 636: Performed by: ANESTHESIOLOGY

## 2021-11-12 PROCEDURE — 250N000013 HC RX MED GY IP 250 OP 250 PS 637: Performed by: PHYSICIAN ASSISTANT

## 2021-11-12 PROCEDURE — 360N000077 HC SURGERY LEVEL 4, PER MIN: Performed by: ORTHOPAEDIC SURGERY

## 2021-11-12 PROCEDURE — C1776 JOINT DEVICE (IMPLANTABLE): HCPCS | Performed by: ORTHOPAEDIC SURGERY

## 2021-11-12 PROCEDURE — 999N000065 XR PELVIS AND HIP LEFT 2 VIEWS

## 2021-11-12 PROCEDURE — 272N000001 HC OR GENERAL SUPPLY STERILE: Performed by: ORTHOPAEDIC SURGERY

## 2021-11-12 PROCEDURE — 250N000011 HC RX IP 250 OP 636: Performed by: NURSE ANESTHETIST, CERTIFIED REGISTERED

## 2021-11-12 PROCEDURE — 370N000017 HC ANESTHESIA TECHNICAL FEE, PER MIN: Performed by: ORTHOPAEDIC SURGERY

## 2021-11-12 PROCEDURE — 258N000003 HC RX IP 258 OP 636: Performed by: PHYSICIAN ASSISTANT

## 2021-11-12 PROCEDURE — 250N000011 HC RX IP 250 OP 636: Performed by: ORTHOPAEDIC SURGERY

## 2021-11-12 PROCEDURE — 250N000013 HC RX MED GY IP 250 OP 250 PS 637: Performed by: ORTHOPAEDIC SURGERY

## 2021-11-12 PROCEDURE — 250N000009 HC RX 250: Performed by: PHYSICIAN ASSISTANT

## 2021-11-12 PROCEDURE — 710N000010 HC RECOVERY PHASE 1, LEVEL 2, PER MIN: Performed by: ORTHOPAEDIC SURGERY

## 2021-11-12 PROCEDURE — 258N000003 HC RX IP 258 OP 636: Performed by: NURSE ANESTHETIST, CERTIFIED REGISTERED

## 2021-11-12 PROCEDURE — 278N000051 HC OR IMPLANT GENERAL: Performed by: ORTHOPAEDIC SURGERY

## 2021-11-12 PROCEDURE — 99225 PR SUBSEQUENT OBSERVATION CARE,LEVEL II: CPT | Mod: GC | Performed by: STUDENT IN AN ORGANIZED HEALTH CARE EDUCATION/TRAINING PROGRAM

## 2021-11-12 PROCEDURE — 250N000009 HC RX 250: Performed by: NURSE ANESTHETIST, CERTIFIED REGISTERED

## 2021-11-12 DEVICE — IMPLANTABLE DEVICE: Type: IMPLANTABLE DEVICE | Site: HIP | Status: FUNCTIONAL

## 2021-11-12 DEVICE — SUMMIT FEMORAL STEM 12/14 TAPER BASIC CEMENTED SIZE 4 114MM
Type: IMPLANTABLE DEVICE | Site: HIP | Status: FUNCTIONAL
Brand: SUMMIT

## 2021-11-12 DEVICE — CEMENTRALIZER STEM CENTRALIZER 9.25MM CEMENTED
Type: IMPLANTABLE DEVICE | Site: HIP | Status: FUNCTIONAL
Brand: CEMENTRALIZER

## 2021-11-12 DEVICE — SELF CENTERING BI-POLAR HEAD 28MM ID 42MM OD
Type: IMPLANTABLE DEVICE | Site: HIP | Status: FUNCTIONAL
Brand: SELF CENTERING

## 2021-11-12 DEVICE — BONE CEMENT SIMPLEX FULL DOSE 6191-1-001: Type: IMPLANTABLE DEVICE | Site: HIP | Status: FUNCTIONAL

## 2021-11-12 DEVICE — ARTICUL/EZE FEMORAL HEAD DIAMETER 28MM +1.5 12/14 TAPER
Type: IMPLANTABLE DEVICE | Site: HIP | Status: FUNCTIONAL
Brand: ARTICUL/EZE

## 2021-11-12 RX ORDER — CEFAZOLIN SODIUM 2 G/100ML
2 INJECTION, SOLUTION INTRAVENOUS
Status: COMPLETED | OUTPATIENT
Start: 2021-11-12 | End: 2021-11-12

## 2021-11-12 RX ORDER — OXYCODONE HYDROCHLORIDE 5 MG/1
5 TABLET ORAL EVERY 4 HOURS PRN
Status: DISCONTINUED | OUTPATIENT
Start: 2021-11-12 | End: 2021-11-12 | Stop reason: HOSPADM

## 2021-11-12 RX ORDER — OXYCODONE HYDROCHLORIDE 5 MG/1
5 TABLET ORAL EVERY 4 HOURS PRN
Status: DISCONTINUED | OUTPATIENT
Start: 2021-11-12 | End: 2021-11-14 | Stop reason: HOSPADM

## 2021-11-12 RX ORDER — SODIUM CHLORIDE, SODIUM LACTATE, POTASSIUM CHLORIDE, CALCIUM CHLORIDE 600; 310; 30; 20 MG/100ML; MG/100ML; MG/100ML; MG/100ML
INJECTION, SOLUTION INTRAVENOUS CONTINUOUS
Status: DISCONTINUED | OUTPATIENT
Start: 2021-11-12 | End: 2021-11-12 | Stop reason: HOSPADM

## 2021-11-12 RX ORDER — CEFAZOLIN SODIUM 1 G/3ML
1 INJECTION, POWDER, FOR SOLUTION INTRAMUSCULAR; INTRAVENOUS EVERY 8 HOURS
Status: COMPLETED | OUTPATIENT
Start: 2021-11-12 | End: 2021-11-13

## 2021-11-12 RX ORDER — EPHEDRINE SULFATE 50 MG/ML
INJECTION, SOLUTION INTRAMUSCULAR; INTRAVENOUS; SUBCUTANEOUS PRN
Status: DISCONTINUED | OUTPATIENT
Start: 2021-11-12 | End: 2021-11-12

## 2021-11-12 RX ORDER — FENTANYL CITRATE 50 UG/ML
INJECTION, SOLUTION INTRAMUSCULAR; INTRAVENOUS PRN
Status: DISCONTINUED | OUTPATIENT
Start: 2021-11-12 | End: 2021-11-12

## 2021-11-12 RX ORDER — GABAPENTIN 300 MG/1
300-600 CAPSULE ORAL 3 TIMES DAILY PRN
Status: DISCONTINUED | OUTPATIENT
Start: 2021-11-12 | End: 2021-11-14 | Stop reason: HOSPADM

## 2021-11-12 RX ORDER — DIMENHYDRINATE 50 MG/ML
25 INJECTION, SOLUTION INTRAMUSCULAR; INTRAVENOUS
Status: DISCONTINUED | OUTPATIENT
Start: 2021-11-12 | End: 2021-11-12 | Stop reason: HOSPADM

## 2021-11-12 RX ORDER — POLYETHYLENE GLYCOL 3350 17 G/17G
.5-1 POWDER, FOR SOLUTION ORAL DAILY PRN
COMMUNITY
End: 2023-06-23

## 2021-11-12 RX ORDER — ONDANSETRON 2 MG/ML
INJECTION INTRAMUSCULAR; INTRAVENOUS PRN
Status: DISCONTINUED | OUTPATIENT
Start: 2021-11-12 | End: 2021-11-12

## 2021-11-12 RX ORDER — AMOXICILLIN 250 MG
1 CAPSULE ORAL 2 TIMES DAILY
Status: DISCONTINUED | OUTPATIENT
Start: 2021-11-12 | End: 2021-11-14 | Stop reason: HOSPADM

## 2021-11-12 RX ORDER — FENTANYL CITRATE 50 UG/ML
25 INJECTION, SOLUTION INTRAMUSCULAR; INTRAVENOUS EVERY 5 MIN PRN
Status: DISCONTINUED | OUTPATIENT
Start: 2021-11-12 | End: 2021-11-12 | Stop reason: HOSPADM

## 2021-11-12 RX ORDER — LISINOPRIL 40 MG/1
40 TABLET ORAL DAILY
Status: DISCONTINUED | OUTPATIENT
Start: 2021-11-13 | End: 2021-11-14 | Stop reason: HOSPADM

## 2021-11-12 RX ORDER — EPINEPHRINE 1 MG/ML
INJECTION, SOLUTION, CONCENTRATE INTRAVENOUS
Status: COMPLETED | OUTPATIENT
Start: 2021-11-12 | End: 2021-11-12

## 2021-11-12 RX ORDER — AMLODIPINE BESYLATE 10 MG/1
10 TABLET ORAL DAILY
Status: DISCONTINUED | OUTPATIENT
Start: 2021-11-13 | End: 2021-11-14 | Stop reason: HOSPADM

## 2021-11-12 RX ORDER — SIMVASTATIN 20 MG
20 TABLET ORAL DAILY
Status: DISCONTINUED | OUTPATIENT
Start: 2021-11-13 | End: 2021-11-14 | Stop reason: HOSPADM

## 2021-11-12 RX ORDER — LIDOCAINE 40 MG/G
CREAM TOPICAL
Status: DISCONTINUED | OUTPATIENT
Start: 2021-11-12 | End: 2021-11-12 | Stop reason: HOSPADM

## 2021-11-12 RX ORDER — BUPIVACAINE HYDROCHLORIDE 7.5 MG/ML
INJECTION, SOLUTION INTRASPINAL
Status: COMPLETED | OUTPATIENT
Start: 2021-11-12 | End: 2021-11-12

## 2021-11-12 RX ORDER — CEFAZOLIN SODIUM 2 G/100ML
2 INJECTION, SOLUTION INTRAVENOUS SEE ADMIN INSTRUCTIONS
Status: DISCONTINUED | OUTPATIENT
Start: 2021-11-12 | End: 2021-11-12 | Stop reason: HOSPADM

## 2021-11-12 RX ORDER — HYDROMORPHONE HCL IN WATER/PF 6 MG/30 ML
0.2 PATIENT CONTROLLED ANALGESIA SYRINGE INTRAVENOUS EVERY 5 MIN PRN
Status: DISCONTINUED | OUTPATIENT
Start: 2021-11-12 | End: 2021-11-12 | Stop reason: HOSPADM

## 2021-11-12 RX ORDER — LEVOTHYROXINE SODIUM 75 UG/1
75 TABLET ORAL DAILY
Status: DISCONTINUED | OUTPATIENT
Start: 2021-11-13 | End: 2021-11-14 | Stop reason: HOSPADM

## 2021-11-12 RX ORDER — LIDOCAINE 40 MG/G
CREAM TOPICAL
Status: DISCONTINUED | OUTPATIENT
Start: 2021-11-12 | End: 2021-11-14 | Stop reason: HOSPADM

## 2021-11-12 RX ORDER — PROCHLORPERAZINE MALEATE 5 MG
5 TABLET ORAL EVERY 6 HOURS PRN
Status: DISCONTINUED | OUTPATIENT
Start: 2021-11-12 | End: 2021-11-14 | Stop reason: HOSPADM

## 2021-11-12 RX ORDER — NALOXONE HYDROCHLORIDE 0.4 MG/ML
0.4 INJECTION, SOLUTION INTRAMUSCULAR; INTRAVENOUS; SUBCUTANEOUS
Status: DISCONTINUED | OUTPATIENT
Start: 2021-11-12 | End: 2021-11-14 | Stop reason: HOSPADM

## 2021-11-12 RX ORDER — DIPHENHYDRAMINE HCL 12.5MG/5ML
25 LIQUID (ML) ORAL EVERY 4 HOURS PRN
Status: DISCONTINUED | OUTPATIENT
Start: 2021-11-12 | End: 2021-11-12 | Stop reason: HOSPADM

## 2021-11-12 RX ORDER — ONDANSETRON 4 MG/1
4 TABLET, ORALLY DISINTEGRATING ORAL EVERY 6 HOURS PRN
Status: DISCONTINUED | OUTPATIENT
Start: 2021-11-12 | End: 2021-11-14 | Stop reason: HOSPADM

## 2021-11-12 RX ORDER — ASCORBIC ACID 500 MG
1000 TABLET ORAL DAILY
COMMUNITY

## 2021-11-12 RX ORDER — POLYETHYLENE GLYCOL 3350 17 G/17G
8.5-17 POWDER, FOR SOLUTION ORAL DAILY PRN
Status: DISCONTINUED | OUTPATIENT
Start: 2021-11-12 | End: 2021-11-14 | Stop reason: HOSPADM

## 2021-11-12 RX ORDER — BISACODYL 10 MG
10 SUPPOSITORY, RECTAL RECTAL DAILY PRN
Status: DISCONTINUED | OUTPATIENT
Start: 2021-11-12 | End: 2021-11-14 | Stop reason: HOSPADM

## 2021-11-12 RX ORDER — NALOXONE HYDROCHLORIDE 0.4 MG/ML
0.2 INJECTION, SOLUTION INTRAMUSCULAR; INTRAVENOUS; SUBCUTANEOUS
Status: DISCONTINUED | OUTPATIENT
Start: 2021-11-12 | End: 2021-11-14 | Stop reason: HOSPADM

## 2021-11-12 RX ORDER — ONDANSETRON 2 MG/ML
4 INJECTION INTRAMUSCULAR; INTRAVENOUS EVERY 30 MIN PRN
Status: DISCONTINUED | OUTPATIENT
Start: 2021-11-12 | End: 2021-11-12 | Stop reason: HOSPADM

## 2021-11-12 RX ORDER — ONDANSETRON 2 MG/ML
4 INJECTION INTRAMUSCULAR; INTRAVENOUS EVERY 6 HOURS PRN
Status: DISCONTINUED | OUTPATIENT
Start: 2021-11-12 | End: 2021-11-14 | Stop reason: HOSPADM

## 2021-11-12 RX ORDER — DIPHENHYDRAMINE HYDROCHLORIDE 50 MG/ML
12.5 INJECTION INTRAMUSCULAR; INTRAVENOUS EVERY 6 HOURS PRN
Status: DISCONTINUED | OUTPATIENT
Start: 2021-11-12 | End: 2021-11-12 | Stop reason: HOSPADM

## 2021-11-12 RX ORDER — PROPOFOL 10 MG/ML
INJECTION, EMULSION INTRAVENOUS CONTINUOUS PRN
Status: DISCONTINUED | OUTPATIENT
Start: 2021-11-12 | End: 2021-11-12

## 2021-11-12 RX ORDER — KETOROLAC TROMETHAMINE 30 MG/ML
15 INJECTION, SOLUTION INTRAMUSCULAR; INTRAVENOUS
Status: DISCONTINUED | OUTPATIENT
Start: 2021-11-12 | End: 2021-11-12 | Stop reason: HOSPADM

## 2021-11-12 RX ORDER — MEPERIDINE HYDROCHLORIDE 25 MG/ML
12.5 INJECTION INTRAMUSCULAR; INTRAVENOUS; SUBCUTANEOUS EVERY 5 MIN PRN
Status: DISCONTINUED | OUTPATIENT
Start: 2021-11-12 | End: 2021-11-12 | Stop reason: HOSPADM

## 2021-11-12 RX ORDER — LORAZEPAM 2 MG/ML
.5-1 INJECTION INTRAMUSCULAR
Status: DISCONTINUED | OUTPATIENT
Start: 2021-11-12 | End: 2021-11-12 | Stop reason: HOSPADM

## 2021-11-12 RX ORDER — SODIUM CHLORIDE, SODIUM LACTATE, POTASSIUM CHLORIDE, CALCIUM CHLORIDE 600; 310; 30; 20 MG/100ML; MG/100ML; MG/100ML; MG/100ML
INJECTION, SOLUTION INTRAVENOUS CONTINUOUS
Status: DISCONTINUED | OUTPATIENT
Start: 2021-11-12 | End: 2021-11-14 | Stop reason: HOSPADM

## 2021-11-12 RX ORDER — POLYETHYLENE GLYCOL 3350 17 G/17G
17 POWDER, FOR SOLUTION ORAL DAILY
Status: DISCONTINUED | OUTPATIENT
Start: 2021-11-13 | End: 2021-11-14 | Stop reason: HOSPADM

## 2021-11-12 RX ORDER — ALBUTEROL SULFATE 0.83 MG/ML
2.5 SOLUTION RESPIRATORY (INHALATION) EVERY 4 HOURS PRN
Status: DISCONTINUED | OUTPATIENT
Start: 2021-11-12 | End: 2021-11-12 | Stop reason: HOSPADM

## 2021-11-12 RX ORDER — PROPOFOL 10 MG/ML
INJECTION, EMULSION INTRAVENOUS PRN
Status: DISCONTINUED | OUTPATIENT
Start: 2021-11-12 | End: 2021-11-12

## 2021-11-12 RX ORDER — ONDANSETRON 4 MG/1
4 TABLET, ORALLY DISINTEGRATING ORAL EVERY 30 MIN PRN
Status: DISCONTINUED | OUTPATIENT
Start: 2021-11-12 | End: 2021-11-12 | Stop reason: HOSPADM

## 2021-11-12 RX ORDER — PANTOPRAZOLE SODIUM 20 MG/1
40 TABLET, DELAYED RELEASE ORAL
Status: DISCONTINUED | OUTPATIENT
Start: 2021-11-13 | End: 2021-11-14 | Stop reason: HOSPADM

## 2021-11-12 RX ORDER — HALOPERIDOL 5 MG/ML
1 INJECTION INTRAMUSCULAR
Status: DISCONTINUED | OUTPATIENT
Start: 2021-11-12 | End: 2021-11-12 | Stop reason: HOSPADM

## 2021-11-12 RX ORDER — DEXAMETHASONE SODIUM PHOSPHATE 10 MG/ML
INJECTION, SOLUTION INTRAMUSCULAR; INTRAVENOUS PRN
Status: DISCONTINUED | OUTPATIENT
Start: 2021-11-12 | End: 2021-11-12

## 2021-11-12 RX ORDER — ACETAMINOPHEN 325 MG/1
975 TABLET ORAL EVERY 8 HOURS
Status: DISCONTINUED | OUTPATIENT
Start: 2021-11-12 | End: 2021-11-14 | Stop reason: HOSPADM

## 2021-11-12 RX ORDER — HYDROMORPHONE HCL IN WATER/PF 6 MG/30 ML
0.2 PATIENT CONTROLLED ANALGESIA SYRINGE INTRAVENOUS
Status: DISCONTINUED | OUTPATIENT
Start: 2021-11-12 | End: 2021-11-14 | Stop reason: HOSPADM

## 2021-11-12 RX ORDER — TRANEXAMIC ACID 650 MG/1
1950 TABLET ORAL ONCE
Status: COMPLETED | OUTPATIENT
Start: 2021-11-12 | End: 2021-11-12

## 2021-11-12 RX ORDER — ACETAMINOPHEN 325 MG/1
650 TABLET ORAL EVERY 4 HOURS PRN
Status: DISCONTINUED | OUTPATIENT
Start: 2021-11-15 | End: 2021-11-14 | Stop reason: HOSPADM

## 2021-11-12 RX ADMIN — ACETAMINOPHEN 975 MG: 325 TABLET ORAL at 11:52

## 2021-11-12 RX ADMIN — CEFAZOLIN SODIUM 2 G: 2 INJECTION, SOLUTION INTRAVENOUS at 07:35

## 2021-11-12 RX ADMIN — Medication 5 MG: at 08:11

## 2021-11-12 RX ADMIN — PROPOFOL 20 MG: 10 INJECTION, EMULSION INTRAVENOUS at 07:54

## 2021-11-12 RX ADMIN — ACETAMINOPHEN 975 MG: 325 TABLET ORAL at 18:32

## 2021-11-12 RX ADMIN — Medication 5 MG: at 08:15

## 2021-11-12 RX ADMIN — PHENYLEPHRINE HYDROCHLORIDE 0.3 MCG/KG/MIN: 10 INJECTION INTRAVENOUS at 07:54

## 2021-11-12 RX ADMIN — PHENYLEPHRINE HYDROCHLORIDE 100 MCG: 10 INJECTION INTRAVENOUS at 07:51

## 2021-11-12 RX ADMIN — DEXAMETHASONE SODIUM PHOSPHATE 10 MG: 10 INJECTION, SOLUTION INTRAMUSCULAR; INTRAVENOUS at 08:15

## 2021-11-12 RX ADMIN — PROPOFOL 25 MCG/KG/MIN: 10 INJECTION, EMULSION INTRAVENOUS at 07:55

## 2021-11-12 RX ADMIN — PHENYLEPHRINE HYDROCHLORIDE 50 MCG: 10 INJECTION INTRAVENOUS at 08:02

## 2021-11-12 RX ADMIN — DOCUSATE SODIUM 50MG AND SENNOSIDES 8.6MG 1 TABLET: 8.6; 5 TABLET, FILM COATED ORAL at 21:21

## 2021-11-12 RX ADMIN — BUPIVACAINE HYDROCHLORIDE IN DEXTROSE 1.4 ML: 7.5 INJECTION, SOLUTION SUBARACHNOID at 08:43

## 2021-11-12 RX ADMIN — MIDAZOLAM 1 MG: 1 INJECTION INTRAMUSCULAR; INTRAVENOUS at 07:28

## 2021-11-12 RX ADMIN — SODIUM CHLORIDE, POTASSIUM CHLORIDE, SODIUM LACTATE AND CALCIUM CHLORIDE: 600; 310; 30; 20 INJECTION, SOLUTION INTRAVENOUS at 08:49

## 2021-11-12 RX ADMIN — EPINEPHRINE 0.1 MG: 1 INJECTION, SOLUTION, CONCENTRATE INTRAVENOUS at 08:43

## 2021-11-12 RX ADMIN — TRANEXAMIC ACID 1950 MG: 650 TABLET ORAL at 06:57

## 2021-11-12 RX ADMIN — FENTANYL CITRATE 50 MCG: 50 INJECTION, SOLUTION INTRAMUSCULAR; INTRAVENOUS at 07:30

## 2021-11-12 RX ADMIN — ASPIRIN 325 MG: 325 TABLET, COATED ORAL at 13:46

## 2021-11-12 RX ADMIN — DOCUSATE SODIUM 50MG AND SENNOSIDES 8.6MG 1 TABLET: 8.6; 5 TABLET, FILM COATED ORAL at 11:52

## 2021-11-12 RX ADMIN — ONDANSETRON 4 MG: 2 INJECTION INTRAMUSCULAR; INTRAVENOUS at 08:15

## 2021-11-12 RX ADMIN — CEFAZOLIN 1 G: 1 INJECTION, POWDER, FOR SOLUTION INTRAMUSCULAR; INTRAVENOUS at 16:23

## 2021-11-12 RX ADMIN — SODIUM CHLORIDE, POTASSIUM CHLORIDE, SODIUM LACTATE AND CALCIUM CHLORIDE: 600; 310; 30; 20 INJECTION, SOLUTION INTRAVENOUS at 11:58

## 2021-11-12 RX ADMIN — SODIUM CHLORIDE, POTASSIUM CHLORIDE, SODIUM LACTATE AND CALCIUM CHLORIDE: 600; 310; 30; 20 INJECTION, SOLUTION INTRAVENOUS at 07:06

## 2021-11-12 ASSESSMENT — MIFFLIN-ST. JEOR: SCORE: 761.6

## 2021-11-12 NOTE — OP NOTE
Preop diagnosis: Left femoral neck fracture displaced closed    Postop diagnosis: Same    Procedure performed: Left hip hemiarthroplasty    Surgeon: Noah Bautista MD    First Assistant: Farida Mckinney, PAC was necessary for this case assist with exposure, placement of hemiarthroplasty, wound closure and dressing placement    Anesthesia: Spinal    Complications: None    EBL: 25 mL    Implants used: [place] press-fit DePuy femoral stem size 4, 28 mm x 42 mm bipolar head, +1.5 femoral head    Indications: This patient has an acute injury as noted.  She and her family understood risks, benefits and alternatives and wished to proceed.    Procedure: Patient was identified.  Left hip was marked.  Spinal anesthesia was provided in the operating room.  Lateral decubitus position was used.  All bony prominences were well-padded.  An axillary roll was placed.  Left lower extremities prepped and draped in standard sterile fashion.  A timeout was performed.  The patient received antibiotics prior to incision.    A lateral incision was made for posterior approach.  The IT band was incised.  The short external rotators and capsule released for later repair.  Hip was dislocated.  Femoral neck fracture was identified.  Femoral neck osteotomy was performed.  Femoral head was removed.  Acetabulum was without injury.  We progressively broached to a size 4.  We trialed the above-noted implants.  The hip had appropriate leg length and stability.  The hip was dislocated.  Trial implants removed.  Copiously irrigated and dried the canal.  We placed our distal femoral restrictor.  Simplex cement was used to cement from distal to proximal while pressurizing.  We cemented our stem into place and removed excess cement.  The cement was allowed to dry.  Femoral heads were placed.  The hip was reduced and determined be stable.  Capsule and short external rotators repaired.  Layered closure performed sterile dressing were placed.    Patient  given aspirin 325 mg daily for 6 weeks for DVT prophylaxis.  She is weightbearing as tolerated.  Standard hip precautions.  Return to clinic in 2 weeks for reevaluation and x-rays.    Noah Bautista MD

## 2021-11-12 NOTE — ANESTHESIA PREPROCEDURE EVALUATION
Anesthesia Pre-Procedure Evaluation    Patient: Nighat Gupta   MRN: [id number] : 1935        Preoperative Diagnosis: Fracture of femoral neck, left (H) [S72.002A]    Procedure : Procedure(s):  LEFT HIP HEMIARTHROPLASTY          Past Medical History:   Diagnosis Date     DEPRESSION W/ANXIETY      Essential hypertension, benign      Pure hypercholesterolemia      Unspecified hypothyroidism       Past Surgical History:   Procedure Laterality Date     CATARACT IOL, RT/LT      Cataract IOL RT/LT     COLONOSCOPY      notable for diverticulosis     GYN SURGERY  2004    Oopherectomy - (R)     INJECT EPIDURAL LUMBAR  2012    Procedure:INJECT EPIDURAL LUMBAR; MORENA with Roberto--; Surgeon:GENERIC ANESTHESIA PROVIDER; Location:[place]     INJECT EPIDURAL LUMBAR  2012    Procedure:INJECT EPIDURAL LUMBAR; MORENA with Jaci--; Surgeon:GENERIC ANESTHESIA PROVIDER; Location:[place]     INJECT EPIDURAL LUMBAR  2012    Procedure: INJECT EPIDURAL LUMBAR;  MORENA-Dr. Tapia;  Surgeon: Provider, Generic Anesthesia;  Location: [place] OR      Allergies   Allergen Reactions     No Known Drug Allergy       Social History     Tobacco Use     Smoking status: Current Every Day Smoker     Packs/day: 0.25     Years: 50.00     Pack years: 12.50     Types: Cigarettes     Smokeless tobacco: Never Used     Tobacco comment: 5-8 cigarettes daily   Substance Use Topics     Alcohol use: No      Wt Readings from Last 1 Encounters:   21 42.2 kg (93 lb)        Anesthesia Evaluation            ROS/MED HX  ENT/Pulmonary:       Neurologic:       Cardiovascular:     (+) hypertension-----    METS/Exercise Tolerance:     Hematologic:       Musculoskeletal:       GI/Hepatic:       Renal/Genitourinary:       Endo:     (+) thyroid problem,     Psychiatric/Substance Use:       Infectious Disease:       Malignancy:       Other:            Physical Exam    Airway        Mallampati: II    Neck ROM: full     Respiratory  Devices and Support         Dental  no notable dental history         Cardiovascular   cardiovascular exam normal          Pulmonary   pulmonary exam normal                OUTSIDE LABS:  CBC:   Lab Results   Component Value Date    WBC 7.3 11/11/2021    WBC 5.4 10/14/2020    HGB 11.2 (L) 11/11/2021    HGB 9.6 (L) 10/14/2020    HCT 32.9 (L) 11/11/2021    HCT 30.4 (L) 10/14/2020     11/11/2021     10/14/2020     BMP:   Lab Results   Component Value Date     11/03/2021     10/14/2020    POTASSIUM 4.2 11/03/2021    POTASSIUM 4.7 10/14/2020    CHLORIDE 105 11/03/2021    CHLORIDE 105 10/14/2020    CO2 26 11/03/2021    CO2 27 10/14/2020    BUN 12 11/03/2021    BUN 12 10/14/2020    CR 0.84 11/03/2021    CR 0.94 10/14/2020    GLC 83 11/03/2021    GLC 92 10/14/2020     COAGS:   Lab Results   Component Value Date    PTT 23 12/20/2006    INR 1.29 (H) 09/08/2020     POC: No results found for: BGM, HCG, HCGS  HEPATIC:   Lab Results   Component Value Date    ALBUMIN 3.8 11/03/2021    PROTTOTAL 7.2 11/03/2021    ALT 21 11/03/2021    AST 21 11/03/2021    ALKPHOS 41 11/03/2021    BILITOTAL 0.5 11/03/2021     OTHER:   Lab Results   Component Value Date    LACT 1.1 09/06/2020    KODI 9.1 11/03/2021    LIPASE 45 (L) 09/06/2020    AMYLASE 36 08/09/2007    TSH 16.53 (H) 11/03/2021    T4 1.01 11/03/2021    T3 79 03/16/2011    .0 (H) 09/06/2020       Anesthesia Plan    ASA Status:  2      Anesthesia Type: Spinal.              Consents    Anesthesia Plan(s) and associated risks, benefits, and realistic alternatives discussed. Questions answered and patient/representative(s) expressed understanding.     - Discussed with:  Patient         Postoperative Care            Comments:                Ivett Dillon MD

## 2021-11-12 NOTE — PHARMACY-ADMISSION MEDICATION HISTORY
Pharmacy Note - Admission Medication History    Pertinent Provider Information:    ______________________________________________________________________    Prior To Admission (PTA) med list completed and updated in EMR.       PTA Med List   Medication Sig Last Dose     amLODIPine (NORVASC) 10 MG tablet Take 1 tablet (10 mg) by mouth daily 11/12/2021 at am     ASPIRIN 81 MG OR TABS Take 81 mg by mouth daily  11/10/2021     CALCIUM 500 + D 500-200 MG-IU OR TABS Take 1 tablet by mouth daily  11/11/2021 at Unknown time     gabapentin (NEURONTIN) 300 MG capsule TAKE ONE CAPSULE BY MOUTH EVERY MORNING AND TAKE ONE CAPSULE BY MOUTH AT NOON AND TAKE TWO CAPSULES BY MOUTH AT BEDTIME (Patient taking differently: Take 300-600 mg by mouth 3 times daily as needed TAKE ONE CAPSULE BY MOUTH EVERY MORNING AND TAKE ONE CAPSULE BY MOUTH AT NOON AND TAKE TWO CAPSULES BY MOUTH AT BEDTIME) 11/11/2021 at Unknown time     HYDROcodone-acetaminophen (NORCO) 5-325 MG tablet TAKE ONE TABLET BY MOUTH TWICE A DAY AS NEEDED FOR SEVERE PAIN (30 DAY SUPPLY) 11/11/2021 at Unknown time     levothyroxine (SYNTHROID/LEVOTHROID) 75 MCG tablet Take 1 tablet (75 mcg) by mouth daily 11/12/2021 at am     lisinopril (ZESTRIL) 40 MG tablet Take 1 tablet (40 mg) by mouth daily 11/11/2021 at Unknown time     MULTIVITAMIN TABS   OR Take 1 tablet by mouth daily  11/11/2021 at Unknown time     oxyCODONE-acetaminophen (PERCOCET) 5-325 MG tablet Take 1-2 tablets by mouth every 4 hours as needed for pain 11/12/2021 at am     pantoprazole (PROTONIX) 40 MG EC tablet Take 1 tablet (40 mg) by mouth every morning (before breakfast) 11/12/2021 at am     polyethylene glycol (MIRALAX) 17 g packet Take 0.5-1 packets by mouth daily as needed for constipation Past Week at Unknown time     simvastatin (ZOCOR) 20 MG tablet Take 1 tablet (20 mg) by mouth At Bedtime 11/12/2021 at am (takes in am)     vitamin C (ASCORBIC ACID) 500 MG tablet Take 1,000 mg by mouth daily 11/11/2021  at Unknown time       Information source(s): Patient and CareEverywhere/SureScripts    Method of interview communication: in-person    Patient was asked about OTC/herbal products specifically.  PTA med list reflects this.    Based on the pharmacist's assessment, the PTA med list information appears reliable    Allergies were reviewed, assessed, and updated with the patient.      Patient does not use any multi-dose medications prior to admission.     Thank you for the opportunity to participate in the care of this patient.      Lefty Farley RPH     11/12/2021     7:17 AM

## 2021-11-12 NOTE — CONSULTS
[facility]  Consult Note - Hospitalist Service     Date of Admission:  11/12/2021  Consult Requested by: Ortho team  Reason for Consult: Chronic condition management    Assessment & Plan   Nighat Gupta is a 86 year old female admitted on 11/12/2021. She has a past medical history of hypertension, hypothyroidism, anemia, and hyperlipidemia.  Patient had recent falls which resulted in hip fracture.    Left hip ortho plasty  Recent history of falls, CT revealed left displaced femoral neck fracture.  -Pain controlled,Per Ortho    Hypothyroidism  -Continue PTA levothyroxine    HTN  -Continue PTA amlodipine  -Continue PTA lisinopril    Hyperlipidemia  -Hold PTA simvastatin    Anemia  Patient hemoglobin range between 8.1-9.6.  -Monitor  -Follow-up as outpatient     The patient's care was discussed with the Attending Physician, Dr. Garrett.    Zack Ford MD  [facility]  Securely message with the Vocera Web Console (learn more here)  Text page via [employer] Paging/Directory        Clinically Significant Risk Factors Present on Admission              # Platelet Defect: home medication list includes an antiplatelet medication      ______________________________________________________________________    Chief Complaint   Left hip fracture    History is obtained from the patient and her daughter    History of Present Illness   Nighat Gupta is a 86 year old female who presented with a recent mechanical fall.  CT head was negative for acute intracranial hemorrhage.  CT hip reveal left displaced femoral neck fracture.  Patient was struggling with walking and decided to go for hip arthroplasty.    Review of Systems   CONSTITUTIONAL: NEGATIVE for fever, chills, change in weight  INTEGUMENTARY/SKIN: NEGATIVE for worrisome rashes, moles or lesions  EYES: NEGATIVE for vision changes or irritation  ENT/MOUTH: NEGATIVE for ear, mouth and throat problems  RESP: NEGATIVE for  significant cough or SOB  BREAST: NEGATIVE for masses, tenderness or discharge  CV: NEGATIVE for chest pain, palpitations or peripheral edema  GI: NEGATIVE for nausea, abdominal pain, heartburn, or change in bowel habits  : NEGATIVE for frequency, dysuria, or hematuria  MUSCULOSKELETAL: NEGATIVE for significant arthralgias or myalgia  NEURO: NEGATIVE for weakness, dizziness or paresthesias  ENDOCRINE: NEGATIVE for temperature intolerance, skin/hair changes  HEME: NEGATIVE for bleeding problems  PSYCHIATRIC: NEGATIVE for changes in mood or affect    Past Medical History    I have reviewed this patient's medical history and updated it with pertinent information if needed.   Past Medical History:   Diagnosis Date     DEPRESSION W/ANXIETY 1980's     Essential hypertension, benign      Pure hypercholesterolemia      Unspecified hypothyroidism        Past Surgical History   I have reviewed this patient's surgical history and updated it with pertinent information if needed.  Past Surgical History:   Procedure Laterality Date     CATARACT IOL, RT/LT  2009    Cataract IOL RT/LT     COLONOSCOPY  2007    notable for diverticulosis     GYN SURGERY  04/2004    Oopherectomy - (R)     INJECT EPIDURAL LUMBAR  4/2/2012    Procedure:INJECT EPIDURAL LUMBAR; MORENA with Flouro--; Surgeon:GENERIC ANESTHESIA PROVIDER; Location:[facility] OR     INJECT EPIDURAL LUMBAR  6/4/2012    Procedure:INJECT EPIDURAL LUMBAR; MORENA with Flouro--; Surgeon:GENERIC ANESTHESIA PROVIDER; Location:[facility] OR     INJECT EPIDURAL LUMBAR  6/18/2012    Procedure: INJECT EPIDURAL LUMBAR;  MORENA-Dr. Tapia;  Surgeon: Provider, Generic Anesthesia;  Location: [facility] OR       Social History   I have reviewed this patient's social history and updated it with pertinent information if needed.  Social History     Tobacco Use     Smoking status: Current Every Day Smoker     Packs/day: 0.25     Years: 50.00     Pack years: 12.50     Types: Cigarettes     Smokeless tobacco:  Never Used     Tobacco comment: 5-8 cigarettes daily   Substance Use Topics     Alcohol use: No     Drug use: No       Family History   I have reviewed this patient's family history and updated it with pertinent information if needed.  Family History   Problem Relation Age of Onset     Heart Disease Mother      Lipids Mother      Hypertension Mother      Lipids Father      Hypertension Father      Hypertension Sister      Lipids Sister      Depression Daughter      Heart Disease Sister      Hypertension Sister      Depression Daughter        Medications   Medications Prior to Admission   Medication Sig Dispense Refill Last Dose     amLODIPine (NORVASC) 10 MG tablet Take 1 tablet (10 mg) by mouth daily 90 tablet 3 11/12/2021 at am     ASPIRIN 81 MG OR TABS Take 81 mg by mouth daily  100 0 11/10/2021     CALCIUM 500 + D 500-200 MG-IU OR TABS Take 1 tablet by mouth daily    11/11/2021 at Unknown time     gabapentin (NEURONTIN) 300 MG capsule TAKE ONE CAPSULE BY MOUTH EVERY MORNING AND TAKE ONE CAPSULE BY MOUTH AT NOON AND TAKE TWO CAPSULES BY MOUTH AT BEDTIME (Patient taking differently: Take 300-600 mg by mouth 3 times daily as needed TAKE ONE CAPSULE BY MOUTH EVERY MORNING AND TAKE ONE CAPSULE BY MOUTH AT NOON AND TAKE TWO CAPSULES BY MOUTH AT BEDTIME) 180 capsule 3 11/11/2021 at Unknown time     HYDROcodone-acetaminophen (NORCO) 5-325 MG tablet TAKE ONE TABLET BY MOUTH TWICE A DAY AS NEEDED FOR SEVERE PAIN (30 DAY SUPPLY) 50 tablet 0 11/11/2021 at Unknown time     levothyroxine (SYNTHROID/LEVOTHROID) 75 MCG tablet Take 1 tablet (75 mcg) by mouth daily 90 tablet 3 11/12/2021 at am     lisinopril (ZESTRIL) 40 MG tablet Take 1 tablet (40 mg) by mouth daily 90 tablet 3 11/11/2021 at Unknown time     MULTIVITAMIN TABS   OR Take 1 tablet by mouth daily   0 11/11/2021 at Unknown time     oxyCODONE-acetaminophen (PERCOCET) 5-325 MG tablet Take 1-2 tablets by mouth every 4 hours as needed for pain 10 tablet 0 11/12/2021 at  am     pantoprazole (PROTONIX) 40 MG EC tablet Take 1 tablet (40 mg) by mouth every morning (before breakfast) 90 tablet 3 11/12/2021 at am     polyethylene glycol (MIRALAX) 17 g packet Take 0.5-1 packets by mouth daily as needed for constipation   Past Week at Unknown time     simvastatin (ZOCOR) 20 MG tablet Take 1 tablet (20 mg) by mouth At Bedtime 90 tablet 3 11/12/2021 at am (takes in am)     vitamin C (ASCORBIC ACID) 500 MG tablet Take 1,000 mg by mouth daily   11/11/2021 at Unknown time       Allergies   Allergies   Allergen Reactions     No Known Drug Allergy        Physical Exam   Vital Signs: Temp: 97.5  F (36.4  C) Temp src: Oral BP: 113/56 Pulse: 76   Resp: 16 SpO2: 96 % O2 Device: Nasal cannula Oxygen Delivery: 2 LPM  Weight: 93 lbs 0 oz    Constitutional: awake, alert, cooperative, no apparent distress, and appears stated age  Eyes: Lids and lashes normal, pupils equal, round and reactive to light, extra ocular muscles intact, sclera clear, conjunctiva normal  Respiratory: No increased work of breathing, good air exchange, clear to auscultation bilaterally, no crackles or wheezing  Cardiovascular: Normal apical impulse, regular rate and rhythm, normal S1 and S2, no S3 or S4, and no murmur noted  GI: No scars, normal bowel sounds, soft, non-distended, non-tender, no masses palpated, no hepatosplenomegally  Skin: no bruising or bleeding and normal skin color, texture, turgor  Musculoskeletal: There is no redness, warmth, or swelling of the joints.  Full range of motion noted.  Motor strength is 5 out of 5 all extremities bilaterally.  Tone is normal.  Neurologic: Awake, alert, oriented to name, place and time.  Cranial nerves II-XII are grossly intact.  Motor is 5 out of 5 bilaterally.  Cerebellar finger to nose, heel to shin intact.  Sensory is intact.  Babinski down going, Romberg negative, and gait is normal.    Data   Results for orders placed or performed during the hospital encounter of 11/12/21  (from the past 24 hour(s))   XR Pelvis and Hip Left 2 Views    Narrative    EXAM: XR PELVIS AND HIP LEFT 2 VIEWS  LOCATION: [facility]  DATE/TIME: 11/12/2021 9:03 AM    INDICATION: Left hip pain.  COMPARISON: None.      Impression    IMPRESSION: Postop changes of a left femoral hemiarthroplasty with a small amount of gas in the adjacent soft tissues. No acute fracture. 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 Osteopenia.

## 2021-11-12 NOTE — ANESTHESIA POSTPROCEDURE EVALUATION
Patient: Nighat Gupta    Procedure: Procedure(s):  LEFT HIP HEMIARTHROPLASTY       Diagnosis:Fracture of femoral neck, left (H) [S72.002A]  Diagnosis Additional Information: No value filed.    Anesthesia Type:  Spinal    Note:  Disposition: Outpatient   Postop Pain Control: Uneventful            Sign Out: Well controlled pain   PONV: No   Neuro/Psych: Uneventful            Sign Out: Acceptable/Baseline neuro status   Airway/Respiratory: Uneventful            Sign Out: Acceptable/Baseline resp. status   CV/Hemodynamics: Uneventful            Sign Out: Acceptable CV status; No obvious hypovolemia; No obvious fluid overload   Other NRE: NONE   DID A NON-ROUTINE EVENT OCCUR? No           Last vitals:  Vitals Value Taken Time   BP 90/46 11/12/21 1050   Temp 36.4  C (97.6  F) 11/12/21 0920   Pulse 64 11/12/21 1050   Resp 19 11/12/21 0937   SpO2 93 % 11/12/21 1050   Vitals shown include unvalidated device data.    Electronically Signed By: Ivett Dillon MD  November 12, 2021  11:49 AM

## 2021-11-12 NOTE — ANESTHESIA CARE TRANSFER NOTE
Patient: Nighat Gupta    Procedure: Procedure(s):  LEFT HIP HEMIARTHROPLASTY       Diagnosis: Fracture of femoral neck, left (H) [S72.002A]  Diagnosis Additional Information: No value filed.    Anesthesia Type:   Spinal     Note:    Oropharynx: oropharynx clear of all foreign objects  Level of Consciousness: awake  Oxygen Supplementation: face mask  Level of Supplemental Oxygen (L/min / FiO2): 6  Independent Airway: airway patency satisfactory and stable  Dentition: dentition unchanged  Vital Signs Stable: post-procedure vital signs reviewed and stable  Report to RN Given: handoff report given  Patient transferred to: PACU    Handoff Report: Identifed the Patient, Identified the Reponsible Provider, Reviewed the pertinent medical history, Discussed the surgical course, Reviewed Intra-OP anesthesia mangement and issues during anesthesia, Set expectations for post-procedure period and Allowed opportunity for questions and acknowledgement of understanding      Vitals:  Vitals Value Taken Time   /56 11/12/21 0857   Temp     Pulse 75 11/12/21 0857   Resp     SpO2 99 % 11/12/21 0857   Vitals shown include unvalidated device data.    Electronically Signed By: MAX Clements CRNA  November 12, 2021  8:57 AM

## 2021-11-12 NOTE — ANESTHESIA PROCEDURE NOTES
Intrathecal injection Procedure Note    Pre-Procedure   Staff -        Anesthesiologist:  Ivett Dillon MD       Performed By: anesthesiologist       Location: OR       Procedure Start/Stop Times: 11/12/2021 7:47 AM and 11/12/2021 7:50 AM       Pre-Anesthestic Checklist: patient identified, IV checked, risks and benefits discussed, informed consent, monitors and equipment checked, pre-op evaluation, at physician/surgeon's request and post-op pain management  Timeout:       Correct Patient: Yes        Correct Procedure: Yes        Correct Site: Yes        Correct Position: Yes   Procedure Documentation  Procedure: intrathecal injection       Patient Position: sitting       Skin prep: Betadine       Insertion Site: L4-5. (right paramedian approach).       Needle Gauge: 20.        Needle Length (Inches): 4        Spinal Needle Type: Sprotte       Introducer used      # of attempts: 1 and  # of redirects:     Assessment/Narrative         Paresthesias: No.       CSF fluid: clear.    Medication(s) Administered   0.75% Hyperbaric Bupivacaine (Intrathecal), 1.4 mL  Epinephrine 1000 mcg/mL (Intrathecal), 0.1 mg

## 2021-11-13 ENCOUNTER — APPOINTMENT (OUTPATIENT)
Dept: OCCUPATIONAL THERAPY | Facility: CLINIC | Age: 86
End: 2021-11-13
Attending: ORTHOPAEDIC SURGERY
Payer: COMMERCIAL

## 2021-11-13 ENCOUNTER — APPOINTMENT (OUTPATIENT)
Dept: PHYSICAL THERAPY | Facility: CLINIC | Age: 86
End: 2021-11-13
Attending: ORTHOPAEDIC SURGERY
Payer: COMMERCIAL

## 2021-11-13 LAB
ANION GAP SERPL CALCULATED.3IONS-SCNC: 10 MMOL/L (ref 5–18)
BASOPHILS # BLD AUTO: 0 10E3/UL (ref 0–0.2)
BASOPHILS NFR BLD AUTO: 0 %
BUN SERPL-MCNC: 15 MG/DL (ref 8–28)
CALCIUM SERPL-MCNC: 8.7 MG/DL (ref 8.5–10.5)
CHLORIDE BLD-SCNC: 101 MMOL/L (ref 98–107)
CO2 SERPL-SCNC: 25 MMOL/L (ref 22–31)
CREAT SERPL-MCNC: 0.91 MG/DL (ref 0.6–1.1)
EOSINOPHIL # BLD AUTO: 0 10E3/UL (ref 0–0.7)
EOSINOPHIL NFR BLD AUTO: 0 %
ERYTHROCYTE [DISTWIDTH] IN BLOOD BY AUTOMATED COUNT: 15.4 % (ref 10–15)
GFR SERPL CREATININE-BSD FRML MDRD: 58 ML/MIN/1.73M2
GLUCOSE BLD-MCNC: 86 MG/DL (ref 70–125)
HCT VFR BLD AUTO: 28.9 % (ref 35–47)
HGB BLD-MCNC: 9.3 G/DL (ref 11.7–15.7)
IMM GRANULOCYTES # BLD: 0.1 10E3/UL
IMM GRANULOCYTES NFR BLD: 1 %
LYMPHOCYTES # BLD AUTO: 1.3 10E3/UL (ref 0.8–5.3)
LYMPHOCYTES NFR BLD AUTO: 11 %
MCH RBC QN AUTO: 29.8 PG (ref 26.5–33)
MCHC RBC AUTO-ENTMCNC: 32.2 G/DL (ref 31.5–36.5)
MCV RBC AUTO: 93 FL (ref 78–100)
MONOCYTES # BLD AUTO: 1 10E3/UL (ref 0–1.3)
MONOCYTES NFR BLD AUTO: 9 %
NEUTROPHILS # BLD AUTO: 8.8 10E3/UL (ref 1.6–8.3)
NEUTROPHILS NFR BLD AUTO: 79 %
NRBC # BLD AUTO: 0 10E3/UL
NRBC BLD AUTO-RTO: 0 /100
PLATELET # BLD AUTO: 301 10E3/UL (ref 150–450)
POTASSIUM BLD-SCNC: 4.6 MMOL/L (ref 3.5–5)
RBC # BLD AUTO: 3.12 10E6/UL (ref 3.8–5.2)
SODIUM SERPL-SCNC: 136 MMOL/L (ref 136–145)
WBC # BLD AUTO: 11.2 10E3/UL (ref 4–11)

## 2021-11-13 PROCEDURE — 85025 COMPLETE CBC W/AUTO DIFF WBC: CPT | Performed by: STUDENT IN AN ORGANIZED HEALTH CARE EDUCATION/TRAINING PROGRAM

## 2021-11-13 PROCEDURE — 97535 SELF CARE MNGMENT TRAINING: CPT | Mod: GO

## 2021-11-13 PROCEDURE — 250N000013 HC RX MED GY IP 250 OP 250 PS 637: Performed by: ORTHOPAEDIC SURGERY

## 2021-11-13 PROCEDURE — 97166 OT EVAL MOD COMPLEX 45 MIN: CPT | Mod: GO

## 2021-11-13 PROCEDURE — 97112 NEUROMUSCULAR REEDUCATION: CPT | Mod: GP | Performed by: PHYSICAL THERAPIST

## 2021-11-13 PROCEDURE — 97116 GAIT TRAINING THERAPY: CPT | Mod: GP | Performed by: PHYSICAL THERAPIST

## 2021-11-13 PROCEDURE — 36415 COLL VENOUS BLD VENIPUNCTURE: CPT | Performed by: STUDENT IN AN ORGANIZED HEALTH CARE EDUCATION/TRAINING PROGRAM

## 2021-11-13 PROCEDURE — 258N000003 HC RX IP 258 OP 636: Performed by: ORTHOPAEDIC SURGERY

## 2021-11-13 PROCEDURE — 99225 PR SUBSEQUENT OBSERVATION CARE,LEVEL II: CPT | Mod: GC | Performed by: STUDENT IN AN ORGANIZED HEALTH CARE EDUCATION/TRAINING PROGRAM

## 2021-11-13 PROCEDURE — 97162 PT EVAL MOD COMPLEX 30 MIN: CPT | Mod: GP | Performed by: PHYSICAL THERAPIST

## 2021-11-13 PROCEDURE — 80048 BASIC METABOLIC PNL TOTAL CA: CPT | Performed by: STUDENT IN AN ORGANIZED HEALTH CARE EDUCATION/TRAINING PROGRAM

## 2021-11-13 PROCEDURE — 250N000011 HC RX IP 250 OP 636: Performed by: ORTHOPAEDIC SURGERY

## 2021-11-13 PROCEDURE — 97110 THERAPEUTIC EXERCISES: CPT | Mod: GP | Performed by: PHYSICAL THERAPIST

## 2021-11-13 RX ORDER — OXYCODONE HYDROCHLORIDE 5 MG/1
5 TABLET ORAL EVERY 4 HOURS PRN
Qty: 30 TABLET | Refills: 0 | Status: SHIPPED | OUTPATIENT
Start: 2021-11-13 | End: 2021-12-10

## 2021-11-13 RX ADMIN — AMLODIPINE BESYLATE 10 MG: 10 TABLET ORAL at 08:30

## 2021-11-13 RX ADMIN — POLYETHYLENE GLYCOL 3350 17 G: 17 POWDER, FOR SOLUTION ORAL at 08:30

## 2021-11-13 RX ADMIN — GABAPENTIN 600 MG: 300 CAPSULE ORAL at 16:00

## 2021-11-13 RX ADMIN — PANTOPRAZOLE SODIUM 40 MG: 20 TABLET, DELAYED RELEASE ORAL at 06:27

## 2021-11-13 RX ADMIN — SODIUM CHLORIDE, POTASSIUM CHLORIDE, SODIUM LACTATE AND CALCIUM CHLORIDE: 600; 310; 30; 20 INJECTION, SOLUTION INTRAVENOUS at 00:23

## 2021-11-13 RX ADMIN — LISINOPRIL 40 MG: 40 TABLET ORAL at 08:30

## 2021-11-13 RX ADMIN — DOCUSATE SODIUM 50MG AND SENNOSIDES 8.6MG 1 TABLET: 8.6; 5 TABLET, FILM COATED ORAL at 21:12

## 2021-11-13 RX ADMIN — ACETAMINOPHEN 975 MG: 325 TABLET ORAL at 18:32

## 2021-11-13 RX ADMIN — ASPIRIN 325 MG: 325 TABLET, COATED ORAL at 08:30

## 2021-11-13 RX ADMIN — CEFAZOLIN 1 G: 1 INJECTION, POWDER, FOR SOLUTION INTRAMUSCULAR; INTRAVENOUS at 00:18

## 2021-11-13 RX ADMIN — OXYCODONE HYDROCHLORIDE 5 MG: 5 TABLET ORAL at 10:30

## 2021-11-13 RX ADMIN — ACETAMINOPHEN 975 MG: 325 TABLET ORAL at 02:44

## 2021-11-13 RX ADMIN — OXYCODONE HYDROCHLORIDE 2.5 MG: 5 TABLET ORAL at 00:23

## 2021-11-13 RX ADMIN — DOCUSATE SODIUM 50MG AND SENNOSIDES 8.6MG 1 TABLET: 8.6; 5 TABLET, FILM COATED ORAL at 08:30

## 2021-11-13 RX ADMIN — SIMVASTATIN 20 MG: 20 TABLET, FILM COATED ORAL at 08:30

## 2021-11-13 RX ADMIN — ACETAMINOPHEN 975 MG: 325 TABLET ORAL at 10:30

## 2021-11-13 RX ADMIN — LEVOTHYROXINE SODIUM 75 MCG: 75 TABLET ORAL at 05:23

## 2021-11-13 RX ADMIN — OXYCODONE HYDROCHLORIDE 5 MG: 5 TABLET ORAL at 16:00

## 2021-11-13 RX ADMIN — OXYCODONE HYDROCHLORIDE 5 MG: 5 TABLET ORAL at 05:23

## 2021-11-13 NOTE — PLAN OF CARE
Problem: Joint Function Impaired (Hip Arthroplasty)  Goal: Optimal Functional Ability  Outcome: Improving     Problem: Pain (Hip Arthroplasty)  Goal: Acceptable Pain Control  Intervention: Prevent or Manage Pain  Recent Flowsheet Documentation  Taken 11/13/2021 0523 by Evelyn Taylro RN  Pain Management Interventions: medication (see MAR)  Taken 11/13/2021 0023 by Evelyn Taylor RN  Pain Management Interventions: medication (see MAR)     Patient vital signs are at baseline: Yes  Patient able to ambulate as they were prior to admission or with assist devices provided by therapies during their stay:  Yes  Patient MUST void prior to discharge:  Yes  Patient able to tolerate oral intake:  Yes  Pain has adequate pain control using Oral analgesics:  Yes    Pain controlled with scheduled Tylenol and prn Oxycodone. Up to bathroom with assist of 1, tolerates well. Voiding small amounts frequently, post void bladder scan 216. Plan to continue to monitor.

## 2021-11-13 NOTE — PROGRESS NOTES
[facility]      OUTPATIENT OCCUPATIONAL THERAPY  EVALUATION  PLAN OF TREATMENT FOR OUTPATIENT REHABILITATION  (COMPLETE FOR INITIAL CLAIMS ONLY)  Patient's Last Name, First Name, M.I.  YOB: 1935  AlonsoNighat  D                          Provider's Name  [facility] Medical Record No.  [id number]                               Onset Date:  11/12/21   Start of Care Date:  11/13/21     Type:     ___PT   _X_OT   ___SLP Medical Diagnosis:  bonifacio hip arthroplasty                        OT Diagnosis:  decr ADL indep/safety   Visits from SOC:  1   _________________________________________________________________________________  Plan of Treatment/Functional Goals    Planned Interventions: ADL retraining,transfer training   Goals: See Occupational Therapy Goals on Care Plan in [employer] electronic health record.    Therapy Frequency: Daily  Predicted Duration of Therapy Intervention: 5 days  _________________________________________________________________________________    I CERTIFY THE NEED FOR THESE SERVICES FURNISHED UNDER        THIS PLAN OF TREATMENT AND WHILE UNDER MY CARE     (Physician co-signature of this document indicates review and certification of the therapy plan).                Certification date from: 11/13/21, Certification date to: 12/13/21    Referring Physician: Dr. Noah Bautista            Initial Assessment        See Occupational Therapy evaluation dated 11/13/21 in [employer] electronic health record.

## 2021-11-13 NOTE — PLAN OF CARE
Note from [id number]. Pt denied pain during shift thus far. Skilled monitoring in all medical conditions. No new skin issues noted. Dressing is CDI. Full sensation per pt. SBA with walker for transferring. Fluids running, IV patent. Pt is having some urinary retention, however, not within parameters to treat and pt is not uncomfortable. Education on medication administration and use of call-light to reduce risk for falls and injury. Alarms in place. No further issues noted. VSS, no shortness of breath. Will continue to monitor.    Taylor R Schoenecker, RN

## 2021-11-13 NOTE — CONSULTS
Care Management Initial Consult    General Information  Assessment completed with: Patient,    Type of CM/SW Visit: Initial Assessment    Primary Care Provider verified and updated as needed: Yes   Readmission within the last 30 days: other (see comments) (discovered a sacral fracture)   Return Category: New Diagnosis  Reason for Consult: discharge planning  Advance Care Planning: Advance Care Planning Reviewed: education/resources on health care directives provided          Communication Assessment  Patient's communication style: spoken language (English or Bilingual)    Hearing Difficulty or Deaf: yes   Wear Glasses or Blind: yes    Cognitive  Cognitive/Neuro/Behavioral: WDL                      Living Environment:   People in home: alone     Current living Arrangements: independent living facility  Name of Facility: [facility] [employer]   Able to return to prior arrangements: yes       Family/Social Support:  Care provided by: self  Provides care for: no one  Marital Status:   Children          Description of Support System: Supportive         Current Resources:   Patient receiving home care services:       Community Resources:    Equipment currently used at home: walker, rolling  Supplies currently used at home:      Employment/Financial:  Employment Status: retired        Financial Concerns: No concerns identified   Referral to Financial Counselor: No       Lifestyle & Psychosocial Needs:  Social Determinants of Health     Tobacco Use: High Risk     Smoking Tobacco Use: Current Every Day Smoker     Smokeless Tobacco Use: Never Used   Alcohol Use: Not on file   Financial Resource Strain: Not on file   Food Insecurity: Not on file   Transportation Needs: Not on file   Physical Activity: Not on file   Stress: Not on file   Social Connections: Not on file   Intimate Partner Violence: Not on file   Depression: Not at risk     PHQ-2 Score: 1   Housing Stability: Not on file       Functional Status:  Prior to  admission patient needed assistance:              Mental Health Status:          Chemical Dependency Status:                Values/Beliefs:  Spiritual, Cultural Beliefs, Zoroastrian Practices, Values that affect care: yes  Description of Beliefs that Will Affect Care: Miguel, would like to see a             Additional Information:  The writer met with the patient in the room to discuss discharge planning. At baseline, the patient lives alone in a senior independent living apartment - [place], [place]. She is normally independent with I/ADLs, but she does use a 4-wheel walker. The patient's goal at discharge is to go to TCU, and referrals have been sent. The patient's main contact with family is Jenn, and the patient asked the writer to talk to Jenn to help with decisions regarding going to TCU. Family may be able to transport the patient at discharge, and TCUs in the [facility] are preferred. CM to follow.    Hansel Lewis RN

## 2021-11-13 NOTE — PROGRESS NOTES
"[facility] Progress Note      Post-operative Day: 1 Day Post-Op    Procedure(s):  LEFT HIP HEMIARTHROPLASTY      Subjective:    Pain: minimal  Chest pain, SOB:  No      Objective:  Blood pressure 118/54, pulse 61, temperature 98.7  F (37.1  C), temperature source Oral, resp. rate 18, height 1.473 m (4' 10\"), weight 42.2 kg (93 lb), SpO2 95 %, not currently breastfeeding.    Patient Vitals for the past 24 hrs:   BP Temp Temp src Pulse Resp SpO2   11/13/21 0716 118/54 98.7  F (37.1  C) Oral 61 18 95 %   11/13/21 0411 129/58 98.5  F (36.9  C) Oral 62 16 92 %   11/13/21 0011 136/63 98.5  F (36.9  C) Oral 69 16 94 %   11/12/21 2000 103/51 97.7  F (36.5  C) Oral 53 16 97 %   11/12/21 1600 103/51 98.1  F (36.7  C) Oral 59 16 93 %   11/12/21 1410 114/52 98.2  F (36.8  C) Oral 60 16 91 %   11/12/21 1310 112/56 98.1  F (36.7  C) Oral 59 16 91 %   11/12/21 1210 115/55 97.4  F (36.3  C) Oral 68 16 91 %   11/12/21 1135 113/56 97.5  F (36.4  C) Oral 76 16 96 %   11/12/21 1105 117/57 98.1  F (36.7  C) Axillary 65 16 91 %   11/12/21 1030 103/49 -- -- 65 -- 93 %   11/12/21 1020 110/55 -- -- 67 -- 94 %   11/12/21 1010 100/49 -- -- 68 -- 95 %   11/12/21 1000 108/54 -- -- 71 -- 96 %   11/12/21 0950 120/53 -- -- 78 -- 96 %   11/12/21 0940 122/53 -- -- 72 -- 97 %   11/12/21 0937 111/75 -- -- 73 19 97 %   11/12/21 0920 120/53 97.6  F (36.4  C) Temporal 78 22 98 %   11/12/21 0919 -- -- -- -- -- (!) 88 %   11/12/21 0910 120/57 -- -- 81 27 98 %   11/12/21 0900 125/58 -- -- 81 (!) 32 97 %   11/12/21 0857 116/56 97.2  F (36.2  C) Temporal 75 16 99 %       Wt Readings from Last 4 Encounters:   11/12/21 42.2 kg (93 lb)   11/11/21 42.2 kg (93 lb)   11/08/21 42.2 kg (93 lb)   11/03/21 42.5 kg (93 lb 12.8 oz)         Motor function, sensation, and circulation intact   Yes  Wound status: incisions are clean dry and intact. Yes  Calf tenderness: Bilateral  No    Pertinent Labs   Lab Results: personally reviewed.     Recent Labs   Lab " Test 11/13/21  0804 11/11/21  1212 11/03/21  1114 10/14/20  0839 09/29/20  0729 09/09/20  0554 09/08/20  0535 09/07/20  0601 09/06/20  1415   INR  --   --   --   --   --   --  1.29*  --   --    HGB 9.3* 11.2*  --  9.6* 9.4*   < > 8.1*   < > 8.2*   HCT 28.9* 32.9*  --  30.4* 29.1*   < > 25.1*   < > 26.0*   MCV 93 92  --  86 85   < > 86   < > 86    295  --  390 472*   < > 531*   < > 572*   NA  --   --  137 135 134   < > 139   < > 136   CRP  --   --   --   --   --   --   --   --  375.0*    < > = values in this interval not displayed.       Plan: Anticoagulation protocol:  mg daily  x 6 weeks            Pain medications:  oxycodone            Weight bearing status:  WBAT, dislocation precautions S/P hip hemiarthroplasty, posterior approach. Still pending therapy evaluation and bed placement.             Disposition: Discussion with patient Home Care versus TCU- Patient doesn't feel safe at home over night, feels she needs TCU to be safe. Daughter in process of coordinating care.             Continue cares and rehabilitation     Report completed by:  Angie Sena PA-C  Date: 11/13/2021  Time: 8:11 AM

## 2021-11-13 NOTE — PROGRESS NOTES
[facility]      OUTPATIENT PHYSICAL THERAPY EVALUATION  PLAN OF TREATMENT FOR OUTPATIENT REHABILITATION  (COMPLETE FOR INITIAL CLAIMS ONLY)  Patient's Last Name, First Name, M.I.  YOB: 1935  Nighat Gupta                        Provider's Name  [facility] Medical Record No.  [id number]                               Onset Date:      Start of Care Date:  11/13/21      Type:     _X_PT   ___OT   ___SLP Medical Diagnosis:  s/p L hemiarthroplasty                        PT Diagnosis:  impaired functional mobility, impaired ability to observe post-surgical precautions   Visits from SOC:  1   _________________________________________________________________________________  Plan of Treatment/Functional Goals    Planned Interventions: balance training,home exercise program,postural re-education,stair training,strengthening,gait training     Goals: See Physical Therapy Goals on Care Plan in [employer] electronic health record.    Therapy Frequency: Daily  Predicted Duration of Therapy Intervention: 6 days  _________________________________________________________________________________    I CERTIFY THE NEED FOR THESE SERVICES FURNISHED UNDER        THIS PLAN OF TREATMENT AND WHILE UNDER MY CARE     (Physician co-signature of this document indicates review and certification of the therapy plan).                Certification date from: 11/13/21, Certification date to: 12/13/21    Referring Physician: Lily            Initial Assessment        See Physical Therapy evaluation dated 11/13/21 in Epic electronic health record.

## 2021-11-13 NOTE — PROGRESS NOTES
[facility]    Medicine Progress Note - Hospitalist Service       Date of Admission:  11/12/2021    Assessment & Plan         Nighat Gupta is a 86 year old female admitted on 11/12/2021. She has a past medical history of hypertension, hypothyroidism, anemia, and hyperlipidemia.  Patient had recent falls which resulted in hip fracture.  Patient medically stable for discharge depend on Ortho/PT recommendation     Left hip ortho plasty  Recent history of falls, CT revealed left displaced femoral neck fracture.  -Pain controlled,Per Ortho  -PT/OT     Hypothyroidism  -Continue PTA levothyroxine     HTN  -Continue PTA amlodipine  -Continue PTA lisinopril     Hyperlipidemia  -Hold PTA simvastatin     Anemia  Patient hemoglobin range between 8.1-9.6.  -Monitor  -Follow-up as outpatient             Diet: Advance Diet as Tolerated: Regular Diet Adult  Diet    DVT Prophylaxis: ASA  Castle Catheter: Not present  Central Lines: None  Code Status: Full Code      Disposition Plan   Expected discharge: 11/14/2021   recommended to Home with assistant, home with home care physical therapy, home with outpatient physical therapy, long-term care facility once adequate pain management/ tolerating PO medications.     The patient's care was discussed with the Attending Physician, Dr. Garrett.    Zack Ford MD  Hospitalist Service  [facility]  Securely message with the Vocera Web Console (learn more here)  Text page via [employer] Paging/Directory        Clinically Significant Risk Factors Present on Admission              # Platelet Defect: home medication list includes an antiplatelet medication      ______________________________________________________________________    Interval History   Overnight patient complaining of mild hip pain that was controlled with oral medication this morning patient was sitting on recliner, mild hip pain.  Denies shortness of breath, chest pain, heart  palpitation, or abdominal pain    Data reviewed today: I reviewed all medications, new labs and imaging results over the last 24 hours. I personally reviewed no images or EKG's today.    Physical Exam   Vital Signs: Temp: 98.7  F (37.1  C) Temp src: Oral BP: 118/54 Pulse: 61   Resp: 18 SpO2: 95 % O2 Device: None (Room air) Oxygen Delivery: 2 LPM  Weight: 93 lbs 0 oz  Constitutional: awake, alert, cooperative, no apparent distress, and appears stated age  Eyes: Lids and lashes normal, pupils equal, round and reactive to light, extra ocular muscles intact, sclera clear, conjunctiva normal  Respiratory: No increased work of breathing, good air exchange, clear to auscultation bilaterally, no crackles or wheezing  Cardiovascular: Normal apical impulse, regular rate and rhythm, normal S1 and S2, no S3 or S4, and no murmur noted  GI: No scars, normal bowel sounds, soft, non-distended, non-tender, no masses palpated, no hepatosplenomegally  Musculoskeletal: There is no redness, warmth, or swelling of the joints.  Full range of motion noted.  Motor strength is 5 out of 5 all extremities bilaterally.  Tone is normal.  Neurologic: Awake, alert, oriented to name, place and time.  Cranial nerves II-XII are grossly intact.  Motor is 5 out of 5 bilaterally.  Cerebellar finger to nose, heel to shin intact.  Sensory is intact.  Babinski down going, Romberg negative, and gait is normal.    Data   Recent Labs   Lab 11/13/21  0804 11/11/21  1212   WBC 11.2* 7.3   HGB 9.3* 11.2*   MCV 93 92    295     --    POTASSIUM 4.6  --    CHLORIDE 101  --    CO2 25  --    BUN 15  --    CR 0.91  --    ANIONGAP 10  --    KODI 8.7  --    GLC 86  --

## 2021-11-13 NOTE — PROGRESS NOTES
"   11/13/21 0920   Quick Adds   Quick Adds Certification   Type of Visit Initial PT Evaluation   Living Environment   People in home alone   Current Living Arrangements apartment   Home Accessibility no concerns   Transportation Anticipated family or friend will provide   Living Environment Comments Independent at home.  Uses rollator outside the home.     Self-Care   Equipment Currently Used at Home walker, rolling   Disability/Function   Number of times patient has fallen within last six months 1   Change in Functional Status Since Onset of Current Illness/Injury yes   General Information   Referring Physician Lily   Patient/Family Therapy Goals Statement (PT) Nighat and her daughter, Jenn, both state that they think Nighat is not ready to return home at this time. They are concerned that she does not have any assistance available to her at home. Her daugter works and is not available to help in Nighat's home. Has two other daugthers but they also work. \"I don't have  anybody at home during the day or night.\" Jenn reports that she lives an hour away from her mother and so even if she tried to go there every night it would be only for a short period. Her sisters are also a similar distance from their mother's house. Nighat says that she is always in a rush, and feels afraid that she will forget her hip precautions.    Pertinent History of Current Problem (include personal factors and/or comorbidities that impact the POC) Patient fell due to a problem she says with losing her balance when she turns her head quickly. She fell and broke her hip, with subsequent hemiarthroplasty perfromed yesterday with posterior approach   Existing Precautions/Restrictions no hip IR;no hip ADD past midline;90 degree hip flexion   Weight-Bearing Status - LLE weight-bearing as tolerated   Cognition   Affect/Mental Status (Cognition) WFL  (Patient's cognition generaly good but shows some impulsivity)   Follows Commands " (Cognition) follows one-step commands   Safety Deficit (Cognition) minimal deficit;impulsivity;insight into deficits/self-awareness;safety precautions awareness;safety precautions follow-through/compliance   Pain Assessment   Patient Currently in Pain No   Strength   Strength Comments general mild weakness   Bed Mobility   Bed Mobility rolling right;supine-sit   Supine-Sit Shirley (Bed Mobility) modified independence   Comment (Bed Mobility) Able to perform sit to supine but with L hip adduction and IR during the transfer    Transfers   Transfers sit-stand transfer   Transfer Safety Concerns Noted inability to maintain weight-bearing restrictions w/o assist   Impairments Contributing to Impaired Transfers   (impulsivity and safety precautions)   Sit-Stand Transfer   Sit-Stand Shirley (Transfers) minimum assist (75% patient effort);verbal cues   Assistive Device (Sit-Stand Transfers) walker, front-wheeled   Gait/Stairs (Locomotion)   Shirley Level (Gait) verbal cues;supervision;set up;nonverbal cues (demo/gesture);contact guard   Assistive Device (Gait) walker, front-wheeled   Distance in Feet (Required for LE Total Joints) 120 feet with FWW, 60 feet sans AD    Pattern (Gait) step-through   Deviations/Abnormal Patterns (Gait) stride length decreased;weight shifting decreased;gait speed decreased;base of support, wide   Maintains Weight-bearing Status (Gait) able to maintain   Balance   Sit-to-Stand Balance good balance   Standing Balance: Static fair balance   Systems Impairment Contributing to Balance Disturbance vestibular   Balance Comments Patient's balance is generally good but demonstrates difficulty with eyes close and with head turns    Clinical Impression   Criteria for Skilled Therapeutic Intervention yes, treatment indicated   PT Diagnosis (PT) impaired functional mobility, impaired ability to observe post-surgical precautions   Influenced by the following impairments impaired strength,  balance, safety awareness   Functional limitations due to impairments post-surgical status   Clinical Presentation Stable/Uncomplicated   Clinical Presentation Rationale presents as diagnosed   Clinical Decision Making (Complexity) moderate complexity   Therapy Frequency (PT) Daily   Predicted Duration of Therapy Intervention (days/wks) 6 days   Planned Therapy Interventions (PT) balance training;home exercise program;postural re-education;stair training;strengthening;gait training   Anticipated Equipment Needs at Discharge (PT)   (FWW)   Risk & Benefits of therapy have been explained daughter;patient   Clinical Impression Comments Patient is walking and transferring well. Largest concern is some impulsivity that may result in violation of post-surgical precautions, as well as balance deicit that might result in another fall.    PT Discharge Planning    PT Discharge Recommendation (DC Rec) home with assist;home with home care physical therapy;home with outpatient physical therapy;Long term care facility   PT Rationale for DC Rec TCU may be most product option given that family is unable to provide assitance in the home, particularly with helping patient observe and remember post-surgical precautions; pt is a fall risk as well. Short TCU stay may be advisable. If returning home, patient should have physical assistance and would also benefit from home physical therapy to address balance, strength and postural deficits.    Therapy Certification   Start of care date 11/13/21   Certification date from 11/13/21   Certification date to 12/13/21   Medical Diagnosis s/p L hemiarthroplasty   Total Evaluation Time   Total Evaluation Time (Minutes) 20

## 2021-11-13 NOTE — DISCHARGE SUMMARY
[facility] Discharge Summary                                       RAULITO MIGUEL [id number]   Age: 86 year old  PCP: Nancy Tapia, [phone] 1935     Date of Admission:  11/12/2021  Date of Discharge::  11/13/2021  Discharge Physician:  Angie Sena PA-C    Code status:  Full Code    Admission Information:  Admission Diagnosis:  Fracture of femoral neck, left (H) [S72.002A]  Osteoarthritis, knee [M17.10]    Post-Operative Day: 1 Day Post-Op     Reason for admission:  The patient was admitted for the following:Procedure(s) (LRB):  LEFT HIP HEMIARTHROPLASTY (Left)    Active Problems:    Osteoarthritis, knee      Allergies:  No known drug allergy    Following the procedure noted above the patient was transferred to the post-op floor and started on:    Therapy:  physical therapy and occupational therapy  Anticoagulation Plan:  mg daily  For 6 weeks  Pain Management: oxycodone  Weight bearing status: Weight bearing as tolerated     The patient was followed and co-managed by the hospitalist service during the inpatient treatment course  Complications:  None  Consultations:  None     Pertinent Labs   Lab Results: personally reviewed.     Recent Labs   Lab Test 11/13/21  0804 11/11/21  1212 11/03/21  1114 10/14/20  0839 09/29/20  0729 09/09/20  0554 09/08/20  0535 09/07/20  0601 09/06/20  1415   INR  --   --   --   --   --   --  1.29*  --   --    HGB 9.3* 11.2*  --  9.6* 9.4*   < > 8.1*   < > 8.2*   HCT 28.9* 32.9*  --  30.4* 29.1*   < > 25.1*   < > 26.0*   MCV 93 92  --  86 85   < > 86   < > 86    295  --  390 472*   < > 531*   < > 572*   NA  --   --  137 135 134   < > 139   < > 136   CRP  --   --   --   --   --   --   --   --  375.0*    < > = values in this interval not displayed.          Discharge Information:  Condition at discharge: Stable  Discharge destination:  Discharged to TCU     Medications at discharge:  Current Discharge Medication List       START taking these medications    Details   oxyCODONE (ROXICODONE) 5 MG tablet Take 1 tablet (5 mg) by mouth every 4 hours as needed for severe pain ((pain rating 7-10))  Qty: 30 tablet, Refills: 0    Associated Diagnoses: History of left hip hemiarthroplasty         CONTINUE these medications which have NOT CHANGED    Details   amLODIPine (NORVASC) 10 MG tablet Take 1 tablet (10 mg) by mouth daily  Qty: 90 tablet, Refills: 3    Associated Diagnoses: Essential hypertension, benign      ASPIRIN 81 MG OR TABS Take 81 mg by mouth daily   Qty: 100, Refills: 0    Associated Diagnoses: Essential hypertension, benign; Other disorders of lipoid metabolism      CALCIUM 500 + D 500-200 MG-IU OR TABS Take 1 tablet by mouth daily       gabapentin (NEURONTIN) 300 MG capsule TAKE ONE CAPSULE BY MOUTH EVERY MORNING AND TAKE ONE CAPSULE BY MOUTH AT NOON AND TAKE TWO CAPSULES BY MOUTH AT BEDTIME  Qty: 180 capsule, Refills: 3    Associated Diagnoses: Lumbar radiculopathy      HYDROcodone-acetaminophen (NORCO) 5-325 MG tablet TAKE ONE TABLET BY MOUTH TWICE A DAY AS NEEDED FOR SEVERE PAIN (30 DAY SUPPLY)  Qty: 50 tablet, Refills: 0    Associated Diagnoses: Chronic left-sided low back pain with left-sided sciatica; Chronic pain syndrome; Chronic, continuous use of opioids; Lumbar radiculopathy      levothyroxine (SYNTHROID/LEVOTHROID) 75 MCG tablet Take 1 tablet (75 mcg) by mouth daily  Qty: 90 tablet, Refills: 3    Associated Diagnoses: Hypothyroidism, unspecified type      lisinopril (ZESTRIL) 40 MG tablet Take 1 tablet (40 mg) by mouth daily  Qty: 90 tablet, Refills: 3    Comments: Profile Rx: patient will contact pharmacy when needed  Associated Diagnoses: Essential hypertension, benign      MULTIVITAMIN TABS   OR Take 1 tablet by mouth daily   Refills: 0      pantoprazole (PROTONIX) 40 MG EC tablet Take 1 tablet (40 mg) by mouth every morning (before breakfast)  Qty: 90 tablet, Refills: 3      polyethylene glycol (MIRALAX) 17 g  packet Take 0.5-1 packets by mouth daily as needed for constipation      simvastatin (ZOCOR) 20 MG tablet Take 1 tablet (20 mg) by mouth At Bedtime  Qty: 90 tablet, Refills: 3    Comments: Profile Rx: patient will contact pharmacy when needed  Associated Diagnoses: Hyperlipidemia LDL goal <130      vitamin C (ASCORBIC ACID) 500 MG tablet Take 1,000 mg by mouth daily         STOP taking these medications       oxyCODONE-acetaminophen (PERCOCET) 5-325 MG tablet Comments:   Reason for Stopping:                          Follow-Up Care:  Patient should be seen in 2 weeks by the patient's Orthopedic Surgeon/Physician Assistant.  Call [phone] for appointment or questions.    Angie Sena PA-C/Dr. Julius Bautista/[facility]

## 2021-11-14 ENCOUNTER — APPOINTMENT (OUTPATIENT)
Dept: OCCUPATIONAL THERAPY | Facility: CLINIC | Age: 86
End: 2021-11-14
Attending: ORTHOPAEDIC SURGERY
Payer: COMMERCIAL

## 2021-11-14 ENCOUNTER — APPOINTMENT (OUTPATIENT)
Dept: PHYSICAL THERAPY | Facility: CLINIC | Age: 86
End: 2021-11-14
Attending: ORTHOPAEDIC SURGERY
Payer: COMMERCIAL

## 2021-11-14 VITALS
RESPIRATION RATE: 16 BRPM | HEIGHT: 58 IN | SYSTOLIC BLOOD PRESSURE: 143 MMHG | DIASTOLIC BLOOD PRESSURE: 67 MMHG | OXYGEN SATURATION: 96 % | WEIGHT: 93 LBS | TEMPERATURE: 98.6 F | BODY MASS INDEX: 19.52 KG/M2 | HEART RATE: 60 BPM

## 2021-11-14 LAB
GLUCOSE BLDC GLUCOMTR-MCNC: 136 MG/DL (ref 70–99)
HGB BLD-MCNC: 8.7 G/DL (ref 11.7–15.7)

## 2021-11-14 PROCEDURE — 36415 COLL VENOUS BLD VENIPUNCTURE: CPT | Performed by: ORTHOPAEDIC SURGERY

## 2021-11-14 PROCEDURE — 82962 GLUCOSE BLOOD TEST: CPT

## 2021-11-14 PROCEDURE — 99225 PR SUBSEQUENT OBSERVATION CARE,LEVEL II: CPT | Mod: GC | Performed by: STUDENT IN AN ORGANIZED HEALTH CARE EDUCATION/TRAINING PROGRAM

## 2021-11-14 PROCEDURE — 97116 GAIT TRAINING THERAPY: CPT | Mod: GP | Performed by: PHYSICAL THERAPIST

## 2021-11-14 PROCEDURE — 85018 HEMOGLOBIN: CPT | Performed by: ORTHOPAEDIC SURGERY

## 2021-11-14 PROCEDURE — 250N000013 HC RX MED GY IP 250 OP 250 PS 637: Performed by: ORTHOPAEDIC SURGERY

## 2021-11-14 PROCEDURE — 97535 SELF CARE MNGMENT TRAINING: CPT | Mod: GO

## 2021-11-14 RX ADMIN — SIMVASTATIN 20 MG: 20 TABLET, FILM COATED ORAL at 08:14

## 2021-11-14 RX ADMIN — POLYETHYLENE GLYCOL 3350 17 G: 17 POWDER, FOR SOLUTION ORAL at 08:14

## 2021-11-14 RX ADMIN — ACETAMINOPHEN 975 MG: 325 TABLET ORAL at 02:58

## 2021-11-14 RX ADMIN — LEVOTHYROXINE SODIUM 75 MCG: 75 TABLET ORAL at 06:51

## 2021-11-14 RX ADMIN — DOCUSATE SODIUM 50MG AND SENNOSIDES 8.6MG 1 TABLET: 8.6; 5 TABLET, FILM COATED ORAL at 08:14

## 2021-11-14 RX ADMIN — OXYCODONE HYDROCHLORIDE 5 MG: 5 TABLET ORAL at 06:52

## 2021-11-14 RX ADMIN — PANTOPRAZOLE SODIUM 40 MG: 20 TABLET, DELAYED RELEASE ORAL at 06:51

## 2021-11-14 RX ADMIN — OXYCODONE HYDROCHLORIDE 5 MG: 5 TABLET ORAL at 11:23

## 2021-11-14 RX ADMIN — ASPIRIN 325 MG: 325 TABLET, COATED ORAL at 08:14

## 2021-11-14 RX ADMIN — ACETAMINOPHEN 975 MG: 325 TABLET ORAL at 10:10

## 2021-11-14 RX ADMIN — LISINOPRIL 40 MG: 40 TABLET ORAL at 08:14

## 2021-11-14 RX ADMIN — AMLODIPINE BESYLATE 10 MG: 10 TABLET ORAL at 08:14

## 2021-11-14 NOTE — PLAN OF CARE
Physical Therapy Discharge Summary    Reason for therapy discharge:    All goals and outcomes met, no further needs identified.    Progress towards therapy goal(s). See goals on Care Plan in [facility] electronic health record for goal details.  Goals met    Therapy recommendation(s):    Continued therapy is recommended.  Rationale/Recommendations:  Home PT is required to address balance and strength deficits, reduce fall risk and improve posture as it relates to chronic low back pain.

## 2021-11-14 NOTE — PLAN OF CARE
Occupational Therapy Discharge Summary    Reason for therapy discharge:    Discharged to home with home therapy.    Progress towards therapy goal(s). See goals on Care Plan in [facility] electronic health record for goal details.  Goals met    Therapy recommendation(s):    Continued therapy is recommended.  Rationale/Recommendations:  Pt has slight impulsivity that may interfere with safety precautions after surgery.

## 2021-11-14 NOTE — PLAN OF CARE
Problem: Adult Inpatient Plan of Care  Goal: Optimal Comfort and Wellbeing  Outcome: Improving    Vital signs stable, pt denies pain. Up walking a couple times throughout night. Patient voiding adequately, incontinent at times. SBA, steady on feet. Aquacel dressing is CDI.

## 2021-11-14 NOTE — PROGRESS NOTES
"[facility] Orthopaedics Progress Note      Post-operative Day: 2 Days Post-Op    Procedure(s):  LEFT HIP HEMIARTHROPLASTY      Subjective:    Pain: minimal, says following therapy her pain increased.  She plans to go home today.  Has supportive help of daughter and home health.    Chest pain, SOB:  No      Objective:  Blood pressure 138/64, pulse 68, temperature 98.5  F (36.9  C), temperature source Oral, resp. rate 16, height 1.473 m (4' 10\"), weight 42.2 kg (93 lb), SpO2 94 %, not currently breastfeeding.    Patient Vitals for the past 24 hrs:   BP Temp Temp src Pulse Resp SpO2   11/14/21 0010 138/64 98.5  F (36.9  C) Oral 68 16 94 %   11/13/21 1605 (!) 179/72 98.2  F (36.8  C) Oral 67 16 94 %   11/13/21 0716 118/54 98.7  F (37.1  C) Oral 61 18 95 %       Wt Readings from Last 4 Encounters:   11/12/21 42.2 kg (93 lb)   11/11/21 42.2 kg (93 lb)   11/08/21 42.2 kg (93 lb)   11/03/21 42.5 kg (93 lb 12.8 oz)         Motor function, sensation, and circulation intact   Yes  Wound status: incisions are clean dry and intact. Yes  Calf tenderness: Bilateral  No    Pertinent Labs   Lab Results: personally reviewed.     Recent Labs   Lab Test 11/14/21  0602 11/13/21  0804 11/11/21  1212 11/03/21  1114 10/14/20  0839 09/09/20  0554 09/08/20  0535 09/07/20  0601 09/06/20  1415   INR  --   --   --   --   --   --  1.29*  --   --    HGB 8.7* 9.3* 11.2*  --  9.6*   < > 8.1*   < > 8.2*   HCT  --  28.9* 32.9*  --  30.4*   < > 25.1*   < > 26.0*   MCV  --  93 92  --  86   < > 86   < > 86   PLT  --  301 295  --  390   < > 531*   < > 572*   NA  --  136  --  137 135   < > 139   < > 136   CRP  --   --   --   --   --   --   --   --  375.0*    < > = values in this interval not displayed.       Plan: Anticoagulation protocol:  mg daily  x 42  days            Pain medications:  oxycodone and tylenol            Weight bearing status:  WBAT            Disposition:  Home today with home care             Continue cares and rehabilitation "     Report completed by:  Onesimo Dominguez PA-C  Date: 11/14/2021  Time: 7:06 AM

## 2021-11-14 NOTE — PLAN OF CARE
Note from 0700 to discharge. Discharge paperwork addressed thoroughly with pt and daughter by the ICU runner. AVS sent with pt. IV access discontinued. No issues noted. VSS, no shortness of breath.    Taylor R Schoenecker, RN

## 2021-11-14 NOTE — PROGRESS NOTES
Went over discharge paperwork with patient and daughter. Daughter was given AVS and oxy script. Daughter is aware to make follow up appointment for pt. Pt is aware of post-op instructions and to follow strict posterior hip precautions. Pt left to home with daughter transporting.

## 2021-11-14 NOTE — DISCHARGE INSTRUCTIONS
Home care services have been arranged for you.  Agency: [facility]  Phone: [phone]  Instructions: Home Care will contact you within 24 hours to arrange the first visit.

## 2021-11-14 NOTE — PROGRESS NOTES
[facility]    Medicine Progress Note - Hospitalist Service       Date of Admission:  11/14/2021    Assessment & Plan         Nighat Gupta is a 86 year old female admitted on 11/12/2021. She has a past medical history of hypertension, hypothyroidism, anemia, and hyperlipidemia. Patient had recent falls which resulted in hip fracture.  The medical team was asked to manage chronic medications. Patient medically stable for discharge depend on Ortho/PT recommendation.     Left hip ortho plasty  Recent history of falls, CT revealed left displaced femoral neck fracture.  -Pain controlled,Per Ortho  -PT/OT     Hypothyroidism  -Continue PTA levothyroxine     HTN  -Continue PTA amlodipine  -Continue PTA lisinopril     Hyperlipidemia  -Hold PTA simvastatin     Anemia  Patient hemoglobin range between 8.1-9.6.  -Monitor  -Follow-up as outpatient          Diet: Diet    DVT Prophylaxis: ASA  Castle Catheter: Not present  Central Lines: None  Code Status:        Disposition Plan   Expected discharge: 11/14/2021   recommended to Home with assistant, home with home care physical therapy, home with outpatient physical therapy, long-term care facility once adequate pain management/ tolerating PO medications.     The patient's care was discussed with the Attending Physician, Dr. Garrett.    Angie Hedrick MD  Hospitalist Service  [facility]  Securely message with the Vocera Web Console (learn more here)  Text page via [employer] Paging/Directory        Clinically Significant Risk Factors Present on Admission              # Platelet Defect: home medication list includes an antiplatelet medication      ______________________________________________________________________    Interval History   Doing well this morning. Seen while walking down the [place] with PT.  Denies shortness of breath, chest pain, heart palpitation, or abdominal pain    Physical Exam   Vital Signs: Temp: 98.6  F  (37  C) Temp src: Oral BP: (!) 143/67 Pulse: 60   Resp: 16 SpO2: 96 % O2 Device: None (Room air)    Weight: 93 lbs 0 oz  Constitutional: awake, alert, cooperative, no apparent distress, and appears stated age.  Neurologic: Ambulating with walker.    Data   Recent Labs   Lab 11/14/21  0839 11/14/21  0602 11/13/21  0804 11/11/21  1212   WBC  --   --  11.2* 7.3   HGB  --  8.7* 9.3* 11.2*   MCV  --   --  93 92   PLT  --   --  301 295   NA  --   --  136  --    POTASSIUM  --   --  4.6  --    CHLORIDE  --   --  101  --    CO2  --   --  25  --    BUN  --   --  15  --    CR  --   --  0.91  --    ANIONGAP  --   --  10  --    KODI  --   --  8.7  --    *  --  86  --

## 2021-11-14 NOTE — PLAN OF CARE
Note from [id number]. Pt rated pain 0-6/10 during shift thus far. Skilled monitoring in all medical conditions. No new skin issues noted. Dressing is CDI. Full sensation per pt. SBA with walker for transferring. Saline locked. Voiding adequately, incontinent at times. Education on medication administration and use of call-light to reduce risk for falls and injury. Alarms in place. No further issues noted. VSS, no shortness of breath. Will continue to monitor.    Taylor R Schoenecker, RN

## 2021-11-14 NOTE — PROGRESS NOTES
Care Management Discharge Note    Discharge Date: 11/14/2021       Discharge Disposition:      Discharge Services:      Discharge DME:      Discharge Transportation: family or friend will provide    Private pay costs discussed: Not applicable    PAS Confirmation Code:    Patient/family educated on Medicare website which has current facility and service quality ratings:      Education Provided on the Discharge Plan:    Persons Notified of Discharge Plans: Pt, daughter and [facility]  Patient/Family in Agreement with the Plan:      Handoff Referral Completed: No    Additional Information:  Met with pt and daughter regarding discharge plans.  Pt prefers discharging home with home care.  Family will be staying with pt initially.  Prior to hospitalization [facility] had been set up-contacted [facility] referral made and accepted.  Daughter will transport home.        URVASHI Thomas

## 2021-11-15 ENCOUNTER — TELEPHONE (OUTPATIENT)
Dept: FAMILY MEDICINE | Facility: CLINIC | Age: 86
End: 2021-11-15
Payer: COMMERCIAL

## 2021-11-15 NOTE — TELEPHONE ENCOUNTER
Reason for Call:  VO    Detailed comments:   1) skilled Nursing 2 x week 1 week, 1 week for 4 weeks, 1 every other week for 4 weeks, 3 prn's   2)PT / OT Eval  3) Med check Pt is using Nicotine patches daily only as needed. This is not on her Discharge list. Ok for her to continue with this.     .[facility]ospice/ [facility]  - [facility]  Nurse Davina     Tele # [phone]      Call taken on 11/15/2021 at 3:56 PM by Alexandrea Canales

## 2021-11-16 ENCOUNTER — TELEPHONE (OUTPATIENT)
Dept: FAMILY MEDICINE | Facility: CLINIC | Age: 86
End: 2021-11-16
Payer: COMMERCIAL

## 2021-11-16 NOTE — TELEPHONE ENCOUNTER
Reason for Call:  Home Health Care  Dennise PT  with [facility] called regarding (reason for call): Verbal orders    Orders are needed for this patient.PT     PT: Once a week for one week then 2x a week for 3 weeks  Working on strength , balance and Mobility    Pt Provider: Regina    Phone Number Homecare Nurse can be reached at: [phone]    Can we leave a detailed message on this number? YES    Call taken on 11/16/2021 at 3:57 PM by Bernie Patterson

## 2021-11-22 DIAGNOSIS — Z53.9 DIAGNOSIS NOT YET DEFINED: Primary | ICD-10-CM

## 2021-11-22 PROCEDURE — G0180 MD CERTIFICATION HHA PATIENT: HCPCS | Performed by: FAMILY MEDICINE

## 2021-11-24 ENCOUNTER — TRANSFERRED RECORDS (OUTPATIENT)
Dept: HEALTH INFORMATION MANAGEMENT | Facility: CLINIC | Age: 86
End: 2021-11-24
Payer: COMMERCIAL

## 2021-12-09 ENCOUNTER — HOSPITAL ENCOUNTER (EMERGENCY)
Facility: CLINIC | Age: 86
Discharge: HOME | End: 2021-12-10
Attending: EMERGENCY MEDICINE | Admitting: EMERGENCY MEDICINE
Payer: COMMERCIAL

## 2021-12-09 VITALS
WEIGHT: 98 LBS | RESPIRATION RATE: 18 BRPM | DIASTOLIC BLOOD PRESSURE: 57 MMHG | HEART RATE: 71 BPM | TEMPERATURE: 97.6 F | BODY MASS INDEX: 20.57 KG/M2 | SYSTOLIC BLOOD PRESSURE: 168 MMHG | OXYGEN SATURATION: 98 % | HEIGHT: 58 IN

## 2021-12-09 DIAGNOSIS — M25.552 HIP PAIN, LEFT: ICD-10-CM

## 2021-12-09 PROCEDURE — 99284 EMERGENCY DEPT VISIT MOD MDM: CPT | Performed by: EMERGENCY MEDICINE

## 2021-12-09 PROCEDURE — 99284 EMERGENCY DEPT VISIT MOD MDM: CPT

## 2021-12-09 RX ORDER — OXYCODONE HYDROCHLORIDE 5 MG/1
5 TABLET ORAL ONCE
Status: COMPLETED | OUTPATIENT
Start: 2021-12-10 | End: 2021-12-10

## 2021-12-09 RX ORDER — HYDROCODONE BITARTRATE AND ACETAMINOPHEN 5; 325 MG/1; MG/1
1 TABLET ORAL ONCE
Status: DISCONTINUED | OUTPATIENT
Start: 2021-12-10 | End: 2021-12-09

## 2021-12-09 RX ORDER — ACETAMINOPHEN 500 MG
1000 TABLET ORAL ONCE
Status: COMPLETED | OUTPATIENT
Start: 2021-12-10 | End: 2021-12-10

## 2021-12-09 ASSESSMENT — MIFFLIN-ST. JEOR: SCORE: 784.28

## 2021-12-10 ENCOUNTER — APPOINTMENT (OUTPATIENT)
Dept: GENERAL RADIOLOGY | Facility: CLINIC | Age: 86
End: 2021-12-10
Attending: EMERGENCY MEDICINE
Payer: COMMERCIAL

## 2021-12-10 PROCEDURE — 250N000013 HC RX MED GY IP 250 OP 250 PS 637: Performed by: EMERGENCY MEDICINE

## 2021-12-10 PROCEDURE — 73502 X-RAY EXAM HIP UNI 2-3 VIEWS: CPT

## 2021-12-10 RX ORDER — OXYCODONE HYDROCHLORIDE 5 MG/1
5 TABLET ORAL EVERY 6 HOURS PRN
Qty: 6 TABLET | Refills: 0 | Status: SHIPPED | OUTPATIENT
Start: 2021-12-10 | End: 2022-04-05

## 2021-12-10 RX ADMIN — OXYCODONE HYDROCHLORIDE 5 MG: 5 TABLET ORAL at 00:19

## 2021-12-10 RX ADMIN — ACETAMINOPHEN 1000 MG: 500 TABLET, FILM COATED ORAL at 00:20

## 2021-12-10 NOTE — ED TRIAGE NOTES
Here with lower back pain that radiates to left hip & thigh/groin. Denies any recent trauma or falls, hx of hip replacement following fall couple months ago. Probable sciatica

## 2021-12-10 NOTE — DISCHARGE INSTRUCTIONS
Return to the emergency department for fevers, worsening pain, rash, swelling, or other concerns.  Follow-up in clinic for recheck early next week.  Apply ice or heat to the area to help with the pain.  Continue walking to help with muscle stiffness.    Use acetaminophen 650 mg every 4 hours for your symptoms.  Use oxycodone as needed for pain that is not controlled by the prior two medications.    Oxycodone is an addictive opioid medication, please only take it when the pain is more than can be controlled by acetaminophen or ibuprofen alone. It will also make you lightheaded, nauseated, and constipated.  Do not drive, operate heavy machinery, or take care of young children while taking this medication. Do not mix it with other medications or drugs that will make you sleepy, such as alcohol.     Repeated studies have shown that the longer you use opioid pain medications, the longer it is until you return to normal function. It is our recommendation that you taper off opioids as quickly as possible with the goal of returning to normal function or near normal function. Long term use of opioids quickly results in growing tolerance to the medication (less of the benefits) and increased dependence (more of the bad side effects).     Pain is very difficult to treat and we can very rarely take away pain completely. If you are having difficulty with pain over several weeks after an injury, you may need to start different medications and therapies (such as physical therapy, graded exercise, massage, and acupuncture). Please talk about this with your regular doctor.     If you are interested in seeking free, confidential treatment referral and information service for individuals and families facing mental health and/or substance use disorders please call [phone])

## 2021-12-10 NOTE — ED PROVIDER NOTES
History     Chief Complaint   Patient presents with     Hip Pain     HPI  Nighat Gupta is a 86 year old female with history of chronic pain who presents for left hip and buttock pain.  Symptoms ongoing over the past week, no known injury.  Pain is aching, hurts when she is walking, improves with rest and pain medication.  Pain is worse in the buttock and radiates to the anterior thigh at times.  No rash.  No swelling.  No fevers, chills, abdominal pain, nausea, vomiting, diarrhea, dysuria, or rash.    Allergies:  Allergies   Allergen Reactions     No Known Drug Allergy        Problem List:    Patient Active Problem List    Diagnosis Date Noted     Osteoarthritis, knee 11/12/2021     Priority: Medium     Hypoxemia 09/08/2020     Priority: Medium     Occult blood positive stool 09/07/2020     Priority: Medium     Pneumonia of right lower lobe due to infectious organism 09/06/2020     Priority: Medium     Anemia 09/06/2020     Priority: Medium     Suspected COVID-19 virus infection 09/06/2020     Priority: Medium     Chronic pain syndrome 01/07/2016     Priority: Medium     Patient is followed by Nancy Tapia MD for ongoing prescription of pain medication.  All refills should be approved by this provider, or covering partner.    Chronic back pain   Medication(s): 40.   Maximum quantity per month: [place]   Clinic visit frequency required: Q 3 months     Controlled substance agreement:  Encounter-Level CSA - 4/4/16:               Controlled Substance Agreement - Scan on 4/8/2016 10:44 AM : CONTROLLED SUBSTANCE AGREEMENT 04/04/2016 (below)            Pain Clinic evaluation in the past: Yes    DIRE Total Score(s):  No flowsheet data found.    Last [facility] website verification:  December 2, 2016    [email or web address]         Chronic low back pain 09/16/2013     Priority: Medium     Lumbar radiculopathy 09/16/2013     Priority: Medium     [facility] 12/19/2012     Priority: Medium     Chasidy  Ranulfo RN-PHN  JAMAL / TRUDY [facility]   [phone]    DX V65.8 REPLACED WITH [id number] [facility]ALTH CARE HOME (04/08/2013)       Advanced directives, counseling/discussion 11/21/2011     Priority: Medium     Receipt of ACP document:  Received: Health Care Directive which was witnessed or notarized on 3/13/13.  Document not previously scanned.  Validation form completed and sent with document to be scanned.  Code Status needs to be updated to reflect choices in most recent ACP document. Orders placed.  Confirmed/documented designated decision maker(s). See permanent comments section of demographics in clinical tab. View document(s) and details by clicking on code status.   Added by Felisha Elizabeth on 3/26/2013.       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     Priority: Medium     Hypothyroidism 11/04/2005     Priority: Medium        Past Medical History:    Past Medical History:   Diagnosis Date     DEPRESSION W/ANXIETY 1980's     Essential hypertension, benign      Pure hypercholesterolemia      Unspecified hypothyroidism        Past Surgical History:    Past Surgical History:   Procedure Laterality Date     CATARACT IOL, RT/LT  2009    Cataract IOL RT/LT     COLONOSCOPY  2007    notable for diverticulosis     GYN SURGERY  04/2004    Oopherectomy - (R)     INJECT EPIDURAL LUMBAR  4/2/2012    Procedure:INJECT EPIDURAL LUMBAR; MORENA with Flouro--; Surgeon:GENERIC ANESTHESIA PROVIDER; Location:[place] OR     INJECT EPIDURAL LUMBAR  6/4/2012    Procedure:INJECT EPIDURAL LUMBAR; MORENA with Flouro--; Surgeon:GENERIC ANESTHESIA PROVIDER; Location:[place] OR     INJECT EPIDURAL LUMBAR  6/18/2012    Procedure: INJECT EPIDURAL LUMBAR;  MORENA-Dr. Tapia;  Surgeon: Provider, Generic  "Anesthesia;  Location: [facility] OR     OPEN REDUCTION INTERNAL FIXATION HIP BIPOLAR Left 11/12/2021    Procedure: LEFT HIP HEMIARTHROPLASTY;  Surgeon: Noah Bautista MD;  Location: [facility] Main OR       Family History:    Family History   Problem Relation Age of Onset     Heart Disease Mother      Lipids Mother      Hypertension Mother      Lipids Father      Hypertension Father      Hypertension Sister      Lipids Sister      Depression Daughter      Heart Disease Sister      Hypertension Sister      Depression Daughter        Social History:  Marital Status:   [5]  Social History     Tobacco Use     Smoking status: Current Every Day Smoker     Packs/day: 0.25     Years: 50.00     Pack years: 12.50     Types: Cigarettes     Smokeless tobacco: Never Used     Tobacco comment: 5-8 cigarettes daily   Substance Use Topics     Alcohol use: No     Drug use: No        Medications:    oxyCODONE (ROXICODONE) 5 MG tablet  amLODIPine (NORVASC) 10 MG tablet  ASPIRIN 81 MG OR TABS  CALCIUM 500 + D 500-200 MG-IU OR TABS  gabapentin (NEURONTIN) 300 MG capsule  HYDROcodone-acetaminophen (NORCO) 5-325 MG tablet  [START ON 1/6/2022] HYDROcodone-acetaminophen (NORCO) 5-325 MG tablet  levothyroxine (SYNTHROID/LEVOTHROID) 75 MCG tablet  lisinopril (ZESTRIL) 40 MG tablet  MULTIVITAMIN TABS   OR  pantoprazole (PROTONIX) 40 MG EC tablet  polyethylene glycol (MIRALAX) 17 g packet  simvastatin (ZOCOR) 20 MG tablet  vitamin C (ASCORBIC ACID) 500 MG tablet          Review of Systems  A 4 point review of systems was performed. All pertinent positives and negatives were listed in the HPI and rest of ROS were otherwise negative.    Physical Exam   BP: (!) 168/57  Pulse: 71  Temp: 97.6  F (36.4  C)  Resp: 18  Height: 147.3 cm (4' 10\")  Weight: 44.5 kg (98 lb)  SpO2: 98 %      Physical Exam  Vitals and nursing note reviewed.   Constitutional:       General: She is in acute distress.      Appearance: She is well-developed. She is not " diaphoretic.   HENT:      Head: Normocephalic and atraumatic.      Right Ear: External ear normal.      Left Ear: External ear normal.      Nose: Nose normal.   Eyes:      General: No scleral icterus.     Conjunctiva/sclera: Conjunctivae normal.   Cardiovascular:      Rate and Rhythm: Normal rate and regular rhythm.   Pulmonary:      Effort: Pulmonary effort is normal. No respiratory distress.      Breath sounds: No stridor.   Abdominal:      General: There is no distension.      Palpations: Abdomen is soft.      Tenderness: There is no abdominal tenderness. There is no guarding.   Musculoskeletal:      Cervical back: Normal range of motion.      Lumbar back: No swelling, deformity, signs of trauma, lacerations, spasms or bony tenderness.      Comments: Left Hip: no deformity, erythema, or warmth appreciated; no tenderness over greater trochanter or inguinal region; full ROM including internal and external rotation; normal strength for flexion and extension.   Skin:     General: Skin is warm and dry.   Neurological:      Mental Status: She is alert and oriented to person, place, and time.   Psychiatric:         Behavior: Behavior normal.         ED Course                 Procedures              Critical Care time:  none               Results for orders placed or performed during the hospital encounter of 12/09/21 (from the past 24 hour(s))   Pelvis XR w/ unilateral hip left    Narrative    EXAM: XR PELVIS AND HIP LEFT 1 VIEW  LOCATION: [facility]  DATE/TIME: 12/10/2021 12:37 AM    INDICATION: pain with ambulation, no known injury  COMPARISON: 11/12/2021.      Impression    IMPRESSION: No acute fracture or dislocation. Unremarkable appearance of the left hip hemiarthroplasty. 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       Medications   oxyCODONE (ROXICODONE) tablet 5 mg (5 mg Oral Given 12/10/21 0019)   acetaminophen (TYLENOL) tablet 1,000 mg (1,000 mg Oral Given 12/10/21 0020)        Assessments & Plan (with Medical Decision Making)   86-year-old female presents with left hip pain over the past week.  Temperature is 36.4  C, heart 71, SPO2 is 98% on room air.  She is given oxycodone and acetaminophen for the pain.  X-ray of the hip obtained, images reviewed independently as well as radiology read reviewed, no signs of fracture or dislocation.  No signs of septic arthritis, bursitis, herpes, cellulitis, or abscess on exam.  Likely musculoskeletal pain as the cause of her symptoms and she is safe to discharge home with a short course of oxycodone and instructions to return if she has worsening symptoms or other concerns, otherwise follow-up in clinic.  The patient and her daughter are in agreement with this plan.    I have reviewed the nursing notes.    I have reviewed the findings, diagnosis, plan and need for follow up with the patient.       New Prescriptions    OXYCODONE (ROXICODONE) 5 MG TABLET    Take 1 tablet (5 mg) by mouth every 6 hours as needed for severe pain       Final diagnoses:   Hip pain, left       12/9/2021   [facility] EMERGENCY DEPT     Marlon Park MD  12/10/21 0129

## 2021-12-17 ENCOUNTER — TELEPHONE (OUTPATIENT)
Dept: FAMILY MEDICINE | Facility: CLINIC | Age: 86
End: 2021-12-17
Payer: COMMERCIAL

## 2021-12-17 DIAGNOSIS — E03.9 HYPOTHYROIDISM, UNSPECIFIED TYPE: ICD-10-CM

## 2021-12-17 RX ORDER — LEVOTHYROXINE SODIUM 75 UG/1
75 TABLET ORAL DAILY
Qty: 90 TABLET | Refills: 3 | Status: SHIPPED | OUTPATIENT
Start: 2021-12-17 | End: 2022-04-05 | Stop reason: DRUGHIGH

## 2021-12-17 NOTE — TELEPHONE ENCOUNTER
Pharmacy requesting new RX of current levothyroxine 75 mcg daily. Last entered Rx was ordered 11/11/21 after OV with Dr Mando Ricks.  Vilma SNEED RN

## 2021-12-23 ENCOUNTER — TRANSFERRED RECORDS (OUTPATIENT)
Dept: HEALTH INFORMATION MANAGEMENT | Facility: CLINIC | Age: 86
End: 2021-12-23
Payer: COMMERCIAL

## 2022-01-02 ENCOUNTER — HEALTH MAINTENANCE LETTER (OUTPATIENT)
Age: 87
End: 2022-01-02

## 2022-01-03 NOTE — TELEPHONE ENCOUNTER
Please call is she needing this percocet or her usual norco rx that she has filled monthly   She has an Rx at the pharmacy that can be filled now for norco   I would prefer she use that and not use the percocet

## 2022-01-03 NOTE — TELEPHONE ENCOUNTER
Requested Prescriptions   Pending Prescriptions Disp Refills     oxyCODONE-acetaminophen (PERCOCET) 5-325 MG tablet [Pharmacy Med Name: OXYCODONE-ACETAMINOPHEN 5-325 TABS] 10 tablet 0     Sig: TAKE 1-2 TABLETS BY MOUTH EVERY 4 HOURS AS NEEDED FOR PAIN       There is no refill protocol information for this order        Last Written Prescription Date:  12/10/21  Last Fill Quantity: 6,  # refills: 0   Last office visit: 11/11/2021 with prescribing provider:     Future Office Visit:

## 2022-01-04 RX ORDER — OXYCODONE AND ACETAMINOPHEN 5; 325 MG/1; MG/1
TABLET ORAL
Qty: 10 TABLET | Refills: 0 | OUTPATIENT
Start: 2022-01-04

## 2022-02-01 ENCOUNTER — TELEPHONE (OUTPATIENT)
Dept: FAMILY MEDICINE | Facility: CLINIC | Age: 87
End: 2022-02-01
Payer: COMMERCIAL

## 2022-02-01 DIAGNOSIS — M54.42 CHRONIC LEFT-SIDED LOW BACK PAIN WITH LEFT-SIDED SCIATICA: Primary | ICD-10-CM

## 2022-02-01 DIAGNOSIS — G89.29 CHRONIC LEFT-SIDED LOW BACK PAIN WITH LEFT-SIDED SCIATICA: Primary | ICD-10-CM

## 2022-02-01 RX ORDER — HYDROCODONE BITARTRATE AND ACETAMINOPHEN 5; 325 MG/1; MG/1
TABLET ORAL
Qty: 50 TABLET | Refills: 0 | Status: SHIPPED | OUTPATIENT
Start: 2022-02-05 | End: 2022-02-09

## 2022-02-01 NOTE — TELEPHONE ENCOUNTER
Patient Quality Outreach    Patient is due for the following:   Phq - [place] for pain management    NEXT STEPS:   Patient has upcoming appointment, these items will be addressed at that time.    Type of outreach:    Chart review performed, no outreach needed.      Questions for provider review:    None     Natalia Mcdonnell, [facility]

## 2022-02-08 ENCOUNTER — TELEPHONE (OUTPATIENT)
Dept: FAMILY MEDICINE | Facility: CLINIC | Age: 87
End: 2022-02-08
Payer: COMMERCIAL

## 2022-02-08 NOTE — TELEPHONE ENCOUNTER
Patient Quality Outreach    Patient is due for the following:   Chronic pain   Needs updated CSA, Phq 9, PRECIOUS 7, Urine drug screen 04/13/21    NEXT STEPS:   Patient was scheduled for an appointment. Appointment note made    Type of outreach:    Chart review performed, no outreach needed.      Questions for provider review:    None     Natalia Mcdonnell, [facility]

## 2022-02-09 ENCOUNTER — VIRTUAL VISIT (OUTPATIENT)
Dept: FAMILY MEDICINE | Facility: CLINIC | Age: 87
End: 2022-02-09
Payer: COMMERCIAL

## 2022-02-09 DIAGNOSIS — M54.42 CHRONIC LEFT-SIDED LOW BACK PAIN WITH LEFT-SIDED SCIATICA: ICD-10-CM

## 2022-02-09 DIAGNOSIS — F32.0 MILD MAJOR DEPRESSION (H): ICD-10-CM

## 2022-02-09 DIAGNOSIS — G89.4 CHRONIC PAIN SYNDROME: Primary | Chronic | ICD-10-CM

## 2022-02-09 DIAGNOSIS — E03.9 HYPOTHYROIDISM, UNSPECIFIED TYPE: ICD-10-CM

## 2022-02-09 DIAGNOSIS — N18.31 CHRONIC KIDNEY DISEASE, STAGE 3A (H): ICD-10-CM

## 2022-02-09 DIAGNOSIS — G89.29 CHRONIC LEFT-SIDED LOW BACK PAIN WITH LEFT-SIDED SCIATICA: ICD-10-CM

## 2022-02-09 PROCEDURE — 96127 BRIEF EMOTIONAL/BEHAV ASSMT: CPT | Mod: 59 | Performed by: FAMILY MEDICINE

## 2022-02-09 PROCEDURE — 99214 OFFICE O/P EST MOD 30 MIN: CPT | Mod: 95 | Performed by: FAMILY MEDICINE

## 2022-02-09 RX ORDER — HYDROCODONE BITARTRATE AND ACETAMINOPHEN 5; 325 MG/1; MG/1
1 TABLET ORAL 2 TIMES DAILY PRN
Qty: 50 TABLET | Refills: 0 | Status: SHIPPED | OUTPATIENT
Start: 2022-03-04 | End: 2022-04-05

## 2022-02-09 RX ORDER — HYDROCODONE BITARTRATE AND ACETAMINOPHEN 5; 325 MG/1; MG/1
1 TABLET ORAL 2 TIMES DAILY PRN
Qty: 50 TABLET | Refills: 0 | Status: SHIPPED | OUTPATIENT
Start: 2022-04-04 | End: 2022-10-14

## 2022-02-09 ASSESSMENT — ANXIETY QUESTIONNAIRES
GAD7 TOTAL SCORE: 0
7. FEELING AFRAID AS IF SOMETHING AWFUL MIGHT HAPPEN: NOT AT ALL
6. BECOMING EASILY ANNOYED OR IRRITABLE: NOT AT ALL
5. BEING SO RESTLESS THAT IT IS HARD TO SIT STILL: NOT AT ALL
3. WORRYING TOO MUCH ABOUT DIFFERENT THINGS: NOT AT ALL
2. NOT BEING ABLE TO STOP OR CONTROL WORRYING: NOT AT ALL
1. FEELING NERVOUS, ANXIOUS, OR ON EDGE: NOT AT ALL

## 2022-02-09 ASSESSMENT — PATIENT HEALTH QUESTIONNAIRE - PHQ9
5. POOR APPETITE OR OVEREATING: NOT AT ALL
SUM OF ALL RESPONSES TO PHQ QUESTIONS 1-9: 9

## 2022-02-09 NOTE — PROGRESS NOTES
Nighat is a 86 year old who is being evaluated via a billable telephone visit.      What phone number would you like to be contacted at? [phone]  How would you like to obtain your AVS? Chris    Assessment & Plan     Chronic pain syndrome      Chronic kidney disease, stage 3a (H)  Stable no change in treatment plan.     Mild major depression (H)  Stable no change in treatment plan.     Chronic left-sided low back pain with left-sided sciatica  Stable no change in treatment plan.   - HYDROcodone-acetaminophen (NORCO) 5-325 MG tablet; Take 1 tablet by mouth 2 times daily as needed for severe pain 30 day supply  - HYDROcodone-acetaminophen (NORCO) 5-325 MG tablet; Take 1 tablet by mouth 2 times daily as needed for severe pain 30 day supply    Hypothyroidism, unspecified type  Adjust meds as indicated by above labs.   - TSH with free T4 reflex; Future                 Return in about 2 months (around 4/9/2022) for using an in person visit, with me.    Nancy Tapia MD  [facility]    Subjective   Nighat is a 86 year old who presents for the following health issues     HPI     Pain History:  When did you first notice your pain? - More than 6 weeks   Have you seen this provider for your pain in the past?   Yes   Where in your body do you have pain? Lower back  Are you seeing anyone else for your pain? No    PHQ-9 SCORE 1/13/2021 7/13/2021 2/9/2022   PHQ-9 Total Score - - -   [facility]   PHQ-9 Total Score 1 5 9           PHQ-9 SCORE 1/13/2021 7/13/2021 2/9/2022   PHQ-9 Total Score - - -   PHQ-9 Total Score MyChart - - -   PHQ-9 Total Score 1 5 9     PRECIOUS-7 SCORE 10/14/2020 1/13/2021 2/9/2022   Total Score - - -   Total Score 4 (minimal anxiety) - -   Total Score 4 2 0     PEG Score 2/9/2022   PEG Total Score 7.67       Chronic Pain Follow Up:    Location of pain: back   Analgesia/pain control:    - Recent changes:  None     - Overall control: Tolerable with  discomfort    - Current treatments: pain meds    Adherence:     - Do you ever take more pain medicine than prescribed? No    - When did you take your last dose of pain medicine?  This am    Adverse effects: No   PDMP Review       Value Time User    State PDMP site checked  Yes 12/10/2021 12:02 AM Marlon Park MD        Last CSA Agreement:   CSA -- Patient Level:    Controlled Substance Agreement - Opioid - Scan on 4/13/2021  9:32 AM  Controlled Substance Agreement - Non - Opioid - Scan on 3/12/2020 11:46 AM: NON-OPIOID CONTROLLED SUBSTANCE AGREEMENT  Controlled Substance Agreement - Opioid - Scan on 3/6/2020  9:44 AM: FOR 3-6-20 VISIT  Controlled Substance Agreement - Opioid - Scan on 3/6/2020  9:41 AM       Last UDS: 4/15/2021        Hypothyroidism Follow-up      Since last visit, patient describes the following symptoms: Weight stable, no hair loss, no skin changes, no constipation, no loose stools      Depression Followup    How are you doing with your depression since your last visit? No change    Are you having other symptoms that might be associated with depression? No    Have you had a significant life event?  No     Are you feeling anxious or having panic attacks?   No    Do you have any concerns with your use of alcohol or other drugs? No    Social History     Tobacco Use     Smoking status: Current Every Day Smoker     Packs/day: 0.25     Years: 50.00     Pack years: 12.50     Types: Cigarettes     Smokeless tobacco: Never Used     Tobacco comment: 5-8 cigarettes daily   Vaping Use     Vaping Use: Never used   Substance Use Topics     Alcohol use: No     Drug use: No     PHQ 1/13/2021 7/13/2021 2/9/2022   PHQ-9 Total Score 1 5 9   Q9: Thoughts of better off dead/self-harm past 2 weeks Not at all Several days Not at all     PRECIOUS-7 SCORE 10/14/2020 1/13/2021 2/9/2022   Total Score - - -   Total Score 4 (minimal anxiety) - -   Total Score 4 2 0         Suicide Assessment Five-step Evaluation and  Treatment (SAFE-T)    Chronic Kidney Disease Follow-up      Do you take any over the counter pain medicine?: No      Review of Systems   Constitutional, HEENT, cardiovascular, pulmonary, gi and gu systems are negative, except as otherwise noted.      Objective           Vitals:  No vitals were obtained today due to virtual visit.    Physical Exam   healthy, alert and no distress  PSYCH: Alert and oriented times 3; coherent speech, normal   rate and volume, able to articulate logical thoughts, able   to abstract reason, no tangential thoughts, no hallucinations   or delusions  Her affect is normal  RESP: No cough, no audible wheezing, able to talk in full sentences  Remainder of exam unable to be completed due to telephone visits                Phone call duration: 23 minutes

## 2022-02-10 ASSESSMENT — ANXIETY QUESTIONNAIRES: GAD7 TOTAL SCORE: 0

## 2022-02-17 ENCOUNTER — LAB (OUTPATIENT)
Dept: LAB | Facility: CLINIC | Age: 87
End: 2022-02-17
Payer: COMMERCIAL

## 2022-02-17 DIAGNOSIS — E03.9 HYPOTHYROIDISM, UNSPECIFIED TYPE: ICD-10-CM

## 2022-02-17 LAB
T4 FREE SERPL-MCNC: 1.05 NG/DL (ref 0.76–1.46)
TSH SERPL DL<=0.005 MIU/L-ACNC: 17.34 MU/L (ref 0.4–4)

## 2022-02-17 PROCEDURE — 84439 ASSAY OF FREE THYROXINE: CPT

## 2022-02-17 PROCEDURE — 84443 ASSAY THYROID STIM HORMONE: CPT

## 2022-02-17 PROCEDURE — 36415 COLL VENOUS BLD VENIPUNCTURE: CPT

## 2022-02-18 ENCOUNTER — TELEPHONE (OUTPATIENT)
Dept: FAMILY MEDICINE | Facility: CLINIC | Age: 87
End: 2022-02-18
Payer: COMMERCIAL

## 2022-02-18 NOTE — TELEPHONE ENCOUNTER
Reason for Call:  Results    Detailed comments: Pt calling to get her results for her Thyroid she had done earlier this week. Would like a call back to discuss further.    Phone Number Patient can be reached at: Home number on file [phone] (home)    Best Time: anytime    Can we leave a detailed message on this number? YES    Call taken on 2/18/2022 at 3:23 PM by Kate López

## 2022-02-21 ENCOUNTER — TELEPHONE (OUTPATIENT)
Dept: FAMILY MEDICINE | Facility: CLINIC | Age: 87
End: 2022-02-21
Payer: COMMERCIAL

## 2022-02-21 DIAGNOSIS — E03.9 HYPOTHYROIDISM, UNSPECIFIED TYPE: Primary | ICD-10-CM

## 2022-02-21 RX ORDER — LEVOTHYROXINE SODIUM 100 UG/1
100 TABLET ORAL DAILY
Qty: 90 TABLET | Refills: 0 | Status: SHIPPED | OUTPATIENT
Start: 2022-02-21 | End: 2022-04-05

## 2022-02-21 NOTE — TELEPHONE ENCOUNTER
Called patient and relayed Dr. Tapia's message regarding test results. She states that she has been taking synthroid 75mcg daily. She will start the new dose. She has an appointment with Dr. Tapia in about six weeks. She will recheck her TSH at that appointment.    Patient verbalized understanding and has no further questions.    Please sign new prescription.    Julisa Clark MA

## 2022-04-05 ENCOUNTER — OFFICE VISIT (OUTPATIENT)
Dept: FAMILY MEDICINE | Facility: CLINIC | Age: 87
End: 2022-04-05
Payer: COMMERCIAL

## 2022-04-05 VITALS
DIASTOLIC BLOOD PRESSURE: 70 MMHG | HEART RATE: 70 BPM | HEIGHT: 58 IN | OXYGEN SATURATION: 97 % | BODY MASS INDEX: 18.56 KG/M2 | RESPIRATION RATE: 22 BRPM | WEIGHT: 88.4 LBS | TEMPERATURE: 97.6 F | SYSTOLIC BLOOD PRESSURE: 138 MMHG

## 2022-04-05 DIAGNOSIS — R35.0 URINARY FREQUENCY: ICD-10-CM

## 2022-04-05 DIAGNOSIS — F32.0 MILD MAJOR DEPRESSION (H): ICD-10-CM

## 2022-04-05 DIAGNOSIS — M54.16 LUMBAR RADICULOPATHY: ICD-10-CM

## 2022-04-05 DIAGNOSIS — Z00.00 ENCOUNTER FOR MEDICARE ANNUAL WELLNESS EXAM: Primary | ICD-10-CM

## 2022-04-05 DIAGNOSIS — I10 ESSENTIAL HYPERTENSION, BENIGN: ICD-10-CM

## 2022-04-05 DIAGNOSIS — G89.29 CHRONIC LEFT-SIDED LOW BACK PAIN WITH LEFT-SIDED SCIATICA: ICD-10-CM

## 2022-04-05 DIAGNOSIS — G89.4 CHRONIC PAIN SYNDROME: ICD-10-CM

## 2022-04-05 DIAGNOSIS — M54.42 CHRONIC LEFT-SIDED LOW BACK PAIN WITH LEFT-SIDED SCIATICA: ICD-10-CM

## 2022-04-05 DIAGNOSIS — Z23 HIGH PRIORITY FOR 2019-NCOV VACCINE: ICD-10-CM

## 2022-04-05 DIAGNOSIS — E03.9 HYPOTHYROIDISM, UNSPECIFIED TYPE: ICD-10-CM

## 2022-04-05 DIAGNOSIS — F11.90 CHRONIC, CONTINUOUS USE OF OPIOIDS: ICD-10-CM

## 2022-04-05 LAB
ALBUMIN UR-MCNC: NEGATIVE MG/DL
APPEARANCE UR: CLEAR
BILIRUB UR QL STRIP: NEGATIVE
COLOR UR AUTO: YELLOW
CREAT UR-MCNC: 35 MG/DL
CREAT UR-MCNC: 35 MG/DL
GLUCOSE UR STRIP-MCNC: NEGATIVE MG/DL
HGB UR QL STRIP: NEGATIVE
KETONES UR STRIP-MCNC: NEGATIVE MG/DL
LEUKOCYTE ESTERASE UR QL STRIP: NEGATIVE
MICROALBUMIN UR-MCNC: 18 MG/L
MICROALBUMIN/CREAT UR: 51.43 MG/G CR (ref 0–25)
NITRATE UR QL: NEGATIVE
PH UR STRIP: 7 [PH] (ref 5–7)
SP GR UR STRIP: 1.01 (ref 1–1.03)
T4 FREE SERPL-MCNC: 1.45 NG/DL (ref 0.76–1.46)
TSH SERPL DL<=0.005 MIU/L-ACNC: 0.22 MU/L (ref 0.4–4)
UROBILINOGEN UR STRIP-ACNC: 0.2 E.U./DL

## 2022-04-05 PROCEDURE — 84439 ASSAY OF FREE THYROXINE: CPT | Performed by: FAMILY MEDICINE

## 2022-04-05 PROCEDURE — 0054A COVID-19,PF,PFIZER (12+ YRS): CPT | Performed by: FAMILY MEDICINE

## 2022-04-05 PROCEDURE — 84443 ASSAY THYROID STIM HORMONE: CPT | Performed by: FAMILY MEDICINE

## 2022-04-05 PROCEDURE — 82043 UR ALBUMIN QUANTITATIVE: CPT | Performed by: FAMILY MEDICINE

## 2022-04-05 PROCEDURE — 91305 COVID-19,PF,PFIZER (12+ YRS): CPT | Performed by: FAMILY MEDICINE

## 2022-04-05 PROCEDURE — 81003 URINALYSIS AUTO W/O SCOPE: CPT | Performed by: FAMILY MEDICINE

## 2022-04-05 PROCEDURE — 99214 OFFICE O/P EST MOD 30 MIN: CPT | Mod: 25 | Performed by: FAMILY MEDICINE

## 2022-04-05 PROCEDURE — 36415 COLL VENOUS BLD VENIPUNCTURE: CPT | Performed by: FAMILY MEDICINE

## 2022-04-05 PROCEDURE — 80307 DRUG TEST PRSMV CHEM ANLYZR: CPT | Performed by: FAMILY MEDICINE

## 2022-04-05 PROCEDURE — G0439 PPPS, SUBSEQ VISIT: HCPCS | Performed by: FAMILY MEDICINE

## 2022-04-05 RX ORDER — HYDROCODONE BITARTRATE AND ACETAMINOPHEN 5; 325 MG/1; MG/1
1 TABLET ORAL 2 TIMES DAILY PRN
Qty: 50 TABLET | Refills: 0 | Status: SHIPPED | OUTPATIENT
Start: 2022-06-05 | End: 2022-07-08

## 2022-04-05 RX ORDER — HYDROCODONE BITARTRATE AND ACETAMINOPHEN 5; 325 MG/1; MG/1
1 TABLET ORAL 2 TIMES DAILY PRN
Qty: 50 TABLET | Refills: 0 | Status: SHIPPED | OUTPATIENT
Start: 2022-04-05 | End: 2022-07-08

## 2022-04-05 RX ORDER — LEVOTHYROXINE SODIUM 100 UG/1
100 TABLET ORAL DAILY
Qty: 90 TABLET | Refills: 3 | Status: SHIPPED | OUTPATIENT
Start: 2022-04-05 | End: 2022-04-08

## 2022-04-05 RX ORDER — HYDROCODONE BITARTRATE AND ACETAMINOPHEN 5; 325 MG/1; MG/1
TABLET ORAL
Qty: 50 TABLET | Refills: 0 | Status: SHIPPED | OUTPATIENT
Start: 2022-05-05 | End: 2022-07-08

## 2022-04-05 RX ORDER — AMLODIPINE BESYLATE 10 MG/1
10 TABLET ORAL DAILY
Qty: 90 TABLET | Refills: 3 | Status: SHIPPED | OUTPATIENT
Start: 2022-04-05 | End: 2022-07-08

## 2022-04-05 ASSESSMENT — ENCOUNTER SYMPTOMS
HEMATURIA: 0
CHILLS: 0
ABDOMINAL PAIN: 0
HEMATOCHEZIA: 0

## 2022-04-05 ASSESSMENT — ACTIVITIES OF DAILY LIVING (ADL)
CURRENT_FUNCTION: PREPARING MEALS REQUIRES ASSISTANCE
CURRENT_FUNCTION: MONEY MANAGEMENT REQUIRES ASSISTANCE
CURRENT_FUNCTION: TELEPHONE REQUIRES ASSISTANCE
CURRENT_FUNCTION: HOUSEWORK REQUIRES ASSISTANCE
CURRENT_FUNCTION: TRANSPORTATION REQUIRES ASSISTANCE
CURRENT_FUNCTION: LAUNDRY REQUIRES ASSISTANCE
CURRENT_FUNCTION: SHOPPING REQUIRES ASSISTANCE
CURRENT_FUNCTION: NO ASSISTANCE NEEDED
CURRENT_FUNCTION: BATHING REQUIRES ASSISTANCE
CURRENT_FUNCTION: MEDICATION ADMINISTRATION REQUIRES ASSISTANCE

## 2022-04-05 ASSESSMENT — PATIENT HEALTH QUESTIONNAIRE - PHQ9
10. IF YOU CHECKED OFF ANY PROBLEMS, HOW DIFFICULT HAVE THESE PROBLEMS MADE IT FOR YOU TO DO YOUR WORK, TAKE CARE OF THINGS AT HOME, OR GET ALONG WITH OTHER PEOPLE: SOMEWHAT DIFFICULT
SUM OF ALL RESPONSES TO PHQ QUESTIONS 1-9: 19
SUM OF ALL RESPONSES TO PHQ QUESTIONS 1-9: 19

## 2022-04-05 ASSESSMENT — PAIN SCALES - GENERAL: PAINLEVEL: MILD PAIN (3)

## 2022-04-05 NOTE — PROGRESS NOTES
"SUBJECTIVE:   Nighat Gupta is a 86 year old female who presents for Preventive Visit.    Patient has been advised of split billing requirements and indicates understanding: Yes  Are you in the first 12 months of your Medicare coverage?  No    Healthy Habits:     In general, how would you rate your overall health?  Fair    Frequency of exercise:  None    Do you usually eat at least 4 servings of fruit and vegetables a day, include whole grains    & fiber and avoid regularly eating high fat or \"junk\" foods?  No    Taking medications regularly:  Yes    Medication side effects:  None    Ability to successfully perform activities of daily living:  Telephone requires assistance, transportation requires assistance, shopping requires assistance, preparing meals requires assistance, housework requires assistance, bathing requires assistance, laundry requires assistance, medication administration requires assistance, money management requires assistance and no assistance needed    Home Safety:  No safety concerns identified    Hearing Impairment:  No hearing concerns    In the past 6 months, have you been bothered by leaking of urine? Yes    In general, how would you rate your overall mental or emotional health?  Poor      PHQ-2 Total Score: 6    Additional concerns today:  No    Do you feel safe in your environment? Yes    Have you ever done Advance Care Planning? (For example, a Health Directive, POLST, or a discussion with a medical provider or your loved ones about your wishes): Yes, advance care planning is on file.      Fall risk  Fallen 2 or more times in the past year?: No  Any fall with injury in the past year?: No    Cognitive Screening   1) Repeat 3 items (Leader, Season, Table)    2) Clock draw: ABNORMAL abnormal  3) 3 item recall: Recalls 3 objects  Results: ABNORMAL clock, 1-2 items recalled: PROBABLE COGNITIVE IMPAIRMENT, **INFORM PROVIDER**    Mini-CogTM Copyright ZANA Inman. Licensed by the author for use " in [facility]; reprinted with permission ([email or web address]). All rights reserved.      Do you have sleep apnea, excessive snoring or daytime drowsiness?: no    Reviewed and updated as needed this visit by clinical staff   Tobacco  Allergies  Meds  Problems  Med Hx  Surg Hx  Fam Hx  Soc   Hx        Reviewed and updated as needed this visit by Provider   Tobacco  Allergies  Meds  Problems  Med Hx  Surg Hx  Fam Hx         Social History     Tobacco Use     Smoking status: Current Every Day Smoker     Packs/day: 0.25     Years: 50.00     Pack years: 12.50     Types: Cigarettes     Smokeless tobacco: Never Used     Tobacco comment: 5-8 cigarettes daily   Substance Use Topics     Alcohol use: No     If you drink alcohol do you typically have >3 drinks per day or >7 drinks per week? No    Alcohol Use 4/5/2022   Prescreen: >3 drinks/day or >7 drinks/week? No   Prescreen: >3 drinks/day or >7 drinks/week? -   No flowsheet data found.    Hypertension Follow-up      Do you check your blood pressure regularly outside of the clinic? No     Are you following a low salt diet? No    Are your blood pressures ever more than 140 on the top number (systolic) OR more   than 90 on the bottom number (diastolic), for example 140/90? unsure    Depression and Anxiety Follow-Up    How are you doing with your depression since your last visit? Worse     Feeling no motivation and no energy     Feeling sad and low     How are you doing with your anxiety since your last visit?  No change    Are you having other symptoms that might be associated with depression or anxiety? No    Have you had a significant life event? No     Do you have any concerns with your use of alcohol or other drugs? No    Social History     Tobacco Use     Smoking status: Current Every Day Smoker     Packs/day: 0.25     Years: 50.00     Pack years: 12.50     Types: Cigarettes     Smokeless tobacco: Never Used     Tobacco comment: 5-8 cigarettes daily    Vaping Use     Vaping Use: Never used   Substance Use Topics     Alcohol use: No     Drug use: No     PHQ 7/13/2021 2/9/2022 4/5/2022   PHQ-9 Total Score 5 9 19   Q9: Thoughts of better off dead/self-harm past 2 weeks Several days Not at all Nearly every day   F/U: Thoughts of suicide or self-harm - - Yes   F/U: Self harm-plan - - No   F/U: Self-harm action - - No   F/U: Safety concerns - - No     PRECIOUS-7 SCORE 10/14/2020 1/13/2021 2/9/2022   Total Score - - -   Total Score 4 (minimal anxiety) - -   Total Score 4 2 0               Follow Up Actions Taken  started meds     Discussed the following ways the patient can remain in a safe environment:  be around others  Suicide Assessment Five-step Evaluation and Treatment (SAFE-T)    Hypothyroidism Follow-up      Since last visit, patient describes the following symptoms: Weight stable, no hair loss, no skin changes, no constipation, no loose stools    Pain History:  When did you first notice your pain? - More than 6 weeks   Have you seen this provider for your pain in the past?   Yes   Where in your body do you have pain? Back pain   Are you seeing anyone else for your pain? No    PHQ-9 SCORE 7/13/2021 2/9/2022 4/5/2022   PHQ-9 Total Score - - -   PHQ-9 Total Score MyChart - - 19 (Moderately severe depression)   PHQ-9 Total Score 5 9 19       PRECIOUS-7 SCORE 10/14/2020 1/13/2021 2/9/2022   Total Score - - -   Total Score 4 (minimal anxiety) - -   Total Score 4 2 0               Chronic Pain Follow Up:    Location of pain: low back   Analgesia/pain control:    - Recent changes:  None     - Overall control: Tolerable with discomfort    - Current treatments:    Adherence:     - Do you ever take more pain medicine than prescribed? No    - When did you take your last dose of pain medicine?  This am    Adverse effects: No   PDMP Review       Value Time User    State PDMP site checked  Yes 12/10/2021 12:02 AM Marlon Park MD        Last CSA Agreement:   CSA -- Patient  Level:    Controlled Substance Agreement - Opioid - Scan on 4/5/2022 10:31 AM  Controlled Substance Agreement - Opioid - Scan on 4/13/2021  9:32 AM  Controlled Substance Agreement - Non - Opioid - Scan on 3/12/2020 11:46 AM: NON-OPIOID CONTROLLED SUBSTANCE AGREEMENT  Controlled Substance Agreement - Opioid - Scan on 3/6/2020  9:44 AM: FOR 3-6-20 VISIT  Controlled Substance Agreement - Opioid - Scan on 3/6/2020  9:41 AM       Last UDS: 4/5/2022            CSA -- Encounter Level on 12/11/2018:    Controlled Substance Agreement - Scan on 12/27/2018  8:55 AM: CONTROLLED SUBSTANCE AGREEMENT - 11/11/18     CSA -- Encounter Level on 12/02/2016:    Controlled Substance Agreement - Scan on 12/8/2016  1:38 PM: CONTROLLED SUBSTANCE AGREEMENT 12/02/2016     CSA -- Encounter Level on 04/04/2016:    Controlled Substance Agreement - Scan on 4/8/2016 10:44 AM: CONTROLLED SUBSTANCE AGREEMENT 04/04/2016     CSA -- Patient Level:    Controlled Substance Agreement - Opioid - Scan on 4/5/2022 10:31 AM  Controlled Substance Agreement - Opioid - Scan on 4/13/2021  9:32 AM  Controlled Substance Agreement - Non - Opioid - Scan on 3/12/2020 11:46 AM: NON-OPIOID CONTROLLED SUBSTANCE AGREEMENT  Controlled Substance Agreement - Opioid - Scan on 3/6/2020  9:44 AM: FOR 3-6-20 VISIT  Controlled Substance Agreement - Opioid - Scan on 3/6/2020  9:41 AM         Current providers sharing in care for this patient include:   Patient Care Team:  Nancy Tapia MD as PCP - General (Family Practice)  Nancy Tapia MD as Assigned PCP    The following health maintenance items are reviewed in Epic and correct as of today:  Health Maintenance Due   Topic Date Due     ZOSTER IMMUNIZATION (1 of 2) Never done     DTAP/TDAP/TD IMMUNIZATION (2 - Td or Tdap) 10/03/2018     URINE DRUG SCREEN  04/13/2022     Labs reviewed in EPIC        Pertinent mammograms are reviewed under the imaging tab.    Review of Systems   Constitutional: Negative for chills.  "  Cardiovascular: Negative for chest pain.   Gastrointestinal: Negative for abdominal pain and hematochezia.   Genitourinary: Negative for hematuria.     Constitutional, HEENT, cardiovascular, pulmonary, gi and gu systems are negative, except as otherwise noted.    OBJECTIVE:   /70   Pulse 70   Temp 97.6  F (36.4  C) (Tympanic)   Resp 22   Ht 1.461 m (4' 9.5\")   Wt 40.1 kg (88 lb 6.4 oz)   SpO2 97%   BMI 18.80 kg/m   Estimated body mass index is 18.8 kg/m  as calculated from the following:    Height as of this encounter: 1.461 m (4' 9.5\").    Weight as of this encounter: 40.1 kg (88 lb 6.4 oz).  Physical Exam  GENERAL: healthy, alert and no distress  NECK: no adenopathy, no asymmetry, masses, or scars and thyroid normal to palpation  RESP: lungs clear to auscultation - no rales, rhonchi or wheezes  CV: regular rate and rhythm, normal S1 S2, no S3 or S4, no murmur, click or rub, no peripheral edema and peripheral pulses strong  ABDOMEN: soft, nontender, no hepatosplenomegaly, no masses and bowel sounds normal  MS: no gross musculoskeletal defects noted, no edema  SKIN: no suspicious lesions or rashes  NEURO: Normal strength and tone, mentation intact and speech normal  PSYCH: mentation appears normal, affect normal/bright    Diagnostic Test Results:  Labs reviewed in Epic    ASSESSMENT / PLAN:   (Z00.00) Encounter for Medicare annual wellness exam  (primary encounter diagnosis)  Comment:   Plan:     (I10) Essential hypertension, benign  Comment: Stable no change in treatment plan.   Plan: Albumin Random Urine Quantitative with Creat         Ratio, amLODIPine (NORVASC) 10 MG tablet            (E03.9) Hypothyroidism, unspecified type  Comment: Adjust meds as indicated by above labs.   Plan: TSH with free T4 reflex, levothyroxine         (SYNTHROID/LEVOTHROID) 100 MCG tablet, T4 free            (G89.4) Chronic pain syndrome  Comment: Stable no change in treatment plan.   Plan: [id number] - Urine Drug " "Confirmation Panel         (Comprehensive), HYDROcodone-acetaminophen         (NORCO) 5-325 MG tablet,         HYDROcodone-acetaminophen (NORCO) 5-325 MG         tablet            (M54.42,  G89.29) Chronic left-sided low back pain with left-sided sciatica  Comment:   Plan: HYDROcodone-acetaminophen (NORCO) 5-325 MG         tablet, HYDROcodone-acetaminophen (NORCO) 5-325        MG tablet, HYDROcodone-acetaminophen (NORCO)         5-325 MG tablet            (F11.90) Chronic, continuous use of opioids  Comment:   Plan: HYDROcodone-acetaminophen (NORCO) 5-325 MG         tablet, HYDROcodone-acetaminophen (NORCO) 5-325        MG tablet            (M54.16) Lumbar radiculopathy  Comment:   Plan: HYDROcodone-acetaminophen (NORCO) 5-325 MG         tablet, HYDROcodone-acetaminophen (NORCO) 5-325        MG tablet            (R35.0) Urinary frequency  Comment: suspect overactive bladder would consider low dose ditropan   Plan: UA Macro with Reflex to Micro and Culture - lab        collect            (F32.0) Mild major depression (H)  Comment: flare   Plan: FLUoxetine (PROZAC) 20 MG capsule            (Z23) High priority for 2019-nCoV vaccine  Comment:   Plan: COVID-19,PF,PFIZER (12+ Yrs GRAY LABEL)              Patient has been advised of split billing requirements and indicates understanding: Yes    COUNSELING:  Reviewed preventive health counseling, as reflected in patient instructions    Estimated body mass index is 18.8 kg/m  as calculated from the following:    Height as of this encounter: 1.461 m (4' 9.5\").    Weight as of this encounter: 40.1 kg (88 lb 6.4 oz).        She reports that she has been smoking cigarettes. She has a 12.50 pack-year smoking history. She has never used smokeless tobacco.  Tobacco Cessation Action Plan:   Information offered: Patient not interested at this time      Appropriate preventive services were discussed with this patient, including applicable screening as appropriate for cardiovascular " disease, diabetes, osteopenia/osteoporosis, and glaucoma.  As appropriate for age/gender, discussed screening for colorectal cancer, prostate cancer, breast cancer, and cervical cancer. Checklist reviewing preventive services available has been given to the patient.    Reviewed patients plan of care and provided an AVS. The Basic Care Plan (routine screening as documented in Health Maintenance) for Nighat meets the Care Plan requirement. This Care Plan has been established and reviewed with the Patient.    Counseling Resources:  ATP IV Guidelines  Pooled Cohorts Equation Calculator  Breast Cancer Risk Calculator  Breast Cancer: Medication to Reduce Risk  FRAX Risk Assessment  ICSI Preventive Guidelines  Dietary Guidelines for Americans, 2010  [employer] MyPlate  ASA Prophylaxis  Lung CA Screening    Nancy Tapia MD  [facility]    Identified Health Risks:    The patient was provided with suggestions to help her develop a healthy physical lifestyle.  She is at risk for lack of exercise and has been provided with information to increase physical activity for the benefit of her well-being.  The patient was counseled and encouraged to consider modifying their diet and eating habits. She was provided with information on recommended healthy diet options.  The patient reports that she has difficulty with activities of daily living. I have asked that the patient make a follow up appointment in 12 weeks where this issue will be further evaluated and addressed.  Information on urinary incontinence and treatment options given to patient.  The patient was provided with suggestions to help her develop a healthy emotional lifestyle.  The patient s PHQ-9 score is consistent with moderate depression. She was provided with information regarding depression and was advised to schedule a follow up appointment in 12 weeks to further address this issue.

## 2022-04-05 NOTE — NURSING NOTE
"Chief Complaint   Patient presents with     Physical       Initial BP (!) 162/60   Pulse 70   Temp 97.6  F (36.4  C) (Tympanic)   Resp 22   Ht 1.461 m (4' 9.5\")   Wt 40.1 kg (88 lb 6.4 oz)   SpO2 97%   BMI 18.80 kg/m   Estimated body mass index is 18.8 kg/m  as calculated from the following:    Height as of this encounter: 1.461 m (4' 9.5\").    Weight as of this encounter: 40.1 kg (88 lb 6.4 oz).    Patient presents to the clinic using [employer] that is potentially due pending provider review:    Blood pressure       Is there anyone who you would like to be able to receive your results? If yes have patient fill out [facility]    "

## 2022-04-05 NOTE — LETTER
Opioid / Opioid Plus Controlled Substance Agreement    This is an agreement between you and your provider about the safe and appropriate use of controlled substance/opioids prescribed by your care team. Controlled substances are medicines that can cause physical and mental dependence (abuse).    There are strict laws about having and using these medicines. We here at [facility] are committing to working with you in your efforts to get better. To support you in this work, we ll help you schedule regular office appointments for medicine refills. If we must cancel or change your appointment for any reason, we ll make sure you have enough medicine to last until your next appointment.     As a Provider, I will:    Listen carefully to your concerns and treat you with respect.     Recommend a treatment plan that I believe is in your best interest. This plan may involve therapies other than opioid pain medication.     Talk with you often about the possible benefits, and the risk of harm of any medicine that we prescribe for you.     Provide a plan on how to taper (discontinue or go off) using this medicine if the decision is made to stop its use.    As a Patient, I understand that opioid(s):     Are a controlled substance prescribed by my care team to help me function or work and manage my condition(s).     Are strong medicines and can cause serious side effects such as:    Drowsiness, which can seriously affect my driving ability    A lower breathing rate, enough to cause death    Harm to my thinking ability     Depression     Abuse of and addiction to this medicine    Need to be taken exactly as prescribed. Combining opioids with certain medicines or chemicals (such as illegal drugs, sedatives, sleeping pills, and benzodiazepines) can be dangerous or even fatal. If I stop opioids suddenly, I may have severe withdrawal symptoms.    Do not work for all types of pain nor for all patients. If they re not helpful, I may  be asked to stop them.        The risks, benefits and side effects of these medicine(s) were explained to me. I agree that:  1. I will take part in other treatments as advised by my care team. This may be psychiatry or counseling, physical therapy, behavioral therapy, group treatment or a referral to a specialist.     2. I will keep all my appointments. I understand that this is part of the monitoring of opioids. My care team may require an office visit for EVERY opioid/controlled substance refill. If I miss appointments or don t follow instructions, my care team may stop my medicine.    3. I will take my medicines as prescribed. I will not change the dose or schedule unless my care team tells me to. There will be no refills if I run out early.     4. I may be asked to come to the clinic and complete a urine drug test or complete a pill count at any time. If I don t give a urine sample or participate in a pill count, the care team may stop my medicine.    5. I will only receive prescriptions from this clinic for chronic pain. If I am treated by another provider for acute pain issues, I will tell them that I am taking opioid pain medication for chronic pain and that I have a treatment agreement with this provider. I will inform my [facility] care team within one business day if I am given a prescription for any pain medication by another healthcare provider. My [facility] care team can contact other providers and pharmacists about my use of any medicines.    6. It is up to me to make sure that I don t run out of my medicines on weekends or holidays. If my care team is willing to refill my opioid prescription without a visit, I must request refills only during office hours. Refills may take up to 3 business days to process. I will use one pharmacy to fill all my opioid and other controlled substance prescriptions. I will notify the clinic about any changes to my insurance or medication  availability.    7. I am responsible for my prescriptions. If the medicine/prescription is lost, stolen or destroyed, it will not be replaced. I also agree not to share controlled substance medicines with anyone.    8. I am aware I should not use any illegal or recreational drugs. I agree not to drink alcohol unless my care team says I can.       9. If I enroll in the [place] [facility] program, I will tell my care team prior to my next refill.     10. I will tell my care team right away if I become pregnant, have a new medical problem treated outside of my regular clinic, or have a change in my medications.    11. I understand that this medicine can affect my thinking, judgment and reaction time. Alcohol and drugs affect the brain and body, which can affect the safety of my driving. Being under the influence of alcohol or drugs can affect my decision-making, behaviors, personal safety, and the safety of others. Driving while impaired (DWI) can occur if a person is driving, operating, or in physical control of a car, motorcycle, boat, snowmobile, ATV, motorbike, off-road vehicle, or any other motor vehicle ([place] Statute 169A.20). I understand the risk if I choose to drive or operate any vehicle or machinery.    I understand that if I do not follow any of the conditions above, my prescriptions or treatment may be stopped or changed.          Opioids  What You Need to Know    What are opioids?   Opioids are pain medicines that must be prescribed by a doctor. They are also known as narcotics.     Examples are:   1. morphine (MS Contin, Britt)  2. oxycodone (Oxycontin)  3. oxycodone and acetaminophen (Percocet)  4. hydrocodone and acetaminophen (Vicodin, Norco)   5. fentanyl patch (Duragesic)   6. hydromorphone (Dilaudid)   7. methadone  8. codeine (Tylenol #3)     What do opioids do well?   Opioids are best for severe short-term pain such as after a surgery or injury. They may work well for cancer pain. They may  help some people with long-lasting (chronic) pain.     What do opioids NOT do well?   Opioids never get rid of pain entirely, and they don t work well for most patients with chronic pain. Opioids don t reduce swelling, one of the causes of pain.                                    Other ways to manage chronic pain and improve function include:       Treat the health problem that may be causing pain    Anti-inflammation medicines, which reduce swelling and tenderness, such as ibuprofen (Advil, Motrin) or naproxen (Aleve)    Acetaminophen (Tylenol)    Antidepressants and anti-seizure medicines, especially for nerve pain    Topical treatments such as patches or creams    Injections or nerve blocks    Chiropractic or osteopathic treatment    Acupuncture, massage, deep breathing, meditation, visual imagery, aromatherapy    Use heat or ice at the pain site    Physical therapy     Exercise    Stop smoking    Take part in therapy       Risks and side effects     Talk to your doctor before you start or decide to keep taking opioids. Possible side effects include:      Lowering your breathing rate enough to cause death    Overdose, including death, especially if taking higher than prescribed doses    Worse depression symptoms; less pleasure in things you usually enjoy    Feeling tired or sluggish    Slower thoughts or cloudy thinking    Being more sensitive to pain over time; pain is harder to control    Trouble sleeping or restless sleep    Changes in hormone levels (for example, less testosterone)    Changes in sex drive or ability to have sex    Constipation    Unsafe driving    Itching and sweating    Dizziness    Nausea, throwing up and dry mouth    What else should I know about opioids?    Opioids may lead to dependence, tolerance, or addiction.      Dependence means that if you stop or reduce the medicine too quickly, you will have withdrawal symptoms. These include loose poop (diarrhea), jitters, flu-like symptoms,  nervousness and tremors. Dependence is not the same as addiction.                       Tolerance means needing higher doses over time to get the same effect. This may increase the chance of serious side effects.      Addiction is when people improperly use a substance that harms their body, their mind or their relations with others. Use of opiates can cause a relapse of addiction if you have a history of drug or alcohol abuse.      People who have used opioids for a long time may have a lower quality of life, worse depression, higher levels of pain and more visits to doctors.    You can overdose on opioids. Take these steps to lower your risk of overdose:    1. Recognize the signs:  Signs of overdose include decrease or loss of consciousness (blackout), slowed breathing, trouble waking up and blue lips. If someone is worried about overdose, they should call 911.    2. Talk to your doctor about Narcan (naloxone).   If you are at risk for overdose, you may be given a prescription for Narcan. This medicine very quickly reverses the effects of opioids.   If you overdose, a friend or family member can give you Narcan while waiting for the ambulance. They need to know the signs of overdose and how to give Narcan.     3. Don't use alcohol or street drugs.   Taking them with opioids can cause death.    4. Do not take any of these medicines unless your doctor says it s OK. Taking these with opioids can cause death:    Benzodiazepines, such as lorazepam (Ativan), alprazolam (Xanax) or diazepam (Valium)    Muscle relaxers, such as cyclobenzaprine (Flexeril)    Sleeping pills like zolpidem (Ambien)     Other opioids      How to keep you and other people safe while taking opioids:    1. Never share your opioids with others.  Opioid medicines are regulated by the Drug Enforcement Agency ([place]). Selling or sharing medications is a criminal act.    2. Be sure to store opioids in a secure place, locked up if possible. Young children  can easily swallow them and overdose.    3. When you are traveling with your medicines, keep them in the original bottles. If you use a pill box, be sure you also carry a copy of your medicine list from your clinic or pharmacy.    4. Safe disposal of opioids    Most pharmacies have places to get rid of medicine, called disposal kiosks. Medicine disposal options are also available in every [facility]. Search your county and  medication disposal  to find more options. You can find more details at:  [email or web address]     I agree that my provider, clinic care team, and pharmacy may work with any city, state or federal law enforcement agency that investigates the misuse, sale, or other diversion of my controlled medicine. I will allow my provider to discuss my care with, or share a copy of, this agreement with any other treating provider, pharmacy or emergency room where I receive care.    I have read this agreement and have asked questions about anything I did not understand.    _______________________________________________________  Patient Signature - Nighat Gupta _____________________                   Date     _______________________________________________________  Provider Signature - Nancy Tapia MD   _____________________                   Date     _______________________________________________________  Witness Signature (required if provider not present while patient signing)   _____________________                   Date

## 2022-04-05 NOTE — PATIENT INSTRUCTIONS
Patient Education   Personalized Prevention Plan  You are due for the preventive services outlined below.  Your care team is available to assist you in scheduling these services.  If you have already completed any of these items, please share that information with your care team to update in your medical record.  Health Maintenance Due   Topic Date Due     Kidney Microalbumin Urine Test  Never done     Zoster (Shingles) Vaccine (1 of 2) Never done     Diptheria Tetanus Pertussis (DTAP/TDAP/TD) Vaccine (2 - Td or Tdap) 10/03/2018     URINE DRUG SCREEN  04/13/2022     Your Health Risk Assessment indicates you feel you are not in good health    A healthy lifestyle helps keep the body fit and the mind alert. It helps protect you from disease, helps you fight disease, and helps prevent chronic disease (disease that doesn't go away) from getting worse. This is important as you get older and begin to notice twinges in muscles and joints and a decline in the strength and stamina you once took for granted. A healthy lifestyle includes good healthcare, good nutrition, weight control, recreation, and regular exercise. Avoid harmful substances and do what you can to keep safe. Another part of a healthy lifestyle is stay mentally active and socially involved.    Good healthcare     Have a wellness visit every year.     If you have new symptoms, let us know right away. Don't wait until the next checkup.     Take medicines exactly as prescribed and keep your medicines in a safe place. Tell us if your medicine causes problems.   Healthy diet and weight control     Eat 3 or 4 small, nutritious, low-fat, high-fiber meals a day. Include a variety of fruits, vegetables, and whole-grain foods.     Make sure you get enough calcium in your diet. Calcium, vitamin D, and exercise help prevent osteoporosis (bone thinning).     If you live alone, try eating with others when you can. That way you get a good meal and have company while you  eat it.     Try to keep a healthy weight. If you eat more calories than your body uses for energy, it will be stored as fat and you will gain weight.     Recreation   Recreation is not limited to sports and team events. It includes any activity that provides relaxation, interest, enjoyment, and exercise. Recreation provides an outlet for physical, mental, and social energy. It can give a sense of worth and achievement. It can help you stay healthy.    Mental Exercise and Social Involvement  Mental and emotional health is as important as physical health. Keep in touch with friends and family. Stay as active as possible. Continue to learn and challenge yourself.   Things you can do to stay mentally active are:    Learn something new, like a foreign language or musical instrument.     Play SCRABBLE or do crossword puzzles. If you cannot find people to play these games with you at home, you can play them with others on your computer through the Internet.     Join a games club--anything from card games to chess or checkers or lawn bowling.     Start a new hobby.     Go back to school.     Volunteer.     Read.   Keep up with world events.    Exercise for a Healthier Heart  You may wonder how you can improve the health of your heart. If you re thinking about exercise, you re on the right track. You don t need to become an athlete. But you do need a certain amount of brisk exercise to help strengthen your heart. If you have been diagnosed with a heart condition, your healthcare provider may advise exercise to help stabilize your condition. To help make exercise a habit, choose safe, fun activities.      Exercise with a friend. When activity is fun, you're more likely to stick with it.   Before you start  Check with your healthcare provider before starting an exercise program. This is especially important if you have not been active for a while. It's also important if you have a long-term (chronic) health problem such as  heart disease, diabetes, or obesity. Or if you are at high risk for having these problems.   Why exercise?  Exercising regularly offers many healthy rewards. It can help you do all of the following:     Improve your blood cholesterol level to help prevent further heart trouble    Lower your blood pressure to help prevent a stroke or heart attack    Control diabetes, or reduce your risk of getting this disease    Improve your heart and lung function    Reach and stay at a healthy weight    Make your muscles stronger so you can stay active    Prevent falls and fractures by slowing the loss of bone mass (osteoporosis)    Manage stress better    Reduce your blood pressure    Improve your sense of self and your body image  Exercise tips      Ease into your routine. Set small goals. Then build on them. If you are not sure what your activity level should be, talk with your healthcare provider first before starting an exercise routine.    Exercise on most days. Aim for a total of 150 minutes (2 hours and 30 minutes) or more of moderate-intensity aerobic activity each week. Or 75 minutes (1 hour and 15 minutes) or more of vigorous-intensity aerobic activity each week. Or try for a combination of both. Moderate activity means that you breathe heavier and your heart rate increases but you can still talk. Think about doing 40 minutes of moderate exercise, 3 to 4 times a week. For best results, activity should last for about 40 minutes to lower blood pressure and cholesterol. It's OK to work up to the 40-minute period over time. Examples of moderate-intensity activity are walking 1 mile in 15 minutes. Or doing 30 to 45 minutes of yard work.    Step up your daily activity level.  Along with your exercise program, try being more active the whole day. Walk instead of drive. Or park further away so that you take more steps each day. Do more household tasks or yard work. You may not be able to meet the advised mount of physical  activity. But doing some moderate- or vigorous-intensity aerobic activity can help reduce your risk for heart disease. Your healthcare provider can help you figure out what is best for you.    Choose 1 or more activities you enjoy.  Walking is one of the easiest things you can do. You can also try swimming, riding a bike, dancing, or taking an exercise class.    When to call your healthcare provider  Call your healthcare provider if you have any of these:     Chest pain or feel dizzy or lightheaded    Burning, tightness, pressure, or heaviness in your chest, neck, shoulders, back, or arms    Abnormal shortness of breath    More joint or muscle pain    A very fast or irregular heartbeat (palpitations)  [employer] last reviewed this educational content on 7/1/2019 2000-2021 [employer]. All rights reserved. This information is not intended as a substitute for professional medical care. Always follow your healthcare professional's instructions.          Understanding USDA MyPlate  The USDA has guidelines to help you make healthy food choices. These are called MyPlate. MyPlate shows the food groups that make up healthy meals using the image of a place setting. Before you eat, think about the healthiest choices for what to put on your plate or in your cup or bowl. To learn more about building a healthy plate, visit www.choosemyplate.gov.    The food groups    Fruits. Any fruit or 100% fruit juice counts as part of the Fruit Group. Fruits may be fresh, canned, frozen, or dried, and may be whole, cut-up, or pureed. Make 1/2 of your plate fruits and vegetables.    Vegetables. Any vegetable or 100% vegetable juice counts as a member of the Vegetable Group. Vegetables may be fresh, frozen, canned, or dried. They can be served raw or cooked and may be whole, cut-up, or mashed. Make 1/2 of your plate fruits and vegetables.    Grains. All foods made from grains are part of the Grains Group. These include wheat,  rice, oats, cornmeal, and barley. Grains are often used to make foods such as bread, pasta, oatmeal, cereal, tortillas, and grits. Grains should be no more than 1/4 of your plate. At least half of your grains should be whole grains.    Protein. This group includes meat, poultry, seafood, beans and peas, eggs, processed soy products (such as tofu), nuts (including nut butters), and seeds. Make protein choices no more than 1/4 of your plate. Meat and poultry choices should be lean or low fat.    Dairy. The Dairy Group includes all fluid milk products and foods made from milk that contain calcium, such as yogurt and cheese. (Foods that have little calcium, such as cream, butter, and cream cheese, are not part of this group.) Most dairy choices should be low-fat or fat-free.    Oils. Oils aren't a food group, but they do contain essential nutrients. However it's important to watch your intake of oils. These are fats that are liquid at room temperature. They include canola, corn, olive, soybean, vegetable, and sunflower oil. Foods that are mainly oil include mayonnaise, certain salad dressings, and soft margarines. You likely already get your daily oil allowance from the foods you eat.  Things to limit  Eating healthy also means limiting these things in your diet:       Salt (sodium). Many processed foods have a lot of sodium. To keep sodium intake down, eat fresh vegetables, meats, poultry, and seafood when possible. Purchase low-sodium, reduced-sodium, or no-salt-added food products at the store. And don't add salt to your meals at home. Instead, season them with herbs and spices such as dill, oregano, cumin, and paprika. Or try adding flavor with lemon or lime zest and juice.    Saturated fat. Saturated fats are most often found in animal products such as beef, pork, and chicken. They are often solid at room temperature, such as butter. To reduce your saturated fat intake, choose leaner cuts of meat and poultry. And  try healthier cooking methods such as grilling, broiling, roasting, or baking. For a simple lower-fat swap, use plain nonfat yogurt instead of mayonnaise when making potato salad or macaroni salad.    Added sugars. These are sugars added to foods. They are in foods such as ice cream, candy, soda, fruit drinks, sports drinks, energy drinks, cookies, pastries, jams, and syrups. Cut down on added sugars by sharing sweet treats with a family member or friend. You can also choose fruit for dessert, and drink water or other unsweetened beverages.     [employer] last reviewed this educational content on 6/1/2020 2000-2021 [employer]. All rights reserved. This information is not intended as a substitute for professional medical care. Always follow your healthcare professional's instructions.        Activities of Daily Living    Your Health Risk Assessment indicates you have difficulties with activities of daily living such as housework, bathing, preparing meals, taking medication, etc. Please make a follow up appointment for us to address this issue in more detail.    Urinary Incontinence, Female (Adult)   Urinary incontinence means loss of bladder control. This problem affects many women, especially as they get older. If you have incontinence, you may be embarrassed to ask for help. But know that this problem can be treated.   Types of Incontinence  There are different types of incontinence. Two of the main types are described here. You can have more than one type.     Stress incontinence. With this type, urine leaks when pressure (stress) is put on the bladder. This may happen when you cough, sneeze, or laugh. Stress incontinence most often occurs because the pelvic floor muscles that support the bladder and urethra are weak. This can happen after pregnancy and vaginal childbirth or a hysterectomy. It can also be due to excess body weight or hormone changes.    Urge incontinence (also called overactive  bladder). With this type, a sudden urge to urinate is felt often. This may happen even though there may not be much urine in the bladder. The need to urinate often during the night is common. Urge incontinence most often occurs because of bladder spasms. This may be due to bladder irritation or infection. Damage to bladder nerves or pelvic muscles, constipation, and certain medicines can also lead to urge incontinence.  Treatment depends on the cause. Further evaluation is needed to find the type you have. This will likely include an exam and certain tests. Based on the results, you and your healthcare provider can then plan treatment. Until a diagnosis is made, the home care tips below can help ease symptoms.   Home care    Do pelvic floor muscle exercises, if they are prescribed. The pelvic floor muscles help support the bladder and urethra. Many women find that their symptoms improve when doing special exercises that strengthen these muscles. To do the exercises, contract the muscles you would use to stop your stream of urine. But do this when you re not urinating. Hold for 10 seconds, then relax. Repeat 10 to 20 times in a row, at least 3 times a day. Your healthcare provider may give you other instructions for how to do the exercises and how often.    Keep a bladder diary. This helps track how often and how much you urinate over a set period of time. Bring this diary with you to your next visit with the provider. The information can help your provider learn more about your bladder problem.    Lose weight, if advised to by your provider. Extra weight puts pressure on the bladder. Your provider can help you create a weight-loss plan that s right for you. This may include exercising more and making certain diet changes.    Don't have foods and drinks that may irritate the bladder. These can include alcohol and caffeinated drinks.    Quit smoking. Smoking and other tobacco use can lead to a long-term (chronic)  cough that strains the pelvic floor muscles. Smoking may also damage the bladder and urethra. Talk with your provider about treatments or methods you can use to quit smoking.    If drinking large amounts of fluid makes you have symptoms, you may be advised to limit your fluid intake. You may also be advised to drink most of your fluids during the day and to limit fluids at night.    If you re worried about urine leakage or accidents, you may wear absorbent pads to catch urine. Change the pads often. This helps reduce discomfort. It may also reduce the risk of skin or bladder infections.    Follow-up care  Follow up with your healthcare provider, or as directed. It may take some to find the right treatment for your problem. But healthy lifestyle changes can be made right away. These include such things as exercising on a regular basis, eating a healthy diet, losing weight (if needed), and quitting smoking. Your treatment plan may include special therapies or medicines. Certain procedures or surgery may also be options. Talk about any questions you have with your provider.   When to seek medical advice  Call the healthcare provider right away if any of these occur:    Fever of 100.4 F (38 C) or higher, or as directed by your provider    Bladder pain or fullness    Belly swelling    Nausea or vomiting    Back pain    Weakness, dizziness, or fainting  [employer] last reviewed this educational content on 1/1/2020 2000-2021 [employer]. All rights reserved. This information is not intended as a substitute for professional medical care. Always follow your healthcare professional's instructions.        Your Health Risk Assessment indicates you feel you are not in good emotional health.    Recreation   Recreation is not limited to sports and team events. It includes any activity that provides relaxation, interest, enjoyment, and exercise. Recreation provides an outlet for physical, mental, and social energy.  "It can give a sense of worth and achievement. It can help you stay healthy.    Mental Exercise and Social Involvement  Mental and emotional health is as important as physical health. Keep in touch with friends and family. Stay as active as possible. Continue to learn and challenge yourself.   Things you can do to stay mentally active are:    Learn something new, like a foreign language or musical instrument.     Play SCRABBLE or do crossword puzzles. If you cannot find people to play these games with you at home, you can play them with others on your computer through the Internet.     Join a games club--anything from card games to chess or checkers or lawn bowling.     Start a new hobby.     Go back to school.     Volunteer.     Read.   Keep up with world events.    Depression and Suicide in Older Adults    Nearly 2 million older Americans have some type of depression. Some of them even take their own lives. Yet depression among older adults is often ignored. Learn the warning signs. You may help spare a loved one needless pain. You may also save a life.   What is depression?  Depression is a common and serious illness that affects the way you think and feel. It is not a normal part of aging, nor is it a sign of weakness, a character flaw, or something you can snap out of. Most people with depression need treatment to get better. The most common symptom is a feeling of deep sadness. People who are depressed also may seem tired and listless. And nothing seems to give them pleasure. It s normal to grieve or be sad sometimes. But sadness lessens or passes with time. Depression rarely goes away or improves on its own. A person with clinical depression can't \"snap out of it.\" Other symptoms of depression are:     Sleeping more or less than normal    Eating more or less than normal    Having headaches, stomachaches, or other pains that don t go away    Feeling nervous,  empty,  or worthless    Crying a great " deal    Thinking or talking about suicide or death    Loss of interest in activities previously enjoyed    Social isolation    Feeling confused or forgetful  What causes it?  The causes of depression aren t fully known. But it is thought to result from a complex blend of these factors:     Biochemistry. Certain chemicals in the brain play a role.    Genes. Depression does run in families.    Life stress. Life stresses can also trigger depression in some people. Older adults often face many stressors, such as death of friends or a spouse, health problems, and financial concerns.    Chronic conditions. This includes conditions such as diabetes, heart disease, or cancer. These can cause symptoms of depression. Medicine side effects can cause changes in thoughts and behaviors.  How you can help  Often, depressed people may not want to ask for help. When they do, they may be ignored. Or, they may receive the wrong treatment. You can help by showing parents and older friends love and support. If they seem depressed, don t lecture the person, ignore the symptoms, or discount the symptoms as a  normal  part of aging -which they are not. Get involved, listen, and show interest and support.   Help them understand that depression is a treatable illness. Tell them you can help them find the right treatment. Offer to go to their healthcare provider's appointment with them for support when the symptoms are discussed. With their approval, contact a local mental health center, social service agency, or hospital about services.   You can be an advocate for him or her at healthcare appointments. Many older adults have chronic illnesses that can cause symptoms of depression. Medicine side effects can change thoughts and behaviors. You can help make sure that the healthcare provider looks at all of these factors. He or she should refer your family member or friend to a mental healthcare provider when needed. in some cases, untreated  depression can lead to a misdiagnosis. A person may be diagnosed with a brain disorder such as dementia. If the healthcare provider does not take the issue of depression seriously, help your family member or friend to find another provider.   Don't be afraid to ask  If you think an older person you care about could be suicidal, ask,  Have you thought about suicide?  Most people will tell you the truth. If they say  yes,  they may already have a plan for how and when they will attempt it. Find out as much as you can. The more detailed the plan, and the easier it is to carry out, the more danger the person is in right now. Tell the person you are there for them and do not want them to harm him or herself. Don't wait to get help for the person. Call the person's healthcare provider, local hospital, or emergency services.   To learn more    National Suicide Prevention Lifeline (crisis hotline) 725-234-MFQK (574-381-1070)    National [place] of Mental Rmhyfi476-282-1847jvs.[facility].nih.gov    National [place] on Mental Zgsiurt907-453-3478prx.hafsa.org    Mental Health Vpkkoav432-026-7535ggi.[facility].org    National Suicide Tijakhk536-MFVPNJM (048-536-6701)    Call 911  Never leave the person alone. A person who is actively suicidal needs psychiatric care right away. They will need constant supervision. Never leave the person out of sight. Call 911 or the national 24-hour suicide crisis hotline at 554-996-QSXS (100-400-3610). You can also take the person to the closest emergency room.   [employer] last reviewed this educational content on 5/1/2020 2000-2021 [employer]. All rights reserved. This information is not intended as a substitute for professional medical care. Always follow your healthcare professional's instructions.              Pain meds refilled     Start prozac daily     We will check urine and consider med if this is normal

## 2022-04-08 ENCOUNTER — TELEPHONE (OUTPATIENT)
Dept: FAMILY MEDICINE | Facility: CLINIC | Age: 87
End: 2022-04-08
Payer: COMMERCIAL

## 2022-04-08 DIAGNOSIS — E03.9 HYPOTHYROIDISM, UNSPECIFIED TYPE: ICD-10-CM

## 2022-04-08 LAB
DHC UR CFM-MCNC: 84 NG/ML
DHC/CREAT UR: 240 NG/MG {CREAT}
GABAPENTIN UR QL CFM: PRESENT
HYDROCODONE UR CFM-MCNC: 112 NG/ML
HYDROCODONE/CREAT UR: 320 NG/MG {CREAT}
HYDROMORPHONE UR CFM-MCNC: 130 NG/ML
HYDROMORPHONE/CREAT UR: 371 NG/MG {CREAT}
OXYCODONE UR CFM-MCNC: 271 NG/ML
OXYCODONE/CREAT UR: 774 NG/MG {CREAT}
OXYMORPHONE UR CFM-MCNC: 800 NG/ML
OXYMORPHONE/CREAT UR: 2286 NG/MG {CREAT}

## 2022-04-08 RX ORDER — LEVOTHYROXINE SODIUM 88 UG/1
88 TABLET ORAL DAILY
Qty: 90 TABLET | Refills: 0 | Status: SHIPPED | OUTPATIENT
Start: 2022-04-08 | End: 2022-07-19

## 2022-04-08 NOTE — TELEPHONE ENCOUNTER
Patient returned call.  This writer relayed Dr Tapia's message.  Patient is currently taking Levothyroxine 100 mcg daily.    Verified preferred pharmacy, sent new order as advised for levothyroxine 88 mcg daily.  Transferred call to scheduling to assist with making lab only -nonfasting appointment for 8 weeks.  Ava Agustin RN

## 2022-04-12 ENCOUNTER — TELEPHONE (OUTPATIENT)
Dept: FAMILY MEDICINE | Facility: CLINIC | Age: 87
End: 2022-04-12
Payer: COMMERCIAL

## 2022-04-12 DIAGNOSIS — R35.0 URINARY FREQUENCY: Primary | ICD-10-CM

## 2022-04-12 RX ORDER — OXYBUTYNIN CHLORIDE 5 MG/1
2.5 TABLET ORAL DAILY
Qty: 45 TABLET | Refills: 1 | Status: SHIPPED | OUTPATIENT
Start: 2022-04-12 | End: 2023-06-23

## 2022-04-12 NOTE — TELEPHONE ENCOUNTER
Please call pt I sent ditropan to the pharmacy for her take one in the am and she should let me know how this works after a week or so

## 2022-04-13 NOTE — TELEPHONE ENCOUNTER
Pt called. Adv rx at pharmacy. To take in am and let md know how working in 1 wk. Pt understanding      Ish Walker RN

## 2022-05-26 ENCOUNTER — DOCUMENTATION ONLY (OUTPATIENT)
Dept: LAB | Facility: CLINIC | Age: 87
End: 2022-05-26
Payer: COMMERCIAL

## 2022-05-26 DIAGNOSIS — E03.9 HYPOTHYROIDISM, UNSPECIFIED TYPE: Primary | ICD-10-CM

## 2022-05-26 NOTE — PROGRESS NOTES
TSH pended.  Forwarded to provider for any additional labs.  Pt has 3 month F/U appt with PCP 07-08-22.   JEAN PIERRE Mckeon RN

## 2022-05-26 NOTE — PROGRESS NOTES
Nighat Gupta has an upcoming lab appointment:    Future Appointments   Date Time Provider Department Center   5/31/2022  8:15 AM [facility]   [facility]2022 10:00 [facility]     Patient is scheduled for the following lab(s): labs per PCP    There is no order available. Please review and place either future orders or HMPO (Review of Health Maintenance Protocol Orders), as appropriate.    There are no preventive care reminders to display for this patient.  Yasmin Jacques

## 2022-06-08 ENCOUNTER — LAB (OUTPATIENT)
Dept: LAB | Facility: CLINIC | Age: 87
End: 2022-06-08
Payer: COMMERCIAL

## 2022-06-08 DIAGNOSIS — E03.9 HYPOTHYROIDISM, UNSPECIFIED TYPE: ICD-10-CM

## 2022-06-08 LAB — TSH SERPL DL<=0.005 MIU/L-ACNC: 3.98 MU/L (ref 0.4–4)

## 2022-06-08 PROCEDURE — 36415 COLL VENOUS BLD VENIPUNCTURE: CPT

## 2022-06-08 PROCEDURE — 84443 ASSAY THYROID STIM HORMONE: CPT

## 2022-07-08 ENCOUNTER — OFFICE VISIT (OUTPATIENT)
Dept: FAMILY MEDICINE | Facility: CLINIC | Age: 87
End: 2022-07-08
Payer: COMMERCIAL

## 2022-07-08 VITALS
HEIGHT: 58 IN | HEART RATE: 74 BPM | BODY MASS INDEX: 18.97 KG/M2 | RESPIRATION RATE: 12 BRPM | TEMPERATURE: 97.2 F | DIASTOLIC BLOOD PRESSURE: 74 MMHG | WEIGHT: 90.4 LBS | SYSTOLIC BLOOD PRESSURE: 159 MMHG

## 2022-07-08 DIAGNOSIS — M54.42 CHRONIC LEFT-SIDED LOW BACK PAIN WITH LEFT-SIDED SCIATICA: ICD-10-CM

## 2022-07-08 DIAGNOSIS — E78.5 HYPERLIPIDEMIA LDL GOAL <130: ICD-10-CM

## 2022-07-08 DIAGNOSIS — I10 ESSENTIAL HYPERTENSION, BENIGN: Chronic | ICD-10-CM

## 2022-07-08 DIAGNOSIS — G89.4 CHRONIC PAIN SYNDROME: Primary | Chronic | ICD-10-CM

## 2022-07-08 DIAGNOSIS — F11.90 CHRONIC, CONTINUOUS USE OF OPIOIDS: ICD-10-CM

## 2022-07-08 DIAGNOSIS — N18.31 CHRONIC KIDNEY DISEASE, STAGE 3A (H): ICD-10-CM

## 2022-07-08 DIAGNOSIS — G89.29 CHRONIC LEFT-SIDED LOW BACK PAIN WITH LEFT-SIDED SCIATICA: ICD-10-CM

## 2022-07-08 PROCEDURE — 99214 OFFICE O/P EST MOD 30 MIN: CPT | Performed by: FAMILY MEDICINE

## 2022-07-08 RX ORDER — HYDROCODONE BITARTRATE AND ACETAMINOPHEN 5; 325 MG/1; MG/1
1 TABLET ORAL 2 TIMES DAILY
Qty: 60 TABLET | Refills: 0 | Status: SHIPPED | OUTPATIENT
Start: 2022-07-08 | End: 2022-10-14

## 2022-07-08 RX ORDER — HYDROCODONE BITARTRATE AND ACETAMINOPHEN 5; 325 MG/1; MG/1
1 TABLET ORAL 2 TIMES DAILY
Qty: 60 TABLET | Refills: 0 | Status: SHIPPED | OUTPATIENT
Start: 2022-08-07 | End: 2022-10-14

## 2022-07-08 RX ORDER — AMLODIPINE BESYLATE 10 MG/1
10 TABLET ORAL DAILY
Qty: 90 TABLET | Refills: 3 | Status: SHIPPED | OUTPATIENT
Start: 2022-07-08 | End: 2023-01-01

## 2022-07-08 RX ORDER — SIMVASTATIN 20 MG
20 TABLET ORAL AT BEDTIME
Qty: 90 TABLET | Refills: 3 | Status: SHIPPED | OUTPATIENT
Start: 2022-07-08 | End: 2023-01-01

## 2022-07-08 RX ORDER — LISINOPRIL 40 MG/1
40 TABLET ORAL DAILY
Qty: 90 TABLET | Refills: 3 | Status: SHIPPED | OUTPATIENT
Start: 2022-07-08 | End: 2023-01-01

## 2022-07-08 RX ORDER — HYDROCODONE BITARTRATE AND ACETAMINOPHEN 5; 325 MG/1; MG/1
1 TABLET ORAL 2 TIMES DAILY
Qty: 60 TABLET | Refills: 0 | Status: SHIPPED | OUTPATIENT
Start: 2022-09-06 | End: 2022-10-14

## 2022-07-08 ASSESSMENT — PATIENT HEALTH QUESTIONNAIRE - PHQ9
SUM OF ALL RESPONSES TO PHQ QUESTIONS 1-9: 3
SUM OF ALL RESPONSES TO PHQ QUESTIONS 1-9: 3
5. POOR APPETITE OR OVEREATING: NOT AT ALL
10. IF YOU CHECKED OFF ANY PROBLEMS, HOW DIFFICULT HAVE THESE PROBLEMS MADE IT FOR YOU TO DO YOUR WORK, TAKE CARE OF THINGS AT HOME, OR GET ALONG WITH OTHER PEOPLE: NOT DIFFICULT AT ALL

## 2022-07-08 ASSESSMENT — ENCOUNTER SYMPTOMS: BACK PAIN: 1

## 2022-07-08 ASSESSMENT — ANXIETY QUESTIONNAIRES
5. BEING SO RESTLESS THAT IT IS HARD TO SIT STILL: NOT AT ALL
1. FEELING NERVOUS, ANXIOUS, OR ON EDGE: NOT AT ALL
6. BECOMING EASILY ANNOYED OR IRRITABLE: NOT AT ALL
2. NOT BEING ABLE TO STOP OR CONTROL WORRYING: NOT AT ALL
7. FEELING AFRAID AS IF SOMETHING AWFUL MIGHT HAPPEN: NOT AT ALL
3. WORRYING TOO MUCH ABOUT DIFFERENT THINGS: SEVERAL DAYS
GAD7 TOTAL SCORE: 1
GAD7 TOTAL SCORE: 1

## 2022-07-08 ASSESSMENT — PAIN SCALES - GENERAL: PAINLEVEL: MODERATE PAIN (5)

## 2022-07-08 NOTE — NURSING NOTE
"Chief Complaint   Patient presents with     Back Pain     BP (!) 176/60   Pulse 74   Temp 97.2  F (36.2  C) (Tympanic)   Resp 12   Ht 1.473 m (4' 10\")   Wt 41 kg (90 lb 6.4 oz)   BMI 18.89 kg/m   Estimated body mass index is 18.89 kg/m  as calculated from the following:    Height as of this encounter: 1.473 m (4' 10\").    Weight as of this encounter: 41 kg (90 lb 6.4 oz).  Patient presents to the clinic using No DME      Health Maintenance that is potentially due pending provider review:    Health Maintenance Due   Topic Date Due     ZOSTER IMMUNIZATION (1 of 2) Never done     Pneumococcal Vaccine: 65+ Years (2 - PCV) 12/20/2007     DTAP/TDAP/TD IMMUNIZATION (2 - Td or Tdap) 10/03/2018        Pended.        "

## 2022-07-08 NOTE — PATIENT INSTRUCTIONS
Refill of pain meds sent to the pharmacy for 3 months     Have your daughter check BP at home and let me know what you are running at home if >140/90 may need to adjust meds     See me back in 3 months

## 2022-07-08 NOTE — PROGRESS NOTES
Assessment & Plan     Chronic pain syndrome  Stable no change in treatment plan.   - HYDROcodone-acetaminophen (NORCO) 5-325 MG tablet; Take 1 tablet by mouth 2 times daily  - HYDROcodone-acetaminophen (NORCO) 5-325 MG tablet; Take 1 tablet by mouth 2 times daily  - HYDROcodone-acetaminophen (NORCO) 5-325 MG tablet; Take 1 tablet by mouth 2 times daily    Chronic left-sided low back pain with left-sided sciatica  Stable no change in treatment plan.   - HYDROcodone-acetaminophen (NORCO) 5-325 MG tablet; Take 1 tablet by mouth 2 times daily  - HYDROcodone-acetaminophen (NORCO) 5-325 MG tablet; Take 1 tablet by mouth 2 times daily  - HYDROcodone-acetaminophen (NORCO) 5-325 MG tablet; Take 1 tablet by mouth 2 times daily    Chronic, continuous use of opioids  Stable no change in treatment plan.   - HYDROcodone-acetaminophen (NORCO) 5-325 MG tablet; Take 1 tablet by mouth 2 times daily  - HYDROcodone-acetaminophen (NORCO) 5-325 MG tablet; Take 1 tablet by mouth 2 times daily  - HYDROcodone-acetaminophen (NORCO) 5-325 MG tablet; Take 1 tablet by mouth 2 times daily    Essential hypertension, benign  Check BP at home   Set up ECHO as murmur is louder than previous   - Echocardiogram Complete; Future  - lisinopril (ZESTRIL) 40 MG tablet; Take 1 tablet (40 mg) by mouth daily  - amLODIPine (NORVASC) 10 MG tablet; Take 1 tablet (10 mg) by mouth daily    Chronic kidney disease, stage 3a (H)  Stable no change in treatment plan.     Hyperlipidemia LDL goal <130  Stable no change in treatment plan.   - simvastatin (ZOCOR) 20 MG tablet; Take 1 tablet (20 mg) by mouth At Bedtime             Patient Instructions   Refill of pain meds sent to the pharmacy for 3 months     Have your daughter check BP at home and let me know what you are running at home if >140/90 may need to adjust meds     See me back in 3 months       Return in about 3 months (around 10/8/2022) for using an in person visit, with me.    MD RASHAD Da Silva  "[facility]    Arun Disla is a 86 year old, presenting for the following health issues:  Back Pain      Back Pain     History of Present Illness       Back Pain:  She presents for follow up of back pain. Patient's back pain is a chronic problem.  Location of back pain:  Left middle of back  Description of back pain: burning, cramping, dull ache, fullness, gnawing and sharp  Back pain spreads: left buttocks    Since patient first noticed back pain, pain is: always present, but gets better and worse  Does back pain interfere with her job:  Not applicable      Reason for visit:  Check up    She eats 0-1 servings of fruits and vegetables daily.She consumes 0 sweetened beverage(s) daily.She exercises with enough effort to increase her heart rate 9 or less minutes per day.  She exercises with enough effort to increase her heart rate 3 or less days per week.   She is taking medications regularly.    Today's PHQ-9         PHQ-9 Total Score: 3    PHQ-9 Q9 Thoughts of better off dead/self-harm past 2 weeks :   Not at all    How difficult have these problems made it for you to do your work, take care of things at home, or get along with other people: Not difficult at all       Hypertension Follow-up      Do you check your blood pressure regularly outside of the clinic? No     Are you following a low salt diet? No    Are your blood pressures ever more than 140 on the top number (systolic) OR more   than 90 on the bottom number (diastolic), for example 140/90? unsure        Review of Systems   Musculoskeletal: Positive for back pain.      Constitutional, HEENT, cardiovascular, pulmonary, gi and gu systems are negative, except as otherwise noted.      Objective    BP (!) 159/74   Pulse 74   Temp 97.2  F (36.2  C) (Tympanic)   Resp 12   Ht 1.473 m (4' 10\")   Wt 41 kg (90 lb 6.4 oz)   BMI 18.89 kg/m    Body mass index is 18.89 kg/m .  Physical Exam   GENERAL APPEARANCE: healthy, alert and no " distress  NECK: no adenopathy, no asymmetry, masses, or scars and thyroid normal to palpation  RESP: lungs clear to auscultation - no rales, rhonchi or wheezes  CV: regular rates and rhythm, normal S1 S2, no S3 or S4 and grade 4/6 KIERA murmur heard best over the LCSB  PSYCH: mentation appears normal and affect normal/bright                    .  ..

## 2022-07-18 DIAGNOSIS — E03.9 HYPOTHYROIDISM, UNSPECIFIED TYPE: ICD-10-CM

## 2022-07-19 RX ORDER — LEVOTHYROXINE SODIUM 88 UG/1
88 TABLET ORAL DAILY
Qty: 90 TABLET | Refills: 0 | Status: SHIPPED | OUTPATIENT
Start: 2022-07-19 | End: 2022-11-04

## 2022-07-22 ENCOUNTER — TELEPHONE (OUTPATIENT)
Dept: FAMILY MEDICINE | Facility: CLINIC | Age: 87
End: 2022-07-22

## 2022-07-22 NOTE — TELEPHONE ENCOUNTER
Pt called back. No RN's available at time of call. Please call pt back when able.    Kate López Patient

## 2022-07-22 NOTE — TELEPHONE ENCOUNTER
Reason for Call:  Hip pain.      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  Would like to have a CT scan to see what is going on.  Would like to discuss.      Phone Number Patient can be reached at:  [phone]    Can we leave a detailed message on this number? Yes    Call taken on 7/22/2022 at 8:59 AM by Sagrario Anderson

## 2022-07-25 ENCOUNTER — OFFICE VISIT (OUTPATIENT)
Dept: URGENT CARE | Facility: URGENT CARE | Age: 87
End: 2022-07-25
Payer: COMMERCIAL

## 2022-07-25 VITALS
WEIGHT: 93 LBS | HEART RATE: 68 BPM | DIASTOLIC BLOOD PRESSURE: 78 MMHG | BODY MASS INDEX: 19.44 KG/M2 | OXYGEN SATURATION: 98 % | SYSTOLIC BLOOD PRESSURE: 126 MMHG

## 2022-07-25 DIAGNOSIS — M54.42 ACUTE LEFT-SIDED LOW BACK PAIN WITH LEFT-SIDED SCIATICA: Primary | ICD-10-CM

## 2022-07-25 PROCEDURE — 99213 OFFICE O/P EST LOW 20 MIN: CPT | Performed by: PHYSICIAN ASSISTANT

## 2022-07-25 RX ORDER — PREDNISONE 20 MG/1
40 TABLET ORAL DAILY
Qty: 10 TABLET | Refills: 0 | Status: SHIPPED | OUTPATIENT
Start: 2022-07-25 | End: 2022-07-30

## 2022-07-25 ASSESSMENT — PAIN SCALES - GENERAL: PAINLEVEL: EXTREME PAIN (8)

## 2022-07-25 NOTE — PROGRESS NOTES
Assessment & Plan     Acute left-sided low back pain with left-sided sciatica  Will treat with prednisone burst. Can continue with norco as prescribed. Avoid aggravating factors. Return to clinic if symptoms worsen or do not improve; otherwise follow up as needed      - predniSONE (DELTASONE) 20 MG tablet; Take 2 tablets (40 mg) by mouth daily for 5 days                 Return in about 1 week (around 8/1/2022), or if symptoms worsen or fail to improve.    DALE Lehman [facility] [place]              Subjective   Chief Complaint   Patient presents with     Hip Pain     Left hip pain rating 8 after taking 2 norco today and Gabapentin. Usually takes 4 advil with her norco and Gabapentin.          HPI     Back Pain    Onset of symptoms was 1 month(s) ago.  Location: left low back  Radiation: radiates into the left leg  Context:       The injury happened while at home      Mechanism: none recalled by the patient      Patient experienced delayed pain  Course of symptoms is same.    Severity moderate  Current and Associated symptoms: pain  Denies: fecal incontinence, urinary incontinence, lower extremity numbness, lower extremity weakness and paresthesia    Aggravating Factors: lifting, standing, walking, sitting, bending and changing position  Therapies to improve symptoms include: norco             Review of Systems   Constitutional, HEENT, cardiovascular, pulmonary, gi and gu systems are negative, except as otherwise noted.      Objective    /78   Pulse 68   Wt 42.2 kg (93 lb)   SpO2 98%   BMI 19.44 kg/m    Body mass index is 19.44 kg/m .  Physical Exam  Constitutional:       General: She is not in acute distress.  Musculoskeletal:      Comments: No obvious deformity, erythema, edema, or ecchymosis of the thoracic or lumbar spine. Tenderness with palpation of left low back and surrounding musculature.   Neurological:      Mental Status: She is alert.                             .  ..

## 2022-07-25 NOTE — TELEPHONE ENCOUNTER
Dr. Tapia:    Patient was called, she is reporting that she has increased left hip pain that started about 3 weeks ago. She says she usually is rates the pain 10/10, today is rated 5-6/10. Pain initially started last December, seen in ER on 12-9-21. Patient report she took 4 tablets of Advil every 4 hours 2 days ago and also 2 took  Norco two days ago as well as she needed to grocery shop and could not manage her pain. Today she has only taken on tablet of Norco. Patient is advised not to take more than 3000 mg Advil per day.  States she is having difficulty walking, says she needs to take things slowly, denies swelling or redness, denies injury or fall to the hip, but does say the pain shoots down her left leg. Patient is wondering if you would order a CT or possibly an order for a cortisone shot.     Please advise.      RICHARD Dupont

## 2022-08-16 NOTE — TELEPHONE ENCOUNTER
"Requested Prescriptions   Pending Prescriptions Disp Refills     levothyroxine (SYNTHROID/LEVOTHROID) 75 MCG tablet [Pharmacy Med Name: LEVOTHYROXIN 75MCG  TAB] 36 tablet 0     Sig: TAKE 1 TABLET BY MOUTH ON MONDAY, WEDNESDAY, AND FRIDAY       Thyroid Protocol Failed - 6/24/2019  9:08 AM        Failed - Normal TSH on file in past 12 months     Recent Labs   Lab Test 03/19/19  1039   TSH 0.38*              Passed - Patient is 12 years or older        Passed - Recent (12 mo) or future (30 days) visit within the authorizing provider's specialty     Patient had office visit in the last 12 months or has a visit in the next 30 days with authorizing provider or within the authorizing provider's specialty.  See \"Patient Info\" tab in inbasket, or \"Choose Columns\" in Meds & Orders section of the refill encounter.              Passed - Medication is active on med list        Passed - No active pregnancy on record     If patient is pregnant or has had a positive pregnancy test, please check TSH.          Passed - No positive pregnancy test in past 12 months     If patient is pregnant or has had a positive pregnancy test, please check TSH.          Last Written Prescription Date:  5/13/19  Last Fill Quantity: 30,  # refills: 4   Last office visit: 3/19/2019 with prescribing provider:     Future Office Visit:      " Please take Protonix once daily, at least 30 minutes prior to eating your first meal of the day.  Take Carafate 4 times daily.  Avoid acidic foods/drinks including tomatoes, citrus, mint, chocolate, alcohol, coffee, and spicy food.  Please make an appointment with GI.  Call central scheduling at [phone].

## 2022-08-31 DIAGNOSIS — M54.42 CHRONIC LEFT-SIDED LOW BACK PAIN WITH LEFT-SIDED SCIATICA: Primary | ICD-10-CM

## 2022-08-31 DIAGNOSIS — G89.29 CHRONIC LEFT-SIDED LOW BACK PAIN WITH LEFT-SIDED SCIATICA: Primary | ICD-10-CM

## 2022-08-31 RX ORDER — METHYLPREDNISOLONE 4 MG
TABLET, DOSE PACK ORAL
Qty: 21 TABLET | Refills: 0 | Status: SHIPPED | OUTPATIENT
Start: 2022-08-31 | End: 2023-02-18

## 2022-08-31 NOTE — TELEPHONE ENCOUNTER
Pt calling and stated she had been sent to the ED for back pain a while back, Pt also stated she seen Dr. Tapia for back pain on 7/8/22. Pt was given Prednisone for her back pain, and it really helped. Pt wondering if Dr. Tapia would send another prescription in for back pain, its really bugging her again. Pt stated it went away and just came back after the course of this prescription.     Sayda Mendez RN

## 2022-08-31 NOTE — TELEPHONE ENCOUNTER
Called Pt and left a message asking which pharmacy she would like her refills sent to? Asked Pt to call back and let us know please.     Sayda Mendez RN

## 2022-10-14 ENCOUNTER — OFFICE VISIT (OUTPATIENT)
Dept: FAMILY MEDICINE | Facility: CLINIC | Age: 87
End: 2022-10-14
Payer: COMMERCIAL

## 2022-10-14 VITALS
SYSTOLIC BLOOD PRESSURE: 136 MMHG | DIASTOLIC BLOOD PRESSURE: 60 MMHG | RESPIRATION RATE: 12 BRPM | TEMPERATURE: 97 F | HEART RATE: 69 BPM | WEIGHT: 91.4 LBS | BODY MASS INDEX: 19.1 KG/M2 | OXYGEN SATURATION: 98 %

## 2022-10-14 DIAGNOSIS — G89.29 CHRONIC LEFT-SIDED LOW BACK PAIN WITH LEFT-SIDED SCIATICA: ICD-10-CM

## 2022-10-14 DIAGNOSIS — E78.5 HYPERLIPIDEMIA LDL GOAL <130: ICD-10-CM

## 2022-10-14 DIAGNOSIS — N18.31 CHRONIC KIDNEY DISEASE, STAGE 3A (H): ICD-10-CM

## 2022-10-14 DIAGNOSIS — M54.42 CHRONIC LEFT-SIDED LOW BACK PAIN WITH LEFT-SIDED SCIATICA: ICD-10-CM

## 2022-10-14 DIAGNOSIS — I10 ESSENTIAL HYPERTENSION, BENIGN: Primary | Chronic | ICD-10-CM

## 2022-10-14 DIAGNOSIS — M54.16 LUMBAR RADICULOPATHY: ICD-10-CM

## 2022-10-14 LAB
ANION GAP SERPL CALCULATED.3IONS-SCNC: 12 MMOL/L (ref 7–15)
BUN SERPL-MCNC: 16.3 MG/DL (ref 8–23)
CALCIUM SERPL-MCNC: 9.8 MG/DL (ref 8.8–10.2)
CHLORIDE SERPL-SCNC: 102 MMOL/L (ref 98–107)
CHOLEST SERPL-MCNC: 230 MG/DL
CREAT SERPL-MCNC: 0.97 MG/DL (ref 0.51–0.95)
DEPRECATED HCO3 PLAS-SCNC: 23 MMOL/L (ref 22–29)
ERYTHROCYTE [DISTWIDTH] IN BLOOD BY AUTOMATED COUNT: 15.3 % (ref 10–15)
GFR SERPL CREATININE-BSD FRML MDRD: 57 ML/MIN/1.73M2
GLUCOSE SERPL-MCNC: 96 MG/DL (ref 70–99)
HCT VFR BLD AUTO: 30.3 % (ref 35–47)
HDLC SERPL-MCNC: 105 MG/DL
HGB BLD-MCNC: 10 G/DL (ref 11.7–15.7)
LDLC SERPL CALC-MCNC: 105 MG/DL
MCH RBC QN AUTO: 31.2 PG (ref 26.5–33)
MCHC RBC AUTO-ENTMCNC: 33 G/DL (ref 31.5–36.5)
MCV RBC AUTO: 94 FL (ref 78–100)
NONHDLC SERPL-MCNC: 125 MG/DL
PLATELET # BLD AUTO: 364 10E3/UL (ref 150–450)
POTASSIUM SERPL-SCNC: 4.3 MMOL/L (ref 3.4–5.3)
RBC # BLD AUTO: 3.21 10E6/UL (ref 3.8–5.2)
SODIUM SERPL-SCNC: 137 MMOL/L (ref 136–145)
TRIGL SERPL-MCNC: 99 MG/DL
WBC # BLD AUTO: 5.9 10E3/UL (ref 4–11)

## 2022-10-14 PROCEDURE — 80048 BASIC METABOLIC PNL TOTAL CA: CPT | Performed by: FAMILY MEDICINE

## 2022-10-14 PROCEDURE — 99214 OFFICE O/P EST MOD 30 MIN: CPT | Performed by: FAMILY MEDICINE

## 2022-10-14 PROCEDURE — 36415 COLL VENOUS BLD VENIPUNCTURE: CPT | Performed by: FAMILY MEDICINE

## 2022-10-14 PROCEDURE — 85027 COMPLETE CBC AUTOMATED: CPT | Performed by: FAMILY MEDICINE

## 2022-10-14 PROCEDURE — 80061 LIPID PANEL: CPT | Performed by: FAMILY MEDICINE

## 2022-10-14 RX ORDER — METHYLPREDNISOLONE 4 MG
TABLET, DOSE PACK ORAL
Qty: 21 TABLET | Refills: 0 | Status: SHIPPED | OUTPATIENT
Start: 2022-10-14 | End: 2023-02-18

## 2022-10-14 RX ORDER — HYDROCODONE BITARTRATE AND ACETAMINOPHEN 5; 325 MG/1; MG/1
1 TABLET ORAL 2 TIMES DAILY
Qty: 60 TABLET | Refills: 0 | Status: SHIPPED | OUTPATIENT
Start: 2022-12-04 | End: 2022-12-19

## 2022-10-14 RX ORDER — HYDROCODONE BITARTRATE AND ACETAMINOPHEN 5; 325 MG/1; MG/1
1 TABLET ORAL 2 TIMES DAILY
Qty: 60 TABLET | Refills: 0 | Status: SHIPPED | OUTPATIENT
Start: 2022-11-04 | End: 2023-02-18

## 2022-10-14 ASSESSMENT — ANXIETY QUESTIONNAIRES
GAD7 TOTAL SCORE: 5
2. NOT BEING ABLE TO STOP OR CONTROL WORRYING: SEVERAL DAYS
8. IF YOU CHECKED OFF ANY PROBLEMS, HOW DIFFICULT HAVE THESE MADE IT FOR YOU TO DO YOUR WORK, TAKE CARE OF THINGS AT HOME, OR GET ALONG WITH OTHER PEOPLE?: SOMEWHAT DIFFICULT
5. BEING SO RESTLESS THAT IT IS HARD TO SIT STILL: NOT AT ALL
4. TROUBLE RELAXING: NOT AT ALL
1. FEELING NERVOUS, ANXIOUS, OR ON EDGE: NOT AT ALL
7. FEELING AFRAID AS IF SOMETHING AWFUL MIGHT HAPPEN: SEVERAL DAYS
3. WORRYING TOO MUCH ABOUT DIFFERENT THINGS: NEARLY EVERY DAY
6. BECOMING EASILY ANNOYED OR IRRITABLE: NOT AT ALL
7. FEELING AFRAID AS IF SOMETHING AWFUL MIGHT HAPPEN: SEVERAL DAYS
GAD7 TOTAL SCORE: 5
IF YOU CHECKED OFF ANY PROBLEMS ON THIS QUESTIONNAIRE, HOW DIFFICULT HAVE THESE PROBLEMS MADE IT FOR YOU TO DO YOUR WORK, TAKE CARE OF THINGS AT HOME, OR GET ALONG WITH OTHER PEOPLE: SOMEWHAT DIFFICULT

## 2022-10-14 NOTE — NURSING NOTE
"Chief Complaint   Patient presents with     Back Pain     /60   Pulse 69   Temp 97  F (36.1  C) (Tympanic)   Resp 12   Wt 41.5 kg (91 lb 6.4 oz)   SpO2 98%   BMI 19.10 kg/m   Estimated body mass index is 19.1 kg/m  as calculated from the following:    Height as of 7/8/22: 1.473 m (4' 10\").    Weight as of this encounter: 41.5 kg (91 lb 6.4 oz).  Patient presents to the clinic using No DME      Health Maintenance that is potentially due pending provider review:    Health Maintenance Due   Topic Date Due     HEPATITIS B IMMUNIZATION (1 of 3 - 3-dose series) Never done     ZOSTER IMMUNIZATION (1 of 2) Never done     Pneumococcal Vaccine: 65+ Years (2 - PCV) 12/20/2007     DTAP/TDAP/TD IMMUNIZATION (2 - Td or Tdap) 10/03/2018     COVID-19 Vaccine (5 - Booster for Pfizer series) 05/31/2022     INFLUENZA VACCINE (1) 09/01/2022     LIPID  11/03/2022     BMP  11/13/2022     HEMOGLOBIN  11/14/2022        Pended.        "

## 2022-10-14 NOTE — PROGRESS NOTES
Assessment & Plan     Hyperlipidemia LDL goal <130  Adjust meds as indicated by above labs.   - Lipid panel reflex to direct LDL Non-fasting; Future  - Lipid panel reflex to direct LDL Non-fasting    Chronic kidney disease, stage 3a (H)  Adjust meds as indicated by above labs.   - BASIC METABOLIC PANEL; Future  - CBC with platelets; Future  - BASIC METABOLIC PANEL  - CBC with platelets    Lumbar radiculopathy  Flares with any activity and prednisone is good to have on hand for her     - methylPREDNISolone (MEDROL DOSEPAK) 4 MG tablet therapy pack; Follow Package Directions  - HYDROcodone-acetaminophen (NORCO) 5-325 MG tablet; Take 1 tablet by mouth 2 times daily  - HYDROcodone-acetaminophen (NORCO) 5-325 MG tablet; Take 1 tablet by mouth 2 times daily    Chronic left-sided low back pain with left-sided sciatica  As above   - HYDROcodone-acetaminophen (NORCO) 5-325 MG tablet; Take 1 tablet by mouth 2 times daily  - HYDROcodone-acetaminophen (NORCO) 5-325 MG tablet; Take 1 tablet by mouth 2 times daily    Essential hypertension, benign  Stable no change in treatment plan.                    Return in about 2 months (around 12/14/2022) for using an in person visit.    Nancy Tapia MD  [facility]    Arun Disla is a 87 year old, presenting for the following health issues:  Back Pain      History of Present Illness       Reason for visit:  Back recheck    She eats 0-1 servings of fruits and vegetables daily.She consumes 1 sweetened beverage(s) daily.She exercises with enough effort to increase her heart rate 9 or less minutes per day.  She exercises with enough effort to increase her heart rate 3 or less days per week.   She is taking medications regularly.  Today's PRECIOUS-7 Score: 5       Pain History:  When did you first notice your pain? - Chronic Pain   Have you seen this provider for your pain in the past?   Yes   Where in your body do you have pain? Low back and left  hip  Are you seeing anyone else for your pain? No    PHQ-9 SCORE 2/9/2022 4/5/2022 7/8/2022   PHQ-9 Total Score - - -   PHQ-9 Total Score MyChart - 19 (Moderately severe depression) 3 (Minimal depression)   PHQ-9 Total Score 9 19 3       PRECIOUS-7 SCORE 2/9/2022 7/8/2022 10/14/2022   Total Score - - -   Total Score - - 5 (mild anxiety)   Total Score 0 1 5               Chronic Pain Follow Up:    Location of pain: low back, left hip  Analgesia/pain control:    - Recent changes:  no    - Overall control: Tolerable with discomfort    - Current treatments: Norco, gabapentin   Adherence:     - Do you ever take more pain medicine than prescribed? No Took 2 1/2 tabs this morning   - When did you take your last dose of pain medicine?  This morning   Adverse effects: No   PDMP Review       Value Time User    State PDMP site checked  Yes 8/31/2022  9:25 AM Nancy Tapia MD        Last CSA Agreement:   CSA -- Patient Level:     [Media Unavailable] Controlled Substance Agreement - Opioid - Scan on 4/5/2022 10:31 AM   [Media Unavailable] Controlled Substance Agreement - Opioid - Scan on 4/13/2021  9:32 AM   [Media Unavailable] Controlled Substance Agreement - Non - Opioid - Scan on 3/12/2020 11:46 AM: NON-OPIOID CONTROLLED SUBSTANCE AGREEMENT   [Media Unavailable] Controlled Substance Agreement - Opioid - Scan on 3/6/2020  9:44 AM: FOR 3-6-20 VISIT   [Media Unavailable] Controlled Substance Agreement - Opioid - Scan on 3/6/2020  9:41 AM       Last UDS: 4/8/2022        Hyperlipidemia Follow-Up      Are you regularly taking any medication or supplement to lower your cholesterol?   Yes- statin    Are you having muscle aches or other side effects that you think could be caused by your cholesterol lowering medication?  No    Hypertension Follow-up      Do you check your blood pressure regularly outside of the clinic? No     Are you following a low salt diet? No    Are your blood pressures ever more than 140 on the top number  (systolic) OR more   than 90 on the bottom number (diastolic), for example 140/90? No    Chronic Kidney Disease Follow-up      Do you take any over the counter pain medicine?: No      Review of Systems   Constitutional, HEENT, cardiovascular, pulmonary, gi and gu systems are negative, except as otherwise noted.      Objective    /60   Pulse 69   Temp 97  F (36.1  C) (Tympanic)   Resp 12   Wt 41.5 kg (91 lb 6.4 oz)   SpO2 98%   BMI 19.10 kg/m    Body mass index is 19.1 kg/m .  Physical Exam   GENERAL APPEARANCE: healthy, alert and no distress  RESP: lungs clear to auscultation - no rales, rhonchi or wheezes  CV: regular rates and rhythm, normal S1 S2, no S3 or S4 and no murmur, click or rub  MS: extremities normal- no gross deformities noted  PSYCH: mentation appears normal and affect normal/bright

## 2022-10-21 DIAGNOSIS — M54.16 LUMBAR RADICULOPATHY: ICD-10-CM

## 2022-10-21 RX ORDER — GABAPENTIN 300 MG/1
CAPSULE ORAL
Qty: 180 CAPSULE | Refills: 3 | Status: SHIPPED | OUTPATIENT
Start: 2022-10-21 | End: 2023-01-01

## 2022-10-21 NOTE — TELEPHONE ENCOUNTER
Requested Prescriptions   Pending Prescriptions Disp Refills     gabapentin (NEURONTIN) 300 MG capsule [Pharmacy Med Name: GABAPENTIN 300MG CAPS] 180 capsule 3     Sig: TAKE ONE CAPSULE BY MOUTH EVERY MORNING AND TAKE ONE CAPSULE BY MOUTH AT NOON AND TAKE TWO CAPSULES BY MOUTH AT BEDTIME       There is no refill protocol information for this order        Last Written Prescription Date: 10/8/22  Last Fill Quantity: 180,  # refills: 0   Last office visit: 10/14/2022 with prescribing provider:     Future Office Visit:   Next 5 appointments (look out 90 days)    Dec 16, 2022 10:30 AM  (Arrive by 10:10 AM)  Provider Visit with Nancy Tapia MD  [facility] ([facility] ) [address] [place] [id number] [phone]

## 2022-11-11 NOTE — TELEPHONE ENCOUNTER
Patient states that when she was here for her physical on 4/5/22 she discussed her urinary frequency and Dr. Tapia said she could put her on a medication to help.     Patient states that she still feels like she needs urinate all of the time and would really like to try a medication to help. She uses the [facility].    Julisa [facility], [place]    Rules:  Count every calorie every day  Limit sweets to one day per month  Limit chips/crackers/pretzels/popcorn to 100 lance/day  Limit restaurants (including fast food and food from a convenience store) to one time every two weeks while in town    Requirements:  Make sure protein intake is at least 65 grams per day (do not count protein every day; instead spot check your intake every 2-3 weeks and make sure what you think you are getting is close to accurate; consider using a protein shake if needed; these are in the pharmacy section of the stores, not the grocery section; Premier, Pure Protein and Fairlife are relatively inexpensive and taste good to most patients; other options are Nectar, Boost Max, Ensure Max, BeneProtein and GNC lean (which is lactose-free); Nectar fruit, Premier Protein Clear, IsoPure Protein Drink, and Protein 2 O are water-based options; Quest (or Cosco, which is cheaper and is ordered on [address] [facility] [address] [facility] [address]) and the [place] [address] protein bars can also be used, but have less protein in them )  (Disclaimer: Dietary supplements rarely have their listed ingredients and the amount of each verified by a third party other. Sometimes they give verification for their claims to be GMO and gluten free and to be organic. However, even such verifications as these may still be untrustworthy.)  Make sure that fiber intake is at least 22 grams per day. Do this by either eating 12 tablespoons of the original, plain Fiber One cereal every day or 4 tablespoons of wheat dextrin powder (Benefiber or a generic brand) every day. Work up to this amount slowly by starting with only one-eighth to one-fourth of the target amount and then adding another one-eighth to one-fourth every one or two weeks until reaching the target. Drink at least 64 oz of water each day  Take one multivitamin every day    Targets:  Limit calorie intake to 1200 calories/day  Avoid eating 2 hours within bedtime.      Tips:  Do not eat outside of the dining room or the kitchen  Do not eat while watching TV, videos, working on the computer or using a smart phone  Do not eat food out of a multi-serving bag or container. Establish 6 hours of food-free \"time-out\" periods (times you don't eat) each day. No period can be less than 1 hour long. The periods need to be the same every day for days that are the same (for example, workdays would have one set of food free periods and weekends would have another set of days). These six hours are in addition to the two hours before bedtime and the time spent sleeping. Tips:  Do not eat outside of the dining room or the kitchen    Medications:  Start Topiramate 25 mg. Take one tablet twice each day for one week. If the appetite suppression is insufficient, then increase to two tablets twice daily. Have the ordered blood test performed at the end of second week after starting it.       Return to see me in 4-6 weeks

## 2022-11-19 ENCOUNTER — HEALTH MAINTENANCE LETTER (OUTPATIENT)
Age: 87
End: 2022-11-19

## 2022-11-23 ENCOUNTER — TELEPHONE (OUTPATIENT)
Dept: FAMILY MEDICINE | Facility: CLINIC | Age: 87
End: 2022-11-23

## 2022-11-23 DIAGNOSIS — M54.16 LUMBAR RADICULOPATHY: Primary | ICD-10-CM

## 2022-11-23 NOTE — TELEPHONE ENCOUNTER
"S-(situation): I talked with Nighat.  She C/O \"really really bad back pain.\"  Back pain does not radiate.  Says since cold weather back worse because has to clean car off.  Back interrupts sleep.  Asking for prednisone    B-(background): chronic back pain.  Takes opioids.     A-(assessment): back pain    R-(recommendations): I did tell her that this is something a person would need to be seen for.  She wanted me to ask Dr Regina Escobar RN      "

## 2022-11-28 RX ORDER — METHYLPREDNISOLONE 4 MG
TABLET, DOSE PACK ORAL
Qty: 21 TABLET | Refills: 0 | Status: SHIPPED | OUTPATIENT
Start: 2022-11-28 | End: 2023-01-25

## 2022-12-16 ENCOUNTER — OFFICE VISIT (OUTPATIENT)
Dept: FAMILY MEDICINE | Facility: CLINIC | Age: 87
End: 2022-12-16
Payer: COMMERCIAL

## 2022-12-16 DIAGNOSIS — Z23 NEED FOR PROPHYLACTIC VACCINATION AND INOCULATION AGAINST INFLUENZA: ICD-10-CM

## 2022-12-16 DIAGNOSIS — G89.4 CHRONIC PAIN SYNDROME: Primary | Chronic | ICD-10-CM

## 2022-12-16 DIAGNOSIS — M54.16 LUMBAR RADICULOPATHY: ICD-10-CM

## 2022-12-16 DIAGNOSIS — I10 ESSENTIAL HYPERTENSION, BENIGN: Chronic | ICD-10-CM

## 2022-12-16 DIAGNOSIS — G89.29 CHRONIC LEFT-SIDED LOW BACK PAIN WITH LEFT-SIDED SCIATICA: ICD-10-CM

## 2022-12-16 DIAGNOSIS — Z23 HIGH PRIORITY FOR 2019-NCOV VACCINE: ICD-10-CM

## 2022-12-16 DIAGNOSIS — M54.42 CHRONIC LEFT-SIDED LOW BACK PAIN WITH LEFT-SIDED SCIATICA: ICD-10-CM

## 2022-12-16 PROCEDURE — 90662 IIV NO PRSV INCREASED AG IM: CPT | Performed by: FAMILY MEDICINE

## 2022-12-16 PROCEDURE — 0124A COVID-19 VACCINE BIVALENT BOOSTER 12+ (PFIZER): CPT | Performed by: FAMILY MEDICINE

## 2022-12-16 PROCEDURE — 91312 COVID-19 VACCINE BIVALENT BOOSTER 12+ (PFIZER): CPT | Performed by: FAMILY MEDICINE

## 2022-12-16 PROCEDURE — G0008 ADMIN INFLUENZA VIRUS VAC: HCPCS | Performed by: FAMILY MEDICINE

## 2022-12-16 PROCEDURE — 99214 OFFICE O/P EST MOD 30 MIN: CPT | Mod: 25 | Performed by: FAMILY MEDICINE

## 2022-12-16 ASSESSMENT — ANXIETY QUESTIONNAIRES
4. TROUBLE RELAXING: NOT AT ALL
2. NOT BEING ABLE TO STOP OR CONTROL WORRYING: SEVERAL DAYS
7. FEELING AFRAID AS IF SOMETHING AWFUL MIGHT HAPPEN: SEVERAL DAYS
5. BEING SO RESTLESS THAT IT IS HARD TO SIT STILL: NOT AT ALL
GAD7 TOTAL SCORE: 4
1. FEELING NERVOUS, ANXIOUS, OR ON EDGE: SEVERAL DAYS
8. IF YOU CHECKED OFF ANY PROBLEMS, HOW DIFFICULT HAVE THESE MADE IT FOR YOU TO DO YOUR WORK, TAKE CARE OF THINGS AT HOME, OR GET ALONG WITH OTHER PEOPLE?: NOT DIFFICULT AT ALL
6. BECOMING EASILY ANNOYED OR IRRITABLE: NOT AT ALL
3. WORRYING TOO MUCH ABOUT DIFFERENT THINGS: SEVERAL DAYS
GAD7 TOTAL SCORE: 4
7. FEELING AFRAID AS IF SOMETHING AWFUL MIGHT HAPPEN: SEVERAL DAYS
IF YOU CHECKED OFF ANY PROBLEMS ON THIS QUESTIONNAIRE, HOW DIFFICULT HAVE THESE PROBLEMS MADE IT FOR YOU TO DO YOUR WORK, TAKE CARE OF THINGS AT HOME, OR GET ALONG WITH OTHER PEOPLE: NOT DIFFICULT AT ALL

## 2022-12-16 ASSESSMENT — PAIN SCALES - GENERAL: PAINLEVEL: MODERATE PAIN (5)

## 2022-12-16 NOTE — NURSING NOTE
"Chief Complaint   Patient presents with     Pain     Chronic     BP (!) 164/60   Pulse 75   Temp 97.3  F (36.3  C) (Tympanic)   Resp 12   Ht 1.473 m (4' 10\")   Wt 41 kg (90 lb 6.4 oz)   SpO2 97%   BMI 18.89 kg/m   Estimated body mass index is 18.89 kg/m  as calculated from the following:    Height as of this encounter: 1.473 m (4' 10\").    Weight as of this encounter: 41 kg (90 lb 6.4 oz).  Patient presents to the clinic using No DME      Health Maintenance that is potentially due pending provider review:    Health Maintenance Due   Topic Date Due     ZOSTER IMMUNIZATION (1 of 2) Never done     Pneumococcal Vaccine: 65+ Years (2 - PCV) 12/20/2007     DTAP/TDAP/TD IMMUNIZATION (2 - Td or Tdap) 10/03/2018     COVID-19 Vaccine (5 - Booster for Pfizer series) 05/31/2022     INFLUENZA VACCINE (1) 09/01/2022     PHQ-9  01/08/2023        Will get flu and Covid booster.        "

## 2022-12-16 NOTE — PROGRESS NOTES
Assessment & Plan     Chronic pain syndrome  Stable no change in treatment plan.     Essential hypertension, benign  Fair control   Murmur is louder   Needs echo     High priority for 2019-nCoV vaccine    - COVID-19,PF,PFIZER BOOSTER BIVALENT 12+Yrs    Need for prophylactic vaccination and inoculation against influenza    - INFLUENZA, QUAD, HIGH DOSE, PF, 65YR + (FLUZONE HD)  - ADMIN INFLUENZA (For MEDICARE Patients ONLY) []    Lumbar radiculopathy  Stable no change in treatment plan.   - HYDROcodone-acetaminophen (NORCO) 5-325 MG tablet; Take 1 tablet by mouth 2 times daily  - HYDROcodone-acetaminophen (NORCO) 5-325 MG tablet; Take 1 tablet by mouth 2 times daily    Chronic left-sided low back pain with left-sided sciatica  Stable no change in treatment plan.   - HYDROcodone-acetaminophen (NORCO) 5-325 MG tablet; Take 1 tablet by mouth 2 times daily  - HYDROcodone-acetaminophen (NORCO) 5-325 MG tablet; Take 1 tablet by mouth 2 times daily             Patient Instructions   Set up ECHO       Return in about 2 months (around 2/16/2023).    Nancy Tapia MD  [facility]    Arun Disla is a 87 year old accompanied by her daughter, presenting for the following health issues:  Pain (Chronic)      History of Present Illness       Back Pain:  She presents for follow up of back pain. Patient's back pain is a chronic problem.  Location of back pain:  Left lower back, left shoulder and left hip  Description of back pain: other  Back pain spreads: left thigh, left knee and left foot    Since patient first noticed back pain, pain is: unchanged  Does back pain interfere with her job:  Yes      Hypertension: She presents for follow up of hypertension.  She does not check blood pressure  regularly outside of the clinic. Outpatient blood pressures have not been over 140/90. She does not follow a low salt diet.    Today's PRECIOUS-7 Score: 4     Was to have echo do to  "increased murmur  She will set this up             Review of Systems   Constitutional, HEENT, cardiovascular, pulmonary, gi and gu systems are negative, except as otherwise noted.      Objective    BP (!) 164/60   Pulse 75   Temp 97.3  F (36.3  C) (Tympanic)   Resp 12   Ht 1.473 m (4' 10\")   Wt 41 kg (90 lb 6.4 oz)   SpO2 97%   BMI 18.89 kg/m    Body mass index is 18.89 kg/m .  Physical Exam   GENERAL APPEARANCE: healthy, alert and no distress  RESP: lungs clear to auscultation - no rales, rhonchi or wheezes  CV: regular rates and rhythm, normal S1 S2, no S3 or S4 and grade 4/6 KIERA loud murmur heard best over the LCSB  MS: extremities normal- no gross deformities noted  PSYCH: mentation appears normal and affect normal/bright                    "

## 2022-12-19 VITALS
RESPIRATION RATE: 12 BRPM | HEIGHT: 58 IN | BODY MASS INDEX: 18.97 KG/M2 | TEMPERATURE: 97.3 F | HEART RATE: 75 BPM | DIASTOLIC BLOOD PRESSURE: 68 MMHG | OXYGEN SATURATION: 97 % | WEIGHT: 90.4 LBS | SYSTOLIC BLOOD PRESSURE: 138 MMHG

## 2022-12-19 RX ORDER — HYDROCODONE BITARTRATE AND ACETAMINOPHEN 5; 325 MG/1; MG/1
1 TABLET ORAL 2 TIMES DAILY
Qty: 60 TABLET | Refills: 0 | Status: SHIPPED | OUTPATIENT
Start: 2023-01-04 | End: 2023-02-18

## 2022-12-19 RX ORDER — HYDROCODONE BITARTRATE AND ACETAMINOPHEN 5; 325 MG/1; MG/1
1 TABLET ORAL 2 TIMES DAILY
Qty: 60 TABLET | Refills: 0 | Status: SHIPPED | OUTPATIENT
Start: 2023-02-04 | End: 2023-02-18

## 2023-01-01 ENCOUNTER — VIRTUAL VISIT (OUTPATIENT)
Dept: FAMILY MEDICINE | Facility: CLINIC | Age: 88
End: 2023-01-01
Payer: COMMERCIAL

## 2023-01-01 ENCOUNTER — MYC REFILL (OUTPATIENT)
Dept: FAMILY MEDICINE | Facility: CLINIC | Age: 88
End: 2023-01-01
Payer: COMMERCIAL

## 2023-01-01 ENCOUNTER — ALLIED HEALTH/NURSE VISIT (OUTPATIENT)
Dept: FAMILY MEDICINE | Facility: CLINIC | Age: 88
End: 2023-01-01
Payer: COMMERCIAL

## 2023-01-01 ENCOUNTER — TELEPHONE (OUTPATIENT)
Dept: ORTHOPEDICS | Facility: CLINIC | Age: 88
End: 2023-01-01

## 2023-01-01 ENCOUNTER — OFFICE VISIT (OUTPATIENT)
Dept: URGENT CARE | Facility: URGENT CARE | Age: 88
End: 2023-01-01
Payer: COMMERCIAL

## 2023-01-01 ENCOUNTER — TELEPHONE (OUTPATIENT)
Dept: FAMILY MEDICINE | Facility: CLINIC | Age: 88
End: 2023-01-01
Payer: COMMERCIAL

## 2023-01-01 ENCOUNTER — OFFICE VISIT (OUTPATIENT)
Dept: FAMILY MEDICINE | Facility: CLINIC | Age: 88
End: 2023-01-01
Payer: COMMERCIAL

## 2023-01-01 ENCOUNTER — HOSPITAL ENCOUNTER (OUTPATIENT)
Dept: CT IMAGING | Facility: CLINIC | Age: 88
Discharge: HOME | End: 2023-12-19
Attending: NURSE PRACTITIONER | Admitting: NURSE PRACTITIONER
Payer: COMMERCIAL

## 2023-01-01 ENCOUNTER — ANCILLARY PROCEDURE (OUTPATIENT)
Dept: GENERAL RADIOLOGY | Facility: CLINIC | Age: 88
End: 2023-01-01
Attending: PHYSICIAN ASSISTANT
Payer: COMMERCIAL

## 2023-01-01 VITALS
RESPIRATION RATE: 18 BRPM | HEART RATE: 76 BPM | WEIGHT: 88.8 LBS | DIASTOLIC BLOOD PRESSURE: 58 MMHG | HEART RATE: 72 BPM | OXYGEN SATURATION: 96 % | DIASTOLIC BLOOD PRESSURE: 68 MMHG | OXYGEN SATURATION: 96 % | RESPIRATION RATE: 20 BRPM | BODY MASS INDEX: 19.73 KG/M2 | SYSTOLIC BLOOD PRESSURE: 186 MMHG | TEMPERATURE: 98.3 F | SYSTOLIC BLOOD PRESSURE: 130 MMHG | TEMPERATURE: 97 F

## 2023-01-01 VITALS
HEART RATE: 76 BPM | OXYGEN SATURATION: 97 % | SYSTOLIC BLOOD PRESSURE: 130 MMHG | DIASTOLIC BLOOD PRESSURE: 60 MMHG | TEMPERATURE: 96.5 F | RESPIRATION RATE: 16 BRPM

## 2023-01-01 DIAGNOSIS — M54.16 LUMBAR RADICULOPATHY: ICD-10-CM

## 2023-01-01 DIAGNOSIS — M54.50 ACUTE RIGHT-SIDED LOW BACK PAIN WITHOUT SCIATICA: Primary | ICD-10-CM

## 2023-01-01 DIAGNOSIS — G89.29 CHRONIC LEFT-SIDED LOW BACK PAIN WITH LEFT-SIDED SCIATICA: ICD-10-CM

## 2023-01-01 DIAGNOSIS — M54.42 CHRONIC LEFT-SIDED LOW BACK PAIN WITH LEFT-SIDED SCIATICA: ICD-10-CM

## 2023-01-01 DIAGNOSIS — S32.10XA SACRAL FRACTURE (H): Primary | ICD-10-CM

## 2023-01-01 DIAGNOSIS — R60.0 LOCALIZED EDEMA: ICD-10-CM

## 2023-01-01 DIAGNOSIS — S32.010A COMPRESSION FRACTURE OF L1 VERTEBRA, INITIAL ENCOUNTER (H): Primary | ICD-10-CM

## 2023-01-01 DIAGNOSIS — I10 ESSENTIAL HYPERTENSION, BENIGN: Chronic | ICD-10-CM

## 2023-01-01 DIAGNOSIS — S32.301A CLOSED FRACTURE OF RIGHT ILIAC WING, INITIAL ENCOUNTER (H): ICD-10-CM

## 2023-01-01 DIAGNOSIS — E78.5 HYPERLIPIDEMIA LDL GOAL <130: ICD-10-CM

## 2023-01-01 DIAGNOSIS — M54.50 ACUTE RIGHT-SIDED LOW BACK PAIN WITHOUT SCIATICA: ICD-10-CM
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
## 2023-01-01 DIAGNOSIS — Z00.00 MEDICARE ANNUAL WELLNESS VISIT, SUBSEQUENT: ICD-10-CM

## 2023-01-01 DIAGNOSIS — F33.1 MAJOR DEPRESSIVE DISORDER, RECURRENT EPISODE, MODERATE (H): ICD-10-CM

## 2023-01-01 PROCEDURE — 99214 OFFICE O/P EST MOD 30 MIN: CPT | Performed by: PHYSICIAN ASSISTANT

## 2023-01-01 PROCEDURE — G0439 PPPS, SUBSEQ VISIT: HCPCS | Performed by: FAMILY MEDICINE

## 2023-01-01 PROCEDURE — 72100 X-RAY EXAM L-S SPINE 2/3 VWS: CPT | Mod: TC | Performed by: RADIOLOGY

## 2023-01-01 PROCEDURE — 99443 PR PHYSICIAN TELEPHONE EVALUATION 21-30 MIN: CPT | Mod: 95 | Performed by: FAMILY MEDICINE

## 2023-01-01 PROCEDURE — G0008 ADMIN INFLUENZA VIRUS VAC: HCPCS | Performed by: FAMILY MEDICINE

## 2023-01-01 PROCEDURE — 99214 OFFICE O/P EST MOD 30 MIN: CPT | Mod: 25 | Performed by: FAMILY MEDICINE

## 2023-01-01 PROCEDURE — 90662 IIV NO PRSV INCREASED AG IM: CPT | Performed by: FAMILY MEDICINE

## 2023-01-01 PROCEDURE — 99215 OFFICE O/P EST HI 40 MIN: CPT | Performed by: NURSE PRACTITIONER

## 2023-01-01 PROCEDURE — 99207 PR NO CHARGE NURSE ONLY: CPT

## 2023-01-01 PROCEDURE — 72131 CT LUMBAR SPINE W/O DYE: CPT

## 2023-01-01 RX ORDER — FUROSEMIDE 20 MG
20 TABLET ORAL DAILY
Qty: 10 TABLET | Refills: 0 | Status: SHIPPED | OUTPATIENT
Start: 2023-01-01 | End: 2024-01-01

## 2023-01-01 RX ORDER — LISINOPRIL 40 MG/1
40 TABLET ORAL DAILY
Qty: 90 TABLET | Refills: 3 | OUTPATIENT
Start: 2023-01-01

## 2023-01-01 RX ORDER — AMLODIPINE BESYLATE 10 MG/1
10 TABLET ORAL DAILY
Qty: 90 TABLET | Refills: 3 | Status: SHIPPED | OUTPATIENT
Start: 2023-01-01 | End: 2024-01-01

## 2023-01-01 RX ORDER — HYDROCODONE BITARTRATE AND ACETAMINOPHEN 5; 325 MG/1; MG/1
1 TABLET ORAL 2 TIMES DAILY
Qty: 60 TABLET | Refills: 0 | Status: SHIPPED | OUTPATIENT
Start: 2023-01-01 | End: 2024-01-01 | Stop reason: ALTCHOICE

## 2023-01-01 RX ORDER — SIMVASTATIN 20 MG
20 TABLET ORAL
Qty: 90 TABLET | Refills: 3 | OUTPATIENT
Start: 2023-01-01

## 2023-01-01 RX ORDER — PREDNISONE 20 MG/1
40 TABLET ORAL DAILY
Qty: 10 TABLET | Refills: 0 | Status: SHIPPED | OUTPATIENT
Start: 2023-01-01 | End: 2023-01-01

## 2023-01-01 RX ORDER — RESPIRATORY SYNCYTIAL VIRUS VACCINE 120MCG/0.5
0.5 KIT INTRAMUSCULAR ONCE
Qty: 1 EACH | Refills: 0 | Status: CANCELLED | OUTPATIENT
Start: 2023-01-01 | End: 2023-01-01

## 2023-01-01 RX ORDER — GABAPENTIN 300 MG/1
CAPSULE ORAL
Qty: 180 CAPSULE | Refills: 3 | Status: SHIPPED | OUTPATIENT
Start: 2023-01-01 | End: 2024-01-01

## 2023-01-01 RX ORDER — DULOXETIN HYDROCHLORIDE 20 MG/1
CAPSULE, DELAYED RELEASE ORAL
Qty: 180 CAPSULE | Refills: 1 | Status: SHIPPED | OUTPATIENT
Start: 2023-01-01 | End: 2024-01-01 | Stop reason: ALTCHOICE

## 2023-01-01 RX ORDER — LISINOPRIL 40 MG/1
40 TABLET ORAL DAILY
Qty: 90 TABLET | Refills: 3 | Status: CANCELLED | OUTPATIENT
Start: 2023-01-01

## 2023-01-01 RX ORDER — SIMVASTATIN 20 MG
20 TABLET ORAL AT BEDTIME
Qty: 90 TABLET | Refills: 3 | Status: SHIPPED | OUTPATIENT
Start: 2023-01-01 | End: 2024-01-01

## 2023-01-01 RX ORDER — FUROSEMIDE 20 MG
20 TABLET ORAL DAILY
Qty: 5 TABLET | Refills: 0 | Status: SHIPPED | OUTPATIENT
Start: 2023-01-01 | End: 2023-01-01

## 2023-01-01 RX ORDER — AMLODIPINE BESYLATE 10 MG/1
10 TABLET ORAL DAILY
Qty: 90 TABLET | Refills: 3 | OUTPATIENT
Start: 2023-01-01

## 2023-01-01 RX ORDER — SIMVASTATIN 20 MG
20 TABLET ORAL AT BEDTIME
Qty: 90 TABLET | Refills: 3 | Status: CANCELLED | OUTPATIENT
Start: 2023-01-01

## 2023-01-01 RX ORDER — SIMVASTATIN 20 MG
20 TABLET ORAL AT BEDTIME
Qty: 90 TABLET | Refills: 0 | Status: SHIPPED | OUTPATIENT
Start: 2023-01-01 | End: 2023-01-01

## 2023-01-01 RX ORDER — HYDROCODONE BITARTRATE AND ACETAMINOPHEN 5; 325 MG/1; MG/1
1 TABLET ORAL 2 TIMES DAILY
Qty: 60 TABLET | Refills: 0 | Status: SHIPPED | OUTPATIENT
Start: 2023-01-01 | End: 2023-01-01

## 2023-01-01 RX ORDER — LISINOPRIL 40 MG/1
40 TABLET ORAL DAILY
Qty: 90 TABLET | Refills: 0 | Status: SHIPPED | OUTPATIENT
Start: 2023-01-01 | End: 2023-01-01

## 2023-01-01 RX ORDER — LISINOPRIL 40 MG/1
40 TABLET ORAL DAILY
Qty: 90 TABLET | Refills: 3 | Status: SHIPPED | OUTPATIENT
Start: 2023-01-01 | End: 2024-01-01

## 2023-01-01 RX ORDER — HYDROCODONE BITARTRATE AND ACETAMINOPHEN 5; 325 MG/1; MG/1
1 TABLET ORAL 3 TIMES DAILY
Qty: 60 TABLET | Refills: 0 | Status: SHIPPED | OUTPATIENT
Start: 2023-01-01 | End: 2024-01-01

## 2023-01-01 RX ORDER — AMLODIPINE BESYLATE 10 MG/1
10 TABLET ORAL DAILY
Qty: 90 TABLET | Refills: 3 | Status: CANCELLED | OUTPATIENT
Start: 2023-01-01

## 2023-01-01 RX ORDER — AMLODIPINE BESYLATE 10 MG/1
10 TABLET ORAL DAILY
Qty: 90 TABLET | Refills: 0 | Status: SHIPPED | OUTPATIENT
Start: 2023-01-01 | End: 2023-01-01

## 2023-01-01 ASSESSMENT — ENCOUNTER SYMPTOMS: BACK PAIN: 1

## 2023-01-01 ASSESSMENT — ACTIVITIES OF DAILY LIVING (ADL): CURRENT_FUNCTION: NEEDS ASSISTANCE

## 2023-01-01 ASSESSMENT — PATIENT HEALTH QUESTIONNAIRE - PHQ9
10. IF YOU CHECKED OFF ANY PROBLEMS, HOW DIFFICULT HAVE THESE PROBLEMS MADE IT FOR YOU TO DO YOUR WORK, TAKE CARE OF THINGS AT HOME, OR GET ALONG WITH OTHER PEOPLE: NOT DIFFICULT AT ALL
SUM OF ALL RESPONSES TO PHQ QUESTIONS 1-9: 0
SUM OF ALL RESPONSES TO PHQ QUESTIONS 1-9: 0

## 2023-01-01 ASSESSMENT — PAIN SCALES - GENERAL
PAINLEVEL: NO PAIN (0)
PAINLEVEL: WORST PAIN (10)

## 2023-01-25 ENCOUNTER — TELEPHONE (OUTPATIENT)
Dept: FAMILY MEDICINE | Facility: CLINIC | Age: 88
End: 2023-01-25
Payer: COMMERCIAL

## 2023-01-25 DIAGNOSIS — M54.16 LUMBAR RADICULOPATHY: ICD-10-CM

## 2023-01-25 RX ORDER — METHYLPREDNISOLONE 4 MG
TABLET, DOSE PACK ORAL
Qty: 21 TABLET | Refills: 0 | Status: SHIPPED | OUTPATIENT
Start: 2023-01-25 | End: 2023-02-18

## 2023-01-25 NOTE — TELEPHONE ENCOUNTER
S-(situation): Patient calling asking for prednisone RX to [facility]  Last OV for chronic pain: 12/16/22.     B-(background): Chronic back pain.  Left hip fracture > 1 year ago. Has left leg pain every once in a while treated in the past with prednisone which was effective.    A-(assessment): No new injury. No redness,swelling or fevers.  Patient is able to bear full weight. Pain has increased with trying to managing snow shoveling and making her bed has been painful.  Leg pain 10/10 most days in the mornings, Vicodin and 4 Advil per day takes the edge off.     R-(recommendations): No appts available until 02/01 which is virtual.   Please advise.  Thank you, Vilma SNEED RN

## 2023-01-26 ENCOUNTER — HOSPITAL ENCOUNTER (OUTPATIENT)
Dept: CARDIOLOGY | Facility: CLINIC | Age: 88
Discharge: HOME | End: 2023-01-26
Attending: FAMILY MEDICINE | Admitting: FAMILY MEDICINE
Payer: COMMERCIAL

## 2023-01-26 DIAGNOSIS — I10 ESSENTIAL HYPERTENSION, BENIGN: Chronic | ICD-10-CM

## 2023-01-26 LAB — LVEF ECHO: NORMAL

## 2023-01-26 PROCEDURE — 93306 TTE W/DOPPLER COMPLETE: CPT | Mod: 26 | Performed by: INTERNAL MEDICINE

## 2023-01-26 PROCEDURE — 93306 TTE W/DOPPLER COMPLETE: CPT

## 2023-02-17 ENCOUNTER — VIRTUAL VISIT (OUTPATIENT)
Dept: FAMILY MEDICINE | Facility: CLINIC | Age: 88
End: 2023-02-17
Payer: COMMERCIAL

## 2023-02-17 DIAGNOSIS — E03.9 HYPOTHYROIDISM, UNSPECIFIED TYPE: ICD-10-CM

## 2023-02-17 DIAGNOSIS — N18.31 CHRONIC KIDNEY DISEASE, STAGE 3A (H): ICD-10-CM

## 2023-02-17 DIAGNOSIS — M54.16 LUMBAR RADICULOPATHY: ICD-10-CM

## 2023-02-17 DIAGNOSIS — F11.90 CHRONIC, CONTINUOUS USE OF OPIOIDS: Primary | ICD-10-CM

## 2023-02-17 DIAGNOSIS — F32.0 MILD MAJOR DEPRESSION (H): ICD-10-CM

## 2023-02-17 DIAGNOSIS — G89.29 CHRONIC LEFT-SIDED LOW BACK PAIN WITH LEFT-SIDED SCIATICA: ICD-10-CM

## 2023-02-17 DIAGNOSIS — M54.42 CHRONIC LEFT-SIDED LOW BACK PAIN WITH LEFT-SIDED SCIATICA: ICD-10-CM

## 2023-02-17 DIAGNOSIS — G89.4 CHRONIC PAIN SYNDROME: Chronic | ICD-10-CM

## 2023-02-17 PROCEDURE — 99214 OFFICE O/P EST MOD 30 MIN: CPT | Mod: VID | Performed by: FAMILY MEDICINE

## 2023-02-17 ASSESSMENT — ANXIETY QUESTIONNAIRES
6. BECOMING EASILY ANNOYED OR IRRITABLE: NOT AT ALL
IF YOU CHECKED OFF ANY PROBLEMS ON THIS QUESTIONNAIRE, HOW DIFFICULT HAVE THESE PROBLEMS MADE IT FOR YOU TO DO YOUR WORK, TAKE CARE OF THINGS AT HOME, OR GET ALONG WITH OTHER PEOPLE: NOT DIFFICULT AT ALL
4. TROUBLE RELAXING: NOT AT ALL
3. WORRYING TOO MUCH ABOUT DIFFERENT THINGS: NOT AT ALL
5. BEING SO RESTLESS THAT IT IS HARD TO SIT STILL: NOT AT ALL
GAD7 TOTAL SCORE: 0
1. FEELING NERVOUS, ANXIOUS, OR ON EDGE: NOT AT ALL
GAD7 TOTAL SCORE: 0
7. FEELING AFRAID AS IF SOMETHING AWFUL MIGHT HAPPEN: NOT AT ALL
2. NOT BEING ABLE TO STOP OR CONTROL WORRYING: NOT AT ALL

## 2023-02-17 ASSESSMENT — PATIENT HEALTH QUESTIONNAIRE - PHQ9: SUM OF ALL RESPONSES TO PHQ QUESTIONS 1-9: 1

## 2023-02-17 NOTE — PROGRESS NOTES
Nighat is a 87 year old who is being evaluated via a billable video visit.      How would you like to obtain your AVS? MyChart  If the video visit is dropped, the invitation should be resent by: Text to cell phone: [phone]  Will anyone else be joining your video visit? No        Assessment & Plan     Mild major depression (H)  Stable no change in treatment plan.     Chronic kidney disease, stage 3a (H)  Stable no change in treatment plan.     Chronic, continuous use of opioids  meds refilled for three months     Chronic pain syndrome  Now with new acute left hip pain   Has had replaced   ?bursitis  Will set up injection in the office   If not helpful will need to adjust meds     Lumbar radiculopathy    - HYDROcodone-acetaminophen (NORCO) 5-325 MG tablet; Take 1 tablet by mouth 2 times daily  - HYDROcodone-acetaminophen (NORCO) 5-325 MG tablet; Take 1 tablet by mouth 2 times daily  - HYDROcodone-acetaminophen (NORCO) 5-325 MG tablet; Take 1 tablet by mouth 2 times daily    Hypothyroidism, unspecified type  Stable no change in treatment plan.   - levothyroxine (SYNTHROID/LEVOTHROID) 88 MCG tablet; Take 1 tablet (88 mcg) by mouth daily             appt set up for injection with Dr Donnelly on Monday evening 500 pm   Pt and daughter aware       No follow-ups on file.    Nancy Tapia MD  [facility]    Subjective   Nighat is a 87 year old accompanied by her daughter, presenting for the following health issues:  No chief complaint on file.      History of Present Illness       Reason for visit:  Follow echo results    She eats 2-3 servings of fruits and vegetables daily.She consumes 0 sweetened beverage(s) daily.She exercises with enough effort to increase her heart rate 9 or less minutes per day.  She exercises with enough effort to increase her heart rate 3 or less days per week.   She is taking medications regularly.     Echo results:  Your Echo shows moderate narrowing of the  aortic valve. So not severe we will just watch this for now and repeat Echo again next year   Let me know if questions and we can talk more about it next time I see you         Pain History:  When did you first notice your pain? - Chronic Pain   Have you seen this provider for your pain in the past?   Yes   Where in your body do you have pain? Hip   Are you seeing anyone else for your pain? No    PHQ-9 SCORE 2/9/2022 4/5/2022 7/8/2022   PHQ-9 Total Score - - -   PHQ-9 Total Score MyChart - 19 (Moderately severe depression) 3 (Minimal depression)   PHQ-9 Total Score 9 19 3       PRECIOUS-7 SCORE 7/8/2022 10/14/2022 12/16/2022   Total Score - - -   Total Score - 5 (mild anxiety) 4 (minimal anxiety)   Total Score 1 5 4               Chronic Pain Follow Up:    Location of pain: left hip pointing right at the greater trochanter   This is new for her   She   Analgesia/pain control:    - Recent changes:  Had a fall so its been a little worse, mornings are really severe she shakes the pain is so bad    - Overall control: Inadequate pain control    - Current treatments: Advil, hydrocodone    Adherence:     - Do you ever take more pain medicine than prescribed? Yes:     - When did you take your last dose of pain medicine?  9 hours ago    Adverse effects: No   PDMP Review       Value Time User    State PDMP site checked  Yes 8/31/2022  9:25 AM Nancy Tapia MD        Last CSA Agreement:   CSA -- Patient Level:     [Media Unavailable] Controlled Substance Agreement - Opioid - Scan on 4/5/2022 10:31 AM   [Media Unavailable] Controlled Substance Agreement - Opioid - Scan on 4/13/2021  9:32 AM   [Media Unavailable] Controlled Substance Agreement - Non - Opioid - Scan on 3/12/2020 11:46 AM: NON-OPIOID CONTROLLED SUBSTANCE AGREEMENT   [Media Unavailable] Controlled Substance Agreement - Opioid - Scan on 3/6/2020  9:44 AM: FOR 3-6-20 VISIT   [Media Unavailable] Controlled Substance Agreement - Opioid - Scan on 3/6/2020  9:41 AM        Last UDS: 4/8/2022            Review of Systems   Constitutional, HEENT, cardiovascular, pulmonary, gi and gu systems are negative, except as otherwise noted.      Objective           Vitals:  No vitals were obtained today due to virtual visit.    Physical Exam   GENERAL: Healthy, alert and no distress  EYES: Eyes grossly normal to inspection.  No discharge or erythema, or obvious scleral/conjunctival abnormalities.  RESP: No audible wheeze, cough, or visible cyanosis.  No visible retractions or increased work of breathing.    SKIN: Visible skin clear. No significant rash, abnormal pigmentation or lesions.  NEURO: Cranial nerves grossly intact.  Mentation and speech appropriate for age.  PSYCH: Mentation appears normal, affect normal/bright, judgement and insight intact, normal speech and appearance well-groomed.                Video-Visit Details    Type of service:  Video Visit   Video Start Time: 10 am   Video End Time:1030am    Originating Location (pt. Location): Home  Distant Location (provider location):  On-site  Platform used for Video Visit: Seeonic

## 2023-02-18 RX ORDER — HYDROCODONE BITARTRATE AND ACETAMINOPHEN 5; 325 MG/1; MG/1
1 TABLET ORAL 2 TIMES DAILY
Qty: 60 TABLET | Refills: 0 | Status: SHIPPED | OUTPATIENT
Start: 2023-03-04 | End: 2024-01-01 | Stop reason: ALTCHOICE

## 2023-02-18 RX ORDER — HYDROCODONE BITARTRATE AND ACETAMINOPHEN 5; 325 MG/1; MG/1
1 TABLET ORAL 2 TIMES DAILY
Qty: 60 TABLET | Refills: 0 | Status: SHIPPED | OUTPATIENT
Start: 2023-04-04 | End: 2024-01-01 | Stop reason: ALTCHOICE

## 2023-02-18 RX ORDER — HYDROCODONE BITARTRATE AND ACETAMINOPHEN 5; 325 MG/1; MG/1
1 TABLET ORAL 2 TIMES DAILY
Qty: 60 TABLET | Refills: 0 | Status: SHIPPED | OUTPATIENT
Start: 2023-05-04 | End: 2023-06-07

## 2023-02-18 RX ORDER — LEVOTHYROXINE SODIUM 88 UG/1
88 TABLET ORAL DAILY
Qty: 90 TABLET | Refills: 3 | Status: SHIPPED | OUTPATIENT
Start: 2023-02-18 | End: 2023-04-18

## 2023-02-20 ENCOUNTER — OFFICE VISIT (OUTPATIENT)
Dept: FAMILY MEDICINE | Facility: CLINIC | Age: 88
End: 2023-02-20
Payer: COMMERCIAL

## 2023-02-20 VITALS
DIASTOLIC BLOOD PRESSURE: 54 MMHG | RESPIRATION RATE: 16 BRPM | BODY MASS INDEX: 20.57 KG/M2 | TEMPERATURE: 98.4 F | OXYGEN SATURATION: 98 % | HEART RATE: 74 BPM | WEIGHT: 98 LBS | HEIGHT: 58 IN | SYSTOLIC BLOOD PRESSURE: 140 MMHG

## 2023-02-20 DIAGNOSIS — M70.61 TROCHANTERIC BURSITIS OF RIGHT HIP: Primary | ICD-10-CM

## 2023-02-20 PROCEDURE — 20610 DRAIN/INJ JOINT/BURSA W/O US: CPT | Mod: RT | Performed by: FAMILY MEDICINE

## 2023-02-20 RX ORDER — BUPIVACAINE HYDROCHLORIDE 5 MG/ML
4 INJECTION, SOLUTION PERINEURAL ONCE
Status: COMPLETED | OUTPATIENT
Start: 2023-02-20 | End: 2023-02-20

## 2023-02-20 RX ORDER — TRIAMCINOLONE ACETONIDE 40 MG/ML
40 INJECTION, SUSPENSION INTRA-ARTICULAR; INTRAMUSCULAR ONCE
Status: COMPLETED | OUTPATIENT
Start: 2023-02-20 | End: 2023-02-20

## 2023-02-20 RX ADMIN — TRIAMCINOLONE ACETONIDE 40 MG: 40 INJECTION, SUSPENSION INTRA-ARTICULAR; INTRAMUSCULAR at 17:44

## 2023-02-20 RX ADMIN — BUPIVACAINE HYDROCHLORIDE 20 MG: 5 INJECTION, SOLUTION PERINEURAL at 17:44

## 2023-02-20 ASSESSMENT — ANXIETY QUESTIONNAIRES
1. FEELING NERVOUS, ANXIOUS, OR ON EDGE: NOT AT ALL
6. BECOMING EASILY ANNOYED OR IRRITABLE: NOT AT ALL
3. WORRYING TOO MUCH ABOUT DIFFERENT THINGS: NOT AT ALL
IF YOU CHECKED OFF ANY PROBLEMS ON THIS QUESTIONNAIRE, HOW DIFFICULT HAVE THESE PROBLEMS MADE IT FOR YOU TO DO YOUR WORK, TAKE CARE OF THINGS AT HOME, OR GET ALONG WITH OTHER PEOPLE: NOT DIFFICULT AT ALL
2. NOT BEING ABLE TO STOP OR CONTROL WORRYING: NOT AT ALL
GAD7 TOTAL SCORE: 0
4. TROUBLE RELAXING: NOT AT ALL
7. FEELING AFRAID AS IF SOMETHING AWFUL MIGHT HAPPEN: NOT AT ALL
GAD7 TOTAL SCORE: 0
5. BEING SO RESTLESS THAT IT IS HARD TO SIT STILL: NOT AT ALL
7. FEELING AFRAID AS IF SOMETHING AWFUL MIGHT HAPPEN: NOT AT ALL
8. IF YOU CHECKED OFF ANY PROBLEMS, HOW DIFFICULT HAVE THESE MADE IT FOR YOU TO DO YOUR WORK, TAKE CARE OF THINGS AT HOME, OR GET ALONG WITH OTHER PEOPLE?: NOT DIFFICULT AT ALL

## 2023-02-20 ASSESSMENT — PAIN SCALES - GENERAL: PAINLEVEL: MODERATE PAIN (5)

## 2023-02-20 NOTE — PROGRESS NOTES
"R hip pain, side of leg.      Hurts to lie on.  Sent here for troch bursa shot by primary    O:BP (!) 140/54   Pulse 74   Temp 98.4  F (36.9  C) (Tympanic)   Resp 16   Ht 1.473 m (4' 10\")   Wt 44.5 kg (98 lb)   SpO2 98%   BMI 20.48 kg/m    Tender R troch bursa    Not hot/red.    Injected with 4cc marcaine and 40mg kenalog, right over bursa.  No complications    A: trochanteric bursitis, R    P: f/u prn.      "

## 2023-03-14 ENCOUNTER — TELEPHONE (OUTPATIENT)
Dept: FAMILY MEDICINE | Facility: CLINIC | Age: 88
End: 2023-03-14
Payer: COMMERCIAL

## 2023-03-14 DIAGNOSIS — M54.50 LOW BACK PAIN, UNSPECIFIED BACK PAIN LATERALITY, UNSPECIFIED CHRONICITY, UNSPECIFIED WHETHER SCIATICA PRESENT: Primary | ICD-10-CM

## 2023-03-14 NOTE — TELEPHONE ENCOUNTER
Symptoms    Describe your symptoms: Back Pain     Any pain: Yes: Back Pain - Pt said she was shoveling last week. Pt said she is taking Advil and Vicodin does help the pain but would like to get Prednisone.   Pt is asking if can get a RX for Prednisone.    Preferred Pharmacy:       [facility] [id number] [place] [address]  Phone: [phone] Fax: [phone]      Could we send this information to you in [employer] or would you prefer to receive a phone call?:   Patient would prefer a phone call   Okay to leave a detailed message?: Yes at Home number on file [phone] (home)

## 2023-03-15 RX ORDER — PREDNISONE 20 MG/1
20 TABLET ORAL DAILY
Qty: 5 TABLET | Refills: 0 | Status: SHIPPED | OUTPATIENT
Start: 2023-03-15 | End: 2023-06-23

## 2023-04-10 ENCOUNTER — OFFICE VISIT (OUTPATIENT)
Dept: URGENT CARE | Facility: URGENT CARE | Age: 88
End: 2023-04-10
Payer: COMMERCIAL

## 2023-04-10 VITALS
HEART RATE: 67 BPM | WEIGHT: 98 LBS | OXYGEN SATURATION: 99 % | DIASTOLIC BLOOD PRESSURE: 58 MMHG | TEMPERATURE: 97.9 F | SYSTOLIC BLOOD PRESSURE: 166 MMHG | BODY MASS INDEX: 20.48 KG/M2

## 2023-04-10 DIAGNOSIS — R60.0 PERIPHERAL EDEMA: Primary | ICD-10-CM

## 2023-04-10 DIAGNOSIS — M54.16 LUMBAR RADICULOPATHY: ICD-10-CM

## 2023-04-10 LAB
ANION GAP SERPL CALCULATED.3IONS-SCNC: 11 MMOL/L (ref 7–15)
BUN SERPL-MCNC: 15.8 MG/DL (ref 8–23)
CALCIUM SERPL-MCNC: 9.5 MG/DL (ref 8.8–10.2)
CHLORIDE SERPL-SCNC: 103 MMOL/L (ref 98–107)
CREAT SERPL-MCNC: 1.03 MG/DL (ref 0.51–0.95)
DEPRECATED HCO3 PLAS-SCNC: 25 MMOL/L (ref 22–29)
GFR SERPL CREATININE-BSD FRML MDRD: 53 ML/MIN/1.73M2
GLUCOSE SERPL-MCNC: 83 MG/DL (ref 70–99)
POTASSIUM SERPL-SCNC: 4.7 MMOL/L (ref 3.4–5.3)
SODIUM SERPL-SCNC: 139 MMOL/L (ref 136–145)

## 2023-04-10 PROCEDURE — 36415 COLL VENOUS BLD VENIPUNCTURE: CPT | Performed by: PHYSICIAN ASSISTANT

## 2023-04-10 PROCEDURE — 80048 BASIC METABOLIC PNL TOTAL CA: CPT | Performed by: PHYSICIAN ASSISTANT

## 2023-04-10 PROCEDURE — 99214 OFFICE O/P EST MOD 30 MIN: CPT | Performed by: PHYSICIAN ASSISTANT

## 2023-04-10 RX ORDER — FUROSEMIDE 20 MG
20 TABLET ORAL DAILY
Qty: 7 TABLET | Refills: 0 | Status: SHIPPED | OUTPATIENT
Start: 2023-04-10 | End: 2023-06-23

## 2023-04-10 RX ORDER — PREDNISONE 20 MG/1
40 TABLET ORAL DAILY
Qty: 10 TABLET | Refills: 0 | Status: SHIPPED | OUTPATIENT
Start: 2023-04-10 | End: 2023-04-15

## 2023-04-10 NOTE — PROGRESS NOTES
Assessment & Plan     Peripheral edema  No chest pain, cough, or shortness of breath. Will check BMP and try lasix x 3-7 days. Follow up with primary care provider in 1 week.     - furosemide (LASIX) 20 MG tablet; Take 1 tablet (20 mg) by mouth daily  - Basic metabolic panel  (Ca, Cl, CO2, Creat, Gluc, K, Na, BUN); Future  - Basic metabolic panel  (Ca, Cl, CO2, Creat, Gluc, K, Na, BUN)    Lumbar radiculopathy  Patient has chronic low back pain and patient reports a flare in symptoms. She is requesting refill of prednisone. Will treat with 5 day burst. Return to clinic if symptoms worsen or do not improve; otherwise follow up as needed      - predniSONE (DELTASONE) 20 MG tablet; Take 2 tablets (40 mg) by mouth daily for 5 days             Return in about 1 week (around 4/17/2023) for Follow up.    [facility]              Subjective   Chief Complaint   Patient presents with     Joint Swelling     Swelling in both ankles for about 3 wks, was going away, now getting worse.         HPI     Leg swelling     Onset of symptoms was 2-3 week(s) ago.  Course of illness is worsening.    Severity moderate  Current and Associated symptoms: bilateral leg swelling  Treatment measures tried include none.  Predisposing factors include none, does not add salt to food .    No chest pain, cough, or shortness of breath               Review of Systems   Constitutional, HEENT, cardiovascular, pulmonary, gi and gu systems are negative, except as otherwise noted.      Objective    BP (!) 166/58   Pulse 67   Temp 97.9  F (36.6  C) (Tympanic)   Wt 44.5 kg (98 lb)   SpO2 99%   BMI 20.48 kg/m    Body mass index is 20.48 kg/m .     Physical Exam  Constitutional:       General: She is not in acute distress.     Appearance: She is well-developed.   HENT:      Head: Normocephalic.      Right Ear: Tympanic membrane and ear canal normal.      Left Ear: Tympanic membrane and ear canal  normal.   Eyes:      Conjunctiva/sclera: Conjunctivae normal.   Cardiovascular:      Rate and Rhythm: Normal rate.      Heart sounds: Normal heart sounds.   Pulmonary:      Effort: Pulmonary effort is normal.      Breath sounds: Normal breath sounds.   Musculoskeletal:      Right lower le+ Pitting Edema present.      Left lower le+ Pitting Edema present.      Comments: Bilateral back pain with palpation.    Skin:     General: Skin is warm and dry.      Findings: No rash.   Neurological:      Mental Status: She is alert.   Psychiatric:         Behavior: Behavior normal.

## 2023-04-17 ENCOUNTER — OFFICE VISIT (OUTPATIENT)
Dept: FAMILY MEDICINE | Facility: CLINIC | Age: 88
End: 2023-04-17
Payer: COMMERCIAL

## 2023-04-17 VITALS
DIASTOLIC BLOOD PRESSURE: 70 MMHG | OXYGEN SATURATION: 98 % | WEIGHT: 92 LBS | BODY MASS INDEX: 19.31 KG/M2 | HEART RATE: 74 BPM | RESPIRATION RATE: 18 BRPM | SYSTOLIC BLOOD PRESSURE: 154 MMHG | HEIGHT: 58 IN | TEMPERATURE: 97.9 F

## 2023-04-17 DIAGNOSIS — E03.9 HYPOTHYROIDISM, UNSPECIFIED TYPE: ICD-10-CM

## 2023-04-17 DIAGNOSIS — R80.9 MICROALBUMINURIA: ICD-10-CM

## 2023-04-17 DIAGNOSIS — N18.31 CHRONIC KIDNEY DISEASE, STAGE 3A (H): ICD-10-CM

## 2023-04-17 DIAGNOSIS — R60.0 PERIPHERAL EDEMA: Primary | ICD-10-CM

## 2023-04-17 LAB
ALBUMIN SERPL BCG-MCNC: 4.3 G/DL (ref 3.5–5.2)
ALBUMIN UR-MCNC: ABNORMAL MG/DL
ALP SERPL-CCNC: 70 U/L (ref 35–104)
ALT SERPL W P-5'-P-CCNC: 18 U/L (ref 10–35)
ANION GAP SERPL CALCULATED.3IONS-SCNC: 13 MMOL/L (ref 7–15)
APPEARANCE UR: CLEAR
AST SERPL W P-5'-P-CCNC: 25 U/L (ref 10–35)
BACTERIA #/AREA URNS HPF: ABNORMAL /HPF
BILIRUB SERPL-MCNC: 0.3 MG/DL
BILIRUB UR QL STRIP: NEGATIVE
BUN SERPL-MCNC: 19 MG/DL (ref 8–23)
CALCIUM SERPL-MCNC: 10 MG/DL (ref 8.8–10.2)
CHLORIDE SERPL-SCNC: 97 MMOL/L (ref 98–107)
COLOR UR AUTO: YELLOW
CREAT SERPL-MCNC: 1.02 MG/DL (ref 0.51–0.95)
CREAT UR-MCNC: 51.7 MG/DL
DEPRECATED HCO3 PLAS-SCNC: 27 MMOL/L (ref 22–29)
GFR SERPL CREATININE-BSD FRML MDRD: 54 ML/MIN/1.73M2
GLUCOSE SERPL-MCNC: 91 MG/DL (ref 70–99)
GLUCOSE UR STRIP-MCNC: NEGATIVE MG/DL
HGB UR QL STRIP: NEGATIVE
KETONES UR STRIP-MCNC: NEGATIVE MG/DL
LEUKOCYTE ESTERASE UR QL STRIP: NEGATIVE
MICROALBUMIN UR-MCNC: 13.4 MG/L
MICROALBUMIN/CREAT UR: 25.92 MG/G CR (ref 0–25)
NITRATE UR QL: NEGATIVE
PH UR STRIP: 8 [PH] (ref 5–7)
POTASSIUM SERPL-SCNC: 5.1 MMOL/L (ref 3.4–5.3)
PROT SERPL-MCNC: 6.7 G/DL (ref 6.4–8.3)
RBC #/AREA URNS AUTO: ABNORMAL /HPF
SODIUM SERPL-SCNC: 137 MMOL/L (ref 136–145)
SP GR UR STRIP: 1.01 (ref 1–1.03)
SQUAMOUS #/AREA URNS AUTO: ABNORMAL /LPF
T4 FREE SERPL-MCNC: 1.15 NG/DL (ref 0.9–1.7)
TSH SERPL DL<=0.005 MIU/L-ACNC: 12.95 UIU/ML (ref 0.3–4.2)
UROBILINOGEN UR STRIP-ACNC: 0.2 E.U./DL
WBC #/AREA URNS AUTO: ABNORMAL /HPF

## 2023-04-17 PROCEDURE — 82570 ASSAY OF URINE CREATININE: CPT | Performed by: STUDENT IN AN ORGANIZED HEALTH CARE EDUCATION/TRAINING PROGRAM

## 2023-04-17 PROCEDURE — 82043 UR ALBUMIN QUANTITATIVE: CPT | Performed by: STUDENT IN AN ORGANIZED HEALTH CARE EDUCATION/TRAINING PROGRAM

## 2023-04-17 PROCEDURE — 80053 COMPREHEN METABOLIC PANEL: CPT | Performed by: STUDENT IN AN ORGANIZED HEALTH CARE EDUCATION/TRAINING PROGRAM

## 2023-04-17 PROCEDURE — 84439 ASSAY OF FREE THYROXINE: CPT | Performed by: STUDENT IN AN ORGANIZED HEALTH CARE EDUCATION/TRAINING PROGRAM

## 2023-04-17 PROCEDURE — 84443 ASSAY THYROID STIM HORMONE: CPT | Performed by: STUDENT IN AN ORGANIZED HEALTH CARE EDUCATION/TRAINING PROGRAM

## 2023-04-17 PROCEDURE — 81001 URINALYSIS AUTO W/SCOPE: CPT | Performed by: STUDENT IN AN ORGANIZED HEALTH CARE EDUCATION/TRAINING PROGRAM

## 2023-04-17 PROCEDURE — 36415 COLL VENOUS BLD VENIPUNCTURE: CPT | Performed by: STUDENT IN AN ORGANIZED HEALTH CARE EDUCATION/TRAINING PROGRAM

## 2023-04-17 PROCEDURE — 99214 OFFICE O/P EST MOD 30 MIN: CPT | Performed by: STUDENT IN AN ORGANIZED HEALTH CARE EDUCATION/TRAINING PROGRAM

## 2023-04-17 ASSESSMENT — ANXIETY QUESTIONNAIRES
IF YOU CHECKED OFF ANY PROBLEMS ON THIS QUESTIONNAIRE, HOW DIFFICULT HAVE THESE PROBLEMS MADE IT FOR YOU TO DO YOUR WORK, TAKE CARE OF THINGS AT HOME, OR GET ALONG WITH OTHER PEOPLE: NOT DIFFICULT AT ALL
GAD7 TOTAL SCORE: 0
5. BEING SO RESTLESS THAT IT IS HARD TO SIT STILL: NOT AT ALL
GAD7 TOTAL SCORE: 0
7. FEELING AFRAID AS IF SOMETHING AWFUL MIGHT HAPPEN: NOT AT ALL
GAD7 TOTAL SCORE: 0
7. FEELING AFRAID AS IF SOMETHING AWFUL MIGHT HAPPEN: NOT AT ALL
2. NOT BEING ABLE TO STOP OR CONTROL WORRYING: NOT AT ALL
4. TROUBLE RELAXING: NOT AT ALL
3. WORRYING TOO MUCH ABOUT DIFFERENT THINGS: NOT AT ALL
8. IF YOU CHECKED OFF ANY PROBLEMS, HOW DIFFICULT HAVE THESE MADE IT FOR YOU TO DO YOUR WORK, TAKE CARE OF THINGS AT HOME, OR GET ALONG WITH OTHER PEOPLE?: NOT DIFFICULT AT ALL
6. BECOMING EASILY ANNOYED OR IRRITABLE: NOT AT ALL
1. FEELING NERVOUS, ANXIOUS, OR ON EDGE: NOT AT ALL

## 2023-04-17 ASSESSMENT — PATIENT HEALTH QUESTIONNAIRE - PHQ9
SUM OF ALL RESPONSES TO PHQ QUESTIONS 1-9: 0
10. IF YOU CHECKED OFF ANY PROBLEMS, HOW DIFFICULT HAVE THESE PROBLEMS MADE IT FOR YOU TO DO YOUR WORK, TAKE CARE OF THINGS AT HOME, OR GET ALONG WITH OTHER PEOPLE: NOT DIFFICULT AT ALL
SUM OF ALL RESPONSES TO PHQ QUESTIONS 1-9: 0

## 2023-04-17 ASSESSMENT — PAIN SCALES - GENERAL: PAINLEVEL: MODERATE PAIN (5)

## 2023-04-17 NOTE — PROGRESS NOTES
Assessment & Plan     Patient is a very pleasant 87-year-old female who presents today for follow-up from recent urgent care visit for bilateral lower extremity swelling.  Patient developed bilateral lower extremity swelling about 3 weeks ago.  She has not had a history of this in the past.  Has not had any associated pain.  She was seen in urgent care, where they obtained a BMP that was not significantly changed from previous.  They started her on Lasix, also gave her a prednisone burst for chronic back pain.    Differential diagnosis for acute bilateral lower extremity swelling includes heart failure, renal disease (including nephrotic syndrome), liver disease, hypoalbuminemia, thyroid myxedema, venous insufficiency, pulmonary hypertension, secondary lymphedema related to a tumor, or medications including CCB (on amlodipine), corticosteroids, NSAIDs.  Patient does have a history of chronic NSAID use, has not had any NSAIDs since her swelling started.  She is also significantly decreased on pop intake.  She had an echocardiogram completed in January of this year that did not show significant reduced ejection fraction, though does have  aortic valve stenosis.  On pulmonary exam today, no wheezing, though does have difficulty with air movement.  Denies any shortness of breath.  Unsure if patient does in fact have pulmonary hypertension, though less likely at this point.  No risk factors for liver disease.  Does have chronic kidney disease, without any acute worsening.  Does get intermittent steroid bursts for chronic back pain for which she is also on opioids.  Patient currently does not have any signs or symptoms of cancer other than the swelling, and she is not interested in finding out about any possible underlying cancer she might have as she would not pursue treatment.    Overall, most likely based on the current differential include medication (nsaids vs amlodipine vs prednisone) associated versus heart  failure versus renal disease versus hypoalbuminemia. The below work-up was obtained today to evaluate this differential further.     Ultimately, at this point unless clear concerns on labs, plan for follow up PRN if swelling returns. Try to avoid prednisone if possible. May need intermittent lasix if she is noticing worsening swelling. Continue to use the compression stockings PRN. Currently, she is working on improving protein intake and avoiding NSAIDS    Peripheral edema  See above  - UA Macro with Reflex to Micro and Culture - lab collect  - Comprehensive metabolic panel  - Comprehensive metabolic panel  - UA Macro with Reflex to Micro and Culture - lab collect  - UA Microscopic with Reflex to Culture    Chronic kidney disease, stage 3a (H)  Microalbuminuria  Due for albumin:creat check d/t history of CKD. Does have a history of microalbuminuria   - Albumin Random Urine Quantitative with Creat Ratio  - Albumin Random Urine Quantitative with Creat Ratio    Hypothyroidism, unspecified type  Last check almost 1 year ago. Will also recheck in context of leg swelling.   - TSH with free T4 reflex  - TSH with free T4 reflex    I spent a total of 35 minutes on the day of the visit.   Time spent by me doing chart review, history and exam, documentation and further activities per the note       Devora Johnson MD  [facility]      Arun Disla is a 87 year old, presenting for the following health issues:  UC Follow-Up (04/10) and Swelling (legs)    History of Present Illness       Reason for visit:  Lower extremity edema    She eats 2-3 servings of fruits and vegetables daily.She consumes 0 sweetened beverage(s) daily.She exercises with enough effort to increase her heart rate 9 or less minutes per day.    She is taking medications regularly.    Today's PHQ-9         PHQ-9 Total Score: 0    PHQ-9 Q9 Thoughts of better off dead/self-harm past 2 weeks :   Not at all    How difficult have  "these problems made it for you to do your work, take care of things at home, or get along with other people: Not difficult at all  Today's PRECIOUS-7 Score: 0     First noticed leg swelling about 3 weeks ago. Had never had swelling before, ever.     Takes a lot of ibuprofen (laying off of that right now)    Usually drinking can of diet pop with meals - now only 1 can a week.     Sometimes completely goes away by end of the night.   Walking will cause worse swelling.     Bursts of steroids.   For back pain  A few years.     Dip    Used to take shots for the back pain - can't get the neeedle in anymore.     Doesn't have much for an appetite.     Dizzy spells  Not short of breath.   1 pillow at night.     Review of Systems   Constitutional, HEENT, cardiovascular, pulmonary, GI, , musculoskeletal, neuro, skin, endocrine and psych systems are negative, except as otherwise noted.      Objective    BP (!) 154/70 (BP Location: Right arm, Patient Position: Sitting, Cuff Size: Child)   Pulse 74   Temp 97.9  F (36.6  C) (Tympanic)   Resp 18   Ht 1.473 m (4' 10\")   Wt 41.7 kg (92 lb)   SpO2 98%   BMI 19.23 kg/m    Body mass index is 19.23 kg/m .  Physical Exam  Constitutional:       Appearance: Normal appearance.   HENT:      Head: Normocephalic.   Eyes:      General: No scleral icterus.     Extraocular Movements: Extraocular movements intact.      Conjunctiva/sclera: Conjunctivae normal.   Cardiovascular:      Rate and Rhythm: Normal rate and regular rhythm.      Heart sounds: Murmur (systolic) heard.   Pulmonary:      Effort: Pulmonary effort is normal.      Breath sounds: Decreased air movement (diffusely) present.   Musculoskeletal:      Right lower leg: No edema.      Left lower leg: No edema.      Comments: Compression stockings in place today, no obvious swelling   Neurological:      General: No focal deficit present.      Mental Status: She is alert and oriented to person, place, and time.   Psychiatric:         " Mood and Affect: Mood normal.         Behavior: Behavior normal.         Results from this visit  Results for orders placed or performed in visit on 04/17/23   UA Macro with Reflex to Micro and Culture - lab collect     Status: Abnormal    Specimen: Urine, Midstream   Result Value Ref Range    Color Urine Yellow Colorless, Straw, Light Yellow, Yellow    Appearance Urine Clear Clear    Glucose Urine Negative Negative mg/dL    Bilirubin Urine Negative Negative    Ketones Urine Negative Negative mg/dL    Specific Gravity Urine 1.010 1.003 - 1.035    Blood Urine Negative Negative    pH Urine 8.0 (H) 5.0 - 7.0    Protein Albumin Urine Trace (A) Negative mg/dL    Urobilinogen Urine 0.2 0.2, 1.0 E.U./dL    Nitrite Urine Negative Negative    Leukocyte Esterase Urine Negative Negative   UA Microscopic with Reflex to Culture     Status: Abnormal   Result Value Ref Range    Bacteria Urine Few (A) None Seen /HPF    RBC Urine 0-2 0-2 /HPF /HPF    WBC Urine 0-5 0-5 /HPF /HPF    Squamous Epithelials Urine Few (A) None Seen /LPF    Narrative    Urine Culture not indicated     Results from last visit:  Office Visit on 04/10/2023   Component Date Value Ref Range Status     Sodium 04/10/2023 139  136 - 145 mmol/L Final     Potassium 04/10/2023 4.7  3.4 - 5.3 mmol/L Final     Chloride 04/10/2023 103  98 - 107 mmol/L Final     Carbon Dioxide (CO2) 04/10/2023 25  22 - 29 mmol/L Final     Anion Gap 04/10/2023 11  7 - 15 mmol/L Final     Urea Nitrogen 04/10/2023 15.8  8.0 - 23.0 mg/dL Final     Creatinine 04/10/2023 1.03 (H)  0.51 - 0.95 mg/dL Final     Calcium 04/10/2023 9.5  8.8 - 10.2 mg/dL Final     Glucose 04/10/2023 83  70 - 99 mg/dL Final     GFR Estimate 04/10/2023 53 (L)  >60 mL/min/1.73m2 Final    eGFR calculated using 2021 CKD-EPI equation.

## 2023-04-18 RX ORDER — LEVOTHYROXINE SODIUM 100 UG/1
100 TABLET ORAL DAILY
Qty: 90 TABLET | Refills: 3 | Status: SHIPPED | OUTPATIENT
Start: 2023-04-18 | End: 2023-08-16 | Stop reason: ALTCHOICE

## 2023-04-20 ENCOUNTER — PATIENT OUTREACH (OUTPATIENT)
Dept: CARE COORDINATION | Facility: CLINIC | Age: 88
End: 2023-04-20
Payer: COMMERCIAL

## 2023-05-22 ENCOUNTER — TELEPHONE (OUTPATIENT)
Dept: FAMILY MEDICINE | Facility: CLINIC | Age: 88
End: 2023-05-22

## 2023-05-22 NOTE — TELEPHONE ENCOUNTER
Reason for Call:  Appointment Request    Patient requesting this type of appt: Chronic Diease Management/Medication/Follow-Up    Requested provider: Nancy Tapia    Reason patient unable to be scheduled: Not within requested timeframe    When does patient want to be seen/preferred time: 3-7 days    Comments patient would an earlier appointment regarding some Depression.    Could we send this information to you in [employer] or would you prefer to receive a phone call?:   Patient would prefer a phone call   Okay to leave a detailed message?: Yes at Cell number on file:    Telephone Information:   Home [phone]       Call taken on 5/22/2023 at 3:30 PM by Jeremiah Lanza

## 2023-05-22 NOTE — TELEPHONE ENCOUNTER
Reports increased sx depression since having to part with foster cat due to inability to physically care for and debilitating chronic low back pain.  Taking fluoxetine 20 mg daily but feels no longer effective. Question suicidal ideation, states has thought about overdosing with Vicodin but no plan to carry out.   Reports has good family support, has discussed her feelings with dtr who agreed should make appt to address.   Appt scheduled with Dr. Johnson tomorrow. Have [employer] suicide hotline to have on hand.   Forwarded to provider for review prior to appt tomorrow.  JEAN PIERRE Mckeon RN

## 2023-05-23 ENCOUNTER — OFFICE VISIT (OUTPATIENT)
Dept: FAMILY MEDICINE | Facility: CLINIC | Age: 88
End: 2023-05-23
Payer: COMMERCIAL

## 2023-05-23 VITALS
HEIGHT: 56 IN | RESPIRATION RATE: 12 BRPM | BODY MASS INDEX: 20.11 KG/M2 | HEART RATE: 74 BPM | OXYGEN SATURATION: 97 % | SYSTOLIC BLOOD PRESSURE: 142 MMHG | WEIGHT: 89.4 LBS | DIASTOLIC BLOOD PRESSURE: 56 MMHG

## 2023-05-23 DIAGNOSIS — M79.18 RHOMBOID MUSCLE PAIN: ICD-10-CM

## 2023-05-23 DIAGNOSIS — K59.03 DRUG-INDUCED CONSTIPATION: ICD-10-CM

## 2023-05-23 DIAGNOSIS — F33.1 MAJOR DEPRESSIVE DISORDER, RECURRENT EPISODE, MODERATE (H): Primary | ICD-10-CM

## 2023-05-23 PROCEDURE — 99214 OFFICE O/P EST MOD 30 MIN: CPT | Performed by: STUDENT IN AN ORGANIZED HEALTH CARE EDUCATION/TRAINING PROGRAM

## 2023-05-23 RX ORDER — DULOXETIN HYDROCHLORIDE 20 MG/1
20 CAPSULE, DELAYED RELEASE ORAL DAILY
Qty: 90 CAPSULE | Refills: 3 | Status: SHIPPED | OUTPATIENT
Start: 2023-05-23 | End: 2023-01-01

## 2023-05-23 RX ORDER — SENNOSIDES 8.6 MG
1 TABLET ORAL
Qty: 90 TABLET | Refills: 3 | COMMUNITY
Start: 2023-05-23 | End: 2023-06-23

## 2023-05-23 ASSESSMENT — ANXIETY QUESTIONNAIRES
8. IF YOU CHECKED OFF ANY PROBLEMS, HOW DIFFICULT HAVE THESE MADE IT FOR YOU TO DO YOUR WORK, TAKE CARE OF THINGS AT HOME, OR GET ALONG WITH OTHER PEOPLE?: NOT DIFFICULT AT ALL
4. TROUBLE RELAXING: MORE THAN HALF THE DAYS
3. WORRYING TOO MUCH ABOUT DIFFERENT THINGS: NEARLY EVERY DAY
7. FEELING AFRAID AS IF SOMETHING AWFUL MIGHT HAPPEN: NEARLY EVERY DAY
1. FEELING NERVOUS, ANXIOUS, OR ON EDGE: NEARLY EVERY DAY
GAD7 TOTAL SCORE: 17
7. FEELING AFRAID AS IF SOMETHING AWFUL MIGHT HAPPEN: NEARLY EVERY DAY
5. BEING SO RESTLESS THAT IT IS HARD TO SIT STILL: NOT AT ALL
IF YOU CHECKED OFF ANY PROBLEMS ON THIS QUESTIONNAIRE, HOW DIFFICULT HAVE THESE PROBLEMS MADE IT FOR YOU TO DO YOUR WORK, TAKE CARE OF THINGS AT HOME, OR GET ALONG WITH OTHER PEOPLE: NOT DIFFICULT AT ALL
GAD7 TOTAL SCORE: 17
GAD7 TOTAL SCORE: 17
6. BECOMING EASILY ANNOYED OR IRRITABLE: NEARLY EVERY DAY
2. NOT BEING ABLE TO STOP OR CONTROL WORRYING: NEARLY EVERY DAY

## 2023-05-23 ASSESSMENT — PATIENT HEALTH QUESTIONNAIRE - PHQ9
SUM OF ALL RESPONSES TO PHQ QUESTIONS 1-9: 16
10. IF YOU CHECKED OFF ANY PROBLEMS, HOW DIFFICULT HAVE THESE PROBLEMS MADE IT FOR YOU TO DO YOUR WORK, TAKE CARE OF THINGS AT HOME, OR GET ALONG WITH OTHER PEOPLE: SOMEWHAT DIFFICULT
SUM OF ALL RESPONSES TO PHQ QUESTIONS 1-9: 16

## 2023-05-23 ASSESSMENT — PAIN SCALES - GENERAL: PAINLEVEL: SEVERE PAIN (6)

## 2023-05-23 NOTE — PROGRESS NOTES
Assessment & Plan     Major depressive disorder, recurrent episode, moderate (H)  Patient is a very pleasant 87 year old who has a distant history of depression who presents today with worsening mental health. She has previously been on multiple antidepressants in the past including venlafaxine, paroxetine, citalopram, and more recently prescribed fluoxetine about a year ago. She has had some changes in her life including worsening physical status contributing to inability to care for her cat, now having had to remove the cat from her home which is contributing to significant depression currently. She feels she is a burden to her children. She states she wakes up and wishes she just hadn't woken up. If she were to self harm, she would overdose on her pain medication. We discussed a safety plan today and she does not appear to be in imminent danger to herself. We discussed trial of duloxetine to help with mental health, as well as its benefits for chronic pain. She was open to this. Also open to referral to counseling, which was placed today. Also placed referral for short term check in with our LICSW, Carola, who I also updated personally today regarding patient's situation. She was given some suggestions for self help books if she would feel these would be beneficial. Will check in again in a few weeks with me.   - Adult Mental Health  Referral  - DULoxetine (CYMBALTA) 20 MG capsule  Dispense: 90 capsule; Refill: 3  - PRIMARY CARE FOLLOW-UP SCHEDULING  - Adult Mental Health  Referral    Drug-induced constipation  Has constipation related to opioids. We discussed OTC senna, mag citrate, and MOM. She declines miralax or metamucil today.   - sennosides (SENOKOT) 8.6 MG tablet  Dispense: 90 tablet; Refill: 3    Rhomboid muscle pain  Tenderness over bilateral rhomboids. No imaging indicated. No spinous process tenderness or step off. Discussed more upright posture, pulling scapulae back to help  strengthen/reduce strain.     I spent a total of 38 minutes on the day of the visit.   Time spent by me doing chart review, history and exam, documentation and further activities per the note     Depression Screening Follow Up        5/23/2023     2:09 PM   PHQ   PHQ-9 Total Score 16   Q9: Thoughts of better off dead/self-harm past 2 weeks Nearly every day   F/U: Thoughts of suicide or self-harm Yes   F/U: Self harm-plan Yes   F/U: Self-harm action No   F/U: Safety concerns No       ollow Up      Follow Up Actions Taken  Crisis resource information provided in the After Visit Summary  Consult with [facility], monitored patient safety and notified provider.  Mental Health Referral placed  follow up with me in 1 month    Discussed the following ways the patient can remain in a safe environment:      Devora Johnson MD  [facility]    Arun Disla is a 87 year old, presenting for the following health issues:  Depression and Constipation (/)        5/23/2023     2:15 PM   Additional Questions   Roomed by Susy   Accompanied by Daughter - Jenn     History of Present Illness       Back Pain:  She presents for follow up of back pain. Patient's back pain is a chronic problem.  Location of back pain:  Right lower back and right shoulder  Description of back pain: shooting  Back pain spreads: nowhere    Since patient first noticed back pain, pain is: gradually worsening  Does back pain interfere with her job:  Yes      Mental Health Follow-up:  Patient presents to follow-up on Depression & Anxiety.Patient's depression since last visit has been:  Worse  The patient is not having other symptoms associated with depression.  Patient's anxiety since last visit has been:  Medium  The patient is not having other symptoms associated with anxiety.  Any significant life events: No  Patient is feeling anxious or having panic attacks.  Patient has no concerns about alcohol or drug use.    She eats 2-3  "servings of fruits and vegetables daily.She consumes 1 sweetened beverage(s) daily.She exercises with enough effort to increase her heart rate 9 or less minutes per day.  She exercises with enough effort to increase her heart rate 3 or less days per week. She is missing 1 dose(s) of medications per week.    Today's PHQ-9         PHQ-9 Total Score: 16    PHQ-9 Q9 Thoughts of better off dead/self-harm past 2 weeks :   Nearly every day  Thoughts of suicide or self harm: (P) Yes  Self-harm Plan:   (P) Yes  Self-harm Action:     (P) No  Safety concerns for self or others: (P) No    How difficult have these problems made it for you to do your work, take care of things at home, or get along with other people: Somewhat difficult  Today's PRECIOUS-7 Score: 17         2/17/2023     9:35 AM 4/17/2023    10:54 AM 5/23/2023     2:09 PM   PHQ   PHQ-9 Total Score 1 0 16   Q9: Thoughts of better off dead/self-harm past 2 weeks Not at all Not at all Nearly every day   F/U: Thoughts of suicide or self-harm   Yes   F/U: Self harm-plan   Yes   F/U: Self-harm action   No   F/U: Safety concerns   No         2/20/2023     4:30 PM 4/17/2023    10:55 AM 5/23/2023     2:10 PM   PRECIOUS-7 SCORE   Total Score 0 (minimal anxiety) 0 (minimal anxiety) 17 (severe anxiety)   Total Score 0 0 17      Days that she isn't getting up washing up, lack of interest.   Still smoking about 6-7 per day. Geovanny early in the morning.   Doesn't want to be around others.   Doesn't want to do anything. Effort to get out of bed.   This has been a while, but had to get rid of cat  Can't  the shower because of pain.   Just don't care about anything.     Think about it a lot. Sometimes more fleeting \"just easier if I'm not here\"  Plan: vicodin  Bridget is protective. Thinking about kids and grandchildren.      Ct scan request:   Now between shoulder blades.  Started about a month ago.   Every time does something it starts to hurt between shoulder blades.     Constipation: " "  Every time eat,s then feels bloated, constipated, takes milk of mag.   Not doing miralax. Caused passing gas then too loose.   Fluids during hte day: not a water drinker. Drinks diet coke once a day,, otherwise maybe a glass of water a day.       Review of Systems   Constitutional, HEENT, cardiovascular, pulmonary, GI, , musculoskeletal, neuro, skin, endocrine and psych systems are negative, except as otherwise noted.      Objective    BP (!) 142/56   Pulse 74   Resp 12   Ht 1.429 m (4' 8.25\")   Wt 40.6 kg (89 lb 6.4 oz)   SpO2 97%   BMI 19.87 kg/m    Body mass index is 19.87 kg/m .  Physical Exam  Constitutional:       Appearance: Normal appearance.   HENT:      Head: Normocephalic.   Eyes:      General: No scleral icterus.     Extraocular Movements: Extraocular movements intact.      Conjunctiva/sclera: Conjunctivae normal.   Cardiovascular:      Rate and Rhythm: Normal rate.   Pulmonary:      Effort: Pulmonary effort is normal.   Musculoskeletal:      Comments: Antalgic gait. Utilizes walker for ambulation.    Neurological:      General: No focal deficit present.      Mental Status: She is alert and oriented to person, place, and time.   Psychiatric:         Attention and Perception: Attention normal.         Mood and Affect: Mood is anxious and depressed. Affect is tearful.         Speech: Speech normal.         Behavior: Behavior normal. Behavior is cooperative.         Thought Content: Thought content includes suicidal (thoughts she would be better off dead) ideation. Thought content does not include suicidal plan.         Cognition and Memory: Cognition normal.         Judgment: Judgment normal.         Results from this visitNo results found for any visits on 05/23/23.                "

## 2023-05-23 NOTE — PATIENT INSTRUCTIONS
Magnesium citrate vs milk of mag  Add senna.     DRINK FLUIDS! Goal of at least 8 glasses of liquid a day. Coffee doesn't count because it dehydrates you.     Options for safety:   911  988 - national crisis line.      The following resource is a relatively affordable workbook that you can use over time to help work through depression. This is not necessarily a replacement for mental health counseling/therapy, however, it can be a great first step. While going through the workbook, there are questions to answer. I recommend that you get a small notebook and write down the answers in there so that you can go back to the book later and not see your previous answers.    The Cognitive Behavioral Workbook for Depression: A Step-by-Step Program (A [facility] Self-Help Workbook).   By Lexx Carmen EdD, Alfredo Sena PhD    https://www.[employer]/Cognitive-Behavioral-Workbook-Depression-Step/dp/1693183827/ref=sr_1_6?crid=5WQACMUHB55A&keywords=depression+workbook&pmh=3799960532&sprefix=depression+workboo%2Caps%2C92&sr=8-6    The following resource is a relatively affordable workbook that you can use over time to help work through anxiety. This is not necessarily a replacement for mental health counseling/therapy, however, it can be a great first step. While going through the workbook, there are questions to answer. I recommend that you get a small notebook and write down the answers in there so that you can go back to the book later and not see your previous answers.    The Cognitive Behavioral Workbook for Anxiety: A Step-By-Step Program  By Kodi Acuña EdD PsyD Amy ABPP    https://www.[employer]/Cognitive-Behavioral-Workbook-Anxiety-Step/dp/0308271738/ref=sr_1_8?crid=[email or web address]&keywords=anxiety+workbook&bkk=3344892894&sprefix=anxiety+workbook%2Caps%2C77&sr=8-8

## 2023-05-23 NOTE — NURSING NOTE
"Chief Complaint   Patient presents with     Depression     Constipation            BP (!) 142/56   Pulse 74   Resp 12   Ht 1.429 m (4' 8.25\")   Wt 40.6 kg (89 lb 6.4 oz)   SpO2 97%   BMI 19.87 kg/m   Estimated body mass index is 19.87 kg/m  as calculated from the following:    Height as of this encounter: 1.429 m (4' 8.25\").    Weight as of this encounter: 40.6 kg (89 lb 6.4 oz).  Patient presents to the clinic using [employer] that is potentially due pending provider review:    Health Maintenance Due   Topic Date Due     ZOSTER IMMUNIZATION (1 of 2) Never done     Pneumococcal Vaccine: 65+ Years (2 - PCV) 12/20/2007     DTAP/TDAP/TD IMMUNIZATION (1 - Tdap) 10/04/2008     MEDICARE ANNUAL WELLNESS VISIT  04/05/2023     URINE DRUG SCREEN  04/05/2023     ANNUAL REVIEW OF HM ORDERS  04/05/2023     TREATMENT AGREEMENT FOR CHRONIC PAIN MANAGEMENT  04/05/2023        Coverage at pharmacy for Tdap and Shingrix.        "

## 2023-05-26 ENCOUNTER — TELEPHONE (OUTPATIENT)
Dept: BEHAVIORAL HEALTH | Facility: CLINIC | Age: 88
End: 2023-05-26
Payer: COMMERCIAL

## 2023-06-01 ENCOUNTER — HEALTH MAINTENANCE LETTER (OUTPATIENT)
Age: 88
End: 2023-06-01

## 2023-06-07 DIAGNOSIS — M54.16 LUMBAR RADICULOPATHY: ICD-10-CM

## 2023-06-07 DIAGNOSIS — G89.29 CHRONIC LEFT-SIDED LOW BACK PAIN WITH LEFT-SIDED SCIATICA: ICD-10-CM

## 2023-06-07 DIAGNOSIS — M54.42 CHRONIC LEFT-SIDED LOW BACK PAIN WITH LEFT-SIDED SCIATICA: ICD-10-CM

## 2023-06-07 RX ORDER — HYDROCODONE BITARTRATE AND ACETAMINOPHEN 5; 325 MG/1; MG/1
1 TABLET ORAL 2 TIMES DAILY
Qty: 60 TABLET | Refills: 0 | Status: SHIPPED | OUTPATIENT
Start: 2023-06-07 | End: 2023-07-07

## 2023-06-16 ENCOUNTER — APPOINTMENT (OUTPATIENT)
Dept: CT IMAGING | Facility: CLINIC | Age: 88
End: 2023-06-16
Attending: FAMILY MEDICINE
Payer: COMMERCIAL

## 2023-06-16 ENCOUNTER — HOSPITAL ENCOUNTER (EMERGENCY)
Facility: CLINIC | Age: 88
Discharge: HOME | End: 2023-06-16
Attending: FAMILY MEDICINE | Admitting: FAMILY MEDICINE
Payer: COMMERCIAL

## 2023-06-16 VITALS
HEART RATE: 65 BPM | RESPIRATION RATE: 21 BRPM | WEIGHT: 89 LBS | OXYGEN SATURATION: 96 % | TEMPERATURE: 98.7 F | SYSTOLIC BLOOD PRESSURE: 175 MMHG | DIASTOLIC BLOOD PRESSURE: 62 MMHG | BODY MASS INDEX: 19.78 KG/M2

## 2023-06-16 DIAGNOSIS — S22.070A COMPRESSION FRACTURE OF T10 VERTEBRA, INITIAL ENCOUNTER (H): ICD-10-CM

## 2023-06-16 DIAGNOSIS — R10.13 EPIGASTRIC PAIN: ICD-10-CM

## 2023-06-16 LAB
ALBUMIN SERPL BCG-MCNC: 4 G/DL (ref 3.5–5.2)
ALBUMIN UR-MCNC: NEGATIVE MG/DL
ALP SERPL-CCNC: 62 U/L (ref 35–104)
ALT SERPL W P-5'-P-CCNC: 19 U/L (ref 0–50)
AMYLASE SERPL-CCNC: 44 U/L (ref 28–100)
ANION GAP SERPL CALCULATED.3IONS-SCNC: 12 MMOL/L (ref 7–15)
APPEARANCE UR: ABNORMAL
AST SERPL W P-5'-P-CCNC: 25 U/L (ref 0–45)
BACTERIA #/AREA URNS HPF: ABNORMAL /HPF
BASOPHILS # BLD AUTO: 0 10E3/UL (ref 0–0.2)
BASOPHILS NFR BLD AUTO: 0 %
BILIRUB SERPL-MCNC: 0.2 MG/DL
BILIRUB UR QL STRIP: NEGATIVE
BUN SERPL-MCNC: 17.8 MG/DL (ref 8–23)
CALCIUM SERPL-MCNC: 10.2 MG/DL (ref 8.8–10.2)
CHLORIDE SERPL-SCNC: 102 MMOL/L (ref 98–107)
COLOR UR AUTO: YELLOW
CREAT SERPL-MCNC: 1.16 MG/DL (ref 0.51–0.95)
DEPRECATED HCO3 PLAS-SCNC: 29 MMOL/L (ref 22–29)
EOSINOPHIL # BLD AUTO: 0 10E3/UL (ref 0–0.7)
EOSINOPHIL NFR BLD AUTO: 0 %
ERYTHROCYTE [DISTWIDTH] IN BLOOD BY AUTOMATED COUNT: 17.2 % (ref 10–15)
GFR SERPL CREATININE-BSD FRML MDRD: 46 ML/MIN/1.73M2
GLUCOSE SERPL-MCNC: 104 MG/DL (ref 70–99)
GLUCOSE UR STRIP-MCNC: NEGATIVE MG/DL
HCT VFR BLD AUTO: 28.1 % (ref 35–47)
HGB BLD-MCNC: 8.9 G/DL (ref 11.7–15.7)
HGB UR QL STRIP: NEGATIVE
HYALINE CASTS: 3 /LPF
IMM GRANULOCYTES # BLD: 0 10E3/UL
IMM GRANULOCYTES NFR BLD: 1 %
KETONES UR STRIP-MCNC: NEGATIVE MG/DL
LEUKOCYTE ESTERASE UR QL STRIP: NEGATIVE
LIPASE SERPL-CCNC: 29 U/L (ref 13–60)
LYMPHOCYTES # BLD AUTO: 1 10E3/UL (ref 0.8–5.3)
LYMPHOCYTES NFR BLD AUTO: 19 %
MCH RBC QN AUTO: 27.6 PG (ref 26.5–33)
MCHC RBC AUTO-ENTMCNC: 31.7 G/DL (ref 31.5–36.5)
MCV RBC AUTO: 87 FL (ref 78–100)
MONOCYTES # BLD AUTO: 0.8 10E3/UL (ref 0–1.3)
MONOCYTES NFR BLD AUTO: 15 %
MUCOUS THREADS #/AREA URNS LPF: PRESENT /LPF
NEUTROPHILS # BLD AUTO: 3.5 10E3/UL (ref 1.6–8.3)
NEUTROPHILS NFR BLD AUTO: 65 %
NITRATE UR QL: NEGATIVE
NRBC # BLD AUTO: 0 10E3/UL
NRBC BLD AUTO-RTO: 0 /100
PH UR STRIP: 8 [PH] (ref 5–7)
PLATELET # BLD AUTO: 347 10E3/UL (ref 150–450)
POTASSIUM SERPL-SCNC: 4.2 MMOL/L (ref 3.4–5.3)
PROT SERPL-MCNC: 6.2 G/DL (ref 6.4–8.3)
RBC # BLD AUTO: 3.23 10E6/UL (ref 3.8–5.2)
RBC URINE: 14 /HPF
SODIUM SERPL-SCNC: 143 MMOL/L (ref 136–145)
SP GR UR STRIP: 1.01 (ref 1–1.03)
SQUAMOUS EPITHELIAL: 1 /HPF
UROBILINOGEN UR STRIP-MCNC: NORMAL MG/DL
WBC # BLD AUTO: 5.3 10E3/UL (ref 4–11)
WBC URINE: 1 /HPF

## 2023-06-16 PROCEDURE — 36415 COLL VENOUS BLD VENIPUNCTURE: CPT | Performed by: FAMILY MEDICINE

## 2023-06-16 PROCEDURE — 258N000003 HC RX IP 258 OP 636: Performed by: FAMILY MEDICINE

## 2023-06-16 PROCEDURE — 82150 ASSAY OF AMYLASE: CPT | Performed by: FAMILY MEDICINE

## 2023-06-16 PROCEDURE — C9113 INJ PANTOPRAZOLE SODIUM, VIA: HCPCS | Performed by: FAMILY MEDICINE

## 2023-06-16 PROCEDURE — 250N000011 HC RX IP 250 OP 636: Performed by: FAMILY MEDICINE

## 2023-06-16 PROCEDURE — 74177 CT ABD & PELVIS W/CONTRAST: CPT

## 2023-06-16 PROCEDURE — 250N000013 HC RX MED GY IP 250 OP 250 PS 637: Performed by: FAMILY MEDICINE

## 2023-06-16 PROCEDURE — 72131 CT LUMBAR SPINE W/O DYE: CPT

## 2023-06-16 PROCEDURE — 250N000009 HC RX 250: Performed by: FAMILY MEDICINE

## 2023-06-16 PROCEDURE — 96375 TX/PRO/DX INJ NEW DRUG ADDON: CPT | Performed by: FAMILY MEDICINE

## 2023-06-16 PROCEDURE — 83690 ASSAY OF LIPASE: CPT | Performed by: FAMILY MEDICINE

## 2023-06-16 PROCEDURE — 99284 EMERGENCY DEPT VISIT MOD MDM: CPT | Performed by: FAMILY MEDICINE

## 2023-06-16 PROCEDURE — 99285 EMERGENCY DEPT VISIT HI MDM: CPT | Mod: 25 | Performed by: FAMILY MEDICINE

## 2023-06-16 PROCEDURE — 85025 COMPLETE CBC W/AUTO DIFF WBC: CPT | Performed by: FAMILY MEDICINE

## 2023-06-16 PROCEDURE — 96361 HYDRATE IV INFUSION ADD-ON: CPT | Performed by: FAMILY MEDICINE

## 2023-06-16 PROCEDURE — 81003 URINALYSIS AUTO W/O SCOPE: CPT | Performed by: FAMILY MEDICINE

## 2023-06-16 PROCEDURE — 96374 THER/PROPH/DIAG INJ IV PUSH: CPT | Mod: 59 | Performed by: FAMILY MEDICINE

## 2023-06-16 PROCEDURE — 80053 COMPREHEN METABOLIC PANEL: CPT | Performed by: FAMILY MEDICINE

## 2023-06-16 RX ORDER — SUCRALFATE ORAL 1 G/10ML
1-2 SUSPENSION ORAL 4 TIMES DAILY PRN
Qty: 420 ML | Refills: 0 | Status: SHIPPED | OUTPATIENT
Start: 2023-06-16 | End: 2024-01-01

## 2023-06-16 RX ORDER — KETOROLAC TROMETHAMINE 15 MG/ML
15 INJECTION, SOLUTION INTRAMUSCULAR; INTRAVENOUS ONCE
Status: COMPLETED | OUTPATIENT
Start: 2023-06-16 | End: 2023-06-16

## 2023-06-16 RX ORDER — SUCRALFATE ORAL 1 G/10ML
1 SUSPENSION ORAL ONCE
Status: COMPLETED | OUTPATIENT
Start: 2023-06-16 | End: 2023-06-16

## 2023-06-16 RX ORDER — IOPAMIDOL 755 MG/ML
43 INJECTION, SOLUTION INTRAVASCULAR ONCE
Status: COMPLETED | OUTPATIENT
Start: 2023-06-16 | End: 2023-06-16

## 2023-06-16 RX ADMIN — SUCRALFATE 1 G: 1 SUSPENSION ORAL at 18:17

## 2023-06-16 RX ADMIN — KETOROLAC TROMETHAMINE 15 MG: 15 INJECTION, SOLUTION INTRAMUSCULAR; INTRAVENOUS at 14:23

## 2023-06-16 RX ADMIN — SODIUM CHLORIDE 51 ML: 9 INJECTION, SOLUTION INTRAVENOUS at 15:04

## 2023-06-16 RX ADMIN — IOPAMIDOL 43 ML: 755 INJECTION, SOLUTION INTRAVENOUS at 15:04

## 2023-06-16 RX ADMIN — PANTOPRAZOLE SODIUM 40 MG: 40 INJECTION, POWDER, LYOPHILIZED, FOR SOLUTION INTRAVENOUS at 14:22

## 2023-06-16 RX ADMIN — SODIUM CHLORIDE 500 ML: 9 INJECTION, SOLUTION INTRAVENOUS at 14:18

## 2023-06-16 ASSESSMENT — ACTIVITIES OF DAILY LIVING (ADL)
ADLS_ACUITY_SCORE: 35
ADLS_ACUITY_SCORE: 35

## 2023-06-16 NOTE — ED PROVIDER NOTES
HPI   Patient is an 87-year-old female presenting with epigastric pain.  She has a known history of hypothyroidism, taking Synthroid.  She has hyperlipidemia and hypertension, taking Norvasc, lisinopril, and simvastatin.  Has chronic pain syndrome along with osteopenia, chronic low back pain, knee pain, and depression.  She takes Norco and gabapentin.  She has a history of pneumonia.  She has a history of anemia.  She has reflux and takes Protonix.  She smokes.  No drugs of abuse.  No alcohol.  On 5/23 she saw her primary clinic for depression.  On 4/17 she saw her clinic for peripheral edema and CKD.    The patient has new epigastric pain that started about a week ago.  It has been constant since onset and progressively worsened.  It radiates to her mid back.  She denies having had similar symptoms previously.  No radiation to the chest.  No shortness of breath.  No cough or congestion.  No fever.  Occasionally she feels nauseous but she denies vomiting.  She has constipation on a regular basis.  She went to the bathroom this morning and had a normal BM.  No hematochezia or melena.  No improvement of symptoms after the BM.  No dysuria, urgency, or frequency of urination.  No vaginal bleeding or irritation of the ordinary.  No discharge.  No trauma or injury.        Allergies:  Allergies   Allergen Reactions     No Known Drug Allergy      Problem List:    Patient Active Problem List    Diagnosis Date Noted     Microalbuminuria 04/17/2023     Priority: Medium     Chronic, continuous use of opioids 07/08/2022     Priority: Medium     Chronic kidney disease, stage 3a (H) 02/09/2022     Priority: Medium     Osteoarthritis, knee 11/12/2021     Priority: Medium     Hypoxemia 09/08/2020     Priority: Medium     Occult blood positive stool 09/07/2020     Priority: Medium     Pneumonia of right lower lobe due to infectious organism 09/06/2020     Priority: Medium     Anemia 09/06/2020     Priority: Medium     Chronic pain  syndrome 01/07/2016     Priority: Medium     Patient is followed by Nancy Tapia MD for ongoing prescription of pain medication.  All refills should be approved by this provider, or covering partner.    Chronic back pain   Medication(s): 40.   Maximum quantity per month: [place]   Clinic visit frequency required: Q 3 months     Controlled substance agreement:  Encounter-Level CSA - 4/4/16:               Controlled Substance Agreement - Scan on 4/8/2016 10:44 AM : CONTROLLED SUBSTANCE AGREEMENT 04/04/2016 (below)            Pain Clinic evaluation in the past: Yes    DIRE Total Score(s):  No flowsheet data found.    Last [facility] website verification:  December 2, 2016    [email or web address]         Chronic low back pain 09/16/2013     Priority: Medium     Lumbar radiculopathy 09/16/2013     Priority: Medium     Advanced directives, counseling/discussion 11/21/2011     Priority: Medium     Receipt of ACP document:  Received: Health Care Directive which was witnessed or notarized on 3/13/13.  Document not previously scanned.  Validation form completed and sent with document to be scanned.  Code Status needs to be updated to reflect choices in most recent ACP document. Orders placed.  Confirmed/documented designated decision maker(s). See permanent comments section of demographics in clinical tab. View document(s) and details by clicking on code status.   Added by Felisha Elizabeth on 3/26/2013.       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     Priority: Medium     Hypothyroidism 11/04/2005     Priority: Medium      Past Medical History:    Past Medical History:   Diagnosis Date     DEPRESSION W/ANXIETY 1980's     Essential hypertension, benign      Pure hypercholesterolemia      Unspecified  hypothyroidism      Past Surgical History:    Past Surgical History:   Procedure Laterality Date     CATARACT IOL, RT/LT  2009    Cataract IOL RT/LT     COLONOSCOPY  2007    notable for diverticulosis     GYN SURGERY  04/2004    Oopherectomy - (R)     INJECT EPIDURAL LUMBAR  4/2/2012    Procedure:INJECT EPIDURAL LUMBAR; MORENA with Flouro--; Surgeon:GENERIC ANESTHESIA PROVIDER; Location:[place] OR     INJECT EPIDURAL LUMBAR  6/4/2012    Procedure:INJECT EPIDURAL LUMBAR; MORENA with Flouro--; Surgeon:GENERIC ANESTHESIA PROVIDER; Location:[facility] OR     INJECT EPIDURAL LUMBAR  6/18/2012    Procedure: INJECT EPIDURAL LUMBAR;  MORENA-Dr. Tapia;  Surgeon: Provider, Generic Anesthesia;  Location: [facility] OR     OPEN REDUCTION INTERNAL FIXATION HIP BIPOLAR Left 11/12/2021    Procedure: LEFT HIP HEMIARTHROPLASTY;  Surgeon: Noah Bautista MD;  Location: [facility] Main OR     Family History:    Family History   Problem Relation Age of Onset     Heart Disease Mother      Lipids Mother      Hypertension Mother      Lipids Father      Hypertension Father      Hypertension Sister      Lipids Sister      Depression Daughter      Heart Disease Sister      Hypertension Sister      Depression Daughter      Social History:  Marital Status:   [5]  Social History     Tobacco Use     Smoking status: Every Day     Packs/day: 0.25     Years: 50.00     Pack years: 12.50     Types: Cigarettes     Smokeless tobacco: Never     Tobacco comments:     5-8 cigarettes daily   Vaping Use     Vaping status: Never Used   Substance Use Topics     Alcohol use: No     Drug use: No      Medications:    sucralfate (CARAFATE) 1 GM/10ML suspension  amLODIPine (NORVASC) 10 MG tablet  ASPIRIN 81 MG OR TABS  CALCIUM 500 + D 500-200 MG-IU OR TABS  DULoxetine (CYMBALTA) 20 MG capsule  furosemide (LASIX) 20 MG tablet  gabapentin (NEURONTIN) 300 MG capsule  HYDROcodone-acetaminophen (NORCO) 5-325 MG tablet  HYDROcodone-acetaminophen (NORCO) 5-325 MG  tablet  HYDROcodone-acetaminophen (NORCO) 5-325 MG tablet  levothyroxine (SYNTHROID/LEVOTHROID) 100 MCG tablet  lisinopril (ZESTRIL) 40 MG tablet  MULTIVITAMIN TABS   OR  oxybutynin (DITROPAN) 5 MG tablet  pantoprazole (PROTONIX) 40 MG EC tablet  polyethylene glycol (MIRALAX) 17 g packet  predniSONE (DELTASONE) 20 MG tablet  sennosides (SENOKOT) 8.6 MG tablet  simvastatin (ZOCOR) 20 MG tablet  vitamin C (ASCORBIC ACID) 500 MG tablet      Review of Systems    PE   BP: (!) 158/60  Pulse: 76  Temp: 98.7  F (37.1  C)  Resp: 22  Weight: 40.4 kg (89 lb)  SpO2: 97 %  Physical Exam  Vitals reviewed.   Constitutional:       Appearance: She is well-developed.      Comments: Obviously uncomfortable.  Cooperative though and answering questions well.  Pleasant.   HENT:      Head: Normocephalic and atraumatic.      Right Ear: External ear normal.      Left Ear: External ear normal.      Nose: Nose normal.      Mouth/Throat:      Mouth: Mucous membranes are moist.      Pharynx: Oropharynx is clear.   Eyes:      Extraocular Movements: Extraocular movements intact.      Conjunctiva/sclera: Conjunctivae normal.      Pupils: Pupils are equal, round, and reactive to light.   Cardiovascular:      Rate and Rhythm: Normal rate and regular rhythm.   Pulmonary:      Effort: Pulmonary effort is normal.   Abdominal:      Comments: Tender in the epigastrium.  No distention.  Soft throughout.  No organomegaly or mass.  She will grimace with any palpation.   Musculoskeletal:         General: Normal range of motion.      Cervical back: Normal range of motion.   Skin:     General: Skin is warm and dry.   Neurological:      Mental Status: She is alert and oriented to person, place, and time.   Psychiatric:         Behavior: Behavior normal.         ED COURSE and MDM   1409.  Patient has symptoms and signs as described above.  Patient with epigastric pain and tenderness that radiates to the mid back.  CT scan lumbar spine and CT scan abdomen and  pelvis with IV contrast pending.  Urine analysis, CBC, comprehensive panel, lipase, amylase.  Normal saline 500 mL, Toradol.    1737.  Patient would like to leave.  I was going to provide her with Carafate to help with potential gastritis/ulcer but she is refusing.  She has been here now for hours and would like to leave without the medicine.  If we can provide it before she leaves we will.  I will provide a prescription, regardless.  Close follow-up recommended.  She does have the T10 fracture which is apparently new compared to previous though the timing of this is unknown.  It does fit her presentation and as I am identifying where on her back the pain is located it is at about the T10 level.  She has narcotic medication for pain at home.  I will place a referral order.    Electronic medical chart reviewed, including medical problems, medications, medical allergies, social history.  Recent hospitalizations and surgical procedures reviewed.  Recent clinic visits and consultations reviewed.  Recent labs and test results reviewed.  Nursing notes reviewed.    The patient, their parent if applicable, and/or their medical decision maker(s) and I have reviewed all of the available historical information, applicable PMH, physical exam findings, and objective diagnostic data gathered during this ED visit.  We then discussed all work-up options and then together agreed upon the course taken during this visit.  The ultimate disposition and plan was a cooperative decision made between myself and the patient, their parent if applicable, and/or their legal decision maker(s).  The risks and benefits of all decisions made during this visit were discussed to the best of my abilities given the circumstances, and all parties are understanding of the pertinent ramifications of these decisions.      LABS  Labs Ordered and Resulted from Time of ED Arrival to Time of ED Departure   COMPREHENSIVE METABOLIC PANEL - Abnormal       Result  Value    Sodium 143      Potassium 4.2      Chloride 102      Carbon Dioxide (CO2) 29      Anion Gap 12      Urea Nitrogen 17.8      Creatinine 1.16 (*)     Calcium 10.2      Glucose 104 (*)     Alkaline Phosphatase 62      AST 25      ALT 19      Protein Total 6.2 (*)     Albumin 4.0      Bilirubin Total 0.2      GFR Estimate 46 (*)    ROUTINE UA WITH MICROSCOPIC REFLEX TO CULTURE - Abnormal    Color Urine Yellow      Appearance Urine Slightly Cloudy (*)     Glucose Urine Negative      Bilirubin Urine Negative      Ketones Urine Negative      Specific Gravity Urine 1.015      Blood Urine Negative      pH Urine 8.0 (*)     Protein Albumin Urine Negative      Urobilinogen Urine Normal      Nitrite Urine Negative      Leukocyte Esterase Urine Negative      Bacteria Urine Many (*)     Mucus Urine Present (*)     RBC Urine 14 (*)     WBC Urine 1      Squamous Epithelials Urine 1      Hyaline Casts Urine 3 (*)    CBC WITH PLATELETS AND DIFFERENTIAL - Abnormal    WBC Count 5.3      RBC Count 3.23 (*)     Hemoglobin 8.9 (*)     Hematocrit 28.1 (*)     MCV 87      MCH 27.6      MCHC 31.7      RDW 17.2 (*)     Platelet Count 347      % Neutrophils 65      % Lymphocytes 19      % Monocytes 15      % Eosinophils 0      % Basophils 0      % Immature Granulocytes 1      NRBCs per 100 WBC 0      Absolute Neutrophils 3.5      Absolute Lymphocytes 1.0      Absolute Monocytes 0.8      Absolute Eosinophils 0.0      Absolute Basophils 0.0      Absolute Immature Granulocytes 0.0      Absolute NRBCs 0.0     LIPASE - Normal    Lipase 29     AMYLASE - Normal    Amylase 44         IMAGING  Images reviewed by me.  Radiology report also reviewed.  CT Lumbar Spine w/o Contrast   Final Result   IMPRESSION:     1. No evidence of acute fracture or posttraumatic subluxation.   2. Severe degenerative change with scoliosis and multiple severe   stenoses.         DOTTIE ARROYO MD            SYSTEM ID:  [id number]      CT Abdomen Pelvis w Contrast    Final Result   IMPRESSION:    1.  Small cholelithiasis.   2.  A few gas distended small bowel loops noted that may relate to an   ileus.   3.  T10 compression deformity is new since the remote comparison CT.   4.  No acute abnormality otherwise identified.       LESLEE REHMAN MD            SYSTEM ID:  [id number]          Procedures    Medications   sucralfate (CARAFATE) suspension 1 g (has no administration in time range)   0.9% sodium chloride BOLUS (0 mLs Intravenous Stopped 6/16/23 1459)   ketorolac (TORADOL) injection 15 mg (15 mg Intravenous $Given 6/16/23 1423)   pantoprazole (PROTONIX) IV push injection 40 mg (40 mg Intravenous $Given 6/16/23 1422)   iopamidol (ISOVUE-370) solution 43 mL (43 mLs Intravenous $Given 6/16/23 1504)   sodium chloride 0.9 % bag 500mL for CT scan flush use (51 mLs As instructed $Given 6/16/23 1504)         IMPRESSION       ICD-10-CM    1. Epigastric pain  R10.13 Primary Care Referral      2. Compression fracture of T10 vertebra, initial encounter (H)  S22.070A Primary Care Referral               Medication List      Started    sucralfate 1 GM/10ML suspension  Commonly known as: Carafate  1-2 g, Oral, 4 TIMES DAILY PRN, TABLETS OK IF TOO EXPENSIVE                        Jose White MD  06/16/23 7720

## 2023-06-16 NOTE — DISCHARGE INSTRUCTIONS
RETURN TO THE EMERGENCY ROOM FOR THE FOLLOWING:    Severely worsened pain, vomiting blood or coffee-ground material, fainting, fever greater than 101, new trouble with breathing, or at anytime for any concern.    FOLLOW UP:    Follow-up with your primary clinic.  A referral order was placed at the time of your discharge.  Expect a phone call within the next 1-2 business days to help with scheduling.  You may need an upper endoscopy to clarify the diagnoses of gastric disease (gastritis versus ulcer).    TREATMENT RECOMMENDATIONS:    Continue Protonix 40 mg daily.  Carafate for acute pain.  Avoid caffeine, smoking, chewing tobacco, ibuprofen/advil/aleve, alcohol, eating within 5 hours of bed.    NURSE ADVICE LINE:  [phone] or [phone]

## 2023-06-23 ENCOUNTER — OFFICE VISIT (OUTPATIENT)
Dept: FAMILY MEDICINE | Facility: CLINIC | Age: 88
End: 2023-06-23
Payer: COMMERCIAL

## 2023-06-23 VITALS
WEIGHT: 88 LBS | OXYGEN SATURATION: 98 % | HEIGHT: 56 IN | BODY MASS INDEX: 19.8 KG/M2 | HEART RATE: 70 BPM | DIASTOLIC BLOOD PRESSURE: 60 MMHG | SYSTOLIC BLOOD PRESSURE: 134 MMHG | RESPIRATION RATE: 18 BRPM

## 2023-06-23 DIAGNOSIS — K28.7 CHRONIC GASTROINTESTINAL ULCER: ICD-10-CM

## 2023-06-23 DIAGNOSIS — S22.070D COMPRESSION FRACTURE OF T10 VERTEBRA WITH ROUTINE HEALING, SUBSEQUENT ENCOUNTER: ICD-10-CM

## 2023-06-23 DIAGNOSIS — F33.1 MAJOR DEPRESSIVE DISORDER, RECURRENT EPISODE, MODERATE (H): Primary | ICD-10-CM

## 2023-06-23 DIAGNOSIS — M47.816 OSTEOARTHRITIS OF LUMBAR SPINE, UNSPECIFIED SPINAL OSTEOARTHRITIS COMPLICATION STATUS: ICD-10-CM

## 2023-06-23 PROCEDURE — 99214 OFFICE O/P EST MOD 30 MIN: CPT | Performed by: STUDENT IN AN ORGANIZED HEALTH CARE EDUCATION/TRAINING PROGRAM

## 2023-06-23 RX ORDER — SUCRALFATE 1 G/1
1 TABLET ORAL 4 TIMES DAILY
Qty: 360 TABLET | Refills: 1 | Status: SHIPPED | OUTPATIENT
Start: 2023-06-23 | End: 2024-01-01

## 2023-06-23 ASSESSMENT — ANXIETY QUESTIONNAIRES
3. WORRYING TOO MUCH ABOUT DIFFERENT THINGS: NOT AT ALL
6. BECOMING EASILY ANNOYED OR IRRITABLE: NOT AT ALL
2. NOT BEING ABLE TO STOP OR CONTROL WORRYING: NOT AT ALL
7. FEELING AFRAID AS IF SOMETHING AWFUL MIGHT HAPPEN: NOT AT ALL
1. FEELING NERVOUS, ANXIOUS, OR ON EDGE: NOT AT ALL
5. BEING SO RESTLESS THAT IT IS HARD TO SIT STILL: NOT AT ALL
IF YOU CHECKED OFF ANY PROBLEMS ON THIS QUESTIONNAIRE, HOW DIFFICULT HAVE THESE PROBLEMS MADE IT FOR YOU TO DO YOUR WORK, TAKE CARE OF THINGS AT HOME, OR GET ALONG WITH OTHER PEOPLE: NOT DIFFICULT AT ALL
4. TROUBLE RELAXING: NOT AT ALL
GAD7 TOTAL SCORE: 0
GAD7 TOTAL SCORE: 0
7. FEELING AFRAID AS IF SOMETHING AWFUL MIGHT HAPPEN: NOT AT ALL
8. IF YOU CHECKED OFF ANY PROBLEMS, HOW DIFFICULT HAVE THESE MADE IT FOR YOU TO DO YOUR WORK, TAKE CARE OF THINGS AT HOME, OR GET ALONG WITH OTHER PEOPLE?: NOT DIFFICULT AT ALL
GAD7 TOTAL SCORE: 0

## 2023-06-23 ASSESSMENT — PATIENT HEALTH QUESTIONNAIRE - PHQ9
SUM OF ALL RESPONSES TO PHQ QUESTIONS 1-9: 2
10. IF YOU CHECKED OFF ANY PROBLEMS, HOW DIFFICULT HAVE THESE PROBLEMS MADE IT FOR YOU TO DO YOUR WORK, TAKE CARE OF THINGS AT HOME, OR GET ALONG WITH OTHER PEOPLE: NOT DIFFICULT AT ALL
SUM OF ALL RESPONSES TO PHQ QUESTIONS 1-9: 2

## 2023-06-23 ASSESSMENT — PAIN SCALES - GENERAL: PAINLEVEL: MODERATE PAIN (5)

## 2023-06-23 NOTE — PROGRESS NOTES
"  Assessment & Plan     Major depressive disorder, recurrent episode, moderate (H)  Follow up today on depression. Started on Duloxetine at our last visit. PHQ9 and GAD7 significantly improved today, she \"feels like a new woman\". Plan continued duloxetine at current dose. Can increase in the future if tolerated/needed. Has had some slight improvement in chronic pain related to this as well.     Chronic gastrointestinal ulcer  Seen recently in the ED for chest pain. With workup, noted to have hemoglobin drop to 8.9. suspected flare of prior ulcer, and increased pantoprazole dose to 40 mg and started carafate. Liquid expensive, so will switch to tablets. Plan recheck hemoglobin in about 1 month. If >10, can drop back down to 20 mg pantoprazole. Okay to take short bursts of higher dose pantoprazole in the future as needed for flares.   - sucralfate (CARAFATE) 1 GM tablet  Dispense: 360 tablet; Refill: 1  - CBC with platelets    Compression fracture of T10 vertebra with routine healing, subsequent encounter  Osteoarthritis of lumbar spine, unspecified spinal osteoarthritis complication status  New T10 compression fracture seen during recent ED visit. Know OA of the lumbar spine with scoliosis, severe. Contributes to difficulty with ambulation. Has had handicap placard for many years. Renewed paperwork written today.       I spent a total of 25 minutes on the day of the visit.   Time spent by me doing chart review, history and exam, documentation and further activities per the note     Devora Johnson MD  [facility]    Arun Disla is a 87 year old, presenting for the following health issues:  Chief Complaint   Patient presents with     Depression     Ulcer           6/23/2023    10:14 AM   Additional Questions   Roomed by Deidre   Accompanied by Daughter     History of Present Illness       Mental Health Follow-up:  Patient presents to follow-up on Depression.Patient's depression " since last visit has been:  Better  The patient is not having other symptoms associated with depression.      Any significant life events: No  Patient is not feeling anxious or having panic attacks.  Patient has no concerns about alcohol or drug use.    Reason for visit:  Ulcer    She eats 2-3 servings of fruits and vegetables daily.She consumes 1 sweetened beverage(s) daily.She exercises with enough effort to increase her heart rate 9 or less minutes per day.  She exercises with enough effort to increase her heart rate 3 or less days per week.   She is taking medications regularly.    Today's PHQ-9         PHQ-9 Total Score: 2    PHQ-9 Q9 Thoughts of better off dead/self-harm past 2 weeks :   Not at all    How difficult have these problems made it for you to do your work, take care of things at home, or get along with other people: Not difficult at all  Today's PRECIOUS-7 Score: 0         4/17/2023    10:54 AM 5/23/2023     2:09 PM 6/23/2023     9:59 AM   PHQ   PHQ-9 Total Score 0 16 2   Q9: Thoughts of better off dead/self-harm past 2 weeks Not at all Nearly every day Not at all   F/U: Thoughts of suicide or self-harm  Yes    F/U: Self harm-plan  Yes    F/U: Self-harm action  No    F/U: Safety concerns  No          4/17/2023    10:55 AM 5/23/2023     2:10 PM 6/23/2023     9:59 AM   PRECIOUS-7 SCORE   Total Score 0 (minimal anxiety) 17 (severe anxiety) 0 (minimal anxiety)   Total Score 0 17 0     increaed abd pain this past week, went to the ED, reportedly d/t ulcer. Did a CT scan. Didn't see anything elsle  Increased pantoprazole to 40 mg and ordered carafate. This has happened before, then backed off after improvement.   No blood in stool.   Hemoglobin down to 8.9 in the Ed.       Review of Systems   Constitutional, HEENT, cardiovascular, pulmonary, GI, , musculoskeletal, neuro, skin, endocrine and psych systems are negative, except as otherwise noted.      Objective    /60 (BP Location: Right arm, Patient  "Position: Sitting, Cuff Size: Adult Small)   Pulse 70   Resp 18   Ht 1.429 m (4' 8.25\")   Wt 39.9 kg (88 lb)   SpO2 98%   BMI 19.55 kg/m    Body mass index is 19.55 kg/m .  Physical Exam  Constitutional:       Appearance: Normal appearance.   HENT:      Head: Normocephalic.      Ears:      Comments: Hard of hearing  Eyes:      General: No scleral icterus.     Extraocular Movements: Extraocular movements intact.      Conjunctiva/sclera: Conjunctivae normal.   Cardiovascular:      Rate and Rhythm: Normal rate.   Pulmonary:      Effort: Pulmonary effort is normal.   Musculoskeletal:      Comments: Antalgic gait     Neurological:      General: No focal deficit present.      Mental Status: She is alert and oriented to person, place, and time.   Psychiatric:         Mood and Affect: Mood normal.         Behavior: Behavior normal.         Thought Content: Thought content normal.         Judgment: Judgment normal.          Results from last visit:  Admission on 06/16/2023, Discharged on 06/16/2023   Component Date Value Ref Range Status     Sodium 06/16/2023 143  136 - 145 mmol/L Final     Potassium 06/16/2023 4.2  3.4 - 5.3 mmol/L Final     Chloride 06/16/2023 102  98 - 107 mmol/L Final     Carbon Dioxide (CO2) 06/16/2023 29  22 - 29 mmol/L Final     Anion Gap 06/16/2023 12  7 - 15 mmol/L Final     Urea Nitrogen 06/16/2023 17.8  8.0 - 23.0 mg/dL Final     Creatinine 06/16/2023 1.16 (H)  0.51 - 0.95 mg/dL Final     Calcium 06/16/2023 10.2  8.8 - 10.2 mg/dL Final     Glucose 06/16/2023 104 (H)  70 - 99 mg/dL Final     Alkaline Phosphatase 06/16/2023 62  35 - 104 U/L Final     AST 06/16/2023 25  0 - 45 U/L Final    Reference intervals for this test were updated on 6/12/2023 to more accurately reflect our healthy population. There may be differences in the flagging of prior results with similar values performed with this method. Interpretation of those prior results can be made in the context of the updated reference " intervals.     ALT 06/16/2023 19  0 - 50 U/L Final    Reference intervals for this test were updated on 6/12/2023 to more accurately reflect our healthy population. There may be differences in the flagging of prior results with similar values performed with this method. Interpretation of those prior results can be made in the context of the updated reference intervals.       Protein Total 06/16/2023 6.2 (L)  6.4 - 8.3 g/dL Final     Albumin 06/16/2023 4.0  3.5 - 5.2 g/dL Final     Bilirubin Total 06/16/2023 0.2  <=1.2 mg/dL Final     GFR Estimate 06/16/2023 46 (L)  >60 mL/min/1.73m2 Final    eGFR calculated using 2021 CKD-EPI equation.     Lipase 06/16/2023 29  13 - 60 U/L Final     Amylase 06/16/2023 44  28 - 100 U/L Final     Color Urine 06/16/2023 Yellow  Colorless, Straw, Light Yellow, Yellow Final     Appearance Urine 06/16/2023 Slightly Cloudy (A)  Clear Final     Glucose Urine 06/16/2023 Negative  Negative mg/dL Final     Bilirubin Urine 06/16/2023 Negative  Negative Final     Ketones Urine 06/16/2023 Negative  Negative mg/dL Final     Specific Gravity Urine 06/16/2023 1.015  1.003 - 1.035 Final     Blood Urine 06/16/2023 Negative  Negative Final     pH Urine 06/16/2023 8.0 (H)  5.0 - 7.0 Final     Protein Albumin Urine 06/16/2023 Negative  Negative mg/dL Final     Urobilinogen Urine 06/16/2023 Normal  Normal, 2.0 mg/dL Final     Nitrite Urine 06/16/2023 Negative  Negative Final     Leukocyte Esterase Urine 06/16/2023 Negative  Negative Final     Bacteria Urine 06/16/2023 Many (A)  None Seen /HPF Final     Mucus Urine 06/16/2023 Present (A)  None Seen /LPF Final     RBC Urine 06/16/2023 14 (H)  <=2 /HPF Final     WBC Urine 06/16/2023 1  <=5 /HPF Final     Squamous Epithelials Urine 06/16/2023 1  <=1 /HPF Final     Hyaline Casts Urine 06/16/2023 3 (H)  <=2 /LPF Final     WBC Count 06/16/2023 5.3  4.0 - 11.0 10e3/uL Final     RBC Count 06/16/2023 3.23 (L)  3.80 - 5.20 10e6/uL Final     Hemoglobin 06/16/2023 8.9  (L)  11.7 - 15.7 g/dL Final     Hematocrit 06/16/2023 28.1 (L)  35.0 - 47.0 % Final     MCV 06/16/2023 87  78 - 100 fL Final     MCH 06/16/2023 27.6  26.5 - 33.0 pg Final     MCHC 06/16/2023 31.7  31.5 - 36.5 g/dL Final     RDW 06/16/2023 17.2 (H)  10.0 - 15.0 % Final     Platelet Count 06/16/2023 347  150 - 450 10e3/uL Final     % Neutrophils 06/16/2023 65  % Final     % Lymphocytes 06/16/2023 19  % Final     % Monocytes 06/16/2023 15  % Final     % Eosinophils 06/16/2023 0  % Final     % Basophils 06/16/2023 0  % Final     % Immature Granulocytes 06/16/2023 1  % Final     NRBCs per 100 WBC 06/16/2023 0  <1 /100 Final     Absolute Neutrophils 06/16/2023 3.5  1.6 - 8.3 10e3/uL Final     Absolute Lymphocytes 06/16/2023 1.0  0.8 - 5.3 10e3/uL Final     Absolute Monocytes 06/16/2023 0.8  0.0 - 1.3 10e3/uL Final     Absolute Eosinophils 06/16/2023 0.0  0.0 - 0.7 10e3/uL Final     Absolute Basophils 06/16/2023 0.0  0.0 - 0.2 10e3/uL Final     Absolute Immature Granulocytes 06/16/2023 0.0  <=0.4 10e3/uL Final     Absolute NRBCs 06/16/2023 0.0  10e3/uL Final

## 2023-06-23 NOTE — PATIENT INSTRUCTIONS
Ferrous sulfate, every other day or 3 days a week (Monday, Wednesday, Friday) only if you want to.

## 2023-07-07 DIAGNOSIS — G89.29 CHRONIC LEFT-SIDED LOW BACK PAIN WITH LEFT-SIDED SCIATICA: ICD-10-CM

## 2023-07-07 DIAGNOSIS — M54.42 CHRONIC LEFT-SIDED LOW BACK PAIN WITH LEFT-SIDED SCIATICA: ICD-10-CM

## 2023-07-07 DIAGNOSIS — M54.16 LUMBAR RADICULOPATHY: ICD-10-CM

## 2023-07-07 RX ORDER — HYDROCODONE BITARTRATE AND ACETAMINOPHEN 5; 325 MG/1; MG/1
1 TABLET ORAL 2 TIMES DAILY
Qty: 60 TABLET | Refills: 0 | Status: SHIPPED | OUTPATIENT
Start: 2023-07-07 | End: 2023-08-03

## 2023-07-25 ENCOUNTER — OFFICE VISIT (OUTPATIENT)
Dept: FAMILY MEDICINE | Facility: CLINIC | Age: 88
End: 2023-07-25
Payer: COMMERCIAL

## 2023-07-25 VITALS
BODY MASS INDEX: 20 KG/M2 | RESPIRATION RATE: 12 BRPM | TEMPERATURE: 98.2 F | WEIGHT: 90 LBS | DIASTOLIC BLOOD PRESSURE: 80 MMHG | OXYGEN SATURATION: 95 % | SYSTOLIC BLOOD PRESSURE: 138 MMHG | HEART RATE: 79 BPM

## 2023-07-25 DIAGNOSIS — G89.4 CHRONIC PAIN SYNDROME: Primary | Chronic | ICD-10-CM

## 2023-07-25 DIAGNOSIS — N39.41 URGE INCONTINENCE OF URINE: ICD-10-CM

## 2023-07-25 DIAGNOSIS — M54.6 MIDLINE THORACIC BACK PAIN, UNSPECIFIED CHRONICITY: ICD-10-CM

## 2023-07-25 DIAGNOSIS — N18.31 CHRONIC KIDNEY DISEASE, STAGE 3A (H): ICD-10-CM

## 2023-07-25 DIAGNOSIS — F11.90 CHRONIC, CONTINUOUS USE OF OPIOIDS: ICD-10-CM

## 2023-07-25 PROCEDURE — 99214 OFFICE O/P EST MOD 30 MIN: CPT | Performed by: FAMILY MEDICINE

## 2023-07-25 RX ORDER — OXYBUTYNIN CHLORIDE 5 MG/1
5 TABLET, EXTENDED RELEASE ORAL DAILY
Qty: 90 TABLET | Refills: 1 | Status: SHIPPED | OUTPATIENT
Start: 2023-07-25 | End: 2024-01-01

## 2023-07-25 ASSESSMENT — ANXIETY QUESTIONNAIRES
5. BEING SO RESTLESS THAT IT IS HARD TO SIT STILL: NOT AT ALL
IF YOU CHECKED OFF ANY PROBLEMS ON THIS QUESTIONNAIRE, HOW DIFFICULT HAVE THESE PROBLEMS MADE IT FOR YOU TO DO YOUR WORK, TAKE CARE OF THINGS AT HOME, OR GET ALONG WITH OTHER PEOPLE: NOT DIFFICULT AT ALL
1. FEELING NERVOUS, ANXIOUS, OR ON EDGE: NOT AT ALL
GAD7 TOTAL SCORE: 0
2. NOT BEING ABLE TO STOP OR CONTROL WORRYING: NOT AT ALL
6. BECOMING EASILY ANNOYED OR IRRITABLE: NOT AT ALL
GAD7 TOTAL SCORE: 0
4. TROUBLE RELAXING: NOT AT ALL
3. WORRYING TOO MUCH ABOUT DIFFERENT THINGS: NOT AT ALL
7. FEELING AFRAID AS IF SOMETHING AWFUL MIGHT HAPPEN: NOT AT ALL

## 2023-07-25 ASSESSMENT — PAIN SCALES - GENERAL: PAINLEVEL: SEVERE PAIN (6)

## 2023-07-25 NOTE — LETTER
Opioid / Opioid Plus Controlled Substance Agreement    This is an agreement between you and your provider about the safe and appropriate use of controlled substance/opioids prescribed by your care team. Controlled substances are medicines that can cause physical and mental dependence (abuse).    There are strict laws about having and using these medicines. We here at [facility] are committing to working with you in your efforts to get better. To support you in this work, we ll help you schedule regular office appointments for medicine refills. If we must cancel or change your appointment for any reason, we ll make sure you have enough medicine to last until your next appointment.     As a Provider, I will:  Listen carefully to your concerns and treat you with respect.   Recommend a treatment plan that I believe is in your best interest. This plan may involve therapies other than opioid pain medication.   Talk with you often about the possible benefits, and the risk of harm of any medicine that we prescribe for you.   Provide a plan on how to taper (discontinue or go off) using this medicine if the decision is made to stop its use.    As a Patient, I understand that opioid(s):   Are a controlled substance prescribed by my care team to help me function or work and manage my condition(s).   Are strong medicines and can cause serious side effects such as:  Drowsiness, which can seriously affect my driving ability  A lower breathing rate, enough to cause death  Harm to my thinking ability   Depression   Abuse of and addiction to this medicine  Need to be taken exactly as prescribed. Combining opioids with certain medicines or chemicals (such as illegal drugs, sedatives, sleeping pills, and benzodiazepines) can be dangerous or even fatal. If I stop opioids suddenly, I may have severe withdrawal symptoms.  Do not work for all types of pain nor for all patients. If they re not helpful, I may be asked to stop  them.        The risks, benefits and side effects of these medicine(s) were explained to me. I agree that:  I will take part in other treatments as advised by my care team. This may be psychiatry or counseling, physical therapy, behavioral therapy, group treatment or a referral to a specialist.     I will keep all my appointments. I understand that this is part of the monitoring of opioids. My care team may require an office visit for EVERY opioid/controlled substance refill. If I miss appointments or don t follow instructions, my care team may stop my medicine.    I will take my medicines as prescribed. I will not change the dose or schedule unless my care team tells me to. There will be no refills if I run out early.     I may be asked to come to the clinic and complete a urine drug test or complete a pill count at any time. If I don t give a urine sample or participate in a pill count, the care team may stop my medicine.    I will only receive prescriptions from this clinic for chronic pain. If I am treated by another provider for acute pain issues, I will tell them that I am taking opioid pain medication for chronic pain and that I have a treatment agreement with this provider. I will inform my [facility] care team within one business day if I am given a prescription for any pain medication by another healthcare provider. My [facility] care team can contact other providers and pharmacists about my use of any medicines.    It is up to me to make sure that I don t run out of my medicines on weekends or holidays. If my care team is willing to refill my opioid prescription without a visit, I must request refills only during office hours. Refills may take up to 3 business days to process. I will use one pharmacy to fill all my opioid and other controlled substance prescriptions. I will notify the clinic about any changes to my insurance or medication availability.    I am responsible for my  prescriptions. If the medicine/prescription is lost, stolen or destroyed, it will not be replaced. I also agree not to share controlled substance medicines with anyone.    I am aware I should not use any illegal or recreational drugs. I agree not to drink alcohol unless my care team says I can.       If I enroll in the [place] [facility] program, I will tell my care team prior to my next refill.     I will tell my care team right away if I become pregnant, have a new medical problem treated outside of my regular clinic, or have a change in my medications.    I understand that this medicine can affect my thinking, judgment and reaction time. Alcohol and drugs affect the brain and body, which can affect the safety of my driving. Being under the influence of alcohol or drugs can affect my decision-making, behaviors, personal safety, and the safety of others. Driving while impaired (DWI) can occur if a person is driving, operating, or in physical control of a car, motorcycle, boat, snowmobile, ATV, motorbike, off-road vehicle, or any other motor vehicle ([place] Statute 169[place].20). I understand the risk if I choose to drive or operate any vehicle or machinery.    I understand that if I do not follow any of the conditions above, my prescriptions or treatment may be stopped or changed.          Opioids  What You Need to Know    What are opioids?   Opioids are pain medicines that must be prescribed by a doctor. They are also known as narcotics.     Examples are:   morphine (MS Contin, Britt)  oxycodone (Oxycontin)  oxycodone and acetaminophen (Percocet)  hydrocodone and acetaminophen (Vicodin, Norco)   fentanyl patch (Duragesic)   hydromorphone (Dilaudid)   methadone  codeine (Tylenol #3)     What do opioids do well?   Opioids are best for severe short-term pain such as after a surgery or injury. They may work well for cancer pain. They may help some people with long-lasting (chronic) pain.     What do opioids NOT do  well?   Opioids never get rid of pain entirely, and they don t work well for most patients with chronic pain. Opioids don t reduce swelling, one of the causes of pain.                                    Other ways to manage chronic pain and improve function include:     Treat the health problem that may be causing pain  Anti-inflammation medicines, which reduce swelling and tenderness, such as ibuprofen (Advil, Motrin) or naproxen (Aleve)  Acetaminophen (Tylenol)  Antidepressants and anti-seizure medicines, especially for nerve pain  Topical treatments such as patches or creams  Injections or nerve blocks  Chiropractic or osteopathic treatment  Acupuncture, massage, deep breathing, meditation, visual imagery, aromatherapy  Use heat or ice at the pain site  Physical therapy   Exercise  Stop smoking  Take part in therapy       Risks and side effects     Talk to your doctor before you start or decide to keep taking opioids. Possible side effects include:    Lowering your breathing rate enough to cause death  Overdose, including death, especially if taking higher than prescribed doses  Worse depression symptoms; less pleasure in things you usually enjoy  Feeling tired or sluggish  Slower thoughts or cloudy thinking  Being more sensitive to pain over time; pain is harder to control  Trouble sleeping or restless sleep  Changes in hormone levels (for example, less testosterone)  Changes in sex drive or ability to have sex  Constipation  Unsafe driving  Itching and sweating  Dizziness  Nausea, throwing up and dry mouth    What else should I know about opioids?    Opioids may lead to dependence, tolerance, or addiction.    Dependence means that if you stop or reduce the medicine too quickly, you will have withdrawal symptoms. These include loose poop (diarrhea), jitters, flu-like symptoms, nervousness and tremors. Dependence is not the same as addiction.                     Tolerance means needing higher doses over time to  get the same effect. This may increase the chance of serious side effects.    Addiction is when people improperly use a substance that harms their body, their mind or their relations with others. Use of opiates can cause a relapse of addiction if you have a history of drug or alcohol abuse.    People who have used opioids for a long time may have a lower quality of life, worse depression, higher levels of pain and more visits to doctors.    You can overdose on opioids. Take these steps to lower your risk of overdose:    Recognize the signs:  Signs of overdose include decrease or loss of consciousness (blackout), slowed breathing, trouble waking up and blue lips. If someone is worried about overdose, they should call 911.    Talk to your doctor about Narcan (naloxone).   If you are at risk for overdose, you may be given a prescription for Narcan. This medicine very quickly reverses the effects of opioids.   If you overdose, a friend or family member can give you Narcan while waiting for the ambulance. They need to know the signs of overdose and how to give Narcan.     Don't use alcohol or street drugs.   Taking them with opioids can cause death.    Do not take any of these medicines unless your doctor says it s OK. Taking these with opioids can cause death:  Benzodiazepines, such as lorazepam (Ativan), alprazolam (Xanax) or diazepam (Valium)  Muscle relaxers, such as cyclobenzaprine (Flexeril)  Sleeping pills like zolpidem (Ambien)   Other opioids      How to keep you and other people safe while taking opioids:    Never share your opioids with others.  Opioid medicines are regulated by the Drug Enforcement Agency ([place]). Selling or sharing medications is a criminal act.    2. Be sure to store opioids in a secure place, locked up if possible. Young children can easily swallow them and overdose.    3. When you are traveling with your medicines, keep them in the original bottles. If you use a pill box, be sure you also  carry a copy of your medicine list from your clinic or pharmacy.    4. Safe disposal of opioids    Most pharmacies have places to get rid of medicine, called disposal kiosks. Medicine disposal options are also available in every [facility]. Search your county and  medication disposal  to find more options. You can find more details at:  [email or web address]://[email or web address].[email or web address]-green/managing-unwanted-medications     I agree that my provider, clinic care team, and pharmacy may work with any city, state or federal law enforcement agency that investigates the misuse, sale, or other diversion of my controlled medicine. I will allow my provider to discuss my care with, or share a copy of, this agreement with any other treating provider, pharmacy or emergency room where I receive care.    I have read this agreement and have asked questions about anything I did not understand.    _______________________________________________________  Patient Signature - Nighat Gupta _____________________                   Date     _______________________________________________________  Provider Signature - Nancy Tapia MD   _____________________                   Date     _______________________________________________________  Witness Signature (required if provider not present while patient signing)   _____________________                   Date

## 2023-07-25 NOTE — PROGRESS NOTES
Assessment & Plan     Chronic pain syndrome  Not well controlled   ?new thoracic issue   Will get xrays   - [id number] - Urine Drug Confirmation Panel (Comprehensive); Future    Chronic, continuous use of opioids    - [id number] - Urine Drug Confirmation Panel (Comprehensive); Future    Urge incontinence of urine  Retry meds and see if they are helpful   - oxybutynin ER (DITROPAN XL) 5 MG 24 hr tablet; Take 1 tablet (5 mg) by mouth daily    Chronic kidney disease, stage 3a (H)    - Basic metabolic panel; Future  - CBC with platelets; Future                 MD RASHAD Da Silva [facility]    Arun Disla is a 87 year old, presenting for the following health issues:  Pain (Compression fracture)        7/25/2023    12:53 PM   Additional Questions   Roomed by Susy RASHAD   Accompanied by Daughter Jenn         7/25/2023    12:53 PM   Patient Reported Additional Medications   Patient reports taking the following new medications .     History of Present Illness       Reason for visit:  Follow up    She eats 2-3 servings of fruits and vegetables daily.She consumes 1 sweetened beverage(s) daily.She exercises with enough effort to increase her heart rate 9 or less minutes per day.  She exercises with enough effort to increase her heart rate 3 or less days per week.   She is taking medications regularly.       Pain History:  When did you first notice your pain? Long time   Have you seen this provider for your pain in the past? Yes   Where in your body do you have pain? Upper back  Are you seeing anyone else for your pain? No        4/17/2023    10:54 AM 5/23/2023     2:09 PM 6/23/2023     9:59 AM   PHQ-9 SCORE   PHQ-9 Total Score MyChart 0 16 (Moderately severe depression) 2 (Minimal depression)   PHQ-9 Total Score 0 16 2           5/23/2023     2:10 PM 6/23/2023     9:59 AM 7/25/2023    12:30 PM   PRECIOUS-7 SCORE   Total Score 17 (severe anxiety) 0 (minimal anxiety) 0 (minimal anxiety)   Total  Score 17 0 0               Chronic Pain Follow Up:    Location of pain: back  Analgesia/pain control:    - Recent changes:  had new compression fracture    - Overall control: Tolerable with discomfort    - Current treatments: norco and gabapentin, advil  Adherence:     - Do you ever take more pain medicine than prescribed? No    - When did you take your last dose of pain medicine?  This am   Adverse effects: No   PDMP Review         Value Time User    State PDMP site checked  Yes 7/25/2023  1:02 PM Nancy Tapia MD          Last CSA Agreement:   CSA -- Patient Level:     [Media Unavailable] Controlled Substance Agreement - Opioid - Scan on 4/5/2022 10:31 AM   [Media Unavailable] Controlled Substance Agreement - Opioid - Scan on 4/13/2021  9:32 AM   [Media Unavailable] Controlled Substance Agreement - Non - Opioid - Scan on 3/12/2020 11:46 AM: NON-OPIOID CONTROLLED SUBSTANCE AGREEMENT   [Media Unavailable] Controlled Substance Agreement - Opioid - Scan on 3/6/2020  9:44 AM: FOR 3-6-20 VISIT   [Media Unavailable] Controlled Substance Agreement - Opioid - Scan on 3/6/2020  9:41 AM       Last UDS: 4/8/2022          Genitourinary - Female  Onset/Duration: years ago   Description:   Painful urination (Dysuria): No           Frequency: YES  Blood in urine (Hematuria): No  Delay in urine (Hesitency): No  Intensity: moderate  Progression of Symptoms:  worsening  Accompanying Signs & Symptoms:  Fever/chills: No  Flank pain: No  Nausea and vomiting: No  Vaginal symptoms: none  Abdominal/Pelvic Pain: No  History:   History of frequent UTI s: No  History of kidney stones: No  Sexually Active: No  Possibility of pregnancy: No  Precipitating or alleviating factors: None  Therapies tried and outcome:  none   Mostly urge incont   She can not make it to the bathroom in time and leaks often       Review of Systems   Constitutional, HEENT, cardiovascular, pulmonary, gi and gu systems are negative, except as otherwise noted.       Objective    /80   Pulse 79   Temp 98.2  F (36.8  C) (Tympanic)   Resp 12   Wt 40.8 kg (90 lb)   SpO2 95%   BMI 20.00 kg/m    Body mass index is 20 kg/m .  Physical Exam   GENERAL: healthy, alert and no distress  MS: no gross musculoskeletal defects noted, no edema  Back sig scoliosis with pain to palpation in the t4-t6 region

## 2023-07-30 ENCOUNTER — LAB (OUTPATIENT)
Dept: LAB | Facility: CLINIC | Age: 88
End: 2023-07-30
Payer: COMMERCIAL

## 2023-07-30 DIAGNOSIS — G89.4 CHRONIC PAIN SYNDROME: ICD-10-CM

## 2023-07-30 DIAGNOSIS — F11.90 CHRONIC, CONTINUOUS USE OF OPIOIDS: ICD-10-CM

## 2023-07-30 DIAGNOSIS — E03.9 HYPOTHYROIDISM, UNSPECIFIED TYPE: Primary | Chronic | ICD-10-CM

## 2023-07-30 DIAGNOSIS — E03.9 HYPOTHYROIDISM, UNSPECIFIED TYPE: ICD-10-CM

## 2023-07-30 DIAGNOSIS — N18.31 CHRONIC KIDNEY DISEASE, STAGE 3A (H): ICD-10-CM

## 2023-07-30 LAB
ANION GAP SERPL CALCULATED.3IONS-SCNC: 12 MMOL/L (ref 7–15)
BUN SERPL-MCNC: 16.3 MG/DL (ref 8–23)
CALCIUM SERPL-MCNC: 10 MG/DL (ref 8.8–10.2)
CHLORIDE SERPL-SCNC: 103 MMOL/L (ref 98–107)
CREAT SERPL-MCNC: 1.01 MG/DL (ref 0.51–0.95)
CREAT UR-MCNC: 91 MG/DL
DEPRECATED HCO3 PLAS-SCNC: 25 MMOL/L (ref 22–29)
ERYTHROCYTE [DISTWIDTH] IN BLOOD BY AUTOMATED COUNT: 18.3 % (ref 10–15)
GFR SERPL CREATININE-BSD FRML MDRD: 54 ML/MIN/1.73M2
GLUCOSE SERPL-MCNC: 129 MG/DL (ref 70–99)
HCT VFR BLD AUTO: 29.9 % (ref 35–47)
HGB BLD-MCNC: 9.2 G/DL (ref 11.7–15.7)
MCH RBC QN AUTO: 26.7 PG (ref 26.5–33)
MCHC RBC AUTO-ENTMCNC: 30.8 G/DL (ref 31.5–36.5)
MCV RBC AUTO: 87 FL (ref 78–100)
PLATELET # BLD AUTO: 398 10E3/UL (ref 150–450)
POTASSIUM SERPL-SCNC: 5.1 MMOL/L (ref 3.4–5.3)
RBC # BLD AUTO: 3.44 10E6/UL (ref 3.8–5.2)
SODIUM SERPL-SCNC: 140 MMOL/L (ref 136–145)
T4 FREE SERPL-MCNC: 1.81 NG/DL (ref 0.9–1.7)
TSH SERPL DL<=0.005 MIU/L-ACNC: 10.77 UIU/ML (ref 0.3–4.2)
WBC # BLD AUTO: 11.3 10E3/UL (ref 4–11)

## 2023-07-30 PROCEDURE — 84443 ASSAY THYROID STIM HORMONE: CPT

## 2023-07-30 PROCEDURE — 36415 COLL VENOUS BLD VENIPUNCTURE: CPT

## 2023-07-30 PROCEDURE — 80048 BASIC METABOLIC PNL TOTAL CA: CPT

## 2023-07-30 PROCEDURE — 85027 COMPLETE CBC AUTOMATED: CPT

## 2023-07-30 PROCEDURE — 84439 ASSAY OF FREE THYROXINE: CPT

## 2023-07-30 NOTE — PROGRESS NOTES
Nighat Gupta has an upcoming lab appointment:    Patient came in today (7-30-23) for a lab appointment and was asking to have her thyroid checked as well during her blood draw. The patient expressed that she has been experiencing swelling on her ankles. The tube for this test has been drawn if you wish to add it on.    Thank you!  Daniela Hernandez

## 2023-08-01 ENCOUNTER — MYC MEDICAL ADVICE (OUTPATIENT)
Dept: FAMILY MEDICINE | Facility: CLINIC | Age: 88
End: 2023-08-01
Payer: COMMERCIAL

## 2023-08-01 DIAGNOSIS — E03.9 HYPOTHYROIDISM, UNSPECIFIED TYPE: Primary | Chronic | ICD-10-CM

## 2023-08-02 LAB
DHC UR CFM-MCNC: 348 NG/ML
DHC/CREAT UR: 382 NG/MG {CREAT}
GABAPENTIN UR QL CFM: PRESENT
HYDROCODONE UR CFM-MCNC: 487 NG/ML
HYDROCODONE/CREAT UR: 535 NG/MG {CREAT}
HYDROMORPHONE UR CFM-MCNC: 804 NG/ML
HYDROMORPHONE/CREAT UR: 884 NG/MG {CREAT}

## 2023-08-03 DIAGNOSIS — M54.42 CHRONIC LEFT-SIDED LOW BACK PAIN WITH LEFT-SIDED SCIATICA: ICD-10-CM

## 2023-08-03 DIAGNOSIS — G89.29 CHRONIC LEFT-SIDED LOW BACK PAIN WITH LEFT-SIDED SCIATICA: ICD-10-CM

## 2023-08-03 DIAGNOSIS — M54.16 LUMBAR RADICULOPATHY: ICD-10-CM

## 2023-08-03 RX ORDER — HYDROCODONE BITARTRATE AND ACETAMINOPHEN 5; 325 MG/1; MG/1
1 TABLET ORAL 2 TIMES DAILY
Qty: 60 TABLET | Refills: 0 | Status: SHIPPED | OUTPATIENT
Start: 2023-08-03 | End: 2023-09-06

## 2023-08-12 ENCOUNTER — LAB (OUTPATIENT)
Dept: LAB | Facility: CLINIC | Age: 88
End: 2023-08-12
Payer: COMMERCIAL

## 2023-08-12 DIAGNOSIS — E03.9 HYPOTHYROIDISM, UNSPECIFIED TYPE: ICD-10-CM

## 2023-08-12 LAB
T4 FREE SERPL-MCNC: 1.83 NG/DL (ref 0.9–1.7)
TSH SERPL DL<=0.005 MIU/L-ACNC: 11.32 UIU/ML (ref 0.3–4.2)

## 2023-08-12 PROCEDURE — 84439 ASSAY OF FREE THYROXINE: CPT

## 2023-08-12 PROCEDURE — 36415 COLL VENOUS BLD VENIPUNCTURE: CPT

## 2023-08-12 PROCEDURE — 84443 ASSAY THYROID STIM HORMONE: CPT

## 2023-08-16 DIAGNOSIS — E03.9 HYPOTHYROIDISM, UNSPECIFIED TYPE: Primary | Chronic | ICD-10-CM

## 2023-08-16 RX ORDER — LEVOTHYROXINE SODIUM 75 UG/1
75 TABLET ORAL DAILY
Qty: 90 TABLET | Refills: 3 | Status: SHIPPED | OUTPATIENT
Start: 2023-08-16 | End: 2024-01-01

## 2023-08-25 ENCOUNTER — TELEPHONE (OUTPATIENT)
Dept: FAMILY MEDICINE | Facility: CLINIC | Age: 88
End: 2023-08-25
Payer: COMMERCIAL

## 2023-08-25 NOTE — TELEPHONE ENCOUNTER
Hold oxybut for now   Elevate legs and compression socks  If not better by next week would need to be seen     If SOA needs to be seen

## 2023-08-25 NOTE — TELEPHONE ENCOUNTER
Pls see telephone encounter below.     H/O HTN and CKD3. Patient was seen on 7/25/23 started on oxybutynin 5 mg daily for overactive bladder.     Patient states since starting the medication she has noticed swelling in her bilateral feet and ankles pitting. Patient denies any SOB, chest pain, redness on her feet/ankle or any pain.    Patient states she has held the oxybutynin x 1 day and no change in her edema. Patient states it has helped her urinary frequency. Patient wondering if she needs a low dose of diuretic.    Message routed to provider to please advise.    Julie Behrendt RN

## 2023-08-25 NOTE — TELEPHONE ENCOUNTER
Reason for Call:  Appointment Request    Patient requesting this type of appt:  Acute    Requested provider: Nancy Tapia    Reason patient unable to be scheduled: Not within requested timeframe    When does patient want to be seen/preferred time:  asap    Comments: Pt has had swollen feet for a week and would like to be seen. She would prefer PCP but will see someone else if need be.    Could we send this information to you in [facility] or would you prefer to receive a phone call?:   Patient would prefer a phone call   Okay to leave a detailed message?: Yes at Home number on file [phone] (home)    Call taken on 8/25/2023 at 9:36 AM by Cherelle Solano

## 2023-08-25 NOTE — TELEPHONE ENCOUNTER
Pt informed/ advised as noted per MD.  Pt voices understanding/ agreement.  JEAN PIERRE Mckeon RN

## 2023-09-05 DIAGNOSIS — M54.16 LUMBAR RADICULOPATHY: ICD-10-CM

## 2023-09-05 DIAGNOSIS — G89.29 CHRONIC LEFT-SIDED LOW BACK PAIN WITH LEFT-SIDED SCIATICA: ICD-10-CM

## 2023-09-05 DIAGNOSIS — M54.42 CHRONIC LEFT-SIDED LOW BACK PAIN WITH LEFT-SIDED SCIATICA: ICD-10-CM

## 2023-09-06 RX ORDER — HYDROCODONE BITARTRATE AND ACETAMINOPHEN 5; 325 MG/1; MG/1
1 TABLET ORAL 2 TIMES DAILY
Qty: 60 TABLET | Refills: 0 | Status: SHIPPED | OUTPATIENT
Start: 2023-09-06 | End: 2023-01-01

## 2023-09-14 ENCOUNTER — LAB (OUTPATIENT)
Dept: LAB | Facility: CLINIC | Age: 88
End: 2023-09-14
Payer: COMMERCIAL

## 2023-09-14 DIAGNOSIS — E03.9 HYPOTHYROIDISM, UNSPECIFIED TYPE: ICD-10-CM

## 2023-09-14 DIAGNOSIS — E78.5 HYPERLIPIDEMIA LDL GOAL <130: Primary | ICD-10-CM

## 2023-09-14 LAB
CHOLEST SERPL-MCNC: 178 MG/DL
HDLC SERPL-MCNC: 80 MG/DL
LDLC SERPL CALC-MCNC: 75 MG/DL
NONHDLC SERPL-MCNC: 98 MG/DL
T4 FREE SERPL-MCNC: 1.33 NG/DL (ref 0.9–1.7)
TRIGL SERPL-MCNC: 117 MG/DL
TSH SERPL DL<=0.005 MIU/L-ACNC: 27.96 UIU/ML (ref 0.3–4.2)

## 2023-09-14 PROCEDURE — 84439 ASSAY OF FREE THYROXINE: CPT

## 2023-09-14 PROCEDURE — 80061 LIPID PANEL: CPT

## 2023-09-14 PROCEDURE — 36415 COLL VENOUS BLD VENIPUNCTURE: CPT

## 2023-09-14 PROCEDURE — 84443 ASSAY THYROID STIM HORMONE: CPT

## 2023-09-15 DIAGNOSIS — E03.9 HYPOTHYROIDISM, UNSPECIFIED TYPE: Primary | Chronic | ICD-10-CM

## 2023-09-18 ENCOUNTER — TELEPHONE (OUTPATIENT)
Dept: FAMILY MEDICINE | Facility: CLINIC | Age: 88
End: 2023-09-18
Payer: COMMERCIAL

## 2023-09-18 NOTE — TELEPHONE ENCOUNTER
Pt returned call, she was given Dr Tapia's message. Pt was given imaging scheduling number to call and schedule appt. Kelly Santillan RN

## 2023-09-25 ENCOUNTER — HOSPITAL ENCOUNTER (OUTPATIENT)
Dept: ULTRASOUND IMAGING | Facility: CLINIC | Age: 88
Discharge: HOME | End: 2023-09-25
Attending: FAMILY MEDICINE | Admitting: FAMILY MEDICINE
Payer: COMMERCIAL

## 2023-09-25 DIAGNOSIS — E03.9 HYPOTHYROIDISM, UNSPECIFIED TYPE: Chronic | ICD-10-CM

## 2023-09-25 PROCEDURE — 76536 US EXAM OF HEAD AND NECK: CPT

## 2023-10-11 NOTE — TELEPHONE ENCOUNTER
Overdue for needed care. Please call to schedule per Dr. Tapia. Once appt is scheduled, route back to the pool.     Julie Behrendt RN

## 2023-10-16 NOTE — PLAN OF CARE
Patient vital signs are at baseline: No,  Reason:  Patient still on 2L of O2 to keep O2 sats > 90% - pt also is a current smoker.  Patient able to ambulate as they were prior to admission or with assist devices provided by therapies during their stay:  Yes - A1 up to commode which pt tolerated well.  Patient MUST void prior to discharge:  No,  Reason:  Due to void - bladder scanned for 374 cc  Patient able to tolerate oral intake:  Yes  Pain has adequate pain control using Oral analgesics:  Yes - scheduled tylenol given    Alarms on for safety.     Modified Advancement Flap Text: The defect edges were debeveled with a #15 scalpel blade.  Given the location of the defect, shape of the defect and the proximity to free margins a modified advancement flap was deemed most appropriate.  Using a sterile surgical marker, an appropriate advancement flap was drawn incorporating the defect and placing the expected incisions within the relaxed skin tension lines where possible.    The area thus outlined was incised deep to adipose tissue with a #15 scalpel blade.  The skin margins were undermined to an appropriate distance in all directions utilizing iris scissors.

## 2023-10-31 NOTE — PROGRESS NOTES
Nighat is a 88 year old who is being evaluated via a billable telephone visit.      What phone number would you like to be contacted at? [phone]  How would you like to obtain your AVS? Chris    Distant Location (provider location):  On-site    Assessment & Plan     Essential hypertension, benign  Stable no change in treatment plan.   - amLODIPine (NORVASC) 10 MG tablet; Take 1 tablet (10 mg) by mouth daily  - lisinopril (ZESTRIL) 40 MG tablet; Take 1 tablet (40 mg) by mouth daily    Major depressive disorder, recurrent episode, moderate (H)  Increase dose to 40 mg daily bid dosing or daily dosing   - DULoxetine (CYMBALTA) 20 MG capsule; Take2 tablets daily    Lumbar radiculopathy  Stable no change in treatment plan.   - gabapentin (NEURONTIN) 300 MG capsule; TAKE ONE CAPSULE BY MOUTH EVERY MORNING AND TAKE ONE CAPSULE BY MOUTH AT NOON AND TAKE TWO CAPSULES BY MOUTH AT BEDTIME  - HYDROcodone-acetaminophen (NORCO) 5-325 MG tablet; Take 1 tablet by mouth 2 times daily  - HYDROcodone-acetaminophen (NORCO) 5-325 MG tablet; Take 1 tablet by mouth 2 times daily    Hyperlipidemia LDL goal <130  Stable no change in treatment plan.   - simvastatin (ZOCOR) 20 MG tablet; Take 1 tablet (20 mg) by mouth at bedtime    Chronic left-sided low back pain with left-sided sciatica  Stable no change in treatment plan.   - HYDROcodone-acetaminophen (NORCO) 5-325 MG tablet; Take 1 tablet by mouth 2 times daily  - HYDROcodone-acetaminophen (NORCO) 5-325 MG tablet; Take 1 tablet by mouth 2 times daily                 Nancy Tapia MD  [facility]    Subjective   Nighat is a 88 year old, presenting for the following health issues:  Pain (Chronic pain)        10/31/2023     8:46 AM   Additional Questions   Roomed by Susy SOLORZANO   Accompanied by Self         10/31/2023     8:46 AM   Patient Reported Additional Medications   Patient reports taking the following new medications .       Pain          Hyperlipidemia Follow-Up    Are you regularly taking any medication or supplement to lower your cholesterol?   Yes- Zocor  Are you having muscle aches or other side effects that you think could be caused by your cholesterol lowering medication?  No    Hypertension Follow-up    Do you check your blood pressure regularly outside of the clinic? No   Are you following a low salt diet? No  Are your blood pressures ever more than 140 on the top number (systolic) OR more   than 90 on the bottom number (diastolic), for example 140/90?     Depression Followup  How are you doing with your depression since your last visit? Worsened some - pt increased cymbalta to 2 tablets - seems to be helping a lot   Are you having other symptoms that might be associated with depression? No  Have you had a significant life event?  No   Are you feeling anxious or having panic attacks?   No  Do you have any concerns with your use of alcohol or other drugs? No    Social History     Tobacco Use    Smoking status: Every Day     Packs/day: 0.25     Years: 50.00     Additional pack years: 0.00     Total pack years: 12.50     Types: Cigarettes    Smokeless tobacco: Never    Tobacco comments:     5 cigarettes daily   Vaping Use    Vaping Use: Never used   Substance Use Topics    Alcohol use: No    Drug use: No         4/17/2023    10:54 AM 5/23/2023     2:09 PM 6/23/2023     9:59 AM   PHQ   PHQ-9 Total Score 0 16 2   Q9: Thoughts of better off dead/self-harm past 2 weeks Not at all Nearly every day Not at all   F/U: Thoughts of suicide or self-harm  Yes    F/U: Self harm-plan  Yes    F/U: Self-harm action  No    F/U: Safety concerns  No          5/23/2023     2:10 PM 6/23/2023     9:59 AM 7/25/2023    12:30 PM   PRECIOUS-7 SCORE   Total Score 17 (severe anxiety) 0 (minimal anxiety) 0 (minimal anxiety)   Total Score 17 0 0         Suicide Assessment Five-step Evaluation and Treatment (SAFE-T)    Pain History:  When did you first notice your pain? Long  time - chronic   Have you seen this provider for your pain in the past? Yes   Where in your body do you have pain? Low back  Are you seeing anyone else for your pain? No        4/17/2023    10:54 AM 5/23/2023     2:09 PM 6/23/2023     9:59 AM   PHQ-9 SCORE   PHQ-9 Total Score MyChart 0 16 (Moderately severe depression) 2 (Minimal depression)   PHQ-9 Total Score 0 16 2           5/23/2023     2:10 PM 6/23/2023     9:59 AM 7/25/2023    12:30 PM   PRECIOUS-7 SCORE   Total Score 17 (severe anxiety) 0 (minimal anxiety) 0 (minimal anxiety)   Total Score 17 0 0               Chronic Pain Follow Up:    Location of pain: low back  Analgesia/pain control:    - Recent changes:  good and bad days    - Overall control: Tolerable with discomfort    - Current treatments: Norco and just added cymbalta   Adherence:     - Do you ever take more pain medicine than prescribed? No    - When did you take your last dose of pain medicine?  Maryneal this am at 7am   Adverse effects: No   PDMP Review         Value Time User    State PDMP site checked  Yes 9/6/2023 12:19 PM Nancy Tapia MD          Last CSA Agreement:   CSA -- Patient Level:     [Media Unavailable] Controlled Substance Agreement - Opioid - Scan on 7/25/2023  1:50 PM   [Media Unavailable] Controlled Substance Agreement - Opioid - Scan on 4/5/2022 10:31 AM   [Media Unavailable] Controlled Substance Agreement - Opioid - Scan on 4/13/2021  9:32 AM   [Media Unavailable] Controlled Substance Agreement - Non - Opioid - Scan on 3/12/2020 11:46 AM: NON-OPIOID CONTROLLED SUBSTANCE AGREEMENT   [Media Unavailable] Controlled Substance Agreement - Opioid - Scan on 3/6/2020  9:44 AM: FOR 3-6-20 VISIT   [Media Unavailable] Controlled Substance Agreement - Opioid - Scan on 3/6/2020  9:41 AM       Last UDS: 8/2/2023                  Review of Systems   Constitutional, HEENT, cardiovascular, pulmonary, gi and gu systems are negative, except as otherwise noted.      Objective            Vitals:  No vitals were obtained today due to virtual visit.    Physical Exam   healthy, alert, and no distress  PSYCH: Alert and oriented times 3; coherent speech, normal   rate and volume, able to articulate logical thoughts, able   to abstract reason, no tangential thoughts, no hallucinations   or delusions  Her affect is normal  RESP: No cough, no audible wheezing, able to talk in full sentences  Remainder of exam unable to be completed due to telephone visits                Phone call duration: 23 minutes

## 2023-12-12 NOTE — PROGRESS NOTES
"  Assessment & Plan     Acute right-sided low back pain without sciatica  No fracture on x-ray. Patient requesting a steroid burst as that has helped with her back in the in the past. Will treat with prednisone burst. Avoid aggravating factors, but encouraged gentle ROM and stretching. Would recommend physical therapy or follow up with primary care provider if symptoms worsen or do not improve.      - XR Lumbar Spine 2/3 Views; Future  - predniSONE (DELTASONE) 20 MG tablet; Take 2 tablets (40 mg) by mouth daily for 5 days    Fall, initial encounter    - XR Lumbar Spine 2/3 Views; Future                 Return in about 1 week (around 12/19/2023), or if symptoms worsen or fail to improve.    DALE Lehman [facility] [place]            Subjective   Chief Complaint   Patient presents with    Fall     Pt fell 9 days ago on her right side while cleaning her car, following the fall no pain, bruising or swelling after.  6 days later pt c/o pain, in the right hip and lower back, pain goes up through thoracic spin area, (hx of thoracic fx per daughter Jenn present) pt has \"spinal stenosis\" and takes Norco and gabapentin daily for pain.       HPI     Back Pain    Onset of symptoms was 9 day(s) ago.  Location: right low back  Radiation: does not radiate  Context:       The injury happened while at home      Mechanism: fall when cleaning off car       Patient experienced delayed pain  Course of symptoms is same.    Severity moderate  Current and Associated symptoms: pain  Denies: fecal incontinence, urinary incontinence, lower extremity numbness, lower extremity weakness, and paresthesia    Aggravating Factors: sitting, bending, and changing position  Therapies to improve symptoms include: norco  Past history: history of chronic back pain                  Review of Systems         Objective    BP (!) 186/68   Pulse 72   Temp 98.3  F (36.8  C) (Tympanic)   Resp 18   Wt 40.3 kg (88 lb 12.8 oz)  "  SpO2 96%   BMI 19.73 kg/m    Body mass index is 19.73 kg/m .  Physical Exam  Constitutional:       General: She is not in acute distress.  Musculoskeletal:      Comments: There is tenderness with palpation of right low back. No redness, swelling, or bruising.    Neurological:      Mental Status: She is alert.            Xray - Reviewed and interpreted by me.  No acute fracture

## 2023-12-18 NOTE — TELEPHONE ENCOUNTER
Dtr informed/ advised as noted per Leydi.   Dtr opting for appt with Leydi tomorrow AM- scheduled.  In meantime advised limit/ avoid wt bearing, Norco as scheduled, can add tylenol as needed in between Norco doses for max tylenol daily dose 4000mg daily, may apply heat/ ice as tolerated.   Dtr requests to keep 12/20 appt with Dr. Tapia at this time.  JEAN PIERRE Mckeon RN

## 2023-12-18 NOTE — TELEPHONE ENCOUNTER
Patient will need to be seen for evaluation before we can order a CT scan.      If pain is worse and change in symptoms should be seen in urgent care or Emergency department today.    If symptoms are unchanged Recommend she follow-up with her schuduled appointment on 12.20 with Dr Tapia.  If she feels she needs to  be seen before 12/20I be willing to work her in my schedule tomorrow AM.    Leydi Arrieta CNP

## 2023-12-18 NOTE — TELEPHONE ENCOUNTER
Symptoms    Describe your symptoms: pt had a fall 12/6 and was seen in urgent care 12/12 for back pain, had images done with no concerns. Daughter Jenn concerned as pt is having further mobility issues and having a hard time getting around and wondering if more xrays or CT may be needed. Pt has scheduled appt 12/20 with PCP but daughter concerned that something is wrong    Any pain: Yes:     How long have you been having symptoms: 12  days    Have you been seen for this:  Yes: urgent care 12/12    Appointment offered?: No    Triage offered?: No      Preferred Pharmacy:       [id number] [address] [place] [id number]  Phone: [phone] Fax: [phone]      Could we send this information to you in [facility] or would you prefer to receive a phone call?:   Patient would prefer a phone call   Okay to leave a detailed message?: Yes at Home number on file [phone] (home)

## 2023-12-18 NOTE — TELEPHONE ENCOUNTER
"S-(situation): Fell 12/6 with subsequent UC visit 12/12. Pt having persistent rt low back, rt side leg/ hip pain. Dtr requesting orders for futher imaging. Dtr does not want to have to go to ED for eval if possible.     B-(background): Pt lives independently with dtrs to check on her and assist as needed.   12/6 fall landing on rt side while cleaning her car off. Was seen in UC 12/12, xray lumbar spine, no new fx identified- please see xray reports suggesting CT lumbar spine if clinical concern for Fx. Prednisone prescribed, completed yesterday without significant pain improvement. Takes Norco BID scheduled for dx lumbar radiculopathy, chronic lt side LBP.    A-(assessment): Dtr states pt walking with walker, using upper extremity strength to offload wt bearing due to persistent pain. Pt has commented \"can't get leg (rt) to get going\". Has had increased bilat L/E swelling past 2 wks. Dtr not aware of groin pain, numbness, tingling.     R-(recommendations): Pt has appt with Dr. Tapia 12/20 but advised eval sooner due to sx.   Requesting order for CT, explained PCP out of office, would need to be seen in UC or ED for eval. Dtr would like another provider to review for consideration CT order.  Forwarded to Leydi, covering provider, for review.  JEAN PIERRE Mckeon RN      "

## 2023-12-19 NOTE — TELEPHONE ENCOUNTER
[facility] Call Center    Phone Message    May a detailed message be left on voicemail: no     Reason for Call: Mayda @ [facility] calling- provider Leydi Arrieta is requesting to talk to someone in ortho regarding sacral wing fracture

## 2023-12-19 NOTE — PROGRESS NOTES
Assessment & Plan   (S32.010A) Compression fracture of L1 vertebra, initial encounter (H)  (primary encounter diagnosis)  Comment: Patient had a fall 2 weeks ago was seen in urgent care x-rays inconclusive for any fractures due to degenerative changes patient continues to have continual pain and not bearing full weight.  Recommend CT scan to do for further evaluation was noted on CT scan to have a new compression L1 fracture and also a sacral wing fracture of the right ileus.  Reviewed with [facility] Dr Rivero.  Recommendation is weightbearing as tolerated for wing Fracture  was brace for comfort for compression fracture and no physical therapy for 6 to 8 weeks recommend follow-up in 2 weeks for further imaging with [facility].    I will place a referral but orthopedics has notified their scheduling team and they will have patient seen either in [place] or [facility].  These fractures do not require most often do not require surgical intervention but conservative therapy. Will determine further treatment after visit with orthopedics in 2 weeks.  In review with patient, patient most likely will not need a braces pain is manageable and the brace may be more uncomfortable due to her posture and wing fracture.      In review with Orthopedics: Fractures most often are related to pathological will require further workup for osteoporosis and management. Will review with primary care provider further intervention.    Pain management patient is controlled with current dose of Norco and gabapentin no further increase in pain medication  Plan:    (S32.301A) Closed fracture of right iliac wing, initial encounter (H)  Comment:See above   Plan:     (M54.16) Lumbar radiculopathy  Comment: Fracture undetermined and x-ray due to degenerative disease unable to fully evaluate will need to obtain CT scan  Plan: CT Lumbar Spine w/o Contrast            40 minutes spent by me on the date of the encounter  doing chart review, review of outside records, review of test results, interpretation of tests, patient visit, documentation, discussion with other provider(s), and discussion with family        See Patient Instructions    MAX Zaragoza CNP  [facility]    Arun Disla is a 88 year old, presenting for the following health issues:  Back Pain        12/19/2023    10:18 AM   Additional Questions   Roomed by Edith FELDMAN CMA   Accompanied by Daughter, Jenn       History of Present Illness       Back Pain:  She presents for follow up of back pain. Patient's back pain is a chronic problem.  Location of back pain:  Right lower back, left lower back, right hip and left hip  Description of back pain: sharp and shooting  Back pain spreads: right buttocks    Since patient first noticed back pain, pain is: unchanged  Does back pain interfere with her job:  Yes       She eats 0-1 servings of fruits and vegetables daily.She consumes 0 sweetened beverage(s) daily.She exercises with enough effort to increase her heart rate 9 or less minutes per day.  She exercises with enough effort to increase her heart rate 3 or less days per week.   She is taking medications regularly.        2/9/2022     3:08 PM 2/20/2023     4:46 PM 12/19/2023    10:19 AM   PEG Score   PEG Total Score 7.67 5.33 10           Review of Systems   Constitutional, HEENT, cardiovascular, pulmonary, gi and gu systems are negative, except as otherwise noted.      Objective    /58 (BP Location: Right arm, Patient Position: Sitting, Cuff Size: Adult Small)   Pulse 76   Temp 97  F (36.1  C) (Tympanic)   Resp 20   SpO2 96%   There is no height or weight on file to calculate BMI.  Physical Exam   GENERAL: healthy, alert and no distress  EYES: Eyes grossly normal to inspection, PERRL and conjunctivae and sclerae normal  RESP: lungs clear to auscultation - no rales, rhonchi or wheezes  CV: regular rate and rhythm, normal S1  S2, no S3 or S4, no murmur, click or rub, no peripheral edema and peripheral pulses strong  SKIN: no suspicious lesions or rashes  NEURO: Normal strength and tone, mentation intact and speech normal  PSYCH: mentation appears normal, affect normal/bright    Tender:  lumbar spinous processes, lumbar facet joints, left para lumbar muscles, right para lumbar muscles, right SI joint, right sciatic notch  Non-tender:  thoracic spinous processes, thoracic facet joints, left parathoracic muscles, right parathoracic muscles  Range of Motion:  left lateral thoracic bending   decreased, right lateral thoracic bending  decreased, left thoracic rotation  decreased, right thoracic rotation  decreased, lumbar flexion  decreased, weakness, painful, lumbar extension  decreased, weakness, painful, left lateral lumbar bending  decreased, weakness, painful, right lateral lumbar bending  decreased, weakness, painful, left lateral lumbar rotation  decreased, weakness, painful, right lateral lumbar rotation  decreased, weakness, painful  Strength:  unable to heel walk        Results for orders placed or performed during the hospital encounter of 12/19/23   CT Lumbar Spine w/o Contrast     Status: None    Narrative    CT LUMBAR SPINE WITHOUT CONTRAST  12/19/2023 11:57 AM     HISTORY: Lumbar radiculopathy.     TECHNIQUE: Axial images of the lumbar spine were obtained without  intravenous contrast. Multiplanar reformations were performed.   Radiation dose for this scan was reduced using automated exposure  control, adjustment of the mA and/or kV according to patient size, or  iterative reconstruction technique.     COMPARISON: Lumbar spine radiographs 12/12/2023. CT lumbar spine  6/16/2023.     FINDINGS: Transitional anatomy at the lumbosacral junction. For the  purposes of this report, I will presume that there are five  nonrib-bearing lumbar vertebral bodies and a transitional partially  lumbarized S1 vertebra. There is diffuse osseous  demineralization that  limits evaluation for fracture. There is a new L1 superior endplate  compression fracture with mild vertebral height loss. Small amount of  air noted in the region of the L1 superior endplate fracture cleft.  This fracture could be recent. There is no evidence for significant L1  fracture fragment retropulsion into the spinal canal. No other lumbar  spine fracture. There is evidence for a recent right sacral  insufficiency fracture, with cortical irregularity and lucency along  the ventral lateral aspect of the right sacral wing (for example see  series 5, image 50).    Minimal retrolisthesis of L2 on L3 and L3 on L4 and minimal  anterolisthesis of L4 on L5, unchanged. Grade 1 anterolisthesis of L5  on S1 measuring approximately 6 mm appears unchanged. There is up to  moderate dextroconvex curvature of the lumbar spine with apex at  L3-L4. There is mild left lateral listhesis of L1 on L2 and mild right  lateral listhesis of L4 on L5.    Moderate/severe disc height loss at each level from L1-L2 through  L4-L5. Moderate disc height loss L5-S1. Relatively diffuse vacuum disc  phenomenon. Marginal endplate osteophytes. Multilevel degenerative  facet disease, most severe bilaterally at L5-S1. Multilevel disc  bulges. There appears to be at least moderate central spinal canal  stenosis at L3-L4 and L4-L5 and moderate to severe spinal canal  stenosis at L5-S1. Moderate to severe left L5-S1 neural foraminal  stenosis with compression of the exiting left L5 nerve root. There  also appears to be moderate to severe left L4-L5 neural foraminal  stenosis. There is at least moderate multilevel neural foraminal  narrowing elsewhere. Diffuse atherosclerotic calcifications of the  aortoiliac arteries. Degenerative changes of the sacroiliac joints.  The visualized paraspinal soft tissues otherwise appear grossly  unremarkable.      Impression    IMPRESSION:  1. 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  2. New L1 superior endplate compression fracture with mild vertebral  height loss, potentially recent.  3. New recent-appearing right sacral wing fracture, which may  represent a sacral insufficiency fracture.  4. Advanced multilevel degenerative changes of the lumbar spine, as  described. Please see the body of the report for details.  5. Grade 1 anterolisthesis of L5 on S1. Minimal mild multilevel  degenerative spondylolisthesis elsewhere and moderate dextroconvex  curvature.    TIFFANIE MONTOYA MD         SYSTEM ID:  [id number]

## 2023-12-20 NOTE — NURSING NOTE
"Chief Complaint   Patient presents with    Back Pain     /60   Pulse 76   Temp (!) 96.5  F (35.8  C) (Tympanic)   Resp 16   SpO2 97%  Estimated body mass index is 19.73 kg/m  as calculated from the following:    Height as of 6/23/23: 1.429 m (4' 8.25\").    Weight as of 12/12/23: 40.3 kg (88 lb 12.8 oz).  Patient presents to the clinic using Wheel Chair      Health Maintenance that is potentially due pending provider review:    Health Maintenance Due   Topic Date Due    ZOSTER IMMUNIZATION (1 of 2) Never done    RSV VACCINE (Pregnancy & 60+) (1 - 1-dose 60+ series) Never done    Pneumococcal Vaccine: 65+ Years (2 - PCV) 12/20/2007    DTAP/TDAP/TD IMMUNIZATION (1 - Tdap) 10/04/2008    MEDICARE ANNUAL WELLNESS VISIT  04/05/2023    INFLUENZA VACCINE (1) 09/01/2023    COVID-19 Vaccine (6 - 2023-24 season) 09/01/2023        n/a          "

## 2023-12-20 NOTE — PROGRESS NOTES
Assessment & Plan     Compression fracture of L1 vertebra, initial encounter (H)  Pain is not controlled   - Physical Therapy Referral; Future  - HYDROcodone-acetaminophen (NORCO) 5-325 MG tablet; Take 1 tablet by mouth 3 times daily Max tab is 3 per day one pill at a time  - Neck/Shoulder/Back/Abdomen Bracing Supplies Order Lumbosacral Brace    Closed fracture of right iliac wing, initial encounter (H)  Pain not controlled   - Physical Therapy Referral; Future  - HYDROcodone-acetaminophen (NORCO) 5-325 MG tablet; Take 1 tablet by mouth 3 times daily Max tab is 3 per day one pill at a time    Localized edema  Due to inactivity     Significant fall risk   Reviewed precautions   Will start PT in 6-8 weeks            Patient Instructions   Set up Ortho     Set up PT mid to end of January     Brace for comfort     Water pill daily for 3-5 days    Up only with walker     Pain medication 1 pill three times daily only       Nancy Tapia MD  [facility]    Arun Disla is a 88 year old, presenting for the following health issues:  Back Pain        12/20/2023     9:03 AM   Additional Questions   Roomed by Susy RASHAD   Accompanied by Daughter         12/20/2023     9:03 AM   Patient Reported Additional Medications   Patient reports taking the following new medications .       Back Pain     History of Present Illness       Back Pain:  She presents for follow up of back pain. Patient's back pain is a chronic problem.  Location of back pain:  Right lower back, left lower back, right hip and left hip  Description of back pain: sharp and shooting  Back pain spreads: right buttocks    Since patient first noticed back pain, pain is: unchanged  Does back pain interfere with her job:  Yes       She eats 0-1 servings of fruits and vegetables daily.She consumes 0 sweetened beverage(s) daily.She exercises with enough effort to increase her heart rate 9 or less minutes per day.  She exercises  "with enough effort to increase her heart rate 3 or less days per week.   She is taking medications regularly.         Annual Wellness Visit    Patient has been advised of split billing requirements and indicates understanding: Yes     Are you in the first 12 months of your Medicare Part B coverage?  No    Physical Health:  In general, how would you rate your overall physical health? good  Outside of work, how many days during the week do you exercise?none  Outside of work, approximately how many minutes a day do you exercise?not applicable  If you drink alcohol do you typically have >3 drinks per day or >7 drinks per week? No  Do you usually eat at least 4 servings of fruit and vegetables a day, include whole grains & fiber and avoid regularly eating high fat or \"junk\" foods? No  Do you have any problems taking medications regularly? No  Do you have any side effects from medications? none  Needs assistance for the following daily activities: transportation, shopping, and preparing meals  Which of the following safety concerns are present in your home?  none identified   Hearing impairment: Yes, has hearing aids  In the past 6 months, have you been bothered by leaking of urine? yes    Mental Health:  In general, how would you rate your overall mental or emotional health? good          Do you feel safe in your environment? Yes    Have you ever done Advance Care Planning? (For example, a Health Directive, POLST, or a discussion with a medical provider or your loved ones about your wishes)? Yes, advance care planning is on file.    Fall risk:  Fallen 2 or more times in the past year?: Yes  Any fall with injury in the past year?: Yes    Cognitive Screenin) Repeat 3 items (Leader, Season, Table)    2) Clock draw:   3) 3 item recall: Recalls 3 objects  Results: 3 items recalled: COGNITIVE IMPAIRMENT LESS LIKELY    Mini-CogTM Copyright ZANA Inman. Licensed by the author for use in [facility]; reprinted " with permission ([email or web address]@[email or web address]). All rights reserved.      Do you have sleep apnea, excessive snoring or daytime drowsiness? : no    Social History     Tobacco Use    Smoking status: Every Day     Packs/day: 0.25     Years: 50.00     Additional pack years: 0.00     Total pack years: 12.50     Types: Cigarettes    Smokeless tobacco: Never    Tobacco comments:     5 cigarettes daily   Substance Use Topics    Alcohol use: No             4/5/2022     9:44 AM   Alcohol Use   Prescreen: >3 drinks/day or >7 drinks/week? No     Do you have a current opioid prescription? Yes   How severe is your pain on a scale from 1-10? 10/10         Do you use any other controlled substances or medications that are not prescribed by a provider? None    Current providers sharing in care for this patient include:   Patient Care Team:  Nancy Tapia MD as PCP - General (Family Practice)  Nancy Tapia MD as Assigned PCP  Nancy Tapia MD as Assigned Pain Medication Provider    The following health maintenance items are reviewed in Epic and correct as of today:  Health Maintenance   Topic Date Due    ZOSTER IMMUNIZATION (1 of 2) Never done    RSV VACCINE (Pregnancy & 60+) (1 - 1-dose 60+ series) Never done    Pneumococcal Vaccine: 65+ Years (2 - PCV) 12/20/2007    DTAP/TDAP/TD IMMUNIZATION (1 - Tdap) 10/04/2008    MEDICARE ANNUAL WELLNESS VISIT  04/05/2023    INFLUENZA VACCINE (1) 09/01/2023    COVID-19 Vaccine (6 - 2023-24 season) 09/01/2023    NICOTINE/TOBACCO CESSATION COUNSELING Q 1 YR  04/10/2024    MICROALBUMIN  04/17/2024    PRECIOUS ASSESSMENT  07/25/2024    ANNUAL REVIEW OF HM ORDERS  07/25/2024    CONTROLLED SUBSTANCE AGREEMENT FOR CHRONIC PAIN MANAGEMENT  07/25/2024    BMP  07/30/2024    URINE DRUG SCREEN  07/30/2024    HEMOGLOBIN  07/30/2024    LIPID  09/14/2024    TSH W/FREE T4 REFLEX  09/14/2024    PHQ-9  12/19/2024    FALL RISK ASSESSMENT  12/20/2024    ADVANCE CARE PLANNING  04/05/2027    DEPRESSION ACTION  PLAN  Completed    URINALYSIS  Completed    IPV IMMUNIZATION  Aged Out    HPV IMMUNIZATION  Aged Out    MENINGITIS IMMUNIZATION  Aged Out    RSV MONOCLONAL ANTIBODY  Aged Out    MAMMO SCREENING  Discontinued       Patient has been advised of split billing requirements and indicates understanding: Yes    Appropriate preventive services were discussed with this patient, including applicable screening as appropriate for fall prevention, nutrition, physical activity, Tobacco-use cessation, weight loss and cognition.  Checklist reviewing preventive services available has been given to the patient.        Review of Systems   Musculoskeletal:  Positive for back pain.      Constitutional, HEENT, cardiovascular, pulmonary, gi and gu systems are negative, except as otherwise noted.      Objective    /60   Pulse 76   Temp (!) 96.5  F (35.8  C) (Tympanic)   Resp 16   SpO2 97%   There is no height or weight on file to calculate BMI.  Physical Exam   GENERAL APPEARANCE: healthy, alert, and no distress  RESP: lungs clear to auscultation - no rales, rhonchi or wheezes  CV: regular rates and rhythm, normal S1 S2, no S3 or S4, and no murmur, click or rub  MS: pitting 2+ lower extremity edema bilaterally  PSYCH: mentation appears normal and affect normal/bright

## 2023-12-20 NOTE — PROGRESS NOTES
DME (Durable Medical Equipment) Orders and Documentation  Orders Placed This Encounter   Procedures     Neck/Shoulder/Back/Abdomen Bracing Supplies Order Lumbosacral Brace        The patient was assessed and it was determined the patient is in need of the following listed DME Supplies/Equipment. Please complete supporting documentation below to demonstrate medical necessity.

## 2023-12-20 NOTE — PATIENT INSTRUCTIONS
Set up Ortho     Set up PT mid to end of January     Brace for comfort     Water pill daily for 3-5 days    Up only with walker     Pain medication 1 pill three times daily only

## 2023-12-20 NOTE — TELEPHONE ENCOUNTER
Ramandeep Arteaga PA-C discussed with Mayda. Patient will be scheduled to discuss options and treatment. Schedule patient with Dr. Rivero in [place] on 01/08/2023.    Jenni Sage RN

## 2023-12-26 NOTE — TELEPHONE ENCOUNTER
Dr Tapia  Daughter Jenn called and wants to know if patient should continue her furosemide.  Pt still has pitting edema.  No shortness of breath .  Daughter says lungs are clear.  Pt  has appt with you on 1/3.  Is that soon enough?

## 2023-12-26 NOTE — TELEPHONE ENCOUNTER
Symptoms    Describe your symptoms: Edema in Legs   Daughter feels she needs another Course of her Furosemide. It is helping her Edema, lungs clear and respiratory status good.   Pt has follow up appt 1/3/23.      Preferred Pharmacy:     [facility], [place] [id number] [place] [id number]  Phone: [phone] Fax: [phone]      Could we send this information to you in [employer] or would you prefer to receive a phone call?:   Patient would prefer a phone call   Okay to leave a detailed message?: Yes at Home number on file [phone] (home)    [facility]

## 2023-12-26 NOTE — TELEPHONE ENCOUNTER
I just answered another pt call on this very issue 5 minutes ago as it was a request for refill on lasix as edema was  better but not gone.   I sent in the refill   Is it better?

## 2024-01-01 ENCOUNTER — TELEPHONE (OUTPATIENT)
Dept: FAMILY MEDICINE | Facility: CLINIC | Age: 89
End: 2024-01-01

## 2024-01-01 ENCOUNTER — TELEPHONE (OUTPATIENT)
Dept: FAMILY MEDICINE | Facility: CLINIC | Age: 89
End: 2024-01-01
Payer: COMMERCIAL

## 2024-01-01 ENCOUNTER — LAB (OUTPATIENT)
Dept: LAB | Facility: CLINIC | Age: 89
End: 2024-01-01
Payer: COMMERCIAL

## 2024-01-01 ENCOUNTER — APPOINTMENT (OUTPATIENT)
Dept: CT IMAGING | Facility: CLINIC | Age: 89
DRG: 536 | End: 2024-01-01
Attending: FAMILY MEDICINE
Payer: COMMERCIAL

## 2024-01-01 ENCOUNTER — APPOINTMENT (OUTPATIENT)
Dept: GENERAL RADIOLOGY | Facility: CLINIC | Age: 89
DRG: 536 | End: 2024-01-01
Attending: PHYSICIAN ASSISTANT
Payer: COMMERCIAL

## 2024-01-01 ENCOUNTER — VIRTUAL VISIT (OUTPATIENT)
Dept: FAMILY MEDICINE | Facility: CLINIC | Age: 89
End: 2024-01-01
Payer: COMMERCIAL

## 2024-01-01 ENCOUNTER — APPOINTMENT (OUTPATIENT)
Dept: GENERAL RADIOLOGY | Facility: CLINIC | Age: 89
DRG: 536 | End: 2024-01-01
Attending: FAMILY MEDICINE
Payer: COMMERCIAL

## 2024-01-01 ENCOUNTER — MYC MEDICAL ADVICE (OUTPATIENT)
Dept: ORTHOPEDICS | Facility: CLINIC | Age: 89
End: 2024-01-01
Payer: COMMERCIAL

## 2024-01-01 ENCOUNTER — APPOINTMENT (OUTPATIENT)
Dept: CT IMAGING | Facility: CLINIC | Age: 89
End: 2024-01-01
Attending: STUDENT IN AN ORGANIZED HEALTH CARE EDUCATION/TRAINING PROGRAM
Payer: COMMERCIAL

## 2024-01-01 ENCOUNTER — MYC REFILL (OUTPATIENT)
Dept: FAMILY MEDICINE | Facility: CLINIC | Age: 89
End: 2024-01-01
Payer: COMMERCIAL

## 2024-01-01 ENCOUNTER — PRE VISIT (OUTPATIENT)
Dept: ORTHOPEDICS | Facility: CLINIC | Age: 89
End: 2024-01-01

## 2024-01-01 ENCOUNTER — APPOINTMENT (OUTPATIENT)
Dept: OCCUPATIONAL THERAPY | Facility: CLINIC | Age: 89
DRG: 536 | End: 2024-01-01
Payer: COMMERCIAL

## 2024-01-01 ENCOUNTER — HOSPITAL ENCOUNTER (INPATIENT)
Facility: CLINIC | Age: 89
LOS: 3 days | Discharge: NURSING, REHAB OR LONG-TERM CARE | DRG: 536 | End: 2024-09-18
Attending: FAMILY MEDICINE | Admitting: STUDENT IN AN ORGANIZED HEALTH CARE EDUCATION/TRAINING PROGRAM
Payer: COMMERCIAL

## 2024-01-01 ENCOUNTER — MEDICAL CORRESPONDENCE (OUTPATIENT)
Dept: HEALTH INFORMATION MANAGEMENT | Facility: CLINIC | Age: 89
End: 2024-01-01
Payer: COMMERCIAL

## 2024-01-01 ENCOUNTER — ANCILLARY PROCEDURE (OUTPATIENT)
Dept: GENERAL RADIOLOGY | Facility: CLINIC | Age: 89
End: 2024-01-01
Attending: ORTHOPAEDIC SURGERY
Payer: COMMERCIAL

## 2024-01-01 ENCOUNTER — APPOINTMENT (OUTPATIENT)
Dept: GENERAL RADIOLOGY | Facility: CLINIC | Age: 89
End: 2024-01-01
Attending: STUDENT IN AN ORGANIZED HEALTH CARE EDUCATION/TRAINING PROGRAM
Payer: COMMERCIAL

## 2024-01-01 ENCOUNTER — PATIENT OUTREACH (OUTPATIENT)
Dept: CARE COORDINATION | Facility: CLINIC | Age: 89
End: 2024-01-01
Payer: COMMERCIAL

## 2024-01-01 ENCOUNTER — HOSPITAL ENCOUNTER (EMERGENCY)
Facility: CLINIC | Age: 89
Discharge: HOME | End: 2024-09-10
Attending: STUDENT IN AN ORGANIZED HEALTH CARE EDUCATION/TRAINING PROGRAM | Admitting: STUDENT IN AN ORGANIZED HEALTH CARE EDUCATION/TRAINING PROGRAM
Payer: COMMERCIAL

## 2024-01-01 ENCOUNTER — MYC MEDICAL ADVICE (OUTPATIENT)
Dept: FAMILY MEDICINE | Facility: CLINIC | Age: 89
End: 2024-01-01
Payer: COMMERCIAL

## 2024-01-01 ENCOUNTER — NURSE TRIAGE (OUTPATIENT)
Dept: NURSING | Facility: CLINIC | Age: 89
End: 2024-01-01
Payer: COMMERCIAL

## 2024-01-01 ENCOUNTER — OFFICE VISIT (OUTPATIENT)
Dept: FAMILY MEDICINE | Facility: CLINIC | Age: 89
End: 2024-01-01
Payer: COMMERCIAL

## 2024-01-01 ENCOUNTER — OFFICE VISIT (OUTPATIENT)
Dept: ORTHOPEDICS | Facility: CLINIC | Age: 89
End: 2024-01-01
Payer: COMMERCIAL

## 2024-01-01 ENCOUNTER — APPOINTMENT (OUTPATIENT)
Dept: ULTRASOUND IMAGING | Facility: CLINIC | Age: 89
DRG: 536 | End: 2024-01-01
Attending: FAMILY MEDICINE
Payer: COMMERCIAL

## 2024-01-01 VITALS
HEART RATE: 67 BPM | DIASTOLIC BLOOD PRESSURE: 47 MMHG | TEMPERATURE: 97.7 F | OXYGEN SATURATION: 95 % | RESPIRATION RATE: 18 BRPM | SYSTOLIC BLOOD PRESSURE: 126 MMHG

## 2024-01-01 VITALS
HEART RATE: 84 BPM | DIASTOLIC BLOOD PRESSURE: 61 MMHG | OXYGEN SATURATION: 91 % | SYSTOLIC BLOOD PRESSURE: 148 MMHG | WEIGHT: 90.17 LBS | BODY MASS INDEX: 20.87 KG/M2 | TEMPERATURE: 98.3 F | RESPIRATION RATE: 18 BRPM | HEIGHT: 55 IN

## 2024-01-01 VITALS
HEART RATE: 75 BPM | WEIGHT: 87.2 LBS | OXYGEN SATURATION: 95 % | TEMPERATURE: 96.8 F | BODY MASS INDEX: 20.27 KG/M2 | SYSTOLIC BLOOD PRESSURE: 150 MMHG | RESPIRATION RATE: 14 BRPM | DIASTOLIC BLOOD PRESSURE: 60 MMHG

## 2024-01-01 VITALS — HEIGHT: 55 IN | WEIGHT: 85 LBS | BODY MASS INDEX: 19.67 KG/M2

## 2024-01-01 DIAGNOSIS — G89.29 CHRONIC LEFT-SIDED LOW BACK PAIN WITH LEFT-SIDED SCIATICA: Primary | ICD-10-CM

## 2024-01-01 DIAGNOSIS — R60.0 LOCALIZED EDEMA: ICD-10-CM

## 2024-01-01 DIAGNOSIS — S32.010A COMPRESSION FRACTURE OF L1 VERTEBRA, INITIAL ENCOUNTER (H): ICD-10-CM

## 2024-01-01 DIAGNOSIS — G89.4 CHRONIC PAIN SYNDROME: ICD-10-CM

## 2024-01-01 DIAGNOSIS — M54.16 LUMBAR RADICULOPATHY: ICD-10-CM
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
## 2024-01-01 DIAGNOSIS — R60.0 LEG EDEMA, RIGHT: ICD-10-CM

## 2024-01-01 DIAGNOSIS — S32.810A CLOSED PELVIC RING FRACTURE, INITIAL ENCOUNTER (H): ICD-10-CM

## 2024-01-01 DIAGNOSIS — K59.00 CONSTIPATION, UNSPECIFIED CONSTIPATION TYPE: ICD-10-CM

## 2024-01-01 DIAGNOSIS — R60.9 EDEMA: Primary | ICD-10-CM

## 2024-01-01 DIAGNOSIS — Z71.6 ENCOUNTER FOR SMOKING CESSATION COUNSELING: ICD-10-CM

## 2024-01-01 DIAGNOSIS — F33.1 MAJOR DEPRESSIVE DISORDER, RECURRENT EPISODE, MODERATE (H): Primary | ICD-10-CM

## 2024-01-01 DIAGNOSIS — F11.90 CHRONIC, CONTINUOUS USE OF OPIOIDS: ICD-10-CM

## 2024-01-01 DIAGNOSIS — R93.89 ABNORMAL CT SCAN: ICD-10-CM

## 2024-01-01 DIAGNOSIS — S22.41XA CLOSED FRACTURE OF MULTIPLE RIBS OF RIGHT SIDE, INITIAL ENCOUNTER: ICD-10-CM

## 2024-01-01 DIAGNOSIS — N18.31 CHRONIC KIDNEY DISEASE, STAGE 3A (H): ICD-10-CM

## 2024-01-01 DIAGNOSIS — Z99.89 WALKER AS AMBULATION AID: Primary | ICD-10-CM

## 2024-01-01 DIAGNOSIS — E78.5 HYPERLIPIDEMIA LDL GOAL <130: ICD-10-CM

## 2024-01-01 DIAGNOSIS — S32.10XA SACRAL FRACTURE (H): ICD-10-CM

## 2024-01-01 DIAGNOSIS — I10 ESSENTIAL HYPERTENSION, BENIGN: Chronic | ICD-10-CM

## 2024-01-01 DIAGNOSIS — M54.42 CHRONIC LEFT-SIDED LOW BACK PAIN WITH LEFT-SIDED SCIATICA: ICD-10-CM

## 2024-01-01 DIAGNOSIS — K21.9 GASTROESOPHAGEAL REFLUX DISEASE WITHOUT ESOPHAGITIS: ICD-10-CM

## 2024-01-01 DIAGNOSIS — R60.9 EDEMA, UNSPECIFIED TYPE: ICD-10-CM

## 2024-01-01 DIAGNOSIS — F11.90 CHRONIC, CONTINUOUS USE OF OPIOIDS: Primary | ICD-10-CM

## 2024-01-01 DIAGNOSIS — G89.29 CHRONIC LEFT-SIDED LOW BACK PAIN WITH LEFT-SIDED SCIATICA: ICD-10-CM

## 2024-01-01 DIAGNOSIS — S32.599A CLOSED FRACTURE OF PUBIC RAMUS, UNSPECIFIED LATERALITY, INITIAL ENCOUNTER (H): ICD-10-CM

## 2024-01-01 DIAGNOSIS — M80.00XA AGE-RELATED OSTEOPOROSIS WITH CURRENT PATHOLOGICAL FRACTURE, INITIAL ENCOUNTER: ICD-10-CM

## 2024-01-01 DIAGNOSIS — Z53.9 DIAGNOSIS NOT YET DEFINED: Primary | ICD-10-CM

## 2024-01-01 DIAGNOSIS — M84.48XD SACRAL INSUFFICIENCY FRACTURE WITH ROUTINE HEALING, SUBSEQUENT ENCOUNTER: Primary | ICD-10-CM

## 2024-01-01 DIAGNOSIS — M84.48XA SACRAL INSUFFICIENCY FRACTURE, INITIAL ENCOUNTER: Primary | ICD-10-CM

## 2024-01-01 DIAGNOSIS — E03.9 HYPOTHYROIDISM, UNSPECIFIED TYPE: ICD-10-CM

## 2024-01-01 DIAGNOSIS — M54.42 CHRONIC LEFT-SIDED LOW BACK PAIN WITH LEFT-SIDED SCIATICA: Primary | ICD-10-CM

## 2024-01-01 DIAGNOSIS — S32.10XA SACRAL FRACTURE (H): Primary | ICD-10-CM

## 2024-01-01 DIAGNOSIS — R62.7 FAILURE TO THRIVE IN ADULT: ICD-10-CM

## 2024-01-01 DIAGNOSIS — R60.9 EDEMA: ICD-10-CM

## 2024-01-01 DIAGNOSIS — N18.31 CHRONIC KIDNEY DISEASE, STAGE 3A (H): Primary | ICD-10-CM

## 2024-01-01 LAB
ALBUMIN SERPL BCG-MCNC: 4 G/DL (ref 3.5–5.2)
ALBUMIN SERPL BCG-MCNC: 4.2 G/DL (ref 3.5–5.2)
ALP SERPL-CCNC: 315 U/L (ref 40–150)
ALP SERPL-CCNC: 72 U/L (ref 40–150)
ALT SERPL W P-5'-P-CCNC: 11 U/L (ref 0–50)
ALT SERPL W P-5'-P-CCNC: 12 U/L (ref 0–50)
ANION GAP SERPL CALCULATED.3IONS-SCNC: 11 MMOL/L (ref 7–15)
ANION GAP SERPL CALCULATED.3IONS-SCNC: 12 MMOL/L (ref 7–15)
ANION GAP SERPL CALCULATED.3IONS-SCNC: 12 MMOL/L (ref 7–15)
ANION GAP SERPL CALCULATED.3IONS-SCNC: 13 MMOL/L (ref 7–15)
ANION GAP SERPL CALCULATED.3IONS-SCNC: 17 MMOL/L (ref 7–15)
ANION GAP SERPL CALCULATED.3IONS-SCNC: 7 MMOL/L (ref 7–15)
AST SERPL W P-5'-P-CCNC: 32 U/L (ref 0–45)
AST SERPL W P-5'-P-CCNC: 32 U/L (ref 0–45)
BASOPHILS # BLD MANUAL: 0 10E3/UL (ref 0–0.2)
BASOPHILS NFR BLD MANUAL: 0 %
BILIRUB SERPL-MCNC: 0.2 MG/DL
BILIRUB SERPL-MCNC: 0.3 MG/DL
BUN SERPL-MCNC: 17.4 MG/DL (ref 8–23)
BUN SERPL-MCNC: 19.2 MG/DL (ref 8–23)
BUN SERPL-MCNC: 19.7 MG/DL (ref 8–23)
BUN SERPL-MCNC: 23.4 MG/DL (ref 8–23)
BUN SERPL-MCNC: 23.7 MG/DL (ref 8–23)
BUN SERPL-MCNC: 30 MG/DL (ref 8–23)
CALCIUM SERPL-MCNC: 8.7 MG/DL (ref 8.8–10.4)
CALCIUM SERPL-MCNC: 8.8 MG/DL (ref 8.8–10.4)
CALCIUM SERPL-MCNC: 9.1 MG/DL (ref 8.8–10.2)
CALCIUM SERPL-MCNC: 9.2 MG/DL (ref 8.8–10.2)
CALCIUM SERPL-MCNC: 9.4 MG/DL (ref 8.8–10.2)
CALCIUM SERPL-MCNC: 9.8 MG/DL (ref 8.8–10.4)
CHLORIDE SERPL-SCNC: 100 MMOL/L (ref 98–107)
CHLORIDE SERPL-SCNC: 101 MMOL/L (ref 98–107)
CHLORIDE SERPL-SCNC: 101 MMOL/L (ref 98–107)
CHLORIDE SERPL-SCNC: 102 MMOL/L (ref 98–107)
CHLORIDE SERPL-SCNC: 103 MMOL/L (ref 98–107)
CHLORIDE SERPL-SCNC: 105 MMOL/L (ref 98–107)
CREAT SERPL-MCNC: 0.83 MG/DL (ref 0.51–0.95)
CREAT SERPL-MCNC: 0.89 MG/DL (ref 0.51–0.95)
CREAT SERPL-MCNC: 0.97 MG/DL (ref 0.51–0.95)
CREAT SERPL-MCNC: 0.98 MG/DL (ref 0.51–0.95)
CREAT SERPL-MCNC: 1 MG/DL (ref 0.51–0.95)
CREAT SERPL-MCNC: 1.06 MG/DL (ref 0.51–0.95)
CREAT UR-MCNC: 79 MG/DL
CREAT UR-MCNC: 79.6 MG/DL
DEPRECATED HCO3 PLAS-SCNC: 28 MMOL/L (ref 22–29)
DEPRECATED HCO3 PLAS-SCNC: 28 MMOL/L (ref 22–29)
DEPRECATED HCO3 PLAS-SCNC: 30 MMOL/L (ref 22–29)
DHC UR CFM-MCNC: 302 NG/ML
DHC/CREAT UR: 382 NG/MG {CREAT}
EGFRCR SERPLBLD CKD-EPI 2021: 51 ML/MIN/1.73M2
EGFRCR SERPLBLD CKD-EPI 2021: 55 ML/MIN/1.73M2
EGFRCR SERPLBLD CKD-EPI 2021: 56 ML/MIN/1.73M2
EGFRCR SERPLBLD CKD-EPI 2021: 57 ML/MIN/1.73M2
EGFRCR SERPLBLD CKD-EPI 2021: 63 ML/MIN/1.73M2
EGFRCR SERPLBLD CKD-EPI 2021: 68 ML/MIN/1.73M2
ELLIPTOCYTES BLD QL SMEAR: SLIGHT
EOSINOPHIL # BLD MANUAL: 0 10E3/UL (ref 0–0.7)
EOSINOPHIL NFR BLD MANUAL: 0 %
ERYTHROCYTE [DISTWIDTH] IN BLOOD BY AUTOMATED COUNT: 19.9 % (ref 10–15)
ERYTHROCYTE [DISTWIDTH] IN BLOOD BY AUTOMATED COUNT: 19.9 % (ref 10–15)
FRAGMENTS BLD QL SMEAR: SLIGHT
GABAPENTIN UR QL CFM: PRESENT
GLUCOSE SERPL-MCNC: 103 MG/DL (ref 70–99)
GLUCOSE SERPL-MCNC: 107 MG/DL (ref 70–99)
GLUCOSE SERPL-MCNC: 109 MG/DL (ref 70–99)
GLUCOSE SERPL-MCNC: 115 MG/DL (ref 70–99)
GLUCOSE SERPL-MCNC: 129 MG/DL (ref 70–99)
GLUCOSE SERPL-MCNC: 94 MG/DL (ref 70–99)
HCO3 SERPL-SCNC: 28 MMOL/L (ref 22–29)
HCO3 SERPL-SCNC: 30 MMOL/L (ref 22–29)
HCO3 SERPL-SCNC: 32 MMOL/L (ref 22–29)
HCT VFR BLD AUTO: 26.4 % (ref 35–47)
HCT VFR BLD AUTO: 28 % (ref 35–47)
HGB BLD-MCNC: 8.1 G/DL (ref 11.7–15.7)
HGB BLD-MCNC: 8.5 G/DL (ref 11.7–15.7)
HOLD SPECIMEN: NORMAL
HOLD SPECIMEN: NORMAL
HYDROCODONE UR CFM-MCNC: 1240 NG/ML
HYDROCODONE/CREAT UR: 1570 NG/MG {CREAT}
HYDROMORPHONE UR CFM-MCNC: 271 NG/ML
HYDROMORPHONE/CREAT UR: 343 NG/MG {CREAT}
LYMPHOCYTES # BLD MANUAL: 1.8 10E3/UL (ref 0.8–5.3)
LYMPHOCYTES NFR BLD MANUAL: 12 %
MAGNESIUM SERPL-MCNC: 2.1 MG/DL (ref 1.7–2.3)
MCH RBC QN AUTO: 27.4 PG (ref 26.5–33)
MCH RBC QN AUTO: 27.4 PG (ref 26.5–33)
MCHC RBC AUTO-ENTMCNC: 30.4 G/DL (ref 31.5–36.5)
MCHC RBC AUTO-ENTMCNC: 30.7 G/DL (ref 31.5–36.5)
MCV RBC AUTO: 89 FL (ref 78–100)
MCV RBC AUTO: 90 FL (ref 78–100)
METAMYELOCYTES # BLD MANUAL: 0.3 10E3/UL
METAMYELOCYTES NFR BLD MANUAL: 2 %
MICROALBUMIN UR-MCNC: 50.8 MG/L
MICROALBUMIN/CREAT UR: 63.82 MG/G CR (ref 0–25)
MONOCYTES # BLD MANUAL: 0.2 10E3/UL (ref 0–1.3)
MONOCYTES NFR BLD MANUAL: 1 %
MYELOCYTES # BLD MANUAL: 0.2 10E3/UL
MYELOCYTES NFR BLD MANUAL: 1 %
NEUTROPHILS # BLD MANUAL: 12.7 10E3/UL (ref 1.6–8.3)
NEUTROPHILS NFR BLD MANUAL: 84 %
NRBC # BLD AUTO: 0.1 10E3/UL
NRBC BLD AUTO-RTO: 1 /100
NT-PROBNP SERPL-MCNC: 1286 PG/ML (ref 0–1800)
PHOSPHATE SERPL-MCNC: 3.1 MG/DL (ref 2.5–4.5)
PLAT MORPH BLD: ABNORMAL
PLATELET # BLD AUTO: 428 10E3/UL (ref 150–450)
PLATELET # BLD AUTO: 460 10E3/UL (ref 150–450)
POTASSIUM SERPL-SCNC: 3 MMOL/L (ref 3.4–5.3)
POTASSIUM SERPL-SCNC: 3.6 MMOL/L (ref 3.4–5.3)
POTASSIUM SERPL-SCNC: 3.6 MMOL/L (ref 3.4–5.3)
POTASSIUM SERPL-SCNC: 4.6 MMOL/L (ref 3.4–5.3)
POTASSIUM SERPL-SCNC: 4.7 MMOL/L (ref 3.4–5.3)
POTASSIUM SERPL-SCNC: 4.9 MMOL/L (ref 3.4–5.3)
PROT SERPL-MCNC: 6.9 G/DL (ref 6.4–8.3)
PROT SERPL-MCNC: 6.9 G/DL (ref 6.4–8.3)
RBC # BLD AUTO: 2.96 10E6/UL (ref 3.8–5.2)
RBC # BLD AUTO: 3.1 10E6/UL (ref 3.8–5.2)
RBC MORPH BLD: ABNORMAL
SODIUM SERPL-SCNC: 141 MMOL/L (ref 135–145)
SODIUM SERPL-SCNC: 142 MMOL/L (ref 135–145)
SODIUM SERPL-SCNC: 143 MMOL/L (ref 135–145)
SODIUM SERPL-SCNC: 143 MMOL/L (ref 135–145)
SODIUM SERPL-SCNC: 144 MMOL/L (ref 135–145)
SODIUM SERPL-SCNC: 147 MMOL/L (ref 135–145)
T4 FREE SERPL-MCNC: 0.93 NG/DL (ref 0.9–1.7)
T4 FREE SERPL-MCNC: 1.17 NG/DL (ref 0.9–1.7)
T4 FREE SERPL-MCNC: 1.34 NG/DL (ref 0.9–1.7)
TSH SERPL DL<=0.005 MIU/L-ACNC: 20.85 UIU/ML (ref 0.3–4.2)
TSH SERPL DL<=0.005 MIU/L-ACNC: 24.27 UIU/ML (ref 0.3–4.2)
TSH SERPL DL<=0.005 MIU/L-ACNC: 33.56 UIU/ML (ref 0.3–4.2)
WBC # BLD AUTO: 14.8 10E3/UL (ref 4–11)
WBC # BLD AUTO: 15.1 10E3/UL (ref 4–11)

## 2024-01-01 PROCEDURE — 72100 X-RAY EXAM L-S SPINE 2/3 VWS: CPT | Performed by: RADIOLOGY

## 2024-01-01 PROCEDURE — 99232 SBSQ HOSP IP/OBS MODERATE 35: CPT | Performed by: FAMILY MEDICINE

## 2024-01-01 PROCEDURE — 72170 X-RAY EXAM OF PELVIS: CPT | Mod: TC | Performed by: RADIOLOGY

## 2024-01-01 PROCEDURE — 83880 ASSAY OF NATRIURETIC PEPTIDE: CPT | Performed by: FAMILY MEDICINE

## 2024-01-01 PROCEDURE — 80048 BASIC METABOLIC PNL TOTAL CA: CPT

## 2024-01-01 PROCEDURE — 84100 ASSAY OF PHOSPHORUS: CPT | Performed by: PHYSICIAN ASSISTANT

## 2024-01-01 PROCEDURE — 94150 VITAL CAPACITY TEST: CPT

## 2024-01-01 PROCEDURE — 71046 X-RAY EXAM CHEST 2 VIEWS: CPT

## 2024-01-01 PROCEDURE — G0180 MD CERTIFICATION HHA PATIENT: HCPCS | Performed by: FAMILY MEDICINE

## 2024-01-01 PROCEDURE — 97535 SELF CARE MNGMENT TRAINING: CPT | Mod: GO

## 2024-01-01 PROCEDURE — 93005 ELECTROCARDIOGRAM TRACING: CPT | Performed by: FAMILY MEDICINE

## 2024-01-01 PROCEDURE — 36415 COLL VENOUS BLD VENIPUNCTURE: CPT

## 2024-01-01 PROCEDURE — 84443 ASSAY THYROID STIM HORMONE: CPT | Performed by: FAMILY MEDICINE

## 2024-01-01 PROCEDURE — 70450 CT HEAD/BRAIN W/O DYE: CPT

## 2024-01-01 PROCEDURE — 93010 ELECTROCARDIOGRAM REPORT: CPT | Performed by: FAMILY MEDICINE

## 2024-01-01 PROCEDURE — 999N000157 HC STATISTIC RCP TIME EA 10 MIN

## 2024-01-01 PROCEDURE — 73030 X-RAY EXAM OF SHOULDER: CPT | Mod: RT

## 2024-01-01 PROCEDURE — 71045 X-RAY EXAM CHEST 1 VIEW: CPT

## 2024-01-01 PROCEDURE — 250N000013 HC RX MED GY IP 250 OP 250 PS 637: Performed by: PHYSICIAN ASSISTANT

## 2024-01-01 PROCEDURE — 99418 PROLNG IP/OBS E/M EA 15 MIN: CPT | Performed by: PHYSICIAN ASSISTANT

## 2024-01-01 PROCEDURE — 97165 OT EVAL LOW COMPLEX 30 MIN: CPT | Mod: GO

## 2024-01-01 PROCEDURE — 74176 CT ABD & PELVIS W/O CONTRAST: CPT

## 2024-01-01 PROCEDURE — 80053 COMPREHEN METABOLIC PANEL: CPT | Performed by: FAMILY MEDICINE

## 2024-01-01 PROCEDURE — 250N000013 HC RX MED GY IP 250 OP 250 PS 637: Performed by: STUDENT IN AN ORGANIZED HEALTH CARE EDUCATION/TRAINING PROGRAM

## 2024-01-01 PROCEDURE — G0481 DRUG TEST DEF 8-14 CLASSES: HCPCS | Performed by: FAMILY MEDICINE

## 2024-01-01 PROCEDURE — 120N000001 HC R&B MED SURG/OB

## 2024-01-01 PROCEDURE — 36415 COLL VENOUS BLD VENIPUNCTURE: CPT | Performed by: PHYSICIAN ASSISTANT

## 2024-01-01 PROCEDURE — 99284 EMERGENCY DEPT VISIT MOD MDM: CPT | Mod: 25

## 2024-01-01 PROCEDURE — 80048 BASIC METABOLIC PNL TOTAL CA: CPT | Performed by: PHYSICIAN ASSISTANT

## 2024-01-01 PROCEDURE — 84443 ASSAY THYROID STIM HORMONE: CPT

## 2024-01-01 PROCEDURE — 72125 CT NECK SPINE W/O DYE: CPT

## 2024-01-01 PROCEDURE — 82570 ASSAY OF URINE CREATININE: CPT | Performed by: FAMILY MEDICINE

## 2024-01-01 PROCEDURE — 97530 THERAPEUTIC ACTIVITIES: CPT | Mod: GP | Performed by: PHYSICAL MEDICINE & REHABILITATION

## 2024-01-01 PROCEDURE — 72128 CT CHEST SPINE W/O DYE: CPT

## 2024-01-01 PROCEDURE — 36415 COLL VENOUS BLD VENIPUNCTURE: CPT | Performed by: FAMILY MEDICINE

## 2024-01-01 PROCEDURE — 99223 1ST HOSP IP/OBS HIGH 75: CPT | Performed by: PHYSICIAN ASSISTANT

## 2024-01-01 PROCEDURE — 99285 EMERGENCY DEPT VISIT HI MDM: CPT | Mod: 25 | Performed by: FAMILY MEDICINE

## 2024-01-01 PROCEDURE — 80048 BASIC METABOLIC PNL TOTAL CA: CPT | Performed by: FAMILY MEDICINE

## 2024-01-01 PROCEDURE — 84439 ASSAY OF FREE THYROXINE: CPT | Performed by: FAMILY MEDICINE

## 2024-01-01 PROCEDURE — 250N000013 HC RX MED GY IP 250 OP 250 PS 637: Performed by: FAMILY MEDICINE

## 2024-01-01 PROCEDURE — 99239 HOSP IP/OBS DSCHRG MGMT >30: CPT | Performed by: FAMILY MEDICINE

## 2024-01-01 PROCEDURE — 93971 EXTREMITY STUDY: CPT | Mod: RT

## 2024-01-01 PROCEDURE — G2211 COMPLEX E/M VISIT ADD ON: HCPCS | Performed by: FAMILY MEDICINE

## 2024-01-01 PROCEDURE — 99443 PR PHYSICIAN TELEPHONE EVALUATION 21-30 MIN: CPT | Mod: 93 | Performed by: FAMILY MEDICINE

## 2024-01-01 PROCEDURE — 85007 BL SMEAR W/DIFF WBC COUNT: CPT | Performed by: FAMILY MEDICINE

## 2024-01-01 PROCEDURE — 83735 ASSAY OF MAGNESIUM: CPT | Performed by: PHYSICIAN ASSISTANT

## 2024-01-01 PROCEDURE — 80053 COMPREHEN METABOLIC PANEL: CPT

## 2024-01-01 PROCEDURE — 99207 PR MD CERTIFICATION HHA PATIENT: CPT | Performed by: FAMILY MEDICINE

## 2024-01-01 PROCEDURE — 82043 UR ALBUMIN QUANTITATIVE: CPT | Performed by: FAMILY MEDICINE

## 2024-01-01 PROCEDURE — 97161 PT EVAL LOW COMPLEX 20 MIN: CPT | Mod: GP | Performed by: PHYSICAL MEDICINE & REHABILITATION

## 2024-01-01 PROCEDURE — 73552 X-RAY EXAM OF FEMUR 2/>: CPT | Mod: RT

## 2024-01-01 PROCEDURE — 99284 EMERGENCY DEPT VISIT MOD MDM: CPT | Performed by: STUDENT IN AN ORGANIZED HEALTH CARE EDUCATION/TRAINING PROGRAM

## 2024-01-01 PROCEDURE — 99203 OFFICE O/P NEW LOW 30 MIN: CPT | Performed by: ORTHOPAEDIC SURGERY

## 2024-01-01 PROCEDURE — 99214 OFFICE O/P EST MOD 30 MIN: CPT | Performed by: FAMILY MEDICINE

## 2024-01-01 PROCEDURE — 84439 ASSAY OF FREE THYROXINE: CPT

## 2024-01-01 PROCEDURE — 85027 COMPLETE CBC AUTOMATED: CPT | Performed by: PHYSICIAN ASSISTANT

## 2024-01-01 PROCEDURE — 85027 COMPLETE CBC AUTOMATED: CPT | Performed by: FAMILY MEDICINE

## 2024-01-01 PROCEDURE — 73502 X-RAY EXAM HIP UNI 2-3 VIEWS: CPT

## 2024-01-01 PROCEDURE — 72131 CT LUMBAR SPINE W/O DYE: CPT

## 2024-01-01 RX ORDER — ONDANSETRON 2 MG/ML
4 INJECTION INTRAMUSCULAR; INTRAVENOUS EVERY 6 HOURS PRN
Status: DISCONTINUED | OUTPATIENT
Start: 2024-01-01 | End: 2024-01-01 | Stop reason: HOSPADM

## 2024-01-01 RX ORDER — OXYCODONE HYDROCHLORIDE 5 MG/1
10 TABLET ORAL EVERY 4 HOURS PRN
Status: DISCONTINUED | OUTPATIENT
Start: 2024-01-01 | End: 2024-01-01 | Stop reason: HOSPADM

## 2024-01-01 RX ORDER — OXYCODONE HYDROCHLORIDE 5 MG/1
TABLET ORAL
Qty: 30 TABLET | Refills: 0 | Status: SHIPPED | OUTPATIENT
Start: 2024-01-01

## 2024-01-01 RX ORDER — POLYETHYLENE GLYCOL 3350 17 G/17G
17 POWDER, FOR SOLUTION ORAL DAILY PRN
DISCHARGE
Start: 2024-01-01

## 2024-01-01 RX ORDER — SIMVASTATIN 20 MG
20 TABLET ORAL DAILY
Status: DISCONTINUED | OUTPATIENT
Start: 2024-01-01 | End: 2024-01-01 | Stop reason: HOSPADM

## 2024-01-01 RX ORDER — LIDOCAINE 40 MG/G
CREAM TOPICAL
Status: DISCONTINUED | OUTPATIENT
Start: 2024-01-01 | End: 2024-01-01 | Stop reason: HOSPADM

## 2024-01-01 RX ORDER — METHOCARBAMOL 500 MG/1
500 TABLET, FILM COATED ORAL EVERY 6 HOURS PRN
Status: DISCONTINUED | OUTPATIENT
Start: 2024-01-01 | End: 2024-01-01 | Stop reason: HOSPADM

## 2024-01-01 RX ORDER — PANTOPRAZOLE SODIUM 40 MG/1
40 TABLET, DELAYED RELEASE ORAL DAILY
Status: DISCONTINUED | OUTPATIENT
Start: 2024-01-01 | End: 2024-01-01 | Stop reason: HOSPADM

## 2024-01-01 RX ORDER — NALOXONE HYDROCHLORIDE 0.4 MG/ML
0.2 INJECTION, SOLUTION INTRAMUSCULAR; INTRAVENOUS; SUBCUTANEOUS
Status: DISCONTINUED | OUTPATIENT
Start: 2024-01-01 | End: 2024-01-01 | Stop reason: HOSPADM

## 2024-01-01 RX ORDER — CALCIUM CARBONATE 500 MG/1
1000 TABLET, CHEWABLE ORAL 4 TIMES DAILY PRN
Status: DISCONTINUED | OUTPATIENT
Start: 2024-01-01 | End: 2024-01-01

## 2024-01-01 RX ORDER — HYDROCODONE BITARTRATE AND ACETAMINOPHEN 5; 325 MG/1; MG/1
1 TABLET ORAL 3 TIMES DAILY
Qty: 90 TABLET | Refills: 0 | Status: SHIPPED | OUTPATIENT
Start: 2024-01-01 | End: 2024-01-01

## 2024-01-01 RX ORDER — ASPIRIN 81 MG/1
81 TABLET ORAL DAILY
Status: DISCONTINUED | OUTPATIENT
Start: 2024-01-01 | End: 2024-01-01 | Stop reason: HOSPADM

## 2024-01-01 RX ORDER — HYDROCODONE BITARTRATE AND ACETAMINOPHEN 5; 325 MG/1; MG/1
1 TABLET ORAL 3 TIMES DAILY
Qty: 90 TABLET | Refills: 0 | Status: ON HOLD | OUTPATIENT
Start: 2024-01-01 | End: 2024-01-01

## 2024-01-01 RX ORDER — FUROSEMIDE 20 MG
40 TABLET ORAL DAILY
Qty: 60 TABLET | Refills: 0 | Status: SHIPPED | OUTPATIENT
Start: 2024-01-01 | End: 2024-01-01

## 2024-01-01 RX ORDER — AMOXICILLIN 250 MG
2 CAPSULE ORAL 2 TIMES DAILY PRN
Status: DISCONTINUED | OUTPATIENT
Start: 2024-01-01 | End: 2024-01-01 | Stop reason: HOSPADM

## 2024-01-01 RX ORDER — IBUPROFEN 400 MG/1
800 TABLET, FILM COATED ORAL 3 TIMES DAILY
Status: DISCONTINUED | OUTPATIENT
Start: 2024-01-01 | End: 2024-01-01 | Stop reason: HOSPADM

## 2024-01-01 RX ORDER — DULOXETIN HYDROCHLORIDE 60 MG/1
60 CAPSULE, DELAYED RELEASE ORAL DAILY
Qty: 90 CAPSULE | Refills: 3 | Status: SHIPPED | OUTPATIENT
Start: 2024-01-01

## 2024-01-01 RX ORDER — GABAPENTIN 300 MG/1
600 CAPSULE ORAL EVERY EVENING
COMMUNITY

## 2024-01-01 RX ORDER — LEVOTHYROXINE SODIUM 75 UG/1
75 TABLET ORAL DAILY
Status: DISCONTINUED | OUTPATIENT
Start: 2024-01-01 | End: 2024-01-01 | Stop reason: HOSPADM

## 2024-01-01 RX ORDER — NALOXONE HYDROCHLORIDE 0.4 MG/ML
0.4 INJECTION, SOLUTION INTRAMUSCULAR; INTRAVENOUS; SUBCUTANEOUS
Status: DISCONTINUED | OUTPATIENT
Start: 2024-01-01 | End: 2024-01-01 | Stop reason: HOSPADM

## 2024-01-01 RX ORDER — AMLODIPINE BESYLATE 10 MG/1
10 TABLET ORAL DAILY
Status: DISCONTINUED | OUTPATIENT
Start: 2024-01-01 | End: 2024-01-01 | Stop reason: HOSPADM

## 2024-01-01 RX ORDER — ACETAMINOPHEN 500 MG
500 TABLET ORAL 2 TIMES DAILY
Status: ON HOLD | COMMUNITY
End: 2024-01-01

## 2024-01-01 RX ORDER — OXYCODONE HYDROCHLORIDE 5 MG/1
5 TABLET ORAL EVERY 4 HOURS PRN
Status: DISCONTINUED | OUTPATIENT
Start: 2024-01-01 | End: 2024-01-01

## 2024-01-01 RX ORDER — LISINOPRIL 40 MG/1
40 TABLET ORAL DAILY
Qty: 90 TABLET | Refills: 3 | Status: SHIPPED | OUTPATIENT
Start: 2024-01-01

## 2024-01-01 RX ORDER — ACETAMINOPHEN 500 MG
1000 TABLET ORAL ONCE
Status: COMPLETED | OUTPATIENT
Start: 2024-01-01 | End: 2024-01-01

## 2024-01-01 RX ORDER — ASPIRIN 81 MG/1
81 TABLET ORAL 2 TIMES DAILY
Status: DISCONTINUED | OUTPATIENT
Start: 2024-01-01 | End: 2024-01-01

## 2024-01-01 RX ORDER — AMOXICILLIN 250 MG
1 CAPSULE ORAL 2 TIMES DAILY PRN
DISCHARGE
Start: 2024-01-01

## 2024-01-01 RX ORDER — GABAPENTIN 300 MG/1
CAPSULE ORAL
Qty: 360 CAPSULE | Refills: 3 | Status: SHIPPED | OUTPATIENT
Start: 2024-01-01 | End: 2024-01-01

## 2024-01-01 RX ORDER — ACETAMINOPHEN 325 MG/1
975 TABLET ORAL EVERY 8 HOURS
Status: DISCONTINUED | OUTPATIENT
Start: 2024-01-01 | End: 2024-01-01 | Stop reason: HOSPADM

## 2024-01-01 RX ORDER — METHOCARBAMOL 500 MG/1
500 TABLET, FILM COATED ORAL EVERY 6 HOURS PRN
DISCHARGE
Start: 2024-01-01

## 2024-01-01 RX ORDER — FUROSEMIDE 20 MG
20 TABLET ORAL DAILY
Qty: 90 TABLET | Refills: 1 | Status: ON HOLD | OUTPATIENT
Start: 2024-01-01 | End: 2024-01-01

## 2024-01-01 RX ORDER — ACETAMINOPHEN 325 MG/1
975 TABLET ORAL EVERY 8 HOURS
DISCHARGE
Start: 2024-01-01

## 2024-01-01 RX ORDER — IBUPROFEN 200 MG
800 TABLET ORAL 3 TIMES DAILY
COMMUNITY

## 2024-01-01 RX ORDER — LIDOCAINE 4 G/G
1 PATCH TOPICAL
Status: DISCONTINUED | OUTPATIENT
Start: 2024-01-01 | End: 2024-01-01 | Stop reason: HOSPADM

## 2024-01-01 RX ORDER — FUROSEMIDE 20 MG
20 TABLET ORAL DAILY PRN
DISCHARGE
Start: 2024-01-01

## 2024-01-01 RX ORDER — LISINOPRIL 40 MG/1
40 TABLET ORAL DAILY
Status: DISCONTINUED | OUTPATIENT
Start: 2024-01-01 | End: 2024-01-01 | Stop reason: HOSPADM

## 2024-01-01 RX ORDER — CYCLOBENZAPRINE HCL 5 MG
5 TABLET ORAL 2 TIMES DAILY PRN
Qty: 20 TABLET | Refills: 0 | Status: SHIPPED | OUTPATIENT
Start: 2024-01-01

## 2024-01-01 RX ORDER — CALCIUM CARBONATE 500 MG/1
1000 TABLET, CHEWABLE ORAL 4 TIMES DAILY PRN
Status: DISCONTINUED | OUTPATIENT
Start: 2024-01-01 | End: 2024-01-01 | Stop reason: HOSPADM

## 2024-01-01 RX ORDER — HYDROMORPHONE HYDROCHLORIDE 1 MG/ML
0.5 INJECTION, SOLUTION INTRAMUSCULAR; INTRAVENOUS; SUBCUTANEOUS
Status: DISCONTINUED | OUTPATIENT
Start: 2024-01-01 | End: 2024-01-01 | Stop reason: HOSPADM

## 2024-01-01 RX ORDER — HYDROCODONE BITARTRATE AND ACETAMINOPHEN 5; 325 MG/1; MG/1
2 TABLET ORAL 2 TIMES DAILY
Status: DISCONTINUED | OUTPATIENT
Start: 2024-01-01 | End: 2024-01-01

## 2024-01-01 RX ORDER — FUROSEMIDE 20 MG
20 TABLET ORAL DAILY PRN
Status: DISCONTINUED | OUTPATIENT
Start: 2024-01-01 | End: 2024-01-01 | Stop reason: HOSPADM

## 2024-01-01 RX ORDER — ONDANSETRON 4 MG/1
4 TABLET, ORALLY DISINTEGRATING ORAL EVERY 6 HOURS PRN
Status: DISCONTINUED | OUTPATIENT
Start: 2024-01-01 | End: 2024-01-01 | Stop reason: HOSPADM

## 2024-01-01 RX ORDER — GABAPENTIN 300 MG/1
CAPSULE ORAL
Qty: 180 CAPSULE | Refills: 3 | OUTPATIENT
Start: 2024-01-01

## 2024-01-01 RX ORDER — FUROSEMIDE 20 MG
20 TABLET ORAL DAILY
Qty: 90 TABLET | Refills: 1 | Status: SHIPPED | OUTPATIENT
Start: 2024-01-01 | End: 2024-01-01

## 2024-01-01 RX ORDER — PANTOPRAZOLE SODIUM 20 MG/1
40 TABLET, DELAYED RELEASE ORAL DAILY
Qty: 90 TABLET | Refills: 3 | Status: SHIPPED | OUTPATIENT
Start: 2024-01-01 | End: 2024-01-01

## 2024-01-01 RX ORDER — PROCHLORPERAZINE MALEATE 5 MG
5 TABLET ORAL EVERY 6 HOURS PRN
Status: DISCONTINUED | OUTPATIENT
Start: 2024-01-01 | End: 2024-01-01 | Stop reason: HOSPADM

## 2024-01-01 RX ORDER — HYDROCODONE BITARTRATE AND ACETAMINOPHEN 5; 325 MG/1; MG/1
1.5 TABLET ORAL 2 TIMES DAILY
Status: ON HOLD | COMMUNITY
End: 2024-01-01

## 2024-01-01 RX ORDER — HYDROMORPHONE HCL IN WATER/PF 6 MG/30 ML
0.2 PATIENT CONTROLLED ANALGESIA SYRINGE INTRAVENOUS
Status: DISCONTINUED | OUTPATIENT
Start: 2024-01-01 | End: 2024-01-01 | Stop reason: HOSPADM

## 2024-01-01 RX ORDER — GABAPENTIN 300 MG/1
300 CAPSULE ORAL 2 TIMES DAILY
COMMUNITY

## 2024-01-01 RX ORDER — FUROSEMIDE 20 MG
20 TABLET ORAL DAILY
Status: DISCONTINUED | OUTPATIENT
Start: 2024-01-01 | End: 2024-01-01

## 2024-01-01 RX ORDER — OXYCODONE HYDROCHLORIDE 5 MG/1
10 TABLET ORAL 2 TIMES DAILY
Status: DISCONTINUED | OUTPATIENT
Start: 2024-01-01 | End: 2024-01-01 | Stop reason: HOSPADM

## 2024-01-01 RX ORDER — LEVOTHYROXINE SODIUM 75 UG/1
75 TABLET ORAL DAILY
Qty: 90 TABLET | Refills: 3 | Status: SHIPPED | OUTPATIENT
Start: 2024-01-01

## 2024-01-01 RX ORDER — SIMVASTATIN 20 MG
20 TABLET ORAL AT BEDTIME
Qty: 90 TABLET | Refills: 3 | Status: SHIPPED | OUTPATIENT
Start: 2024-01-01

## 2024-01-01 RX ORDER — LIDOCAINE 4 G/G
1 PATCH TOPICAL EVERY 24 HOURS
DISCHARGE
Start: 2024-01-01

## 2024-01-01 RX ORDER — DULOXETIN HYDROCHLORIDE 30 MG/1
60 CAPSULE, DELAYED RELEASE ORAL DAILY
Status: DISCONTINUED | OUTPATIENT
Start: 2024-01-01 | End: 2024-01-01 | Stop reason: HOSPADM

## 2024-01-01 RX ORDER — POLYETHYLENE GLYCOL 3350 17 G/17G
17 POWDER, FOR SOLUTION ORAL DAILY PRN
Status: DISCONTINUED | OUTPATIENT
Start: 2024-01-01 | End: 2024-01-01 | Stop reason: HOSPADM

## 2024-01-01 RX ORDER — ACETAMINOPHEN 500 MG
1000 TABLET ORAL AT BEDTIME
Status: ON HOLD | COMMUNITY
End: 2024-01-01

## 2024-01-01 RX ORDER — PROCHLORPERAZINE 25 MG
12.5 SUPPOSITORY, RECTAL RECTAL EVERY 12 HOURS PRN
Status: DISCONTINUED | OUTPATIENT
Start: 2024-01-01 | End: 2024-01-01 | Stop reason: HOSPADM

## 2024-01-01 RX ORDER — PANTOPRAZOLE SODIUM 20 MG/1
40 TABLET, DELAYED RELEASE ORAL DAILY
Qty: 180 TABLET | Refills: 3 | Status: SHIPPED | OUTPATIENT
Start: 2024-01-01

## 2024-01-01 RX ORDER — AMOXICILLIN 250 MG
1 CAPSULE ORAL 2 TIMES DAILY PRN
Status: DISCONTINUED | OUTPATIENT
Start: 2024-01-01 | End: 2024-01-01 | Stop reason: HOSPADM

## 2024-01-01 RX ORDER — AMLODIPINE BESYLATE 10 MG/1
10 TABLET ORAL DAILY
Qty: 90 TABLET | Refills: 3 | Status: SHIPPED | OUTPATIENT
Start: 2024-01-01

## 2024-01-01 RX ORDER — FUROSEMIDE 20 MG
40 TABLET ORAL DAILY
Qty: 60 TABLET | Refills: 1 | Status: SHIPPED | OUTPATIENT
Start: 2024-01-01 | End: 2024-01-01

## 2024-01-01 RX ORDER — GABAPENTIN 300 MG/1
CAPSULE ORAL
Qty: 180 CAPSULE | Refills: 3 | Status: SHIPPED | OUTPATIENT
Start: 2024-01-01 | End: 2024-01-01

## 2024-01-01 RX ORDER — OXYCODONE HYDROCHLORIDE 5 MG/1
5 TABLET ORAL EVERY 4 HOURS PRN
Status: DISCONTINUED | OUTPATIENT
Start: 2024-01-01 | End: 2024-01-01 | Stop reason: HOSPADM

## 2024-01-01 RX ORDER — FUROSEMIDE 20 MG
20 TABLET ORAL DAILY
COMMUNITY
Start: 2024-01-01 | End: 2024-01-01

## 2024-01-01 RX ORDER — FUROSEMIDE 20 MG
20 TABLET ORAL DAILY PRN
Status: CANCELLED | DISCHARGE
Start: 2024-01-01

## 2024-01-01 RX ORDER — ASPIRIN 81 MG/1
81 TABLET ORAL DAILY
Status: DISCONTINUED | OUTPATIENT
Start: 2024-01-01 | End: 2024-01-01

## 2024-01-01 RX ADMIN — OXYCODONE HYDROCHLORIDE 10 MG: 5 TABLET ORAL at 00:38

## 2024-01-01 RX ADMIN — LISINOPRIL 40 MG: 40 TABLET ORAL at 07:35

## 2024-01-01 RX ADMIN — IBUPROFEN 800 MG: 400 TABLET ORAL at 20:20

## 2024-01-01 RX ADMIN — OXYCODONE HYDROCHLORIDE 10 MG: 5 TABLET ORAL at 20:20

## 2024-01-01 RX ADMIN — ACETAMINOPHEN 975 MG: 325 TABLET ORAL at 21:50

## 2024-01-01 RX ADMIN — IBUPROFEN 800 MG: 400 TABLET ORAL at 13:20

## 2024-01-01 RX ADMIN — ACETAMINOPHEN 975 MG: 325 TABLET ORAL at 16:09

## 2024-01-01 RX ADMIN — POLYETHYLENE GLYCOL 3350 17 G: 17 POWDER, FOR SOLUTION ORAL at 10:51

## 2024-01-01 RX ADMIN — ACETAMINOPHEN 975 MG: 325 TABLET ORAL at 00:38

## 2024-01-01 RX ADMIN — ACETAMINOPHEN 975 MG: 325 TABLET ORAL at 08:42

## 2024-01-01 RX ADMIN — ACETAMINOPHEN 975 MG: 325 TABLET ORAL at 07:36

## 2024-01-01 RX ADMIN — PANTOPRAZOLE SODIUM 40 MG: 40 TABLET, DELAYED RELEASE ORAL at 07:38

## 2024-01-01 RX ADMIN — IBUPROFEN 800 MG: 400 TABLET ORAL at 13:58

## 2024-01-01 RX ADMIN — SIMVASTATIN 20 MG: 20 TABLET, FILM COATED ORAL at 08:44

## 2024-01-01 RX ADMIN — FUROSEMIDE 20 MG: 20 TABLET ORAL at 08:43

## 2024-01-01 RX ADMIN — OXYCODONE HYDROCHLORIDE 5 MG: 5 TABLET ORAL at 15:50

## 2024-01-01 RX ADMIN — OXYCODONE HYDROCHLORIDE 5 MG: 5 TABLET ORAL at 10:38

## 2024-01-01 RX ADMIN — LEVOTHYROXINE SODIUM 75 MCG: 75 TABLET ORAL at 07:36

## 2024-01-01 RX ADMIN — SENNOSIDES AND DOCUSATE SODIUM 1 TABLET: 50; 8.6 TABLET ORAL at 08:45

## 2024-01-01 RX ADMIN — OXYCODONE HYDROCHLORIDE 5 MG: 5 TABLET ORAL at 03:50

## 2024-01-01 RX ADMIN — LISINOPRIL 40 MG: 40 TABLET ORAL at 08:43

## 2024-01-01 RX ADMIN — DULOXETINE HYDROCHLORIDE 60 MG: 30 CAPSULE, DELAYED RELEASE ORAL at 08:43

## 2024-01-01 RX ADMIN — METHOCARBAMOL 500 MG: 500 TABLET ORAL at 12:27

## 2024-01-01 RX ADMIN — NICOTINE 1 PATCH: 7 PATCH, EXTENDED RELEASE TRANSDERMAL at 21:50

## 2024-01-01 RX ADMIN — SIMVASTATIN 20 MG: 20 TABLET, FILM COATED ORAL at 07:41

## 2024-01-01 RX ADMIN — AMLODIPINE BESYLATE 10 MG: 10 TABLET ORAL at 07:38

## 2024-01-01 RX ADMIN — IBUPROFEN 800 MG: 400 TABLET ORAL at 20:24

## 2024-01-01 RX ADMIN — IBUPROFEN 800 MG: 400 TABLET ORAL at 08:43

## 2024-01-01 RX ADMIN — PANTOPRAZOLE SODIUM 40 MG: 40 TABLET, DELAYED RELEASE ORAL at 08:43

## 2024-01-01 RX ADMIN — LISINOPRIL 40 MG: 40 TABLET ORAL at 07:38

## 2024-01-01 RX ADMIN — DULOXETINE HYDROCHLORIDE 60 MG: 30 CAPSULE, DELAYED RELEASE ORAL at 07:34

## 2024-01-01 RX ADMIN — SENNOSIDES AND DOCUSATE SODIUM 1 TABLET: 50; 8.6 TABLET ORAL at 07:44

## 2024-01-01 RX ADMIN — OXYCODONE HYDROCHLORIDE 5 MG: 5 TABLET ORAL at 00:05

## 2024-01-01 RX ADMIN — PANTOPRAZOLE SODIUM 40 MG: 40 TABLET, DELAYED RELEASE ORAL at 07:36

## 2024-01-01 RX ADMIN — ACETAMINOPHEN 975 MG: 325 TABLET ORAL at 00:04

## 2024-01-01 RX ADMIN — LEVOTHYROXINE SODIUM 75 MCG: 75 TABLET ORAL at 08:43

## 2024-01-01 RX ADMIN — OXYCODONE HYDROCHLORIDE 5 MG: 5 TABLET ORAL at 12:27

## 2024-01-01 RX ADMIN — AMLODIPINE BESYLATE 10 MG: 10 TABLET ORAL at 08:43

## 2024-01-01 RX ADMIN — SIMVASTATIN 20 MG: 20 TABLET, FILM COATED ORAL at 07:36

## 2024-01-01 RX ADMIN — OXYCODONE HYDROCHLORIDE 10 MG: 5 TABLET ORAL at 07:35

## 2024-01-01 RX ADMIN — NICOTINE 1 PATCH: 7 PATCH, EXTENDED RELEASE TRANSDERMAL at 20:27

## 2024-01-01 RX ADMIN — AMLODIPINE BESYLATE 10 MG: 10 TABLET ORAL at 07:35

## 2024-01-01 RX ADMIN — ACETAMINOPHEN 1000 MG: 500 TABLET, FILM COATED ORAL at 00:18

## 2024-01-01 RX ADMIN — ACETAMINOPHEN 975 MG: 325 TABLET ORAL at 16:07

## 2024-01-01 RX ADMIN — ASPIRIN 81 MG: 81 TABLET, COATED ORAL at 08:42

## 2024-01-01 RX ADMIN — IBUPROFEN 800 MG: 400 TABLET ORAL at 07:36

## 2024-01-01 RX ADMIN — OXYCODONE HYDROCHLORIDE 10 MG: 5 TABLET ORAL at 21:50

## 2024-01-01 RX ADMIN — ACETAMINOPHEN 975 MG: 325 TABLET ORAL at 07:34

## 2024-01-01 RX ADMIN — IBUPROFEN 800 MG: 400 TABLET ORAL at 07:38

## 2024-01-01 RX ADMIN — OXYCODONE HYDROCHLORIDE 10 MG: 5 TABLET ORAL at 16:09

## 2024-01-01 RX ADMIN — LIDOCAINE 1 PATCH: 4 PATCH TOPICAL at 07:44

## 2024-01-01 RX ADMIN — LIDOCAINE 1 PATCH: 4 PATCH TOPICAL at 08:48

## 2024-01-01 RX ADMIN — LEVOTHYROXINE SODIUM 75 MCG: 75 TABLET ORAL at 07:38

## 2024-01-01 RX ADMIN — OXYCODONE HYDROCHLORIDE 10 MG: 5 TABLET ORAL at 08:42

## 2024-01-01 RX ADMIN — DULOXETINE HYDROCHLORIDE 60 MG: 30 CAPSULE, DELAYED RELEASE ORAL at 07:35

## 2024-01-01 RX ADMIN — OXYCODONE HYDROCHLORIDE 10 MG: 5 TABLET ORAL at 20:24

## 2024-01-01 RX ADMIN — NICOTINE 1 PATCH: 7 PATCH, EXTENDED RELEASE TRANSDERMAL at 21:20

## 2024-01-01 RX ADMIN — LIDOCAINE 1 PATCH: 4 PATCH TOPICAL at 07:34

## 2024-01-01 RX ADMIN — OXYCODONE HYDROCHLORIDE 10 MG: 5 TABLET ORAL at 07:38

## 2024-01-01 ASSESSMENT — ACTIVITIES OF DAILY LIVING (ADL)
ADLS_ACUITY_SCORE: 43
ADLS_ACUITY_SCORE: 37
DEPENDENT_IADLS:: CLEANING;LAUNDRY;SHOPPING;MEDICATION MANAGEMENT;TRANSPORTATION
ADLS_ACUITY_SCORE: 40
ADLS_ACUITY_SCORE: 38
ADLS_ACUITY_SCORE: 40
ADLS_ACUITY_SCORE: 40
ADLS_ACUITY_SCORE: 38
ADLS_ACUITY_SCORE: 41
ADLS_ACUITY_SCORE: 38
ADLS_ACUITY_SCORE: 43
ADLS_ACUITY_SCORE: 43
ADLS_ACUITY_SCORE: 38
ADLS_ACUITY_SCORE: 43
ADLS_ACUITY_SCORE: 41
ADLS_ACUITY_SCORE: 37
ADLS_ACUITY_SCORE: 37
ADLS_ACUITY_SCORE: 38
ADLS_ACUITY_SCORE: 40
ADLS_ACUITY_SCORE: 38
ADLS_ACUITY_SCORE: 40
ADLS_ACUITY_SCORE: 38
ADLS_ACUITY_SCORE: 41
ADLS_ACUITY_SCORE: 38
ADLS_ACUITY_SCORE: 41
ADLS_ACUITY_SCORE: 43
ADLS_ACUITY_SCORE: 41
ADLS_ACUITY_SCORE: 38
ADLS_ACUITY_SCORE: 40
ADLS_ACUITY_SCORE: 41
ADLS_ACUITY_SCORE: 38
ADLS_ACUITY_SCORE: 41
ADLS_ACUITY_SCORE: 38
ADLS_ACUITY_SCORE: 38
ADLS_ACUITY_SCORE: 43
ADLS_ACUITY_SCORE: 40
ADLS_ACUITY_SCORE: 38
ADLS_ACUITY_SCORE: 38
ADLS_ACUITY_SCORE: 37
ADLS_ACUITY_SCORE: 38
ADLS_ACUITY_SCORE: 40
ADLS_ACUITY_SCORE: 41
ADLS_ACUITY_SCORE: 43
ADLS_ACUITY_SCORE: 37
ADLS_ACUITY_SCORE: 38
ADLS_ACUITY_SCORE: 37
ADLS_ACUITY_SCORE: 38
ADLS_ACUITY_SCORE: 41
ADLS_ACUITY_SCORE: 38
ADLS_ACUITY_SCORE: 41
ADLS_ACUITY_SCORE: 43
ADLS_ACUITY_SCORE: 29
ADLS_ACUITY_SCORE: 40
ADLS_ACUITY_SCORE: 41
ADLS_ACUITY_SCORE: 29
ADLS_ACUITY_SCORE: 37

## 2024-01-01 ASSESSMENT — ANXIETY QUESTIONNAIRES
GAD7 TOTAL SCORE: 1
3. WORRYING TOO MUCH ABOUT DIFFERENT THINGS: SEVERAL DAYS
6. BECOMING EASILY ANNOYED OR IRRITABLE: NOT AT ALL
GAD7 TOTAL SCORE: 1
2. NOT BEING ABLE TO STOP OR CONTROL WORRYING: NOT AT ALL
7. FEELING AFRAID AS IF SOMETHING AWFUL MIGHT HAPPEN: NOT AT ALL
1. FEELING NERVOUS, ANXIOUS, OR ON EDGE: NOT AT ALL
5. BEING SO RESTLESS THAT IT IS HARD TO SIT STILL: NOT AT ALL

## 2024-01-01 ASSESSMENT — ENCOUNTER SYMPTOMS
BACK PAIN: 1
BACK PAIN: 1

## 2024-01-01 ASSESSMENT — PATIENT HEALTH QUESTIONNAIRE - PHQ9
SUM OF ALL RESPONSES TO PHQ QUESTIONS 1-9: 7
5. POOR APPETITE OR OVEREATING: NOT AT ALL

## 2024-01-01 ASSESSMENT — COLUMBIA-SUICIDE SEVERITY RATING SCALE - C-SSRS
2. HAVE YOU ACTUALLY HAD ANY THOUGHTS OF KILLING YOURSELF IN THE PAST MONTH?: NO
6. HAVE YOU EVER DONE ANYTHING, STARTED TO DO ANYTHING, OR PREPARED TO DO ANYTHING TO END YOUR LIFE?: NO
1. IN THE PAST MONTH, HAVE YOU WISHED YOU WERE DEAD OR WISHED YOU COULD GO TO SLEEP AND NOT WAKE UP?: NO
1. IN THE PAST MONTH, HAVE YOU WISHED YOU WERE DEAD OR WISHED YOU COULD GO TO SLEEP AND NOT WAKE UP?: NO
6. HAVE YOU EVER DONE ANYTHING, STARTED TO DO ANYTHING, OR PREPARED TO DO ANYTHING TO END YOUR LIFE?: NO
2. HAVE YOU ACTUALLY HAD ANY THOUGHTS OF KILLING YOURSELF IN THE PAST MONTH?: NO

## 2024-01-01 ASSESSMENT — PAIN SCALES - GENERAL: PAINLEVEL: MILD PAIN (3)

## 2024-01-02 NOTE — TELEPHONE ENCOUNTER
DIAGNOSIS: Compression fracture of L1 vertebra, initial encounter (H) [S32.010A]  - Primary  Closed fracture of right iliac wing, initial encounter (H) [S32.301A]   APPOINTMENT DATE:  01/08/2024   NOTES STATUS DETAILS   OFFICE NOTE from referring provider Internal 12/20/2023 - [facility]. Nancy BUSH -[facility]   DISCHARGE REPORT from the ER Internal 06/16/2023 - [facility]   MEDICATION LIST Internal    IMAGING Internal

## 2024-01-03 PROBLEM — F33.1 MAJOR DEPRESSIVE DISORDER, RECURRENT EPISODE, MODERATE (H): Status: ACTIVE | Noted: 2024-01-01

## 2024-01-03 NOTE — PROGRESS NOTES
Nighat is a 88 year old who is being evaluated via a billable telephone visit.      What phone number would you like to be contacted at? [phone]  How would you like to obtain your AVS? Chris    Distant Location (provider location):  On-site    Assessment & Plan     Major depressive disorder, recurrent episode, moderate (H)  Stable no change in treatment plan.     Chronic kidney disease, stage 3a (H)  Stable no change in treatment plan.   - Comprehensive metabolic panel; Future    Localized edema  This is better but still has swelling   No other signs or sxs of HF   Stasis changes per daughter   Compression and increase lasix dose for now   Check K+ Friday   - furosemide (LASIX) 20 MG tablet; Take 2 tablets (40 mg) by mouth daily    Compression fracture of L1 vertebra, initial encounter (H)  Discussed with Daughter and pt. She is DNR DNI   She wants no heroic measures   Would like to be comfortable   Reduced mobility and mild dementia   - Hospice Referral; Future                 MD RASHAD Da Silva [facility]    Subjective   Nighat is a 88 year old, presenting for the following health issues:  Back Pain        12/20/2023     9:03 AM   Additional Questions   Roomed by Susy SOLORZANO   Accompanied by Daughter       History of Present Illness       Back Pain:  She presents for follow up of back pain. Patient's back pain is a chronic problem.  Location of back pain:  Right lower back, left lower back, right hip and left hip  Description of back pain: sharp and shooting  Back pain spreads: right buttocks    Since patient first noticed back pain, pain is: unchanged  Does back pain interfere with her job:  Yes       She eats 0-1 servings of fruits and vegetables daily.She consumes 0 sweetened beverage(s) daily.She exercises with enough effort to increase her heart rate 9 or less minutes per day.  She exercises with enough effort to increase her heart rate 3 or less days per week.   She  is taking medications regularly.     Mobility is better but still in a lot of pain  Swelling in lower extremities isn't better - kuldip rodrigezcarliing   Has follow up with Orthopaedics on the 8th - then would start PT      Depression Followup  How are you doing with your depression since your last visit? No change  Are you having other symptoms that might be associated with depression? No  Have you had a significant life event?  No   Are you feeling anxious or having panic attacks?   No  Do you have any concerns with your use of alcohol or other drugs? No    Social History     Tobacco Use    Smoking status: Every Day     Packs/day: 0.25     Years: 50.00     Additional pack years: 0.00     Total pack years: 12.50     Types: Cigarettes    Smokeless tobacco: Never    Tobacco comments:     5 cigarettes daily   Vaping Use    Vaping Use: Never used   Substance Use Topics    Alcohol use: No    Drug use: No         5/23/2023     2:09 PM 6/23/2023     9:59 AM 12/19/2023    10:01 AM   PHQ   PHQ-9 Total Score 16 2 0   Q9: Thoughts of better off dead/self-harm past 2 weeks Nearly every day Not at all Not at all   F/U: Thoughts of suicide or self-harm Yes     F/U: Self harm-plan Yes     F/U: Self-harm action No     F/U: Safety concerns No           5/23/2023     2:10 PM 6/23/2023     9:59 AM 7/25/2023    12:30 PM   PRECIOUS-7 SCORE   Total Score 17 (severe anxiety) 0 (minimal anxiety) 0 (minimal anxiety)   Total Score 17 0 0         Suicide Assessment Five-step Evaluation and Treatment (SAFE-T)    Chronic Kidney Disease Follow-up    Do you take any over the counter pain medicine?: Yes  What over the counter medicine are you taking for your pain?:  tylenol and occasional Ibu with new fx     How often do you take this medicine?:  Two times daily      Edema of LE  This is better not weeping any longer   Lasix helped some   She is not wearing compression   No PND orhtop dong no resp issues at all   Daughter is RN and lungs are clear       Review of  Systems   Constitutional, HEENT, cardiovascular, pulmonary, gi and gu systems are negative, except as otherwise noted.      Objective           Vitals:  No vitals were obtained today due to virtual visit.    Physical Exam   healthy, alert, and no distress  PSYCH: Alert and oriented times 3; coherent speech, normal   rate and volume, able to articulate logical thoughts, able   to abstract reason, no tangential thoughts, no hallucinations   or delusions  Her affect is normal  RESP: No cough, no audible wheezing, able to talk in full sentences  Remainder of exam unable to be completed due to telephone visits                Phone call duration: 25 minutes

## 2024-01-08 NOTE — NURSING NOTE
"Reason For Visit:   Chief Complaint   Patient presents with    Consult     Referral from Leydi Arrieta // Sacral Wing Fracture /       Primary MD: Nancy Tapia  Ref. MD: Leydi Arrieta    ?  No  Occupation retired.    Date of injury: 12/6/23  Type of injury: Fall.    Date of surgery: No  Type of surgery: No.    Smoker: Yes  Request smoking cessation information: No    Ht 1.397 m (4' 7\")   Wt 38.6 kg (85 lb)   BMI 19.76 kg/m      Pain Assessment  Patient Currently in Pain: Yes  0-10 Pain Scale: 10  Primary Pain Location: Back    Oswestry (HIRAM) Questionnaire        1/8/2024     1:41 AM   OSWESTRY DISABILITY INDEX   Count 9   Sum 30   Oswestry Score (%) 66.67 %        Visual Analog Pain Scale  Back Pain Scale 0-10: 10  Right leg pain: 0  Left leg pain: 0  Neck Pain Scale 0-10: 0  Right arm pain: 0  Left arm pain: 0    Promis 10 Assessment         No data to display                         Natalia Henderson, DORIANN   "

## 2024-01-08 NOTE — LETTER
1/8/2024         RE: Nighat Gupta  [address]  [facility] [address]        Dear Colleague,    Thank you for referring your patient, Nighat Gupta, to the [facility]. Please see a copy of my visit note below.    Chief Concern: referral for sacral wing fracture    History Present Illness  Nighat Gupta is an 88 year old female with mild dementia, L1 compression fracture, insufficiency fracture of sacral ala. Patient had a ground level fall on December 6 with subsequent pain and limitation in ambulation. She also has previously identified L1 insufficiency fracture.  She denies having any pain in anterior pelvis.  She has been mobilizing with walker or wheelchair. She reports her back pain is 10/10. HIRAM today is 66.67%.     The patient's family is considering hospice care at this point.     On examination today patient is in no acute distress. There is global deconditioning but no focal weakness in the lower extremities.  Sensation is intact in lowers. She has tenderness to palpation over lumbar area as well as PSIS.     I personally reviewed and interpreted her imaging today including lumbar and pelvis radiographs. She has L1 burst fracture without acute segmental kyphosis. There is lumbar scoliosis with overall neutral appearing alignment. Pelvc radiographs show what appears to be bilateral rami fractures which in addition to her sacral alar fracture is an LC1 pelvis fracture.   CT from 12/19 shows right zone 1 sacral insufficiency fracture    Impression and plan  88 year old female with LC1 pelvic ring fracture due to osteoporosis and ground level fall. She has already been weight bearing on these fractures proving they are stable. Recommend continued weight bearing as tolerated. With regard to her L1 burst fracture I do not believe there is any need for bracing to prevent further fracture collapse. She could try a brace for comfort, but this would only be if it  helped with symptom control.  Should work with therapies for gait training for fall prevention. I do not believe there is a need to get additional radiographs unless patient has additional trauma or develops worsening pain.     Time spent on this clinical encounter including previsit chart review, history and physical examination, patient counseling and documentation was 30 minutes on the date of encounter.    Thompson Rivero MD  , Department of Orthopedic Surgery  [facility]

## 2024-01-11 PROBLEM — M80.00XA AGE-RELATED OSTEOPOROSIS WITH CURRENT PATHOLOGICAL FRACTURE, INITIAL ENCOUNTER: Status: ACTIVE | Noted: 2024-01-01

## 2024-01-11 PROBLEM — M84.48XA SACRAL INSUFFICIENCY FRACTURE, INITIAL ENCOUNTER: Status: ACTIVE | Noted: 2024-01-01

## 2024-01-11 PROBLEM — S32.810A: Status: ACTIVE | Noted: 2024-01-01

## 2024-01-11 NOTE — PROGRESS NOTES
Chief Concern: referral for sacral wing fracture    History Present Illness  Nighat Gupta is an 88 year old female with mild dementia, L1 compression fracture, insufficiency fracture of sacral ala. Patient had a ground level fall on December 6 with subsequent pain and limitation in ambulation. She also has previously identified L1 insufficiency fracture.  She denies having any pain in anterior pelvis.  She has been mobilizing with walker or wheelchair. She reports her back pain is 10/10. HIRAM today is 66.67%.     The patient's family is considering hospice care at this point.     On examination today patient is in no acute distress. There is global deconditioning but no focal weakness in the lower extremities.  Sensation is intact in lowers. She has tenderness to palpation over lumbar area as well as PSIS.     I personally reviewed and interpreted her imaging today including lumbar and pelvis radiographs. She has L1 burst fracture without acute segmental kyphosis. There is lumbar scoliosis with overall neutral appearing alignment. Pelvc radiographs show what appears to be bilateral rami fractures which in addition to her sacral alar fracture is an LC1 pelvis fracture.   CT from 12/19 shows right zone 1 sacral insufficiency fracture    Impression and plan  88 year old female with LC1 pelvic ring fracture due to osteoporosis and ground level fall. She has already been weight bearing on these fractures proving they are stable. Recommend continued weight bearing as tolerated. With regard to her L1 burst fracture I do not believe there is any need for bracing to prevent further fracture collapse. She could try a brace for comfort, but this would only be if it helped with symptom control.  Should work with therapies for gait training for fall prevention. I do not believe there is a need to get additional radiographs unless patient has additional trauma or develops worsening pain.     Time spent on this clinical  encounter including previsit chart review, history and physical examination, patient counseling and documentation was 30 minutes on the date of encounter.    Thompson Rivero MD  , Department of Orthopedic Surgery  [facility]

## 2024-01-11 NOTE — TELEPHONE ENCOUNTER
FYI - Status Update    Who is Calling: nurse[facility]azlyn [facility]    Update: Jazlyn had a hospice consult with pts family 1/11. Jazlyn stated that pt is doing better and daughters feel pt does not need hospice at this time.    Does caller want a call/response back: No

## 2024-01-17 NOTE — PROGRESS NOTES
Nighat is a 88 year old who is being evaluated via a billable telephone visit.      What phone number would you like to be contacted at? [phone]  How would you like to obtain your AVS? Chris    Distant Location (provider location):  On-site    Assessment & Plan     Sacral insufficiency fracture with routine healing, subsequent encounter  Continue on norco TID for another month she is doing well pain is controlled   PT started     Essential hypertension, benign  Stable no change in treatment plan.   - lisinopril (ZESTRIL) 40 MG tablet; Take 1 tablet (40 mg) by mouth daily  - amLODIPine (NORVASC) 10 MG tablet; Take 1 tablet (10 mg) by mouth daily    Hyperlipidemia LDL goal <130  Stable no change in treatment plan.   - simvastatin (ZOCOR) 20 MG tablet; Take 1 tablet (20 mg) by mouth at bedtime    Gastroesophageal reflux disease without esophagitis  Stable no change in treatment plan.   - pantoprazole (PROTONIX) 20 MG EC tablet; Take 2 tablets (40 mg) by mouth daily    Compression fracture of L1 vertebra, initial encounter (H)  As above     - HYDROcodone-acetaminophen (NORCO) 5-325 MG tablet; Take 1 tablet by mouth 3 times daily Max tab is 3 per day one pill at a time    Closed fracture of right iliac wing, initial encounter (H)  As above  - HYDROcodone-acetaminophen (NORCO) 5-325 MG tablet; Take 1 tablet by mouth 3 times daily Max tab is 3 per day one pill at a time    Localized edema  Improved   Reduce dose to 20 mg daily   - furosemide (LASIX) 20 MG tablet; Take 1 tablet (20 mg) by mouth daily                Subjective   Nighat is a 88 year old, presenting for the following health issues:  Pain and Edema    Pain    History of Present Illness       Back Pain:  She presents for follow up of back pain. Patient's back pain is a chronic problem.  Location of back pain:  Right lower back and left lower back  Description of back pain: dull ache  Back pain spreads: nowhere    Since patient first noticed back pain,  pain is: always present, but gets better and worse  Does back pain interfere with her job:  Not applicable   She consumes 0 sweetened beverage(s) daily.She exercises with enough effort to increase her heart rate 10 to 19 minutes per day.  She exercises with enough effort to increase her heart rate 3 or less days per week.   She is taking medications regularly.     Edema  Has gotten better   Not up to her thighs -   Pt is up moving around more      Hyperlipidemia Follow-Up    Are you regularly taking any medication or supplement to lower your cholesterol?   Yes- Zocor  Are you having muscle aches or other side effects that you think could be caused by your cholesterol lowering medication?  No    Hypertension Follow-up    Do you check your blood pressure regularly outside of the clinic? Yes   Are you following a low salt diet? No  Are your blood pressures ever more than 140 on the top number (systolic) OR more   than 90 on the bottom number (diastolic), for example 140/90? No            Objective           Vitals:  No vitals were obtained today due to virtual visit.      Review of Systems  Constitutional, HEENT, cardiovascular, pulmonary, gi and gu systems are negative, except as otherwise noted.  Physical Exam   General: Alert and no distress //Respiratory: No audible wheeze, cough, or shortness of breath // Psychiatric:  Appropriate affect, tone, and pace of words            Phone call duration: 22 minutes  Signed Electronically by: Nancy Tapia MD

## 2024-01-23 NOTE — TELEPHONE ENCOUNTER
PT need additional visit  1 visit x1  every 2 weeks for discharge  Helen Avitia RN on 1/23/2024 at 10:50 AM

## 2024-02-07 NOTE — PROGRESS NOTES
Nighat is a 88 year old who is being evaluated via a billable telephone visit.      What phone number would you like to be contacted at? [phone]  How would you like to obtain your AVS? Chris    Distant Location (provider location):  On-site    Assessment & Plan     Compression fracture of L1 vertebra, initial encounter (H)  Improving   More mobile   Pain is well controlled   - HYDROcodone-acetaminophen (NORCO) 5-325 MG tablet; Take 1 tablet by mouth 3 times daily Max tab is 3 per day one pill at a time  - HYDROcodone-acetaminophen (NORCO) 5-325 MG tablet; Take 1 tablet by mouth 3 times daily Max tab is 3 per day one pill at a time    Localized edema  Stable and better as she is moving more   Will decrease dose of lasix  Will check K+ next week   - furosemide (LASIX) 20 MG tablet; Take 1 tablet (20 mg) by mouth daily  - Basic metabolic panel; Future      Recheck     Subjective   Nighat is a 88 year old, presenting for the following health issues:  Back Pain        2/7/2024     8:55 AM   Additional Questions   Roomed by Susy SOLORZANO   Accompanied by Self         2/7/2024     8:55 AM   Patient Reported Additional Medications   Patient reports taking the following new medications .     Back Pain     History of Present Illness       Back Pain:  She presents for follow up of back pain. Patient's back pain is a chronic problem.  Location of back pain:  Right lower back and left lower back  Description of back pain: dull ache  Back pain spreads: nowhere    Since patient first noticed back pain, pain is: unchanged  Does back pain interfere with her job:  Not applicable   She consumes 3 sweetened beverage(s) daily.She exercises with enough effort to increase her heart rate 9 or less minutes per day.  She exercises with enough effort to increase her heart rate 3 or less days per week.   She is taking medications regularly.     Pt has been using 20mg once a day and alternating with 40mg every other day  Discuss  labs:  Please call daughter with results. Where are they with the lasix dose?  If still taking lasix would need to start some K+ if they have stopped would just repeat lab in a week  Thyroid is stable  Component      Latest Ref Rng 2/5/2024  11:03 AM   Sodium      135 - 145 mmol/L 147 (H)    Potassium      3.4 - 5.3 mmol/L 3.0 (L)    Chloride      98 - 107 mmol/L 100    Carbon Dioxide (CO2)      22 - 29 mmol/L 30 (H)    Anion Gap      7 - 15 mmol/L 17 (H)    Urea Nitrogen      8.0 - 23.0 mg/dL 19.7    Creatinine      0.51 - 0.95 mg/dL 0.89    GFR Estimate      >60 mL/min/1.73m2 63    Calcium      8.8 - 10.2 mg/dL 9.2    Glucose      70 - 99 mg/dL 115 (H)    TSH      0.30 - 4.20 uIU/mL 20.85 (H)    T4 Free      0.90 - 1.70 ng/dL 1.34       Legend:  (H) High  (L) Low            Review of Systems  Constitutional, HEENT, cardiovascular, pulmonary, gi and gu systems are negative, except as otherwise noted.      Objective           Vitals:  No vitals were obtained today due to virtual visit.    Physical Exam   General: Alert and no distress //Respiratory: No audible wheeze, cough, or shortness of breath // Psychiatric:  Appropriate affect, tone, and pace of words            Phone call duration: 22 minutes  Signed Electronically by: Nancy Tapia MD

## 2024-02-09 NOTE — TELEPHONE ENCOUNTER
Pls see GTRANt message below.     Last Written Prescription Date:  10/31/23 for cymbalta 20 mg tabs  Last Fill Quantity: 180,  # refills: 1   Last office visit: 12/20/2023 ; last virtual visit: 2/7/2024 with prescribing provider:     Future Office Visit:   Next 5 appointments (look out 90 days)      Apr 17, 2024 10:00 AM  (Arrive by 9:40 AM)  Provider Visit with Nancy Tapia MD  [facility] ([facility] ) [address] [place] [id number]  [phone]           RX pended and message routed to provider for consideration.     Julie Behrendt RN

## 2024-04-17 NOTE — PROGRESS NOTES
Nighat is a 88 year old who is being evaluated via a billable telephone visit.    What phone number would you like to be contacted at? [phone]  How would you like to obtain your AVS? Chris  Originating Location (pt. Location): Home    Distant Location (provider location):  On-site    Assessment & Plan     Compression fracture of L1 vertebra, initial encounter (H)  Stable no change in treatment plan.   Signficant improvement   - HYDROcodone-acetaminophen (NORCO) 5-325 MG tablet; Take 1 tablet by mouth 3 times daily Max tab is 3 per day one pill at a time  - HYDROcodone-acetaminophen (NORCO) 5-325 MG tablet; Take 1 tablet by mouth 3 times daily Max tab is 3 per day one pill at a time  - HYDROcodone-acetaminophen (NORCO) 5-325 MG tablet; Take 1 tablet by mouth 3 times daily Max tab is 3 per day one pill at a time    Chronic, continuous use of opioids  Stable no change in treatment plan.   - HYDROcodone-acetaminophen (NORCO) 5-325 MG tablet; Take 1 tablet by mouth 3 times daily Max tab is 3 per day one pill at a time  - HYDROcodone-acetaminophen (NORCO) 5-325 MG tablet; Take 1 tablet by mouth 3 times daily Max tab is 3 per day one pill at a time  - HYDROcodone-acetaminophen (NORCO) 5-325 MG tablet; Take 1 tablet by mouth 3 times daily Max tab is 3 per day one pill at a time    Chronic pain syndrome  Stable no change in treatment plan.   - HYDROcodone-acetaminophen (NORCO) 5-325 MG tablet; Take 1 tablet by mouth 3 times daily Max tab is 3 per day one pill at a time  - HYDROcodone-acetaminophen (NORCO) 5-325 MG tablet; Take 1 tablet by mouth 3 times daily Max tab is 3 per day one pill at a time  - HYDROcodone-acetaminophen (NORCO) 5-325 MG tablet; Take 1 tablet by mouth 3 times daily Max tab is 3 per day one pill at a time            Nicotine/Tobacco Cessation  She reports that she has been smoking cigarettes. She has a 12.5 pack-year smoking history. She has never used smokeless tobacco.  Nicotine/Tobacco  Cessation Plan  Information offered: Patient not interested at this time            Arun Disla is a 88 year old, presenting for the following health issues:  Back Pain (recheck)      4/17/2024     9:35 AM   Additional Questions   Roomed by Susy SOLORZANO   Accompanied by Daughter         4/17/2024     9:35 AM   Patient Reported Additional Medications   Patient reports taking the following new medications .     Back Pain     History of Present Illness       Reason for visit:  Follow up after fall    She eats 0-1 servings of fruits and vegetables daily.She consumes 1 sweetened beverage(s) daily.She exercises with enough effort to increase her heart rate 10 to 19 minutes per day.  She exercises with enough effort to increase her heart rate 3 or less days per week.   She is taking medications regularly.     Edema  Sleeps on right side at night  More swelling on that side  Much better than before     Pain History:  When did you first notice your pain? chronic   Have you seen this provider for your pain in the past? Yes   Where in your body do you have pain? Back pain  Are you seeing anyone else for your pain? No        5/23/2023     2:09 PM 6/23/2023     9:59 AM 12/19/2023    10:01 AM   PHQ-9 SCORE   PHQ-9 Total Score MyChart 16 (Moderately severe depression) 2 (Minimal depression) 0   PHQ-9 Total Score 16 2 0           5/23/2023     2:10 PM 6/23/2023     9:59 AM 7/25/2023    12:30 PM   PRECIOUS-7 SCORE   Total Score 17 (severe anxiety) 0 (minimal anxiety) 0 (minimal anxiety)   Total Score 17 0 0               Chronic Pain Follow Up:    Location of pain: low back  Analgesia/pain control:    - Recent changes:  Much better - over did it yesterday so changed to phone visit today    - Overall control: Comfortably manageable    - Current treatments: Norco 3 times a day   Adherence:     - Do you ever take more pain medicine than prescribed? No    - When did you take your last dose of pain medicine?  This morning   Adverse  effects: No   PDMP Review         Value Time User    State PDMP site checked  Yes 10/31/2023  9:21 AM Nancy Tapia MD          Last CSA Agreement:   CSA -- Patient Level:     [Media Unavailable] Controlled Substance Agreement - Opioid - Scan on 7/25/2023  1:50 PM   [Media Unavailable] Controlled Substance Agreement - Opioid - Scan on 4/5/2022 10:31 AM   [Media Unavailable] Controlled Substance Agreement - Opioid - Scan on 4/13/2021  9:32 AM   [Media Unavailable] Controlled Substance Agreement - Non - Opioid - Scan on 3/12/2020 11:46 AM: NON-OPIOID CONTROLLED SUBSTANCE AGREEMENT   [Media Unavailable] Controlled Substance Agreement - Opioid - Scan on 3/6/2020  9:44 AM: FOR 3-6-20 VISIT   [Media Unavailable] Controlled Substance Agreement - Opioid - Scan on 3/6/2020  9:41 AM       Last UDS: 8/2/2023                    Review of Systems  Constitutional, HEENT, cardiovascular, pulmonary, gi and gu systems are negative, except as otherwise noted.      Objective           Vitals:  No vitals were obtained today due to virtual visit.    Physical Exam   General: Alert and no distress //Respiratory: No audible wheeze, cough, or shortness of breath // Psychiatric:  Appropriate affect, tone, and pace of words            Phone call duration: 23 minutes  Signed Electronically by: Nancy Tapia MD

## 2024-05-20 NOTE — TELEPHONE ENCOUNTER
The Rx for Lorraines Gabapentin is written for #180 (45 days) Can we have a new Rx rewritten for #360 (90 days)      Thank You,  Pamela Gutierrez, [facility]

## 2024-07-23 NOTE — PROGRESS NOTES
Assessment & Plan     Chronic kidney disease, stage 3a (H)  Stable no change in treatment plan.   - Albumin Random Urine Quantitative with Creat Ratio; Future    Compression fracture of L1 vertebra, initial encounter (H)  Stable no change in treatment plan.   - HYDROcodone-acetaminophen (NORCO) 5-325 MG tablet; Take 1 tablet by mouth 3 times daily Max tab is 3 per day one pill at a time  - HYDROcodone-acetaminophen (NORCO) 5-325 MG tablet; Take 1 tablet by mouth 3 times daily Max tab is 3 per day one pill at a time    Chronic, continuous use of opioids  Stable no change in treatment plan.   - HYDROcodone-acetaminophen (NORCO) 5-325 MG tablet; Take 1 tablet by mouth 3 times daily Max tab is 3 per day one pill at a time  - HYDROcodone-acetaminophen (NORCO) 5-325 MG tablet; Take 1 tablet by mouth 3 times daily Max tab is 3 per day one pill at a time    Chronic pain syndrome  Stable no change in treatment plan.   - [id number] - Urine Drug Confirmation Panel (Comprehensive); Future  - HYDROcodone-acetaminophen (NORCO) 5-325 MG tablet; Take 1 tablet by mouth 3 times daily Max tab is 3 per day one pill at a time  - HYDROcodone-acetaminophen (NORCO) 5-325 MG tablet; Take 1 tablet by mouth 3 times daily Max tab is 3 per day one pill at a time    Hypothyroidism, unspecified type  Adjust meds as indicated by above labs.   - levothyroxine (SYNTHROID/LEVOTHROID) 75 MCG tablet; Take 1 tablet (75 mcg) by mouth daily  - TSH with free T4 reflex; Future    Gastroesophageal reflux disease without esophagitis  Taking PPI daily   Has tried off and not able   - pantoprazole (PROTONIX) 20 MG EC tablet; Take 2 tablets (40 mg) by mouth daily    Localized edema  Stable no change in treatment plan.   - furosemide (LASIX) 20 MG tablet; Take 1 tablet (20 mg) by mouth daily  - Comprehensive metabolic panel; Future  - TSH with free T4 reflex; Future      The longitudinal plan of care for the diagnosis(es)/condition(s) as documented were addressed  during this visit. Due to the added complexity in care, I will continue to support Nighat in the subsequent management and with ongoing continuity of care.            Arun Disla is a 88 year old, presenting for the following health issues:  Pain (Chronic pain)        7/23/2024     9:34 AM   Additional Questions   Roomed by Susy SOLORZANO   Accompanied by Daughter         7/23/2024     9:34 AM   Patient Reported Additional Medications   Patient reports taking the following new medications .     History of Present Illness       Reason for visit:  Chronic pain    She eats 2-3 servings of fruits and vegetables daily.She consumes 0 sweetened beverage(s) daily.She exercises with enough effort to increase her heart rate 20 to 29 minutes per day.  She exercises with enough effort to increase her heart rate 4 days per week.   She is taking medications regularly.         Pain History:  When did you first notice your pain? Chronic pain   Have you seen this provider for your pain in the past? Yes   Where in your body do you have pain? back  Are you seeing anyone else for your pain? No        6/23/2023     9:59 AM 12/19/2023    10:01 AM 7/23/2024    10:24 AM   PHQ-9 SCORE   PHQ-9 Total Score MyChart 2 (Minimal depression) 0    PHQ-9 Total Score 2 0 7                 Chronic Pain Follow Up:    Location of pain: back pain  Analgesia/pain control:    - Recent changes:  had a recent fall on back and scraped arm    - Overall control: Tolerable with discomfort    - Current treatments: Norcor   Adherence:     - Do you ever take more pain medicine than prescribed? No    - When did you take your last dose of pain medicine?  This morning   Adverse effects: No   PDMP Review         Value Time User    State PDMP site checked  Yes 7/23/2024 10:17 AM Nancy Tapia MD          Last CSA Agreement:   CSA -- Patient Level:     [Media Unavailable] Controlled Substance Agreement - Opioid - Scan on 7/25/2023  1:50 PM   [Media  Unavailable] Controlled Substance Agreement - Opioid - Scan on 4/5/2022 10:31 AM   [Media Unavailable] Controlled Substance Agreement - Opioid - Scan on 4/13/2021  9:32 AM   [Media Unavailable] Controlled Substance Agreement - Non - Opioid - Scan on 3/12/2020 11:46 AM: NON-OPIOID CONTROLLED SUBSTANCE AGREEMENT   [Media Unavailable] Controlled Substance Agreement - Opioid - Scan on 3/6/2020  9:44 AM: FOR 3-6-20 VISIT   [Media Unavailable] Controlled Substance Agreement - Opioid - Scan on 3/6/2020  9:41 AM       Last UDS: 8/2/2023          Hypothyroidism Follow-up    Since last visit, patient describes the following symptoms: Weight stable, no hair loss, no skin changes, no constipation, no loose stools    Wt Readings from Last 4 Encounters:   07/23/24 39.6 kg (87 lb 3.2 oz)   01/08/24 38.6 kg (85 lb)   12/12/23 40.3 kg (88 lb 12.8 oz)   07/25/23 40.8 kg (90 lb)       Edema feet   Stable on the lasix   Will not wear compression     GERD stable       Review of Systems  Constitutional, HEENT, cardiovascular, pulmonary, gi and gu systems are negative, except as otherwise noted.      Objective    BP (!) 150/60   Pulse 75   Temp 96.8  F (36  C) (Tympanic)   Resp 14   Wt 39.6 kg (87 lb 3.2 oz)   SpO2 95%   BMI 20.27 kg/m    Body mass index is 20.27 kg/m .  Physical Exam   GENERAL: alert and no distress  RESP: lungs clear to auscultation - no rales, rhonchi or wheezes  CV: regular rate and rhythm, normal S1 S2, no S3 or S4, no murmur, click or rub, no peripheral edema   MS: no gross musculoskeletal defects noted, no edema  PSYCH: mentation appears normal, affect normal/bright            Signed Electronically by: Nancy Tapia MD

## 2024-07-23 NOTE — LETTER
July 29, 2024      Nighat OSIRIS Gupta  [address]   [place] [address]        Dear ,    We are writing to inform you of your test results.      All labs are stable   Thyroid is inching up so I want to keep track of that   Plan to repeat labs in 3 months         Resulted Orders   Albumin Random Urine Quantitative with Creat Ratio   Result Value Ref Range    Creatinine Urine mg/dL 79.6 mg/dL      Comment:      The reference ranges have not been established in urine creatinine. The results should be integrated into the clinical context for interpretation.    Albumin Urine mg/L 50.8 mg/L      Comment:      The reference ranges have not been established in urine albumin. The results should be integrated into the clinical context for interpretation.    Albumin Urine mg/g Cr 63.82 (H) 0.00 - 25.00 mg/g Cr      Comment:      Microalbuminuria is defined as an albumin:creatinine ratio of 17 to 299 for males and 25 to 299 for females. A ratio of albumin:creatinine of 300 or higher is indicative of overt proteinuria.  Due to biologic variability, positive results should be confirmed by a second, first-morning random or 24-hour timed urine specimen. If there is discrepancy, a third specimen is recommended. When 2 out of 3 results are in the microalbuminuria range, this is evidence for incipient nephropathy and warrants increased efforts at glucose control, blood pressure control, and institution of therapy with an angiotensin-converting-enzyme (ACE) inhibitor (if the patient can tolerate it).     Comprehensive metabolic panel   Result Value Ref Range    Sodium 143 135 - 145 mmol/L    Potassium 4.6 3.4 - 5.3 mmol/L    Carbon Dioxide (CO2) 30 (H) 22 - 29 mmol/L    Anion Gap 12 7 - 15 mmol/L    Urea Nitrogen 23.4 (H) 8.0 - 23.0 mg/dL    Creatinine 1.06 (H) 0.51 - 0.95 mg/dL    GFR Estimate 51 (L) >60 mL/min/1.73m2      Comment:      eGFR calculated using 2021 CKD-EPI equation.    Calcium 9.8 8.8 - 10.4  mg/dL      Comment:      Reference intervals for this test were updated on 7/16/2024 to reflect our healthy population more accurately. There may be differences in the flagging of prior results with similar values performed with this method. Those prior results can be interpreted in the context of the updated reference intervals.    Chloride 101 98 - 107 mmol/L    Glucose 107 (H) 70 - 99 mg/dL    Alkaline Phosphatase 72 40 - 150 U/L    AST 32 0 - 45 U/L    ALT 12 0 - 50 U/L    Protein Total 6.9 6.4 - 8.3 g/dL    Albumin 4.2 3.5 - 5.2 g/dL    Bilirubin Total 0.2 <=1.2 mg/dL   TSH with free T4 reflex   Result Value Ref Range    TSH 24.27 (H) 0.30 - 4.20 uIU/mL   Urine Drug Confirmation Panel   Result Value Ref Range    Dihydrocodeine ng/mL 302 (H) <50 ng/mL    Dihydrocodeine 382 Absent ng/mg [creat]      Comment:      Hydromorphone and dihydrocodeine are expected metabolites of hydrocodone. Hydromorphone and dihydrocodeine are also available as scheduled prescription medications.    Hydrocodone ng/mL 1,240 (H) <50 ng/mL    Hydrocodone 1,570 Absent ng/mg [creat]      Comment:      Sources of hydrocodone include scheduled prescription medications.  Hydromorphone and dihydrocodeine are expected metabolites of hydrocodone. Hydromorphone and dihydrocodeine are also available as scheduled prescription medications.    Hydromorphone ng/mL 271 (H) <50 ng/mL    Hydromorphone 343 Absent ng/mg [creat]      Comment:      Hydromorphone may be present as a metabolite of morphine.  Concentrations of hydromorphone rarely exceed 5% of the morphine concentration when this is the source of hydromorphone.  Hydromorphone and dihydrocodeine are expected metabolites of hydrocodone. Hydromorphone and dihydrocodeine are also available as scheduled prescription medications.    Gabapentin (Neurontin) Present (A) Absent      Comment:      Sources of gabapentin are prescription medications.    Narrative    This test was developed and its  performance characteristics determined by the [facility],  Special [facility] Laboratory. It has not been cleared or approved by the FDA. The laboratory is regulated under CLIA as qualified to perform high-complexity testing. This test is used for clinical purposes. It should not be regarded as investigational or for research.    Drugs with concentrations less than the cutoff will not be reported.  The drugs with applicable detection cutoff limits that are included within the Drug Confirmation Panel are:    The following drugs are detected with a cutoff of 3 ng/ml: FENTANYL    The following drugs are detected with a cutoff of 5 ng/mL: 6-ACETYLMORPHINE, BUPRENORPHINE, NALOXONE, NORBUPRENORPHINE, NORFENTANYL    The following drugs are detected with a cutoff of 10 ng/mL: SUFENTANIL    The following drugs are detected with a cutoff of 20 ng/mL: PCP (PHENCYCLIDINE)    The following drugs are detected with a cutoff of 50 ng/mL: 7-AMINOCLONAZEPAM, 7-AMINOFLUNITRAZEPAM, ALPRAZOLAM, AMPHETAMINE, A-OH-ALPRAZOLAM, A-OH-MIDAZOLAM, A-OH-TRIAZOLAM, BENZOYLECGONINE (Cocaine Metabolite), CLONAZEPAM, COCAETHYLENE (Cocaine Metabolite), CODEINE, DIAZEPAM, DIHYDROCODEINE, EDDP (Methadone Metabolite), HYDROCODONE, HYDROMORPHONE, LORAZEPAM, MDA (3,4-Methylenedioxyamphetamine), MDEA (3,8-Cietjseamrswjm-O-ethylcathinone), MDMA (Methylenedioxyamphetamine,Ecstasy), MEPERIDINE, METHADONE, METHAMPHETAMINE, METHYLPHENIDATE (Ritalin), MORPHINE, NALTREXONE, N-DESMETH-TAPENTADOL, NORCODEINE, NORDIAZEPAM, NORMEPERIDINE, O-BERNADETTE-TRAMADOL, OXAZEPAM, OXYCODONE, OXYMORPHONE, PROPOXYPHENE, RITALINIC ACID, TAPENTADOL, TEMAZEPAM, THEBAINE, TRAMADOL    The following drugs are detected with a cutoff of 100 ng/mL: GABAPENTIN, KETAMINE    The following drugs are detected with a cutoff of 200 ng/mL: PREGABALIN, XYLAZINE     Urine Creatinine for Drug Screen Panel   Result Value Ref Range    Creatinine Urine for Drug Screen 79 mg/dL       Comment:      The reference range has not been established for creatinine in random urines. The results should be integrated into the clinical context for interpretation.   T4 free   Result Value Ref Range    Free T4 1.17 0.90 - 1.70 ng/dL       If you have any questions or concerns, please call the clinic at the number listed above.       Sincerely,      Nancy Tapia MD

## 2024-07-23 NOTE — LETTER

## 2024-07-23 NOTE — NURSING NOTE
"Chief Complaint   Patient presents with    Pain     Chronic pain     BP (!) 150/60   Pulse 75   Temp 96.8  F (36  C) (Tympanic)   Resp 14   Wt 39.6 kg (87 lb 3.2 oz)   SpO2 95%   BMI 20.27 kg/m   Estimated body mass index is 20.27 kg/m  as calculated from the following:    Height as of 1/8/24: 1.397 m (4' 7\").    Weight as of this encounter: 39.6 kg (87 lb 3.2 oz).  Patient presents to the clinic using [facility]      Health Maintenance that is potentially due pending provider review:    Health Maintenance Due   Topic Date Due    ZOSTER IMMUNIZATION (1 of 2) Never done    RSV VACCINE (Pregnancy & 60+) (1 - 1-dose 60+ series) Never done    Pneumococcal Vaccine: 65+ Years (2 of 2 - PCV) 12/20/2007    DTAP/TDAP/TD IMMUNIZATION (1 - Tdap) 10/04/2008    COVID-19 Vaccine (6 - 2023-24 season) 09/01/2023    MICROALBUMIN  04/17/2024    PRECIOUS ASSESSMENT  07/25/2024    ANNUAL REVIEW OF HM ORDERS  07/25/2024    CONTROLLED SUBSTANCE AGREEMENT FOR CHRONIC PAIN MANAGEMENT  07/25/2024    URINE DRUG SCREEN  07/30/2024    HEMOGLOBIN  07/30/2024                  "

## 2024-09-10 NOTE — DISCHARGE INSTRUCTIONS
Your CAT scans did not show any acute injuries.  You have multiple old fractures in your back.  An EKG and labs were recommended here, but you wanted to hold off. I recommend that you follow-up with your regular doctor to determine why you have been falling so much.  Make sure you are drinking plenty of fluids and take your time whenever you are standing up from a sitting or lying position.  Return to the ER with any new or worsening symptoms.

## 2024-09-10 NOTE — ED TRIAGE NOTES
Pt was making dinner and fell backwards, striking her head on the ground. No loc and not on blood thinners. Endorses headache, upper and lower back pain.     Triage Assessment (Adult)       Row Name 09/09/24 [id number]          Triage Assessment    Airway WDL WDL        Respiratory WDL    Respiratory WDL WDL        Skin Circulation/Temperature WDL    Skin Circulation/Temperature WDL WDL        Cardiac WDL    Cardiac WDL WDL        Peripheral/Neurovascular WDL    Peripheral Neurovascular WDL WDL        Cognitive/Neuro/Behavioral WDL    Cognitive/Neuro/Behavioral WDL WDL

## 2024-09-10 NOTE — ED NOTES
Pt returned from CT, resting comfortably @ this time, daughter @ bedside, awaiting results, warm blanket given, call light within reach, will continue to monitor and assist as needed.

## 2024-09-10 NOTE — ED PROVIDER NOTES
History     Chief Complaint   Patient presents with    Fall            HPI  Nighat Gupta is a 88 year old female who has hypothyroidism, hyperlipidemia, chronic pain, chronic kidney disease, osteoporosis, who presents for evaluation after a fall.  Patient states that she was making dinner when she fell backwards and struck her head on the ground.  She does not know why she fell.  She thinks that she may have passed out.  She states this is happened to her before but she has never seen a doctor for it.  She denied any prodromal symptoms.  Specifically denied having any chest pain, shortness of breath or lightheadedness.  She was able to scoot on her butt to reach her phone and call 911 on her own.  She is complaining of a headache and upper back pain.  She is not on blood thinners.  She does not think she lost consciousness when she struck her head.  She is also complaining of pain in her right hip.  No abdominal pain, no chest pain and no trouble breathing.  No weakness in the extremities.  No numbness or tingling.  No vision issues.  No nausea or vomiting.  Patient initially did not want anything done.  She did not want any imaging or labs done.  Eventually she did agree to having imaging, but did not want any labs done.    Allergies:  Allergies   Allergen Reactions    No Known Drug Allergy        Problem List:    Patient Active Problem List    Diagnosis Date Noted    Sacral insufficiency fracture, initial encounter 01/11/2024     Priority: Medium    Closed pelvic ring fracture, initial encounter (H) 01/11/2024     Priority: Medium    Age-related osteoporosis with current pathological fracture, initial encounter 01/11/2024     Priority: Medium    Major depressive disorder, recurrent episode, moderate (H) 01/03/2024     Priority: Medium    Microalbuminuria 04/17/2023     Priority: Medium    Chronic, continuous use of opioids 07/08/2022     Priority: Medium    Chronic kidney disease, stage 3a (H) 02/09/2022      Priority: Medium    Osteoarthritis, knee 11/12/2021     Priority: Medium    Hypoxemia 09/08/2020     Priority: Medium    Occult blood positive stool 09/07/2020     Priority: Medium    Pneumonia of right lower lobe due to infectious organism 09/06/2020     Priority: Medium    Anemia 09/06/2020     Priority: Medium    Chronic pain syndrome 01/07/2016     Priority: Medium     Patient is followed by Nancy Tapia MD for ongoing prescription of pain medication.  All refills should be approved by this provider, or covering partner.    Chronic back pain   Medication(s): 40.   Maximum quantity per month: norco   Clinic visit frequency required: Q 3 months     Controlled substance agreement:  Encounter-Level CSA - 4/4/16:               Controlled Substance Agreement - Scan on 4/8/2016 10:44 AM : CONTROLLED SUBSTANCE AGREEMENT 04/04/2016 (below)            Pain Clinic evaluation in the past: Yes    DIRE Total Score(s):  No flowsheet data found.    Last [facility] website verification:  December 2, 2016    [email or web address]        Chronic low back pain 09/16/2013     Priority: Medium    Lumbar radiculopathy 09/16/2013     Priority: Medium    Essential hypertension, benign 11/21/2011     Priority: Medium    Hyperlipidemia LDL goal <130 10/31/2010     Priority: Medium    Osteopenia 11/18/2008     Priority: Medium    Tobacco abuse 09/08/2008     Priority: Medium    Hypothyroidism 11/04/2005     Priority: Medium        Past Medical History:    Past Medical History:   Diagnosis Date    DEPRESSION W/ANXIETY 1980's    Essential hypertension, benign     Pure hypercholesterolemia     Unspecified hypothyroidism        Past Surgical History:    Past Surgical History:   Procedure Laterality Date    CATARACT IOL, RT/LT  2009    Cataract IOL RT/LT    COLONOSCOPY  2007    notable for diverticulosis    GYN SURGERY  04/2004    Oopherectomy - (R)    INJECT EPIDURAL LUMBAR  4/2/2012    Procedure:INJECT EPIDURAL LUMBAR; MORENA with  Kulwinder; Surgeon:GENERIC ANESTHESIA PROVIDER; Location:[place] OR    INJECT EPIDURAL LUMBAR  6/4/2012    Procedure:INJECT EPIDURAL LUMBAR; MORENA with Kulwinder; Surgeon:GENERIC ANESTHESIA PROVIDER; Location:[place] OR    INJECT EPIDURAL LUMBAR  6/18/2012    Procedure: INJECT EPIDURAL LUMBAR;  MORENA-Dr. Tapia;  Surgeon: Provider, Generic Anesthesia;  Location: [place] OR    OPEN REDUCTION INTERNAL FIXATION HIP BIPOLAR Left 11/12/2021    Procedure: LEFT HIP HEMIARTHROPLASTY;  Surgeon: Noah Bautista MD;  Location: [facility] Main OR       Family History:    Family History   Problem Relation Age of Onset    Heart Disease Mother     Lipids Mother     Hypertension Mother     Lipids Father     Hypertension Father     Hypertension Sister     Lipids Sister     Depression Daughter     Heart Disease Sister     Hypertension Sister     Depression Daughter        Social History:  Marital Status:   [5]  Social History     Tobacco Use    Smoking status: Every Day     Current packs/day: 0.25     Average packs/day: 0.3 packs/day for 50.0 years (12.5 ttl pk-yrs)     Types: Cigarettes    Smokeless tobacco: Never    Tobacco comments:     5 cigarettes daily   Vaping Use    Vaping status: Never Used   Substance Use Topics    Alcohol use: No    Drug use: No        Medications:    amLODIPine (NORVASC) 10 MG tablet  ASPIRIN 81 MG OR TABS  CALCIUM 500 + D 500-200 MG-IU OR TABS  DULoxetine (CYMBALTA) 60 MG capsule  furosemide (LASIX) 20 MG tablet  gabapentin (NEURONTIN) 300 MG capsule  [START ON 9/22/2024] HYDROcodone-acetaminophen (NORCO) 5-325 MG tablet  HYDROcodone-acetaminophen (NORCO) 5-325 MG tablet  levothyroxine (SYNTHROID/LEVOTHROID) 75 MCG tablet  lisinopril (ZESTRIL) 40 MG tablet  MULTIVITAMIN TABS   OR  pantoprazole (PROTONIX) 20 MG EC tablet  simvastatin (ZOCOR) 20 MG tablet  vitamin C (ASCORBIC ACID) 500 MG tablet          Review of Systems  See HPI  Physical Exam   BP: (!) 142/54  Pulse: 72  Temp: 97.7  F  (36.5  C)  Resp: 18  SpO2: 95 %      Physical Exam  /47   Pulse 67   Temp 97.7  F (36.5  C) (Oral)   Resp 18   SpO2 95%   General: alert, interactive, in no apparent distress  Head: atraumatic  Nose: no rhinorrhea or epistaxis  Ears: no external auditory canal discharge or bleeding.    Eyes: Sclera nonicteric. Conjunctiva noninjected. PERRL, EOMI  Mouth: no tonsillar erythema, edema, or exudate.  Moist mucous membranes  Neck: supple, moving spontaneously no midline cervical tenderness  Lungs: No increased work of breathing.  Clear to auscultation bilaterally.  CV: RRR, peripheral pulses palpable and symmetric  Abdomen: soft, nt, nd, no guarding or rebound.   Extremities: Warm and well-perfused.  Midline thoracic spine tender to palpation.  No step-offs or deformities.  Lumbar spine is nontender to palpation.  There is mild tenderness on range of motion of the right hip.  Small superficial abrasion on the left lower leg without any associated bony tenderness.  No obvious leg deformity.  The remainder of all 4 extremities are ranged and palpated and are normal.  Skin: no rash or diaphoresis  Neuro: CN II-XII grossly intact, strength 5/5 in UE and LEs bilaterally, sensation intact to light touch in UE and LEs bilaterally;     ED Course        Procedures             Critical Care time:  none             Results for orders placed or performed during the hospital encounter of 09/09/24 (from the past 24 hour(s))   Pelvis XR w/ unilateral hip right    Narrative    EXAM: XR PELVIS AND HIP RIGHT 1 VIEW  LOCATION: [facility]  DATE: 9/10/2024    INDICATION: Pain after a fall  COMPARISON: 1/8/2024      Impression    IMPRESSION: No evidence for acute fracture or dislocation involving the right femur or right hip. Minimal to moderate degenerative osteoarthrosis right hip. Stable healed posttraumatic deformity left pubic rami. Lumbar curvature convex to the right with   a lumbar laminectomy.  Left IRINEO. Atherosclerotic vascular calcification.   CT Head w/o Contrast    Narrative    EXAM: CT HEAD W/O CONTRAST  LOCATION: [facility]  DATE: 9/10/2024    INDICATION: Fall with head trauma and loss of consciousness  COMPARISON: 11/8/2021  TECHNIQUE: Routine CT Head without IV contrast. Multiplanar reformats. Dose reduction techniques were used.    FINDINGS:  INTRACRANIAL CONTENTS: No intracranial hemorrhage, extraaxial collection, or mass effect.  No CT evidence of acute infarct. Mild to moderate presumed chronic small vessel ischemic changes. Mild to moderate generalized volume loss. No hydrocephalus.     VISUALIZED ORBITS/SINUSES/MASTOIDS: No intraorbital abnormality. No paranasal sinus mucosal disease. No middle ear or mastoid effusion.    BONES/SOFT TISSUES: No acute abnormality.      Impression    IMPRESSION:  1.  No CT evidence for acute intracranial process.  2.  Brain atrophy and presumed chronic microvascular ischemic changes as above.   Shoulder XR, 2 view, right    Narrative    EXAM: XR SHOULDER RIGHT 2 VIEWS  LOCATION: [facility]  DATE: 9/10/2024    INDICATION: Pain after a fall  COMPARISON: None.      Impression    IMPRESSION: No definitive evidence for acute fracture or dislocation. Minimal degenerative changes right acromioclavicular joint right glenohumeral joint. Degenerative changes thoracic spine.   CT Cervical Spine w/o Contrast    Narrative    EXAM: CT CERVICAL SPINE W/O CONTRAST  LOCATION: [facility]  DATE: 09/10/2024    INDICATION: Fall with neck pain.  COMPARISON: None.  TECHNIQUE: Routine CT Cervical Spine without IV contrast. Multiplanar reformats. Dose reduction techniques were used.    FINDINGS:  VERTEBRA: Cervical vertebrae are normal in height. There are mild alterations in the lateral alignment, likely relating to degenerative changes. No fracture or posttraumatic subluxation.     CANAL/FORAMINA:  Advanced multilevel degenerative changes. There is mild central canal stenosis at the C3-C4 level. There is high-grade foraminal narrowing on the left at C3-C4, C4-C5, and C5-C6. There is high-grade foraminal narrowing on the right at   C4-C5, C5-C6, and C6-C7.    PARASPINAL: Dense atheromatous calcifications are demonstrated at the carotid bifurcations. 1.5 cm hyperattenuating lesion, likely submucosal in location, within the left hypopharynx.      Impression    IMPRESSION:  1.  No fracture or posttraumatic subluxation.  2.  Indeterminate nonspecific 1.5 cm hyperattenuating submucosal lesion in the left aspect of the hypopharynx. ENT consultation is recommended.  3.  Degenerative changes as highlighted above.    NOTE: ABNORMAL REPORT    THE DICTATION ABOVE DESCRIBES AN ABNORMALITY FOR WHICH FOLLOWUP IS NEEDED.    CT Thoracic Spine w/o Contrast    Narrative    EXAM: CT THORACIC SPINE W/O CONTRAST, CT LUMBAR SPINE W/O CONTRAST  LOCATION: [facility]  DATE: 09/10/2024    INDICATION: Pain after a fall.  COMPARISON: CT lumbar spine 12/19/2023.  TECHNIQUE:  1) Routine CT Thoracic Spine without IV contrast. Multiplanar reformats. Dose reduction techniques were used.   2) Routine CT Lumbar Spine without IV contrast. Multiplanar reformats. Dose reduction techniques were used.     FINDINGS:    THORACIC SPINE CT:  VERTEBRA: Multiple age-indeterminate compression fractures are demonstrated in the thoracic spine. This includes moderate to severe age-indeterminate compression fracture at T10, moderate age-indeterminate compression fractures at T7 and T8, and mild   age-indeterminate compression fractures at T3, T4, T5, T6, T9, and T11. Increased density at the levels of compression deformity favored to reflect a compressive effect.    CANAL/FORAMINA: Multilevel degenerative changes. Mild canal stenosis at T7-T8 and T9-T10. High-grade foraminal narrowing on the right at T7-T8, T8-T9, and T9-T10 and on  the left at T7-T8.    PARASPINAL: Multifocal atheromatous calcifications.      LUMBAR SPINE CT:  VERTEBRA: Mild chronic compression fracture along the superior endplate at the L1 level. Additional lumbar vertebrae are grossly normal in height. There are alterations in the lateral alignment, presumably relating to degenerative changes. There are   nonunited, potentially recent fractures of the left L4 and L5 transverse processes.     CANAL/FORAMINA: Advanced multilevel degenerative changes resulting in high-grade foraminal narrowing on the left from L2-L3 through L5-S1 and on the right at L5-S1. There is moderate severe central canal stenosis at L5-S1, L3-L4, and at L4-L5.    PARASPINAL: There are nonunited bilateral sacral wing fractures with the right-sided fracture being present on the prior study. There is increased density throughout the sacrum, presumably relating to a compressive effect. Questionable fluid-filled   structure or free fluid within the upper right hemipelvis, which is incompletely evaluated. There is also questionable bowel wall thickening in this region.        Impression    IMPRESSION:  THORACIC SPINE CT:  1.  Multiple age-indeterminate compression fractures. Consider further evaluation by MRI if not contraindicated.    LUMBAR SPINE CT:  1.  Age-indeterminate but potentially recent nonunited fractures involving the left L4 and L5 transverse processes.  2.  Chronic mild L1 superior endplate compression fracture.  3.  There are nonunited bilateral sacral fractures, which on the right was present previously. These are favored to be chronic or potentially subacute. A more recent fracture superimposed upon chronic changes would be difficult to completely exclude.   Increased density within the sacrum is favored to reflect a compressive effect in the setting of osteopenia.  4.  Suboptimal evaluation of the soft tissues in the pelvis demonstrates potential free fluid and bowel wall thickening.  Clinical correlation is advised and if indicated, consider dedicated CT of the abdomen and pelvis.    NOTE: ABNORMAL REPORT    THE DICTATION ABOVE DESCRIBES AN ABNORMALITY FOR WHICH FOLLOWUP IS NEEDED.    CT Lumbar Spine w/o Contrast    Narrative    EXAM: CT THORACIC SPINE W/O CONTRAST, CT LUMBAR SPINE W/O CONTRAST  LOCATION: [facility]  DATE: 09/10/2024    INDICATION: Pain after a fall.  COMPARISON: CT lumbar spine 12/19/2023.  TECHNIQUE:  1) Routine CT Thoracic Spine without IV contrast. Multiplanar reformats. Dose reduction techniques were used.   2) Routine CT Lumbar Spine without IV contrast. Multiplanar reformats. Dose reduction techniques were used.     FINDINGS:    THORACIC SPINE CT:  VERTEBRA: Multiple age-indeterminate compression fractures are demonstrated in the thoracic spine. This includes moderate to severe age-indeterminate compression fracture at T10, moderate age-indeterminate compression fractures at T7 and T8, and mild   age-indeterminate compression fractures at T3, T4, T5, T6, T9, and T11. Increased density at the levels of compression deformity favored to reflect a compressive effect.    CANAL/FORAMINA: Multilevel degenerative changes. Mild canal stenosis at T7-T8 and T9-T10. High-grade foraminal narrowing on the right at T7-T8, T8-T9, and T9-T10 and on the left at T7-T8.    PARASPINAL: Multifocal atheromatous calcifications.      LUMBAR SPINE CT:  VERTEBRA: Mild chronic compression fracture along the superior endplate at the L1 level. Additional lumbar vertebrae are grossly normal in height. There are alterations in the lateral alignment, presumably relating to degenerative changes. There are   nonunited, potentially recent fractures of the left L4 and L5 transverse processes.     CANAL/FORAMINA: Advanced multilevel degenerative changes resulting in high-grade foraminal narrowing on the left from L2-L3 through L5-S1 and on the right at L5-S1. There is moderate  severe central canal stenosis at L5-S1, L3-L4, and at L4-L5.    PARASPINAL: There are nonunited bilateral sacral wing fractures with the right-sided fracture being present on the prior study. There is increased density throughout the sacrum, presumably relating to a compressive effect. Questionable fluid-filled   structure or free fluid within the upper right hemipelvis, which is incompletely evaluated. There is also questionable bowel wall thickening in this region.        Impression    IMPRESSION:  THORACIC SPINE CT:  1.  Multiple age-indeterminate compression fractures. Consider further evaluation by MRI if not contraindicated.    LUMBAR SPINE CT:  1.  Age-indeterminate but potentially recent nonunited fractures involving the left L4 and L5 transverse processes.  2.  Chronic mild L1 superior endplate compression fracture.  3.  There are nonunited bilateral sacral fractures, which on the right was present previously. These are favored to be chronic or potentially subacute. A more recent fracture superimposed upon chronic changes would be difficult to completely exclude.   Increased density within the sacrum is favored to reflect a compressive effect in the setting of osteopenia.  4.  Suboptimal evaluation of the soft tissues in the pelvis demonstrates potential free fluid and bowel wall thickening. Clinical correlation is advised and if indicated, consider dedicated CT of the abdomen and pelvis.    NOTE: ABNORMAL REPORT    THE DICTATION ABOVE DESCRIBES AN ABNORMALITY FOR WHICH FOLLOWUP IS NEEDED.        Medications   acetaminophen (TYLENOL) tablet 1,000 mg (1,000 mg Oral $Given 9/10/24 0018)       Assessments & Plan (with Medical Decision Making)     I have reviewed the nursing notes.    I have reviewed the findings, diagnosis, plan and need for follow up with the patient.          Medical Decision Making  Nighat Gupta is a 88 year old female who has hypothyroidism, hyperlipidemia, chronic pain, chronic  kidney disease, osteoporosis, who presents for evaluation after a fall.  Vital signs reviewed notable for hypertension, otherwise reassuring.  Patient unclear why she fell.  Sounds like she may have had a syncopal episode and has had these in the past that have not been worked up.  I recommended EKG and labs, but patient declined.  She initially declined any imaging as well.  We discussed the risks and benefits of this.  She was agreeable to proceed with imaging, but still did not want labs or imaging.  I explained to her that I cannot determine why she passed out without any further workup and she understood this and still did not want labs or EKG.    All the patient's imaging was reviewed.  X-rays of the shoulder and pelvis are negative.  X-ray of the head and neck is negative.  She has numerous age-indeterminate compression fractures of her thoracic spine.  No old thoracic imaging to compare.  Her lumbar CT shows transverse process fractures of L4 and L5 and an L1 compression fracture that appears to be old.  When I reassessed the patient she was sleeping on her side.  I woke her up and she reported feeling much better.  Denied having any pain.  I was able to sit her up and palpate her entire spine and she had no tenderness on palpation of her thoracic or lumbar spine.  Suspect likely chronic fractures.  Patient was able to ambulate around the ER with a walker without any difficulty.  There is no indication for hospitalization.  Patient lives alone and her daughter is comfortable taking her home.  I explained that I had recommended workup for her falling and I recommended they get this done as an outpatient.  I also explained the incidental findings on the CT and have placed a nonemergent ENT referral.  Anticipatory guidance discussed.  Return precautions discussed.        Discharge Medication List as of 9/10/2024  3:10 AM          Final diagnoses:   Fall, initial encounter   Abnormal CT scan - 1.5 cm lesion in  the hypopharynx       9/9/2024   [facility] EMERGENCY DEPT       Ambrose Kohli MD  09/10/24 0326

## 2024-09-11 NOTE — TELEPHONE ENCOUNTER
Dr. Tapia:    Please see my chart, can you order muscle relaxer? Seen in ED for fall, please advise, thank you.      RICHARD Dupont

## 2024-09-12 NOTE — TELEPHONE ENCOUNTER
Patients daughter Jenn calling. No Consent to Communicate on File. Patient was present for phone call.     Nighat was in the ED for a fall. No new fractures. However patient has been experiencing an increase in pain. Jenn was thinking that maybe a muscle relaxer would help patient however Nighat doesn't want that. She would like Dr. Tapia to increase her Norco.     Please advise.     Asking for a call back to Jenn (ok per patient) at [phone]

## 2024-09-12 NOTE — TELEPHONE ENCOUNTER
Request was for muscle relaxor yesterday which I sent   So I would not do both if they want to take an extra norco at lunch that is fine for a week or so but would not do both the flexeril and the norco unless someone was with her at all times

## 2024-09-12 NOTE — TELEPHONE ENCOUNTER
Routing to PCP as FYI    Pt decided to take 1 extra Norco at lunch and not take the flexeril.  Plan is to do that for just 1 week.    Cailin HEDRICK RN  [facility]

## 2024-09-12 NOTE — TELEPHONE ENCOUNTER
"Routing to PCP for pain medication consideration.    Pt fell 9/9 and seen in ER.  No new but old fractures found.  Daughter states pt is requesting increase in pain meds.  Pt tells daughter \"I can't get around.  I ache all over.\"  Currently pt takes 1 1/2 norco in AM and PM.  Also taking 800 mg Ibuprofen TID.  Daughter requesting to increase Norco to adding a pill at lunch time \"for a short while.\"  Please advise.    Cailin HEDRICK RN  [facility]    "

## 2024-09-15 PROBLEM — R60.0 LEG EDEMA, RIGHT: Status: ACTIVE | Noted: 2024-01-01

## 2024-09-15 PROBLEM — I35.0 AORTIC STENOSIS: Status: ACTIVE | Noted: 2024-01-01

## 2024-09-15 PROBLEM — S22.41XA CLOSED FRACTURE OF MULTIPLE RIBS OF RIGHT SIDE: Status: ACTIVE | Noted: 2024-01-01
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
## 2024-09-15 PROBLEM — S32.599A: Status: ACTIVE | Noted: 2024-01-01

## 2024-09-15 PROBLEM — R09.02 HYPOXEMIA: Status: RESOLVED | Noted: 2020-09-08 | Resolved: 2024-01-01

## 2024-09-15 PROBLEM — K21.9 ESOPHAGEAL REFLUX: Status: ACTIVE | Noted: 2024-01-01

## 2024-09-15 PROBLEM — R01.1 HEART MURMUR: Status: ACTIVE | Noted: 2024-01-01

## 2024-09-15 PROBLEM — J18.9 PNEUMONIA OF RIGHT LOWER LOBE DUE TO INFECTIOUS ORGANISM: Status: RESOLVED | Noted: 2020-09-06 | Resolved: 2024-01-01

## 2024-09-15 NOTE — MEDICATION SCRIBE - ADMISSION MEDICATION HISTORY
Medication Scribe Admission Medication History    Admission medication history is complete. The information provided in this note is only as accurate as the sources available at the time of the update.    Information Source(s): Family member and CareEverywhere/[employer] via  with patient's daughter in room with patient and finished at desk.    Pertinent Information: Patient's daughter states that dispenses medicine to the patient.  Patient is still smoking a 1/2 pack of cigs per day.  Smoking since childhood.  She takes her medicines at 0500, noon and 7 pm.  Instead of tid dosing for Norco, she is getting 1.5 tabs bid per her sleep and awake schedule.  Cyclobenzaprine was filled, but daughter never gave her any of it as she was concerned with patient already having falling issues.    Changes made to PTA medication list:  Added: Acetaminophen 500 mg, Ibuprofen 200 mg, Gabapentin 300 mg  tid dosing, Norco 1.5 tabs bid.  Deleted: Gabapentin combined dosing, Norco 1 tab tid current script(has a second tid script for future use).  Changed: Simvastatin from taking at hs to noon.    Allergies reviewed with patient and updates made in EHR: yes, no allergies.    Medication History Completed By: Yumiko Lemus 9/15/2024 2:39 PM    PTA Med List   Medication Sig Last Dose    acetaminophen (TYLENOL) 500 MG tablet Take 500 mg by mouth 2 times daily. 5 AM and Noon 9/15/2024 at noon    acetaminophen (TYLENOL) 500 MG tablet Take 1,000 mg by mouth at bedtime. 9/14/2024 at 1900    amLODIPine (NORVASC) 10 MG tablet Take 1 tablet (10 mg) by mouth daily 9/15/2024 at noon    ASPIRIN 81 MG OR TABS Take 81 mg by mouth daily  9/15/2024 at noon    CALCIUM 500 + D 500-200 MG-IU OR TABS Take 1 tablet by mouth daily  9/15/2024 at noon    cyclobenzaprine (FLEXERIL) 5 MG tablet Take 1 tablet (5 mg) by mouth 2 times daily as needed for muscle spasms. filled at never took any    DULoxetine (CYMBALTA) 60 MG capsule Take 1 capsule (60 mg) by  mouth daily 9/15/2024 at 0500    furosemide (LASIX) 20 MG tablet Take 1 tablet (20 mg) by mouth daily 9/15/2024 at 0500    gabapentin (NEURONTIN) 300 MG capsule Take 300 mg by mouth 2 times daily. 5 AM and Noon. 9/15/2024 at noon    gabapentin (NEURONTIN) 300 MG capsule Take 600 mg by mouth every evening. At 7 PM 9/14/2024 at 1900    HYDROcodone-acetaminophen (NORCO) 5-325 MG tablet Take 1.5 tablets by mouth 2 times daily. 5 AM and 7 PM 9/15/2024 at 0500    ibuprofen (ADVIL/MOTRIN) 200 MG tablet Take 800 mg by mouth 3 times daily. 9/15/2024 at noon    levothyroxine (SYNTHROID/LEVOTHROID) 75 MCG tablet Take 1 tablet (75 mcg) by mouth daily 9/15/2024 at noon    lisinopril (ZESTRIL) 40 MG tablet Take 1 tablet (40 mg) by mouth daily 9/15/2024 at noon    MULTIVITAMIN TABS   OR Take 1 tablet by mouth daily  9/15/2024 at noon    pantoprazole (PROTONIX) 20 MG EC tablet Take 2 tablets (40 mg) by mouth daily 9/15/2024 at noon    simvastatin (ZOCOR) 20 MG tablet Take 1 tablet (20 mg) by mouth at bedtime 9/15/2024 at noon    vitamin C (ASCORBIC ACID) 500 MG tablet Take 1,000 mg by mouth daily 9/15/2024 at noon

## 2024-09-15 NOTE — ED TRIAGE NOTES
Pt fell last night at approx 22:00 lives by herself, complaining of right hip  pain, does use a walker, here with her daughter  no LOC     Triage Assessment (Adult)       Row Name 09/15/24 1231          Triage Assessment    Airway WDL WDL        Respiratory WDL    Respiratory WDL WDL        Skin Circulation/Temperature WDL    Skin Circulation/Temperature WDL WDL        Cardiac WDL    Cardiac WDL WDL        Peripheral/Neurovascular WDL    Peripheral Neurovascular WDL WDL        Cognitive/Neuro/Behavioral WDL    Cognitive/Neuro/Behavioral WDL WDL

## 2024-09-15 NOTE — H&P
"[facility]    History and Physical - Hospitalist Service       Date of Admission:  9/15/2024    Assessment & Plan      Nighat Gupta is a 88 year old female admitted on 9/15/2024. She has a past medical history significant for chronic kidney disease, chronic anemia, aortic stenosis, hypothyroidism, hypertension, dyslipidemia, chronic back pain / lumbar radiculopathy, depression, osteopenia, and tobacco use disorder who presented to the emergency department for evaluation of groin pain after a fall and was found to have acute pubic rami fractures, sacral insufficiency fractures, and right sided rib fractures.     Closed fractures of pubic rami  Closed fracture of multiple ribs of right side  Sacral insufficiency fracture  Lumbar radiculopathy / chronic back pain  Patient has multiple acute fractures related to her fall on 9/14 (although also had a fall on 9/10, see below), now with difficulty ambulating or getting up due to pain. Has chronic back pain at baseline managed prior to admission with scheduled acetaminophen, scheduled gabapentin (300 mg bid, plus 600 mg qpm), scheduled ibuprofen (800 mg tid), scheduled Norco bid, and prn Flexeril.    Chest x-ray: \"Patient rotated to the left. Cardiac silhouette size is within normal limits. Linear cicatricial changes versus subsegmental atelectasis within the right midlung. There appears to be a small right pleural effusion. Left lung appears clear. Calcified atherosclerotic changes of the thoracic aorta. New fractures of the lateral aspects of the right seventh through ninth ribs. Moderate compression of several mid to lower thoracic vertebral bodies.\"    Right femur x-ray: \"Anatomic alignment right femur. No definitive acute displaced right femur fracture. Degenerative change in the right hip and right knee with knee chondrocalcinosis. No change in the small sclerotic lesion supra-acetabular right ilium. Arterial calcification. " "Horizontally oriented lucencies in the distal femoral and proximal tibial metaphyses do not appear to be acute. No sizable right knee joint effusion. Probable displaced fracture of the superior pubic ramus/right superior pubic root suspected. Questionable inferior pubic ramus fracture on the right. Correlation with pelvis radiographic exam recommended.\"    CT abdomen & pelvis: \"1.  Multiple chronic pelvic fractures including both sides of the symphysis pubis, left superior and inferior pubic rami. There is a more acute appearing impaction fracture at the junction of the right superior pubic ramus and acetabulum. 2.  Chronic bilateral sacral insufficiency fractures. 3.  Advanced lumbar spondylosis without spondylolisthesis. 4.  Sigmoid diverticulosis but no diverticulitis. 5.  Diffuse body wall and mesenteric edema.\"    Case discussed between emergency department and on-call ortho team.  - Ortho consult for trauma evaluation and ambulation recommendations, but no surgery is anticipated at this time  - Rib fracture order set utilized  - Analgesia: continued home scheduled gabapentin, ibuprofen, and Norco. Utilized analgesia recommendations from rib fracture order set for additional scheduled acetaminophen, prn oxycodone, and pry dilaudid  - PT/OT evaluations  - Care Transitions consult, may need placement    Fall, subsequent encounter  Patient with two recent falls on 9/10/24 and 9/14/24. She had a possible syncopal episode on 9/10 and was evaluated in the emergency department, no specific etiology was identified although patient declined some recommended evaluations for full work-up (refer to note on 9/10/24). She fell again on 9/14 while at home in the bathroom; she thinks she slipped on her pajamas and landed between the toilet and the wall. No head trauma or loss of consciousness, but was unable to get up and ambulate on her own due to right groin pain, found to have pubic rami, sacral insufficiency, and rib " "fractures as noted above.     Low concern for syncopal episode on 9/14, although unclear etiology of 9/10 fall.   - Therapy evaluations as noted above  - Orthostatic vitals     Mesenteric edema   Incidental finding on CT abdomen & pelvis -  \"5.  Diffuse body wall and mesenteric edema.\" Patient does have a distended abdomen, but is non-tender on exam. She mentions chronic constipation and feels bloated, but otherwise no abdominal symptoms. No significant stool burden is noted on CT. CT findings may represent anasarca and she does appear to be hypervolemic, although patient appears stable from a cardiac standpoint.  - Continue patient's home furosemide dosing     Bilateral lower extremity edema, right > left   Has chronic lower extremity edema at baseline managed with furosemide 20 mg daily. However, right lower extremity more edematous than left on exam. Right lower extremity ultrasound was negative for DVT.   - See above regarding furosemide    Leukocytosis   Initial WBC 15.1, ANC 12.7. Afebrile. No evidence of infection by history or exam. Possibly stress response to trauma.  - CBC in am   - Monitor for fevers or evidence of infection, but no indication for antibiotics at this time    Chronic anemia   Initial hemoglobin 8.5, baseline 8.9 - 9.2. No evidence of acute bleeding. Hemodynamically stable.   - Stable    Chronic kidney disease, stage 3a  Admit creatinine 0.98 (baseline 0.89 - 1.06), GFR 56 (baseline 51 - 63).   - Stable    Essential hypertension, benign  Managed prior to admission with amlodipine 10 mg daily, lisinopril 40 mg daily, and furosemide 20 mg daily. Blood pressure stable at time of admission.   - Continue amlodipine and lisinopril with holding parameters  - See above regarding furosemide    Hyperlipidemia LDL goal <130  Managed prior to admission with Zocor 20 mg q hs, continue.     Hypothyroidism  Admission TSH elevated (33.56), although free T4 WNL (0.93). Managed prior to admission with " "levothyroxine 75 mcg daily, continue.     GERD   Managed prior to admission with Protonix 40 mg daily, continue.     Osteopenia  Previously diagnosed. Does not appear to be on bisphosphonate therapy. Likely contributes to patient's current fractures.   - Defer to PCP     Major depressive disorder, recurrent episode, moderate   Stable mood. Managed prior to admission with Cymbalta 60 mg daily, continue.     Aortic stenosis  Murmur noted on exam. Echo in 2023 shows moderate aortic stenosis and +1 aortic regurgitation.    Tobacco use disorder  Patient smokes 4 cigarettes in the morning and 2 at night. Would like a nicotine patch.  - Nicotine 7mg patch available          Diet: Regular Diet Adult    DVT Prophylaxis: Pneumatic Compression Devices  Castle Catheter: Not present  Lines: None     Cardiac Monitoring: None  Code Status:  DNR/DNI - discussed at time of admission     Clinically Significant Risk Factors Present on Admission                # Drug Induced Platelet Defect: home medication list includes an antiplatelet medication   # Hypertension: Noted on problem list    # Anemia: based on hgb <11      Disposition Plan     Medically Ready for Discharge: Anticipated in 2-4 Days         The patient's care was discussed with the Attending Physician, Dr. Adria Herman and Patient.    Talita Luciano PA-C  Hospitalist Service  [facility]  Securely message with [email or web address] (more info)  Text page via [facility] Paging/Directory     ______________________________________________________________________    Chief Complaint   \"I fell and it hurts to walk.\"    History is obtained from the patient, review of EMR, and emergency department documentation    History of Present Illness   Nighat Gupta is a 88 year old female with a past medical history significant for chronic kidney disease, chronic anemia, aortic stenosis, hypothyroidism, hypertension, dyslipidemia, chronic back pain / lumbar radiculopathy, " "depression, osteopenia, and tobacco use disorder who presented to the emergency department for evaluation of groin pain after a fall and was found to have acute pubic rami fractures, sacral insufficiency fractures, and right sided rib fractures.     Patient has had two recent falls in the past week.  She lives alone independently in a senior living apartment, but has a daughter who lives nearby and is involved.  The first fall occurred five days ago (9/10) and patient fell \"out of nowhere.\"   She was seen in the emergency department on that day and no obvious cause was identified, although she had declined getting blood work or an EKG at that time.   She agreed to imaging, and no obvious acute fractures were identified.  She was aware of the risks of an incomplete work-up and was discharged home.     The second fall occurred last night (9/14) around 10pm.   Patient was in the bathroom and thinks she slipped on her pajamas, and landed on the floor between the toilet and the wall.  She denies hitting her head, no loss of consciousness.  She was unable to get up on her own due to pain, but was able to crawl to the living room and get her cell phone to call her daughter.  Her daughter came to help her up and patient slept at home overnight, but due to pain with ambulating (specifically in the right groin and low back), patient was brought to the emergency department today for evaluation.  Emergency department work-up revealed multiple acute pubic rami fractures, sacral insufficiency fractures, and right sided rib fractures (lateral 7-9th ribs).     Patient denies pain other than her right groin and low back. She has no chest wall pain when at rest, but does feel some mild pain in her lateral right ribs when she uses her arms to get up from a chair.  No respiratory changes, cough, wheeze, shortness of breath.  No headache, vision changes.   She does feel dizzy at times, but wasn't dizzy prior to either fall.     She has " chronic lower extremity edema, right worse than left - stable compared to baseline.  She has chronic constipation and feels bloated, but no abdominal pain.  She is feeling generally weak.     The remainder review of systems is negative.       Past Medical History    Past Medical History:   Diagnosis Date    DEPRESSION W/ANXIETY 09/01/1980    Essential hypertension, benign     Pneumonia of right lower lobe due to infectious organism 09/06/2020    Pure hypercholesterolemia     Unspecified hypothyroidism        Past Surgical History   Past Surgical History:   Procedure Laterality Date    CATARACT IOL, RT/LT  2009    Cataract IOL RT/LT    COLONOSCOPY  2007    notable for diverticulosis    GYN SURGERY  04/2004    Oopherectomy - (R)    INJECT EPIDURAL LUMBAR  4/2/2012    Procedure:INJECT EPIDURAL LUMBAR; MORENA with Flouro--; Surgeon:GENERIC ANESTHESIA PROVIDER; Location:[facility] OR    INJECT EPIDURAL LUMBAR  6/4/2012    Procedure:INJECT EPIDURAL LUMBAR; MORENA with Flouro--; Surgeon:GENERIC ANESTHESIA PROVIDER; Location:[facility] OR    INJECT EPIDURAL LUMBAR  6/18/2012    Procedure: INJECT EPIDURAL LUMBAR;  MORENA-Dr. Tapia;  Surgeon: Provider, Generic Anesthesia;  Location: [facility] OR    OPEN REDUCTION INTERNAL FIXATION HIP BIPOLAR Left 11/12/2021    Procedure: LEFT HIP HEMIARTHROPLASTY;  Surgeon: Noah Bautista MD;  Location: [facility] Main OR       Prior to Admission Medications   Prior to Admission Medications   Prescriptions Last Dose Informant Patient Reported? Taking?   ASPIRIN 81 MG OR TABS 9/15/2024 at noon Daughter Yes Yes   Sig: Take 81 mg by mouth daily    CALCIUM 500 + D 500-200 MG-IU OR TABS 9/15/2024 at noon Daughter Yes Yes   Sig: Take 1 tablet by mouth daily    DULoxetine (CYMBALTA) 60 MG capsule 9/15/2024 at 0500 Daughter No Yes   Sig: Take 1 capsule (60 mg) by mouth daily   HYDROcodone-acetaminophen (NORCO) 5-325 MG tablet future use at start 9/22/24 or after Daughter No No   Sig: Take 1 tablet by  mouth 3 times daily Max tab is 3 per day one pill at a time   HYDROcodone-acetaminophen (NORCO) 5-325 MG tablet 9/15/2024 at 0500  Yes Yes   Sig: Take 1.5 tablets by mouth 2 times daily. 5 AM and 7 PM   MULTIVITAMIN TABS   OR 9/15/2024 at noon Daughter Yes Yes   Sig: Take 1 tablet by mouth daily    acetaminophen (TYLENOL) 500 MG tablet 9/15/2024 at noon  Yes Yes   Sig: Take 500 mg by mouth 2 times daily. 5 AM and Noon   acetaminophen (TYLENOL) 500 MG tablet 9/14/2024 at 1900  Yes Yes   Sig: Take 1,000 mg by mouth at bedtime.   amLODIPine (NORVASC) 10 MG tablet 9/15/2024 at noon Daughter No Yes   Sig: Take 1 tablet (10 mg) by mouth daily   cyclobenzaprine (FLEXERIL) 5 MG tablet filled at never took any Daughter No Yes   Sig: Take 1 tablet (5 mg) by mouth 2 times daily as needed for muscle spasms.   furosemide (LASIX) 20 MG tablet 9/15/2024 at 0500 Daughter No Yes   Sig: Take 1 tablet (20 mg) by mouth daily   gabapentin (NEURONTIN) 300 MG capsule 9/15/2024 at noon  Yes Yes   Sig: Take 300 mg by mouth 2 times daily. 5 AM and Noon.   gabapentin (NEURONTIN) 300 MG capsule 9/14/2024 at 1900  Yes Yes   Sig: Take 600 mg by mouth every evening. At 7 PM   ibuprofen (ADVIL/MOTRIN) 200 MG tablet 9/15/2024 at noon  Yes Yes   Sig: Take 800 mg by mouth 3 times daily.   levothyroxine (SYNTHROID/LEVOTHROID) 75 MCG tablet 9/15/2024 at noon Daughter No Yes   Sig: Take 1 tablet (75 mcg) by mouth daily   lisinopril (ZESTRIL) 40 MG tablet 9/15/2024 at noon Daughter No Yes   Sig: Take 1 tablet (40 mg) by mouth daily   pantoprazole (PROTONIX) 20 MG EC tablet 9/15/2024 at noon Daughter No Yes   Sig: Take 2 tablets (40 mg) by mouth daily   simvastatin (ZOCOR) 20 MG tablet 9/15/2024 at noon Daughter No Yes   Sig: Take 1 tablet (20 mg) by mouth at bedtime   Patient taking differently: Take 20 mg by mouth daily.   vitamin C (ASCORBIC ACID) 500 MG tablet 9/15/2024 at noon Daughter Yes Yes   Sig: Take 1,000 mg by mouth daily       Facility-Administered Medications: None        Review of Systems    The 10 point Review of Systems is negative other than noted in the HPI or here.     Social History   I have reviewed this patient's social history and updated it with pertinent information if needed.  Social History     Tobacco Use    Smoking status: Every Day     Current packs/day: 0.25     Average packs/day: 0.3 packs/day for 50.0 years (12.5 ttl pk-yrs)     Types: Cigarettes    Smokeless tobacco: Never    Tobacco comments:     5 cigarettes daily   Vaping Use    Vaping status: Never Used   Substance Use Topics    Alcohol use: No    Drug use: No         Family History   I have reviewed this patient's family history and updated it with pertinent information if needed.  Family History   Problem Relation Age of Onset    Heart Disease Mother     Lipids Mother     Hypertension Mother     Lipids Father     Hypertension Father     Hypertension Sister     Lipids Sister     Depression Daughter     Heart Disease Sister     Hypertension Sister     Depression Daughter         Physical Exam   Vital Signs: Temp: 98.1  F (36.7  C) Temp src: Oral BP: 130/54 Pulse: 64   Resp: 20 SpO2: 98 % O2 Device: None (Room air)    Weight: 90 lbs 9.6 oz    Constitutional: Alert, oriented, cooperative. No apparent distress, appears nontoxic. Speaking in full coherent sentences.     Eyes: Eyes are clear, pupils are reactive. No scleral icterus.    HEENT: Oropharynx is clear and moist, no lesions. Normocephalic, no evidence of cranial trauma.      Cardiovascular: Regular rhythm and rate, normal S1 and S2. Known harsh systolic 4/6 murmur, no rubs or gallops. Peripheral pulses intact bilaterally. Bilateral pitting +3 lower extremity edema, right > left.    Respiratory: Non-labored breathing on room air. Lung sounds are clear to auscultation bilaterally without wheezes, rhonchi, or crackles.    GI: Soft but distended. Non-tender, no rebound or guarding. No hepatosplenomegaly or  masses appreciated. Hyperactive bowel sounds.     Musculoskeletal: Kyphosis and possible scoliosis noted, otherwise without obvious deformity. Diminished muscle bulk and tone. Distal CMS intact.      Skin: Warm and dry, no rashes. Scattered ecchymoses present. Superficial abrasion present on left knee. No mottling of skin.      Neurologic: Patient moves all extremities. Gross strength and sensation are equal bilaterally.    Genitourinary: Deferred      Medical Decision Making       95 MINUTES SPENT BY ME on the date of service doing chart review, history, exam, documentation & further activities per the note.  MANAGEMENT DISCUSSED with the following over the past 24 hours: Dr. Adria Herman.   NOTE(S)/MEDICAL RECORDS REVIEWED over the past 24 hours: emergency department note 9/10/24, emergency department notes 9/15/24  Tests ORDERED & REVIEWED in the past 24 hours:  - BMP  - CBC  - BNP  - TSH / T4       Data     I have personally reviewed the following data over the past 24 hrs:    15.1 (H)  \   8.5 (L)   / 460 (H)     142 103 30.0 (H) /  129 (H)   3.6 28 0.98 (H) \     Trop: N/A BNP: 1,286     TSH: 33.56 (H) T4: 0.93 A1C: N/A       Imaging results reviewed over the past 24 hrs:   Recent Results (from the past 24 hour(s))   US Lower Extremity Venous Duplex Right    Narrative    EXAM: ULTRASOUND LOWER EXTREMITY VENOUS DUPLEX RIGHT  LOCATION: [facility]  DATE: 09/15/2024    INDICATION: Edema right leg.  COMPARISON: None.  TECHNIQUE: Venous Duplex ultrasound of the right lower extremity with and without compression, augmentation and duplex. Color flow and spectral Doppler with waveform analysis performed.    FINDINGS: Exam includes the common femoral, femoral, popliteal, and contralateral common femoral veins as well as segmentally visualized deep calf veins and greater saphenous vein.     RIGHT: No deep vein thrombosis. No superficial thrombophlebitis. No popliteal cyst.      Impression     IMPRESSION:  1.  No deep venous thrombosis in the right lower extremity.       XR Femur Right 2 Views    Narrative    EXAM: XR FEMUR RIGHT 2 VIEWS  LOCATION: [facility]  DATE: 09/15/2024    INDICATION: Right thigh pain.  COMPARISON: Pelvis radiographic exam 01/8/2024.      Impression    IMPRESSION: Anatomic alignment right femur. No definitive acute displaced right femur fracture. Degenerative change in the right hip and right knee with knee chondrocalcinosis. No change in the small sclerotic lesion supra-acetabular right ilium.   Arterial calcification. Horizontally oriented lucencies in the distal femoral and proximal tibial metaphyses do not appear to be acute. No sizable right knee joint effusion.    Probable displaced fracture of the superior pubic ramus/right superior pubic root suspected. Questionable inferior pubic ramus fracture on the right. Correlation with pelvis radiographic exam recommended.    NOTE: ABNORMAL REPORT    THE DICTATION ABOVE DESCRIBES AN ABNORMALITY FOR WHICH FOLLOW-UP IS NEEDED.        Chest XR,  PA & LAT    Narrative    EXAM: XR CHEST 2 VIEWS  LOCATION: [facility]  DATE: 9/15/2024    INDICATION: Syncope  COMPARISON: Portable chest x-ray 9/6/2020      Impression    IMPRESSION: Patient rotated to the left. Cardiac silhouette size is within normal limits. Linear cicatricial changes versus subsegmental atelectasis within the right midlung. There appears to be a small right pleural effusion. Left lung appears clear.   Calcified atherosclerotic changes of the thoracic aorta. New fractures of the lateral aspects of the right seventh through ninth ribs. Moderate compression of several mid to lower thoracic vertebral bodies.   Abd/pelvis CT - no contrast - Stone Protocol    Narrative    EXAM: CT ABDOMEN PELVIS W/O CONTRAST  LOCATION: [facility]  DATE: 9/15/2024    INDICATION: fall, eval for fracture  pelvis.  COMPARISON: Radiographs of the pelvis 9/10/2024  TECHNIQUE: CT scan of the abdomen and pelvis was performed without IV contrast. Multiplanar reformats were obtained. Dose reduction techniques were used.  CONTRAST: None.    FINDINGS:   LOWER CHEST: Focal atelectasis along the anterior right minor fissure and towards the right posterior costophrenic sulcus. There is a trace right pleural effusion which layers dependently. Severe calcification of the aortic root and   calcification/thickening of aortic valve leaflets. Patchy calcified plaque in the imaged descending thoracic aorta. There are chronic appearing bilateral lower rib fractures.    HEPATOBILIARY: Within normal limits.    PANCREAS: Normal.    SPLEEN: Normal.    ADRENAL GLANDS: Normal.    KIDNEYS/BLADDER: Kidneys are normal in size. No hydronephrosis or nephrolithiasis.    BOWEL: Sigmoid diverticulosis. No dilated bowel or bowel wall thickening. Diffuse gray attenuation of the mesenteric and subcutaneous fat consistent with soft tissue edema.    LYMPH NODES: No lymphadenopathy.    VASCULATURE: Moderate aortoiliac atheromatous calcifications.    PELVIC ORGANS: Uterus is normal in size.    MUSCULOSKELETAL: Unipolar left hip arthroplasty. No periprosthetic fracture. Chronic bilateral sacroiliac insufficiency fractures are present with mixed lucency and sclerosis. There are chronic fracture deformities of the pelvis including both sides of   the symphysis pubis, the left inferior and superior pubic rami. There is a more acute appearing impaction type fracture at the junction of the right superior pubic ramus and acetabulum (series 3, image 147). Rightward convexity curvature of the lumbar   spine centered on L3-L4. Chronic anterior wedge compression deformity of L1. Severe disc space narrowing and vacuum disc phenomenon throughout the lumbar spine and moderate degenerative osteophytes. Diffuse lumbar facet arthropathy.      Impression    IMPRESSION:      1.  Multiple chronic pelvic fractures including both sides of the symphysis pubis, left superior and inferior pubic rami. There is a more acute appearing impaction fracture at the junction of the right superior pubic ramus and acetabulum.  2.  Chronic bilateral sacral insufficiency fractures.  3.  Advanced lumbar spondylosis without spondylolisthesis.  4.  Sigmoid diverticulosis but no diverticulitis.  5.  Diffuse body wall and mesenteric edema.

## 2024-09-15 NOTE — TELEPHONE ENCOUNTER
Nurse Triage SBAR    Is this a 2nd Level Triage? NO    Situation: Falls     Background: Daughter, Jenn, calling. Consent: Not on file - She is not with the patient. 9/9/2024 patient has a fall and was seen in the ED.     Assessment: Patient had a fall last night. Since her first fall she has been having a lot of leg pain.     Recommendation: Jenn was informed that a consent to communicate is needed and since she is not with the patient she will need to call back with the patient. She stated understanding.     Dennise Razo RN Nursing Advisor 9/15/2024 10:46 AM     Reason for Disposition   [1] Caller is not with the adult (patient) AND [2] probable NON-URGENT symptoms    Protocols used: Information Only Call - No Triage-A-

## 2024-09-15 NOTE — TELEPHONE ENCOUNTER
"Nurse Triage SBAR    Is this a 2nd Level Triage? No    Situation/Background: Daughter, Jenn, is calling with concern of patient falling on Monday night overnight on 9/9/24. Patient was evaluated Tuesday morning. No fractures noted. Patient has chronic back pain. Patient uses a walker, has been having more pain in the right thigh. Provider ordered a muscle relaxant which the patient has not taken. Since the fall on 9/9/24, the patient is becoming more weak, is having more difficulty with walking, has more edema in bilateral legs. Patient fell again last night while getting onto the toilet.    Patient gave verbal CTC with daughter Jenn.     Assessment:   Last night the patient fell between the toilet and the wall, daughter came to pick patient up from the floor  Patient did not hit her head, states her hips hurt, \" just a tich.\"  Patient on lasix and wears justo hose  Left ankle has drainage on ankle from edema  Right leg is more swollen than left    Recommendation: Per disposition, Go to ED now. Reviewed Care Advice with patient/daughter and verbalizes understanding. Declines additional questions. Advised patient/daughter to call back with any new or worsening symptoms. Patient/daughter verbalized understanding and agrees with plan.     Protocol Recommended Disposition: Emergency department    Christina Gramajo RN on 9/15/2024 at 11:20 AM  [facility]  Reason for Disposition   [1] Can't walk or can barely walk AND [2] new-onset    Additional Information   Negative: SEVERE difficulty breathing (e.g., struggling for each breath, speaks in single words)   Negative: Looks like a broken bone or dislocated joint (e.g., crooked or deformed)   Negative: Sounds like a life-threatening emergency to the triager   Negative: Chest pain   Negative: Followed a leg injury   Negative: [1] Followed an insect bite AND [2] localized swelling (e.g., small area of puffy or swollen skin)   Negative: Swelling of one ankle " joint   Negative: Swelling of knee is main symptom   Negative: Pregnant   Negative: Postpartum (from 0 to 6 weeks after delivery)   Negative: [1] Heart failure suspected (e.g., ankle swelling, shortness of breath, weight gain) AND [2] recently in hospital for heart failure   Negative: Difficulty breathing at rest   Negative: Entire foot is cool or blue in comparison to other side    Protocols used: Leg Swelling and Edema-A-AH

## 2024-09-15 NOTE — ED NOTES
[facility]   Admission Handoff    The patient is Nighat Gupta, 88 year old who arrived in the ED by CAR from emergency room with a complaint of Fall (Pt fell last night at approx 22:00 lives by herself, complaining of right hip  pain, does use a walker, here with her daughter  no LOC)  . The patient's current symptoms are new and during this time the symptoms have increased. In the ED, patient was diagnosed with   Final diagnoses:   Closed fracture of pubic ramus, unspecified laterality, initial encounter (H)   Fall, initial encounter   Leg edema, right         Needed?: No    Allergies:    Allergies   Allergen Reactions    No Known Drug Allergy        Past Medical Hx:   Past Medical History:   Diagnosis Date    DEPRESSION W/ANXIETY 1980's    Essential hypertension, benign     Pure hypercholesterolemia     Unspecified hypothyroidism        Initial vitals were: BP: (!) 150/60  Pulse: 64  Temp: 98.1  F (36.7  C)  Resp: 14  Weight: 41.1 kg (90 lb 9.6 oz)  SpO2: 98 %   Recent vital Signs: /54   Pulse 64   Temp 98.1  F (36.7  C) (Oral)   Resp 20   Wt 41.1 kg (90 lb 9.6 oz)   SpO2 98%   BMI 21.06 kg/m      Elimination Status: Continent: Yes. Pure wick in place.     Activity Level: SBA. Weight bearing as tolerated. PT/OT to see.    Fall Status: Reason for falls risk:  Mobility  bed/chair alarm on, nonskid shoes/slippers when out of bed, arm band in place, patient and family education, and assistive device/personal items within reach    Baseline Mental status: WDL  Current Mental Status changes: at basesline    Infection present or suspected this encounter: no  Sepsis suspected: No    Isolation type: None    Bariatric equipment needed?: No    In the ED these meds were given:   Medications   oxyCODONE (ROXICODONE) tablet 5 mg (5 mg Oral $Given 9/15/24 3270)       Drips running?  No    Home pump  No    Current LDAs: Peripheral IV: Site LFA; Gauge 20  none     Results:    Labs/Imaging  Ordered and Resulted from Time of ED Arrival Up to the Time of Departure from the ED  Results for orders placed or performed during the hospital encounter of 09/15/24 (from the past 24 hour(s))   [place] Draw    Narrative    The following orders were created for panel order [place] Draw.  Procedure                               Abnormality         Status                     ---------                               -----------         ------                     Extra Blue Top Tube[[id number]]                                                         Extra Red Top Tube[[id number]]                                                          Extra Green Top (Lithium...[[id number]]                      Final result               Extra Purple Top Tube[[id number]]                            Final result                 Please view results for these tests on the individual orders.   Extra Green Top (Lithium Heparin) Tube   Result Value Ref Range    Hold Specimen JIC    Extra Purple Top Tube   Result Value Ref Range    Hold Specimen JIC    CBC with platelets, differential    Narrative    The following orders were created for panel order CBC with platelets, differential.  Procedure                               Abnormality         Status                     ---------                               -----------         ------                     CBC with platelets and d...[[id number]]  Abnormal            Final result               Manual Differential[[id number]]          Abnormal            Final result                 Please view results for these tests on the individual orders.   Basic metabolic panel   Result Value Ref Range    Sodium 142 135 - 145 mmol/L    Potassium 3.6 3.4 - 5.3 mmol/L    Chloride 103 98 - 107 mmol/L    Carbon Dioxide (CO2) 28 22 - 29 mmol/L    Anion Gap 11 7 - 15 mmol/L    Urea Nitrogen 30.0 (H) 8.0 - 23.0 mg/dL    Creatinine 0.98 (H) 0.51 - 0.95 mg/dL    GFR Estimate 56 (L) >60 mL/min/1.73m2     Calcium 8.8 8.8 - 10.4 mg/dL    Glucose 129 (H) 70 - 99 mg/dL   TSH with free T4 reflex   Result Value Ref Range    TSH 33.56 (H) 0.30 - 4.20 uIU/mL   NT pro BNP   Result Value Ref Range    N terminal Pro BNP Inpatient 1,286 0 - 1,800 pg/mL   CBC with platelets and differential   Result Value Ref Range    WBC Count 15.1 (H) 4.0 - 11.0 10e3/uL    RBC Count 3.10 (L) 3.80 - 5.20 10e6/uL    Hemoglobin 8.5 (L) 11.7 - 15.7 g/dL    Hematocrit 28.0 (L) 35.0 - 47.0 %    MCV 90 78 - 100 fL    MCH 27.4 26.5 - 33.0 pg    MCHC 30.4 (L) 31.5 - 36.5 g/dL    RDW 19.9 (H) 10.0 - 15.0 %    Platelet Count 460 (H) 150 - 450 10e3/uL    NRBCs per 100 WBC 1 (H) <1 /100    Absolute NRBCs 0.1 10e3/uL   Manual Differential   Result Value Ref Range    % Neutrophils 84 %    % Lymphocytes 12 %    % Monocytes 1 %    % Eosinophils 0 %    % Basophils 0 %    % Metamyelocytes 2 %    % Myelocytes 1 %    Absolute Neutrophils 12.7 (H) 1.6 - 8.3 10e3/uL    Absolute Lymphocytes 1.8 0.8 - 5.3 10e3/uL    Absolute Monocytes 0.2 0.0 - 1.3 10e3/uL    Absolute Eosinophils 0.0 0.0 - 0.7 10e3/uL    Absolute Basophils 0.0 0.0 - 0.2 10e3/uL    Absolute Metamyelocytes 0.3 (H) <=0.0 10e3/uL    Absolute Myelocytes 0.2 (H) <=0.0 10e3/uL    RBC Morphology Confirmed RBC Indices     Platelet Assessment  Automated Count Confirmed. Platelet morphology is normal.     Automated Count Confirmed. Platelet morphology is normal.    Elliptocytes Slight (A) None Seen    RBC Fragments Slight (A) None Seen   T4 free   Result Value Ref Range    Free T4 0.93 0.90 - 1.70 ng/dL   US Lower Extremity Venous Duplex Right    Narrative    EXAM: ULTRASOUND LOWER EXTREMITY VENOUS DUPLEX RIGHT  LOCATION: [facility]  DATE: 09/15/2024    INDICATION: Edema right leg.  COMPARISON: None.  TECHNIQUE: Venous Duplex ultrasound of the right lower extremity with and without compression, augmentation and duplex. Color flow and spectral Doppler with waveform analysis  performed.    FINDINGS: Exam includes the common femoral, femoral, popliteal, and contralateral common femoral veins as well as segmentally visualized deep calf veins and greater saphenous vein.     RIGHT: No deep vein thrombosis. No superficial thrombophlebitis. No popliteal cyst.      Impression    IMPRESSION:  1.  No deep venous thrombosis in the right lower extremity.       XR Femur Right 2 Views    Narrative    EXAM: XR FEMUR RIGHT 2 VIEWS  LOCATION: [facility]  DATE: 09/15/2024    INDICATION: Right thigh pain.  COMPARISON: Pelvis radiographic exam 01/8/2024.      Impression    IMPRESSION: Anatomic alignment right femur. No definitive acute displaced right femur fracture. Degenerative change in the right hip and right knee with knee chondrocalcinosis. No change in the small sclerotic lesion supra-acetabular right ilium.   Arterial calcification. Horizontally oriented lucencies in the distal femoral and proximal tibial metaphyses do not appear to be acute. No sizable right knee joint effusion.    Probable displaced fracture of the superior pubic ramus/right superior pubic root suspected. Questionable inferior pubic ramus fracture on the right. Correlation with pelvis radiographic exam recommended.    NOTE: ABNORMAL REPORT    THE DICTATION ABOVE DESCRIBES AN ABNORMALITY FOR WHICH FOLLOW-UP IS NEEDED.        Chest XR,  PA & LAT    Narrative    EXAM: XR CHEST 2 VIEWS  LOCATION: [facility]  DATE: 9/15/2024    INDICATION: Syncope  COMPARISON: Portable chest x-ray 9/6/2020      Impression    IMPRESSION: Patient rotated to the left. Cardiac silhouette size is within normal limits. Linear cicatricial changes versus subsegmental atelectasis within the right midlung. There appears to be a small right pleural effusion. Left lung appears clear.   Calcified atherosclerotic changes of the thoracic aorta. New fractures of the lateral aspects of the right seventh through  ninth ribs. Moderate compression of several mid to lower thoracic vertebral bodies.   Abd/pelvis CT - no contrast - Stone Protocol    Narrative    EXAM: CT ABDOMEN PELVIS W/O CONTRAST  LOCATION: [facility]  DATE: 9/15/2024    INDICATION: fall, eval for fracture pelvis.  COMPARISON: Radiographs of the pelvis 9/10/2024  TECHNIQUE: CT scan of the abdomen and pelvis was performed without IV contrast. Multiplanar reformats were obtained. Dose reduction techniques were used.  CONTRAST: None.    FINDINGS:   LOWER CHEST: Focal atelectasis along the anterior right minor fissure and towards the right posterior costophrenic sulcus. There is a trace right pleural effusion which layers dependently. Severe calcification of the aortic root and   calcification/thickening of aortic valve leaflets. Patchy calcified plaque in the imaged descending thoracic aorta. There are chronic appearing bilateral lower rib fractures.    HEPATOBILIARY: Within normal limits.    PANCREAS: Normal.    SPLEEN: Normal.    ADRENAL GLANDS: Normal.    KIDNEYS/BLADDER: Kidneys are normal in size. No hydronephrosis or nephrolithiasis.    BOWEL: Sigmoid diverticulosis. No dilated bowel or bowel wall thickening. Diffuse gray attenuation of the mesenteric and subcutaneous fat consistent with soft tissue edema.    LYMPH NODES: No lymphadenopathy.    VASCULATURE: Moderate aortoiliac atheromatous calcifications.    PELVIC ORGANS: Uterus is normal in size.    MUSCULOSKELETAL: Unipolar left hip arthroplasty. No periprosthetic fracture. Chronic bilateral sacroiliac insufficiency fractures are present with mixed lucency and sclerosis. There are chronic fracture deformities of the pelvis including both sides of   the symphysis pubis, the left inferior and superior pubic rami. There is a more acute appearing impaction type fracture at the junction of the right superior pubic ramus and acetabulum (series 3, image 147). Rightward convexity  curvature of the lumbar   spine centered on L3-L4. Chronic anterior wedge compression deformity of L1. Severe disc space narrowing and vacuum disc phenomenon throughout the lumbar spine and moderate degenerative osteophytes. Diffuse lumbar facet arthropathy.      Impression    IMPRESSION:     1.  Multiple chronic pelvic fractures including both sides of the symphysis pubis, left superior and inferior pubic rami. There is a more acute appearing impaction fracture at the junction of the right superior pubic ramus and acetabulum.  2.  Chronic bilateral sacral insufficiency fractures.  3.  Advanced lumbar spondylosis without spondylolisthesis.  4.  Sigmoid diverticulosis but no diverticulitis.  5.  Diffuse body wall and mesenteric edema.         For the majority of the shift this patient's behavior was Green     Cardiac Rhythm: N/A  Pt needs tele? No  Skin/wound Issues: None    Code Status: Full Code    Pain control: good    Nausea control: good    Abnormal labs/tests/findings requiring intervention: See imaging report. Pubic Ramus Fx    Patient tested for COVID 19 prior to admission: NO     OBS brochure/video discussed/provided to patient/family: N/A     Family present during ED course? Yes     Family Comments/Social Situation comments: None    Tasks needing completion: None    Josefa Beauchamp RN

## 2024-09-16 NOTE — PROVIDER NOTIFICATION
MD notified of BPA firing regarding incentive spirometer use of <1000. Continue to assess. Pain is minimal with breathing.

## 2024-09-16 NOTE — PLAN OF CARE
Goal Outcome Evaluation:      Plan of Care Reviewed With: patient    Overall Patient Progress: no change  Problem: Adult Inpatient Plan of Care  Goal: Plan of Care Review  Outcome: Progressing  Flowsheets (Taken 9/16/2024 0451)  Plan of Care Reviewed With: patient  Overall Patient Progress: no change  Goal: Absence of Hospital-Acquired Illness or Injury  Outcome: Progressing  Intervention: Identify and Manage Fall Risk  Recent Flowsheet Documentation  Taken 9/15/2024 2300 by Lary Reagan RN  Safety Promotion/Fall Prevention:   activity supervised   assistive device/personal items within reach  Taken 9/15/2024 2050 by Lary Reagan RN  Safety Promotion/Fall Prevention:   activity supervised   assistive device/personal items within reach  Intervention: Prevent Skin Injury  Recent Flowsheet Documentation  Taken 9/15/2024 2300 by Lary Reagan RN  Body Position:   left   right   turned  Taken 9/15/2024 2050 by Lary Reagan RN  Body Position:   left   right   turned  Intervention: Prevent Infection  Recent Flowsheet Documentation  Taken 9/15/2024 2300 by Lary Reagan RN  Infection Prevention: single patient room provided  Taken 9/15/2024 2050 by Lary Reagan RN  Infection Prevention: single patient room provided  Goal: Optimal Comfort and Wellbeing  Outcome: Progressing  Intervention: Monitor Pain and Promote Comfort  Recent Flowsheet Documentation  Taken 9/16/2024 0434 by Lary Reagan RN  Pain Management Interventions: medication (see MAR)  Taken 9/16/2024 0350 by Lary Reagan RN  Pain Management Interventions: medication (see MAR)  Taken 9/15/2024 2100 by Lary Reagan RN  Pain Management Interventions: medication (see MAR)  Taken 9/15/2024 2021 by Lary Reagan RN  Pain Management Interventions: declines  Goal: Readiness for Transition of Care  Outcome: Progressing  Intervention: Mutually Develop Transition Plan  Recent Flowsheet Documentation  Taken 9/15/2024 2000 by  Lary Reagan RN  Equipment Currently Used at Home: other (see comments)     Problem: Risk for Delirium  Goal: Optimal Coping  Outcome: Progressing  Goal: Improved Behavioral Control  Outcome: Progressing  Intervention: Minimize Safety Risk  Recent Flowsheet Documentation  Taken 9/15/2024 2300 by Lary Reagan RN  Enhanced Safety Measures: pain management  Taken 9/15/2024 2050 by Lary Reagan RN  Enhanced Safety Measures: pain management  Goal: Improved Attention and Thought Clarity  Outcome: Progressing  Goal: Improved Sleep  Outcome: Progressing     Problem: Fall Injury Risk  Goal: Absence of Fall and Fall-Related Injury  Outcome: Progressing  Intervention: Identify and Manage Contributors  Recent Flowsheet Documentation  Taken 9/15/2024 2300 by Lary Reagan RN  Medication Review/Management: medications reviewed  Taken 9/15/2024 2050 by Lary Reagan RN  Medication Review/Management: medications reviewed  Intervention: Promote Injury-Free Environment  Recent Flowsheet Documentation  Taken 9/15/2024 2300 by Lary Reagan RN  Safety Promotion/Fall Prevention:   activity supervised   assistive device/personal items within reach  Taken 9/15/2024 2050 by Lary Reagan RN  Safety Promotion/Fall Prevention:   activity supervised   assistive device/personal items within reach     Problem: Orthopaedic Fracture  Goal: Absence of Bleeding  Outcome: Progressing  Goal: Bowel Elimination  Outcome: Progressing  Goal: Absence of Embolism Signs and Symptoms  Outcome: Progressing  Goal: Fracture Stability  Outcome: Progressing  Goal: Optimal Functional Ability  Outcome: Progressing  Intervention: Optimize Functional Ability  Recent Flowsheet Documentation  Taken 9/15/2024 2300 by Lary Reagan RN  Activity Management: activity adjusted per tolerance  Positioning/Transfer Devices: pillows  Taken 9/15/2024 2050 by Lary Reagan RN  Activity Management: activity adjusted per  tolerance  Positioning/Transfer Devices: pillows  Goal: Absence of Infection Signs and Symptoms  Outcome: Progressing  Goal: Effective Tissue Perfusion  Outcome: Progressing  Goal: Optimal Pain Control and Function  Outcome: Progressing  Intervention: Manage Acute Orthopaedic-Related Pain  Recent Flowsheet Documentation  Taken 9/16/2024 0434 by Lary Reagan RN  Pain Management Interventions: medication (see MAR)  Taken 9/16/2024 0350 by Lary Reagan RN  Pain Management Interventions: medication (see MAR)  Taken 9/15/2024 2100 by Lary Reagan RN  Pain Management Interventions: medication (see MAR)  Taken 9/15/2024 2021 by Lary Reagan RN  Pain Management Interventions: declines  Goal: Effective Oxygenation and Ventilation  Outcome: Progressing  Intervention: Promote Airway Secretion Clearance  Recent Flowsheet Documentation  Taken 9/15/2024 2300 by Lary Reagan RN  Activity Management: activity adjusted per tolerance  Taken 9/15/2024 2134 by Lary Reagan RN  Cough And Deep Breathing: done with encouragement  Taken 9/15/2024 2050 by Lary Reagan RN  Activity Management: activity adjusted per tolerance     Problem: Comorbidity Management  Goal: Blood Pressure in Desired Range  Outcome: Progressing  Intervention: Maintain Blood Pressure Management  Recent Flowsheet Documentation  Taken 9/15/2024 2300 by Lary Reagan RN  Medication Review/Management: medications reviewed  Taken 9/15/2024 2050 by Lary Reagan RN  Medication Review/Management: medications reviewed     Patient has good pain control with current interventions. Encouraged to deep breathe and cough. Incentive spirometer encouraged. Patient up with one assist and walker, gait belt. Fall precautions. Continue to assess.

## 2024-09-16 NOTE — PROGRESS NOTES
"[place] [facility] ADMISSION NOTE    Patient admitted to room 2312 at approximately 2030 via cart from emergency room. Patient was accompanied by transport tech.     Verbal SBAR report received from Pati prior to patient arrival.     Patient trasferred to bed via lift sheet Patient alert and oriented X 3. Pain is controlled without any medications.  . Admission vital signs: Blood pressure (!) 149/52, pulse 68, temperature 97.9  F (36.6  C), temperature source Oral, resp. rate 16, height 1.397 m (4' 7\"), weight 42.2 kg (93 lb 0.6 oz), SpO2 92%, not currently breastfeeding. Patient was oriented to plan of care, call light, bed controls, tv, telephone, bathroom, and visiting hours.     Risk Assessment    The following safety risks were identified during admission: fall. Yellow risk band applied: YES.     Skin Initial Assessment    This writer admitted this patient and completed a full skin assessment and Misael score in the Adult PCS flowsheet.   Photo documentation of skin problem and/or wound competed via [employer] application (located under Media):  N/A    Appropriate interventions initiated as needed.     Secondary skin check completed by JAMIE.    Misael Risk Assessment  Sensory Perception: 3-->slightly limited  Moisture: 3-->occasionally moist  Activity: 1-->bedfast  Mobility: 2-->very limited  Nutrition: 2-->probably inadequate  Friction and Shear: 1-->problem  Misael Score: 12  Moisture Interventions: Urinary collection device, No brief in bed  Nutrition Interventions: Maintain adequate hydration  Friction/Shear Interventions: Silicone foam sacral dressing  Sensory/Activity/Mobility Interventions: Turn: left/right positioning, Pillows between bony prominence  Mattress: Standard gel/foam mattress (IsoFlex, Atmos Air, etc.)  Bed Frame: Standard width and length    Education    Patient has a [place] to Observation order: No  Observation education completed and documented: N/A      Lary Reagan RN      "

## 2024-09-16 NOTE — CONSULTS
Care Management Initial Consult    General Information  Assessment completed with: VM-chart review, Patient, Nahun Disla and Cailin  Type of CM/SW Visit: Offer D/C Planning    Primary Care Provider verified and updated as needed: Yes   Readmission within the last 30 days: no previous admission in last 30 days      Reason for Consult:  (high risk for readmission)  Advance Care Planning: Advance Care Planning Reviewed: present on chart          Communication Assessment  Patient's communication style: spoken language (English or Bilingual)    Hearing Difficulty or Deaf: yes   Wear Glasses or Blind: yes    Cognitive  Cognitive/Neuro/Behavioral: WDL                      Living Environment:   People in home: alone     Current living Arrangements: apartment      Able to return to prior arrangements: no       Family/Social Support:  Care provided by: self, child(irving)  Provides care for: no one     Support system: Children, Neighbor          Description of Support System: Supportive, Involved    Support Assessment: Adequate family and caregiver support    Current Resources:   Patient receiving home care services: No        Community Resources: None  Equipment currently used at home: grab bar, toilet, grab bar, tub/shower, shower chair, walker, rolling, scooter  Supplies currently used at home: Incontinence Supplies    Employment/Financial:  Employment Status: retired        Financial Concerns: none           Does the patient's insurance plan have a 3 day qualifying hospital stay waiver?  Yes     Which insurance plan 3 day waiver is available? Alternative insurance waiver    Will the waiver be used for post-acute placement? No    Lifestyle & Psychosocial Needs:  Social Determinants of Health     Food Insecurity: Low Risk  (9/15/2024)    Food Insecurity     Within the past 12 months, did you worry that your food would run out before you got money to buy more?: No     Within the past 12 months, did the food you bought  just not last and you didn t have money to get more?: No   Depression: Not at risk (7/23/2024)    PHQ-2     PHQ-2 Score: 2   Housing Stability: Low Risk  (9/15/2024)    Housing Stability     Do you have housing? : Yes     Are you worried about losing your housing?: No   Tobacco Use: High Risk (7/23/2024)    Patient History     Smoking Tobacco Use: Every Day     Smokeless Tobacco Use: Never     Passive Exposure: Not on file   Financial Resource Strain: Low Risk  (9/15/2024)    Financial Resource Strain     Within the past 12 months, have you or your family members you live with been unable to get utilities (heat, electricity) when it was really needed?: No   Alcohol Use: Not on file   Transportation Needs: Low Risk  (9/15/2024)    Transportation Needs     Within the past 12 months, has lack of transportation kept you from medical appointments, getting your medicines, non-medical meetings or appointments, work, or from getting things that you need?: No   Physical Activity: Not on file   Interpersonal Safety: Low Risk  (9/15/2024)    Interpersonal Safety     Do you feel physically and emotionally safe where you currently live?: Yes     Within the past 12 months, have you been hit, slapped, kicked or otherwise physically hurt by someone?: No     Within the past 12 months, have you been humiliated or emotionally abused in other ways by your partner or ex-partner?: No   Stress: Not on file   Social Connections: Not on file   Health Literacy: Not on file       Functional Status:  Prior to admission patient needed assistance:   Dependent ADLs:: Ambulation-walker, Bathing  Dependent IADLs:: Cleaning, Laundry, Shopping, Medication Management, Transportation  Assesssment of Functional Status: Not at  functional baseline    Mental Health Status:  Mental Health Status: No Current Concerns       Chemical Dependency Status:  Chemical Dependency Status: No Current Concerns             Values/Beliefs:  Spiritual, Cultural Beliefs,  Pent[employer]alism Practices, Values that affect care: no               Discussed  Partnership in Safe Discharge Planning  document with patient/family: Yes    Additional Information:    Met with the Patient and Daughters, Scott for discharge planning due to high risk for readmission.    The Patient lives alone in a apartment.  Her children assist with bathing, medication management, grocery shopping and laundry.  She is incontinent of urine and stool.  She uses a walker for mobility.    Patient has a history of frequent falls.  Prior to admission she fell in the bathroom, was wedged between the bathtub and toilet.  She feels the fall was due to imbalance.  She is admitted with pubic rami fracture and multiple rib fractures.    Discussed TCU and LTC with hospice due to inability to return home and care for self.  Initially the plan was TCU.  I was asked to return later to discuss LTC with hospice.  Family/Patient would prefer LTC with hospice.  [facility]  [facility]  [facility] (Phone: [phone] Fax: [phone]) referral.  Discussed LTC financial options.  Patient will private pay for LTC and agreed to $6000-$8000 down payment.  Jenn is the family .    Family will provide transportation if it is safe for Patient to transport via private vehicle.    LTC referrals:  Service Provider Request Status Selected Services Address Phone Fax Patient Preferred   [facility] ([facility])  Pending - Request Sent N/A [address] [id number] [phone] --   Current Capacity last updated by Roni Davila RN on 8/12/2024 11:55 AM    Send referrals to [facility] - Hard scripts must be faxed to [phone] ([place])            [facility] ([facility])  Pending - Request Sent N/A [address] [place] [id number] [phone] --   Current Capacity last updated by Kerry  Giovanni RN on 9/8/2023  2:59 PM    No weekend admits            [facility] ([facility])  Pending - Request Sent N/A [address] [facility] [address] [phone] --   [facility]  Pending - Request Sent N/A [address] [facility] [place] [address] [phone] --   Current Capacity last updated by Margi Carlos MA on 9/11/2024  1:03 PM    (aka [facility]) - No weekend admissions            [facility] ([facility])  Pending - Request Sent N/A [address] [facility] [address] [phone] --   Current Capacity last updated by Margi Carlos MA on 8/2/2024 10:33 AM    No MA pending            [facility] ([facility])  Pending - Request Sent N/A [address] [facility] [address] [phone] --   [facility] ([facility])  Pending - Request Sent N/A [address] [facility] [address] [phone] --   Current Capacity last updated by Margi Carlos MA on 4/1/2024  3:23 PM    (aka [facility])            [facility] ONLY ([facility])  Declined  Change in discharge plan, LTC N/A [address] [facility] [address] [phone] --   [facility] ()  Declined  Bed Not Available N/A [address] [facility] [address] [phone] --   Current Capacity last updated by Alondra Aguila MA on 9/10/2024  8:03 AM    9/10 Full until the 12th            [facility])  Declined  Change in discharge plan N/A [address] [facility] [address] [phone] --   Current Capacity last updated by Alondra Aguila MA on 7/25/2024  1:02 PM    Always send full referral              Plan:  LTC with [facility] (referral pending)     Next Steps:  Patient is medically ready for discharge today.  Care Management will continue to pursue LTC placement  with hospice.      Cheyenne Hardy RN

## 2024-09-16 NOTE — PROGRESS NOTES
09/16/24 0800   Appointment Info   Signing Clinician's Name / Credentials (OT) Alena Mckeon, OTR/L   Living Environment   People in Home alone   Current Living Arrangements apartment  (senior apartment)   Home Accessibility no concerns   Self-Care   Fall history within last six months yes   Number of times patient has fallen within last six months 10   Activity/Exercise/Self-Care Comment at baseline: independent with dressing, toileting and functional mobility using FWW. Has supervision with showers. per family- has been staying with her this past weekend d/t falls.   General Information   Onset of Illness/Injury or Date of Surgery 09/15/24   Referring Physician Talita Luciano PA-C   Patient/Family Therapy Goal Statement (OT) open to going to TCU   Additional Occupational Profile Info/Pertinent History of Current Problem Nighat Gupta is a 88 year old female with a past medical history significant for chronic kidney disease, chronic anemia, aortic stenosis, hypothyroidism, hypertension, dyslipidemia, chronic back pain / lumbar radiculopathy, depression, osteopenia, and tobacco use disorder who presented to the emergency department for evaluation of groin pain after a fall and was found to have acute pubic rami fractures, sacral insufficiency fractures, and right sided rib fractures.  Fall on 9/10/24 and was discharged home from ED.   Existing Precautions/Restrictions fall   Right Lower Extremity (Weight-bearing Status) weight-bearing as tolerated (WBAT)   Cognitive Status Examination   Orientation Status orientation to person, place and time   Pain Assessment   Patient Currently in Pain Yes, see Vital Sign flowsheet  (R groin pain.)   Strength Comprehensive (MMT)   Comment, General Manual Muscle Testing (MMT) Assessment low activity tolerance- fatigues with little activity.   Bed Mobility   Comment (Bed Mobility) Min A   Transfers   Transfer Comments CGA for sit to stand using FWW   Balance    Balance Comments unsteady. CGA and FWW. 1 LOB while in bathroom.   Activities of Daily Living   BADL Assessment/Intervention lower body dressing;upper body dressing;toileting   Upper Body Dressing Assessment/Training   Herk[place] Level (Upper Body Dressing) set up   Lower Body Dressing Assessment/Training   Herk[place] Level (Lower Body Dressing) minimum assist (75% patient effort)   Toileting   Comment, (Toileting) assist with brief management and cues for safety.   Herk[place] Level (Toileting) contact guard assist   Clinical Impression   Criteria for Skilled Therapeutic Interventions Met (OT) Yes, treatment indicated   OT Diagnosis decreased independence with ADLs   OT Problem List-Impairments impacting ADL problems related to;pain;activity tolerance impaired;strength;balance   Assessment of Occupational Performance 3-5 Performance Deficits   Identified Performance Deficits dressing, toileting, showering and functional mobility   Planned Therapy Interventions (OT) ADL retraining;strengthening;progressive activity/exercise   Clinical Decision Making Complexity (OT) problem focused assessment/low complexity   Risk & Benefits of therapy have been explained patient;daughter;participants included;participants voiced agreement with care plan;current/potential barriers reviewed;risks/benefits reviewed;care plan/treatment goals reviewed;evaluation/treatment results reviewed   OT Total Evaluation Time   OT Eval, Low Complexity Minutes (06839) 10   OT Goals   Therapy Frequency (OT) 5 times/week   OT Predicted Duration/Target Date for Goal Attainment 09/23/24   OT Goals Lower Body Dressing;Hygiene/Grooming;Toilet Transfer/Toileting   OT: Hygiene/Grooming supervision/stand-by assist;while standing   OT: Lower Body Dressing Supervision/stand-by assist   OT: Toilet Transfer/Toileting Supervision/stand-by assist   Interventions   Interventions Quick Adds Self-Care/Home Management   Self-Care/Home Management    Self-Care/Home Mgmt/ADL, Compensatory, Meal Prep Minutes (86952) 10   Symptoms Noted During/After Treatment (Meal Preparation/Planning Training) increased pain;fatigue   Treatment Detail/Skilled Intervention Introduced self/role/POC and current discharge recommendation. Family with questions regarding TCU- answered as appropriate. Educated pt on safety during toileting (ie. utilizing grab bar versus using walker to stand up). Pt ambulates out of bathroom with CGA and FWW. Transitioned pt to working with PT.   OT Discharge Planning   OT Plan POC Monday 1/5. LB dressing, standing g/h   OT Discharge Recommendation (DC Rec) Transitional Care Facility   OT Rationale for DC Rec TCU to increase independence with ADLs and related mobility   OT Brief overview of current status Min A for ADLs.   Total Session Time   Timed Code Treatment Minutes 10   Total Session Time (sum of timed and untimed services) 20

## 2024-09-16 NOTE — PLAN OF CARE
Goal Outcome Evaluation:      Plan of Care Reviewed With: patient, child          Outcome Evaluation: TCU

## 2024-09-16 NOTE — PLAN OF CARE
Goal Outcome Evaluation:      Plan of Care Reviewed With: patient, child          Outcome Evaluation: LTC with hospice

## 2024-09-16 NOTE — PLAN OF CARE
"  Problem: Adult Inpatient Plan of Care  Goal: Plan of Care Review  Description: The Plan of Care Review/Shift note should be completed every shift.  The Outcome Evaluation is a brief statement about your assessment that the patient is improving, declining, or no change.  This information will be displayed automatically on your shift  note.  Outcome: Progressing  Goal: Patient-Specific Goal (Individualized)  Description: You can add care plan individualizations to a care plan. Examples of Individualization might be:  \"Parent requests to be called daily at 9am for status\", \"I have a hard time hearing out of my right ear\", or \"Do not touch me to wake me up as it startles  me\".  Outcome: Progressing  Goal: Absence of Hospital-Acquired Illness or Injury  Outcome: Progressing  Intervention: Identify and Manage Fall Risk  Recent Flowsheet Documentation  Taken 9/16/2024 1000 by Stephanie Keys RN  Safety Promotion/Fall Prevention:   activity supervised   assistive device/personal items within reach  Intervention: Prevent Infection  Recent Flowsheet Documentation  Taken 9/16/2024 1000 by Stephanie Keys RN  Infection Prevention: single patient room provided  Goal: Optimal Comfort and Wellbeing  Outcome: Progressing  Goal: Readiness for Transition of Care  Outcome: Progressing   Goal Outcome Evaluation:    Patient is very frail and weak. Has been able to walk to the bathroom with walker, gait belt and assist of 1. Incontinent of urine and has also voided. Poor appetite. Chronic pain issues and she does have tendency to constipation. Daughters report that she does not follow a bowel regimen, but drinks mag citrate which they don't like and say they argue with her about that. Given a senna this am. She gets scheduled oxycodone and was given oxycodone 5 mg along with robaxin for breakthrough generalized pain. Sat up in chair over breakfast and then has refused to sit in chair afterwards as \"too tired.\" Has walked though. Is " "unable to pull IS over 800. History of smoking and rib fractures. Daughters spoke with care management about placement to a facility and then later had this nurse call her as they decided on hospice care. Patient does not want to take the aspirin prescribed to her to prevent blood clots. Daughter said patient told her \"she would rather have the clots.\" Will update MD by sticky note. She and her daughters are also asking to have IV removed. Added that to sticky note.Resting comfortably at present.                         "

## 2024-09-16 NOTE — CONSULTS
[facility] Consultation    Consultation - [facility]  Level of consult: Consult, follow and place orders    Nighat Gupta,  1935, MRN [id number]     Admitting Dx: Leg edema, right [R60.0]  Fall, initial encounter [W19.XXXA]  Closed fracture of pubic ramus, unspecified laterality, initial encounter (H) [J71.071W]     PCP: Nancy Tapia, [phone]     Code status:  No CPR- Do NOT Intubate     Extended Emergency Contact Information  Primary Emergency Contact:  Jenn Gupta  Address: [address] [place] [address] [facility]  Home Phone: [phone]  Work Phone: none  Mobile Phone: [phone]  Relation: Daughter  Secondary Emergency Contact:  Cailin Berg   [facility]  Home Phone: [phone]  Mobile Phone: [phone]  Relation: Daughter     Assessment:   Acute on chronic pelvic fracture right superior pubic ramus   Acute on chronic pelvic fracture acetabulum  Chronic pelvic fractures of symphysis pubis, left superior and inferior pubic rami    Plan:  Non operative treatment  Pain management per primary team  Anticoagulation: ASA 81mg BID x 6 weeks, then can resume ASA 81mg once daily  Weightbearing status: WBAT with walker  Follow up outpatient with [facility] in 6 weeks    Principal Problem:    Closed fracture of pubic ramus, unspecified laterality, initial encounter (H)  Active Problems:    Hypothyroidism    Osteopenia    Hyperlipidemia LDL goal <130    Essential hypertension, benign    Lumbar radiculopathy    Anemia    Chronic kidney disease, stage 3a (H)    Major depressive disorder, recurrent episode, moderate (H)    Sacral insufficiency fracture    Leg edema, right    Fall, initial encounter    Aortic stenosis    Closed fracture of multiple ribs of right side    Esophageal reflux       Chief Complaint  Groin pain     HPI  We have been requested by Emergency Medicine to evaluate Nighat Gupta who is a 88 year old year old female for  right hip pain. She has a past medical history significant for chronic kidney disease, chronic anemia, aortic stenosis, hypothyroidism, hypertension, dyslipidemia, chronic back pain / lumbar radiculopathy, depression, osteopenia, and tobacco use disorder who presented to the emergency department on 9/15/24 for evaluation of groin pain after a fall and was found to have acute pubic rami fractures, sacral insufficiency fractures, and right sided rib fractures. She localizes the worst of her pain to her right groin, rating her pain as a 4/10. She is taking Tylenol, ibuprofen and oxycodone for pain. She does take ASA 81mg once daily as a home medication. She was ambulating with physical therapy right before visit today. Her daughters are presents with her today. She has quite a bit of edema, which developed after fractures from a fall back in December 2023.      History is obtained from the patient, her daughter's and electronic health record     Past Medical History  Past Medical History:   Diagnosis Date    DEPRESSION W/ANXIETY 09/01/1980    Essential hypertension, benign     Pneumonia of right lower lobe due to infectious organism 09/06/2020    Pure hypercholesterolemia     Unspecified hypothyroidism        Surgical History  Past Surgical History:   Procedure Laterality Date    CATARACT IOL, RT/LT  2009    Cataract IOL RT/LT    COLONOSCOPY  2007    notable for diverticulosis    GYN SURGERY  04/2004    Oopherectomy - (R)    INJECT EPIDURAL LUMBAR  4/2/2012    Procedure:INJECT EPIDURAL LUMBAR; MORENA with Flouro--; Surgeon:GENERIC ANESTHESIA PROVIDER; Location:[facility]    INJECT EPIDURAL LUMBAR  6/4/2012    Procedure:INJECT EPIDURAL LUMBAR; MORENA with Flouro--; Surgeon:GENERIC ANESTHESIA PROVIDER; Location:[facility]    INJECT EPIDURAL LUMBAR  6/18/2012    Procedure: INJECT EPIDURAL LUMBAR;  MORENA-Dr. Tapia;  Surgeon: Provider, Generic Anesthesia;  Location: [facility] OR    OPEN REDUCTION INTERNAL FIXATION HIP BIPOLAR  Left 11/12/2021    Procedure: LEFT HIP HEMIARTHROPLASTY;  Surgeon: Noah Bautista MD;  Location: [facility] OR        Social History  Social History     Socioeconomic History    Marital status:      Spouse name: Not on file    Number of children: Not on file    Years of education: Not on file    Highest education level: Not on file   Occupational History    Not on file   Tobacco Use    Smoking status: Every Day     Current packs/day: 0.25     Average packs/day: 0.3 packs/day for 50.0 years (12.5 ttl pk-yrs)     Types: Cigarettes    Smokeless tobacco: Never    Tobacco comments:     5 cigarettes daily   Vaping Use    Vaping status: Never Used   Substance and Sexual Activity    Alcohol use: No    Drug use: No    Sexual activity: Not Currently   Other Topics Concern    Parent/sibling w/ CABG, MI or angioplasty before 65F 55M? No   Social History Narrative    Not on file     Social Determinants of Health     Financial Resource Strain: Low Risk  (9/15/2024)    Financial Resource Strain     Within the past 12 months, have you or your family members you live with been unable to get utilities (heat, electricity) when it was really needed?: No   Food Insecurity: Low Risk  (9/15/2024)    Food Insecurity     Within the past 12 months, did you worry that your food would run out before you got money to buy more?: No     Within the past 12 months, did the food you bought just not last and you didn t have money to get more?: No   Transportation Needs: Low Risk  (9/15/2024)    Transportation Needs     Within the past 12 months, has lack of transportation kept you from medical appointments, getting your medicines, non-medical meetings or appointments, work, or from getting things that you need?: No   Physical Activity: Not on file   Stress: Not on file   Social Connections: Not on file   Interpersonal Safety: Low Risk  (9/15/2024)    Interpersonal Safety     Do you feel physically and emotionally safe where  you currently live?: Yes     Within the past 12 months, have you been hit, slapped, kicked or otherwise physically hurt by someone?: No     Within the past 12 months, have you been humiliated or emotionally abused in other ways by your partner or ex-partner?: No   Housing Stability: Low Risk  (9/15/2024)    Housing Stability     Do you have housing? : Yes     Are you worried about losing your housing?: No       Family History  Family History   Problem Relation Age of Onset    Heart Disease Mother     Lipids Mother     Hypertension Mother     Lipids Father     Hypertension Father     Hypertension Sister     Lipids Sister     Depression Daughter     Heart Disease Sister     Hypertension Sister     Depression Daughter         Allergies:  No known drug allergy      Current Medications:  Current Facility-Administered Medications   Medication Dose Route Frequency Provider Last Rate Last Admin    acetaminophen (TYLENOL) tablet 975 mg  975 mg Oral Q8H Talita Luciano PA-C   975 mg at 09/15/24 2150    amLODIPine (NORVASC) tablet 10 mg  10 mg Oral Daily Talita Luciano PA-C        aspirin EC tablet 81 mg  81 mg Oral Daily Talita Luciano PA-C        calcium carbonate (TUMS) chewable tablet 1,000 mg  1,000 mg Oral 4x Daily PRN Talita Luciano PA-C        DULoxetine (CYMBALTA) DR capsule 60 mg  60 mg Oral Daily Talita Luciano PA-C        furosemide (LASIX) tablet 20 mg  20 mg Oral Daily Talita Luciano PA-C        HYDROmorphone (DILAUDID) injection 0.2 mg  0.2 mg Intravenous Q2H PRN Talita Luciano PA-C        Or    HYDROmorphone (PF) (DILAUDID) injection 0.5 mg  0.5 mg Intravenous Q2H PRN Talita Luciano PA-C        ibuprofen (ADVIL/MOTRIN) tablet 800 mg  800 mg Oral TID Talita Luciano PA-C        levothyroxine (SYNTHROID/LEVOTHROID) tablet 75 mcg  75 mcg Oral Daily Talita Luciano PA-C        Lidocaine (LIDOCARE) 4 % Patch 1 patch  1 patch Transdermal Q24H  Talita Luciano PA-C        lidocaine (LMX4) kit   Topical Q1H PRN Talita Luciano PA-C        lidocaine 1 % 0.1-1 mL  0.1-1 mL Other Q1H PRN Talita Luciano PA-C        lisinopril (ZESTRIL) tablet 40 mg  40 mg Oral Daily Talita Luciano PA-C        methocarbamol (ROBAXIN) tablet 500 mg  500 mg Oral Q6H PRN Talita Luciano PA-C        naloxone (NARCAN) injection 0.2 mg  0.2 mg Intravenous Q2 Min PRN Stu Herman MD        Or    naloxone (NARCAN) injection 0.4 mg  0.4 mg Intravenous Q2 Min PRN Stu Herman MD        Or    naloxone (NARCAN) injection 0.2 mg  0.2 mg Intramuscular Q2 Min PRN Stu Herman MD        Or    naloxone (NARCAN) injection 0.4 mg  0.4 mg Intramuscular Q2 Min PRN Stu Herman MD        nicotine (NICODERM CQ) 7 MG/24HR 24 hr patch 1 patch  1 patch Transdermal At Bedtime Talita Luciano PA-C   1 patch at 09/15/24 2150    ondansetron (ZOFRAN ODT) ODT tab 4 mg  4 mg Oral Q6H PRN Talita Luciano PA-C        Or    ondansetron (ZOFRAN) injection 4 mg  4 mg Intravenous Q6H PRN Talita Luciano PA-C        oxyCODONE (ROXICODONE) tablet 5 mg  5 mg Oral Q4H PRN Talita Luciano PA-C   5 mg at 09/16/24 0350    Or    oxyCODONE (ROXICODONE) tablet 10 mg  10 mg Oral Q4H PRN Talita Luciano PA-C        oxyCODONE (ROXICODONE) tablet 10 mg  10 mg Oral BID Talita Luciano PA-C   10 mg at 09/15/24 2150    pantoprazole (PROTONIX) EC tablet 40 mg  40 mg Oral Daily Talita Luciano PA-C        prochlorperazine (COMPAZINE) injection 5 mg  5 mg Intravenous Q6H PRN Talita Luciano PA-C        Or    prochlorperazine (COMPAZINE) tablet 5 mg  5 mg Oral Q6H PRN Talita Luciano PA-C        Or    prochlorperazine (COMPAZINE) suppository 12.5 mg  12.5 mg Rectal Q12H PRN Talita Luciano PA-C        senna-docusate (SENOKOT-S/PERICOLACE) 8.6-50 MG per tablet 1 tablet  1 tablet Oral BID PRN Talita Luciano  "DALE        Or    senna-docusate (SENOKOT-S/PERICOLACE) 8.6-50 MG per tablet 2 tablet  2 tablet Oral BID PRN Talita Luciano PA-C        simvastatin (ZOCOR) tablet 20 mg  20 mg Oral Daily Talita Luciano PA-C        sodium chloride (PF) 0.9% PF flush 3 mL  3 mL Intracatheter Q8H Talita Luciano PA-C   3 mL at 09/15/24 2150    sodium chloride (PF) 0.9% PF flush 3 mL  3 mL Intracatheter q1 min prn Talita Luciano PA-C           Review of Systems:  ROS in HPI    Physical Exam:  Temp:  [97.9  F (36.6  C)-98.7  F (37.1  C)] 98.7  F (37.1  C)  Pulse:  [64-80] 80  Resp:  [14-20] 16  BP: (130-166)/(40-76) 140/51  SpO2:  [92 %-98 %] 94 %    Right lower extremity: Nontender to palpation to greater trochanter. Tenderness to palpation to anterior groin. Hip flexion 3/5, knee flexion/extension 3/5 both limited by pain. Plantarflexion/dorsiflexion 4/5. Capillary refill <2 seconds. Able to wiggle toes. Normal sensation throughout. 2+ edema.     Left lower extremity: Nontender to palpation to anterior groin, hip flexion 4/5, knee flexion/extension 4/5, dorsiflexion/plantarflexion 4/5. Capillary refill < 2 seconds. Able to wiggle toes. Normal sensation throughout. 1+ weeping edema.      Pertinent Labs  Lab Results: personally reviewed.  Lab Results   Component Value Date    WBC 14.8 (H) 09/16/2024    HGB 8.1 (L) 09/16/2024    HCT 26.4 (L) 09/16/2024    MCV 89 09/16/2024     09/16/2024     No results for input(s): \"INR\" in the last 168 hours.    Pertinent Radiology  Radiology Results: images and radiology report reviewed  Recent Results (from the past 24 hour(s))   US Lower Extremity Venous Duplex Right    Narrative    EXAM: ULTRASOUND LOWER EXTREMITY VENOUS DUPLEX RIGHT  LOCATION: [facility]  DATE: 09/15/2024    INDICATION: Edema right leg.  COMPARISON: None.  TECHNIQUE: Venous Duplex ultrasound of the right lower extremity with and without compression, augmentation and " duplex. Color flow and spectral Doppler with waveform analysis performed.    FINDINGS: Exam includes the common femoral, femoral, popliteal, and contralateral common femoral veins as well as segmentally visualized deep calf veins and greater saphenous vein.     RIGHT: No deep vein thrombosis. No superficial thrombophlebitis. No popliteal cyst.      Impression    IMPRESSION:  1.  No deep venous thrombosis in the right lower extremity.       XR Femur Right 2 Views    Narrative    EXAM: XR FEMUR RIGHT 2 VIEWS  LOCATION: [facility]  DATE: 09/15/2024    INDICATION: Right thigh pain.  COMPARISON: Pelvis radiographic exam 01/8/2024.      Impression    IMPRESSION: Anatomic alignment right femur. No definitive acute displaced right femur fracture. Degenerative change in the right hip and right knee with knee chondrocalcinosis. No change in the small sclerotic lesion supra-acetabular right ilium.   Arterial calcification. Horizontally oriented lucencies in the distal femoral and proximal tibial metaphyses do not appear to be acute. No sizable right knee joint effusion.    Probable displaced fracture of the superior pubic ramus/right superior pubic root suspected. Questionable inferior pubic ramus fracture on the right. Correlation with pelvis radiographic exam recommended.    NOTE: ABNORMAL REPORT    THE DICTATION ABOVE DESCRIBES AN ABNORMALITY FOR WHICH FOLLOW-UP IS NEEDED.        Chest XR,  PA & LAT    Narrative    EXAM: XR CHEST 2 VIEWS  LOCATION: [facility]  DATE: 9/15/2024    INDICATION: Syncope  COMPARISON: Portable chest x-ray 9/6/2020      Impression    IMPRESSION: Patient rotated to the left. Cardiac silhouette size is within normal limits. Linear cicatricial changes versus subsegmental atelectasis within the right midlung. There appears to be a small right pleural effusion. Left lung appears clear.   Calcified atherosclerotic changes of the thoracic aorta. New  fractures of the lateral aspects of the right seventh through ninth ribs. Moderate compression of several mid to lower thoracic vertebral bodies.   Abd/pelvis CT - no contrast - Stone Protocol    Narrative    EXAM: CT ABDOMEN PELVIS W/O CONTRAST  LOCATION: [facility]  DATE: 9/15/2024    INDICATION: fall, eval for fracture pelvis.  COMPARISON: Radiographs of the pelvis 9/10/2024  TECHNIQUE: CT scan of the abdomen and pelvis was performed without IV contrast. Multiplanar reformats were obtained. Dose reduction techniques were used.  CONTRAST: None.    FINDINGS:   LOWER CHEST: Focal atelectasis along the anterior right minor fissure and towards the right posterior costophrenic sulcus. There is a trace right pleural effusion which layers dependently. Severe calcification of the aortic root and   calcification/thickening of aortic valve leaflets. Patchy calcified plaque in the imaged descending thoracic aorta. There are chronic appearing bilateral lower rib fractures.    HEPATOBILIARY: Within normal limits.    PANCREAS: Normal.    SPLEEN: Normal.    ADRENAL GLANDS: Normal.    KIDNEYS/BLADDER: Kidneys are normal in size. No hydronephrosis or nephrolithiasis.    BOWEL: Sigmoid diverticulosis. No dilated bowel or bowel wall thickening. Diffuse gray attenuation of the mesenteric and subcutaneous fat consistent with soft tissue edema.    LYMPH NODES: No lymphadenopathy.    VASCULATURE: Moderate aortoiliac atheromatous calcifications.    PELVIC ORGANS: Uterus is normal in size.    MUSCULOSKELETAL: Unipolar left hip arthroplasty. No periprosthetic fracture. Chronic bilateral sacroiliac insufficiency fractures are present with mixed lucency and sclerosis. There are chronic fracture deformities of the pelvis including both sides of   the symphysis pubis, the left inferior and superior pubic rami. There is a more acute appearing impaction type fracture at the junction of the right superior pubic ramus  and acetabulum (series 3, image 147). Rightward convexity curvature of the lumbar   spine centered on L3-L4. Chronic anterior wedge compression deformity of L1. Severe disc space narrowing and vacuum disc phenomenon throughout the lumbar spine and moderate degenerative osteophytes. Diffuse lumbar facet arthropathy.      Impression    IMPRESSION:     1.  Multiple chronic pelvic fractures including both sides of the symphysis pubis, left superior and inferior pubic rami. There is a more acute appearing impaction fracture at the junction of the right superior pubic ramus and acetabulum.  2.  Chronic bilateral sacral insufficiency fractures.  3.  Advanced lumbar spondylosis without spondylolisthesis.  4.  Sigmoid diverticulosis but no diverticulitis.  5.  Diffuse body wall and mesenteric edema.     XR Chest Port 1 View    Narrative    EXAM: XR CHEST PORT 1 VIEW  LOCATION: [facility]  DATE: 9/16/2024    INDICATION: Trauma Patient with Rib Fracture(s)  COMPARISON: 9/6/2020      Impression    IMPRESSION: AP chest x-ray. Right third rib fracture. Right sixth and seventh rib fractures. No pneumothorax seen. Linear atelectasis right midlung. Mild elevation right hemidiaphragm stable. Left lung clear. Left 10th rib fracture suspected. No   pneumothorax. Heart size and pulmonary vascularity within normal limits. Moderate left thoracic curvature. Degenerative changes bilateral shoulders. Visualized upper abdomen unremarkable.       Attestation:  I have reviewed today's vital signs, notes, medications, labs and imaging.     Sarah Canela PA-C

## 2024-09-16 NOTE — PROGRESS NOTES
[facility]           Assessment & Plan        Nighat Gupta is a 88 year old female admitted on 9/15/2024. She has a past medical history significant for chronic kidney disease, chronic anemia, aortic stenosis, hypothyroidism, hypertension, dyslipidemia, chronic back pain / lumbar radiculopathy, depression, osteopenia, and tobacco use disorder who presented to the emergency department for evaluation of groin pain after a fall and was found to have acute pubic rami fractures, sacral insufficiency fractures, and right sided rib fractures.      Goals of care   Patient wants to discharge to LTC with hospice.   - holding off on further labs/imaging as of 9/16.    - continue all medications for now, but patient and staff aware that it is fine for her to refuse any medications that she doesn't want.  See lasix and aspirin below.     Closed fractures of pubic rami  Closed fracture of multiple ribs of right side  Sacral insufficiency fracture  Lumbar radiculopathy / chronic back pain  Patient has multiple acute fractures related to her fall on 9/14 (although also had a fall on 9/10, see below), now with difficulty ambulating or getting up due to pain. Has chronic back pain at baseline managed prior to admission with scheduled acetaminophen, scheduled gabapentin (300 mg bid, plus 600 mg qpm), scheduled ibuprofen (800 mg tid), scheduled Norco bid, and prn Flexeril.     Chest x-ray: \"Patient rotated to the left. Cardiac silhouette size is within normal limits. Linear cicatricial changes versus subsegmental atelectasis within the right midlung. There appears to be a small right pleural effusion. Left lung appears clear. Calcified atherosclerotic changes of the thoracic aorta. New fractures of the lateral aspects of the right seventh through ninth ribs. Moderate compression of several mid to lower thoracic vertebral bodies.\"     Right femur x-ray: \"Anatomic alignment right femur. No definitive " "acute displaced right femur fracture. Degenerative change in the right hip and right knee with knee chondrocalcinosis. No change in the small sclerotic lesion supra-acetabular right ilium. Arterial calcification. Horizontally oriented lucencies in the distal femoral and proximal tibial metaphyses do not appear to be acute. No sizable right knee joint effusion. Probable displaced fracture of the superior pubic ramus/right superior pubic root suspected. Questionable inferior pubic ramus fracture on the right. Correlation with pelvis radiographic exam recommended.\"     CT abdomen & pelvis: \"1.  Multiple chronic pelvic fractures including both sides of the symphysis pubis, left superior and inferior pubic rami. There is a more acute appearing impaction fracture at the junction of the right superior pubic ramus and acetabulum. 2.  Chronic bilateral sacral insufficiency fractures. 3.  Advanced lumbar spondylosis without spondylolisthesis. 4.  Sigmoid diverticulosis but no diverticulitis. 5.  Diffuse body wall and mesenteric edema.\"     Case discussed between emergency department and on-call ortho team.  - Ortho consult for trauma evaluation and ambulation recommendations, but no surgery is anticipated at this time -  ok for patient to weight bear as tolerated with walker, then recommended aspirin 81 mg twice daily x 6 weeks then daily but patient does not want to do that - will leave aspirin daily for now but sounds like patient will likely refuse that as well which is fine.   - Rib fracture order set utilized  - Analgesia: continued home scheduled gabapentin, ibuprofen, and Norco. Utilized analgesia recommendations from rib fracture order set for additional scheduled acetaminophen, prn oxycodone, and pry dilaudid  - PT/OT evaluations  - Care Transitions consult, may need placement     Fall, subsequent encounter  Patient with two recent falls on 9/10/24 and 9/14/24. She had a possible syncopal episode on 9/10 and was " "evaluated in the emergency department, no specific etiology was identified although patient declined some recommended evaluations for full work-up (refer to note on 9/10/24). She fell again on 9/14 while at home in the bathroom; she thinks she slipped on her pajamas and landed between the toilet and the wall. No head trauma or loss of consciousness, but was unable to get up and ambulate on her own due to right groin pain, found to have pubic rami, sacral insufficiency, and rib fractures as noted above.     Low concern for syncopal episode on 9/14, although unclear etiology of 9/10 fall.   - Therapy evaluations as noted above     Mesenteric edema   Incidental finding on CT abdomen & pelvis -  \"5.  Diffuse body wall and mesenteric edema.\" On admission patient does have a distended abdomen, but is non-tender on exam. She mentions chronic constipation and feels bloated, but otherwise no abdominal symptoms. No significant stool burden is noted on CT. CT findings may represent anasarca and she does appear to be hypervolemic, although patient appears stable from a cardiac standpoint.   - patient does not want any furosemide currently - held for 9/17 - might consider 1/2 dose on discharge.       Bilateral lower extremity edema, right > left   Has chronic lower extremity edema at baseline managed with furosemide 20 mg daily. However, right lower extremity more edematous than left on exam. Right lower extremity ultrasound was negative for DVT.   - See above regarding furosemide     Leukocytosis   Initial WBC 15.1, ANC 12.7. Afebrile. No evidence of infection by history or exam. Possibly stress response to trauma.  Essentially unchanged 9/16.   - Monitor for fevers or evidence of infection, but no indication for antibiotics so far      Chronic anemia   Initial hemoglobin 8.5, baseline 8.9 - 9.2. No evidence of acute bleeding. Hemodynamically stable.   - Stable     Chronic kidney disease, stage 3a  Admit creatinine 0.98 " (baseline 0.89 - 1.06), GFR 56 (baseline 51 - 63).   - Stable     Essential hypertension, benign  Managed prior to admission with amlodipine 10 mg daily, lisinopril 40 mg daily, and furosemide 20 mg daily. Blood pressure stable at time of admission.   - Continue amlodipine and lisinopril with holding parameters  - See above regarding furosemide      Hyperlipidemia LDL goal <130  Managed prior to admission with Zocor 20 mg q hs, continue. Can reassess with hospice on discharge if she wants to continue this going forward.       Hypothyroidism  Admission TSH elevated (33.56), although free T4 WNL (0.93). Managed prior to admission with levothyroxine 75 mcg daily, continue.      GERD   Managed prior to admission with Protonix 40 mg daily, continue.      Osteopenia  Previously diagnosed. Does not appear to be on bisphosphonate therapy. Likely contributes to patient's current fractures.  No change given goals as above      Major depressive disorder, recurrent episode, moderate   Stable mood. Managed prior to admission with Cymbalta 60 mg daily, continue.      Aortic stenosis  Murmur noted on exam. Echo in 2023 shows moderate aortic stenosis and +1 aortic regurgitation.     Tobacco use disorder  Patient smokes 4 cigarettes in the morning and 2 at night. Would like a nicotine patch.  - Nicotine 7mg patch available           Diet: Regular Diet Adult    DVT Prophylaxis: Pneumatic Compression Devices  Castle Catheter: Not present  Lines: None     Cardiac Monitoring: None  Code Status:  DNR/DNI - discussed at time of admission         Clinically Significant Risk Factors Present on Admission                # Drug Induced Platelet Defect: home medication list includes an antiplatelet medication   # Hypertension: Noted on problem list    # Anemia: based on hgb <11        Diet  Orders Placed This Encounter      Regular Diet Adult        Discussed with daughter over the phone 9/16 who is in agreement with plan.              "    Disposition Plan        Medically Ready for Discharge: Anticipated Tomorrow                  Tal Issa MD  Hospitalist Service  [facility]  Securely message with the [employer] Web Console (learn more here)  Text page via [employer] Paging/Directory             Interval History:   Improving.  No new concerns.  Patient wants to go to LTC with hospice once ready.  Pain reasonably controlled currently.  No new concerns.                 Review of Systems:    ROS: 10 point ROS neg other than the symptoms noted above in the HPI.           Medications:   Current active medications and PTA medications reviewed, see medication list for details.            Physical Exam:   Vitals were reviewed  Patient Vitals for the past 24 hrs:   BP Temp Temp src Pulse Resp SpO2 Height Weight   24 1512 (!) 146/54 98.3  F (36.8  C) Oral 67 16 93 % -- --   24 1257 129/46 -- Oral 67 16 90 % -- --   24 0754 (!) 140/51 -- Oral 80 16 94 % -- --   24 0350 -- -- -- -- -- -- -- 43 kg (94 lb 12.8 oz)   24 0344 137/40 98.7  F (37.1  C) Oral -- 16 92 % -- --   09/15/24 2021 (!) 149/52 97.9  F (36.6  C) Oral 68 16 92 % 1.397 m (4' 7\") 42.2 kg (93 lb 0.6 oz)   09/15/24 1923 (!) 166/60 -- -- 70 -- 92 % -- --   09/15/24 1550 130/54 -- -- -- 20 -- -- --       Temperatures:  Current - Temp: 98.3  F (36.8  C); Max - Temp  Av.3  F (36.8  C)  Min: 97.9  F (36.6  C)  Max: 98.7  F (37.1  C)  Respiration range: Resp  Av.7  Min: 16  Max: 20  Pulse range: Pulse  Av.4  Min: 67  Max: 80  Blood pressure range: Systolic (24hrs), Av , Min:129 , Max:166   ; Diastolic (24hrs), Av, Min:40, Max:60    Pulse oximetry range: SpO2  Av.2 %  Min: 90 %  Max: 94 %  I/O last 3 completed shifts:  In: 470 [P.O.:470]  Out: 200 [Urine:200]    Intake/Output Summary (Last 24 hours) at 2024 1541  Last data filed at 2024 1300  Gross per 24 hour   Intake 470 ml   Output 200 ml   Net 270 ml "     EXAM:  General: awake and alert, NAD, oriented x 3  Head: normocephalic  Neck: unremarkable, no lymphadenopathy   HEENT: oropharynx pink and moist    Heart: Regular rate and rhythm, no murmurs, rubs, or gallops  Lungs: clear to auscultation bilaterally with good air movement throughout  Abdomen: soft, non-tender, no masses or organomegaly  Extremities: no edema in lower extremities   Skin unremarkable as visualized.               Data:     Reviewed data:  Results for orders placed or performed during the hospital encounter of 09/15/24 (from the past 24 hour(s))   Basic metabolic panel   Result Value Ref Range    Sodium 144 135 - 145 mmol/L    Potassium 3.6 3.4 - 5.3 mmol/L    Chloride 105 98 - 107 mmol/L    Carbon Dioxide (CO2) 32 (H) 22 - 29 mmol/L    Anion Gap 7 7 - 15 mmol/L    Urea Nitrogen 23.7 (H) 8.0 - 23.0 mg/dL    Creatinine 0.83 0.51 - 0.95 mg/dL    GFR Estimate 68 >60 mL/min/1.73m2    Calcium 8.7 (L) 8.8 - 10.4 mg/dL    Glucose 94 70 - 99 mg/dL   CBC with platelets   Result Value Ref Range    WBC Count 14.8 (H) 4.0 - 11.0 10e3/uL    RBC Count 2.96 (L) 3.80 - 5.20 10e6/uL    Hemoglobin 8.1 (L) 11.7 - 15.7 g/dL    Hematocrit 26.4 (L) 35.0 - 47.0 %    MCV 89 78 - 100 fL    MCH 27.4 26.5 - 33.0 pg    MCHC 30.7 (L) 31.5 - 36.5 g/dL    RDW 19.9 (H) 10.0 - 15.0 %    Platelet Count 428 150 - 450 10e3/uL   Magnesium   Result Value Ref Range    Magnesium 2.1 1.7 - 2.3 mg/dL   Phosphorus   Result Value Ref Range    Phosphorus 3.1 2.5 - 4.5 mg/dL   XR Chest Port 1 View    Narrative    EXAM: XR CHEST PORT 1 VIEW  LOCATION: [facility]  DATE: 9/16/2024    INDICATION: Trauma Patient with Rib Fracture(s)  COMPARISON: 9/6/2020      Impression    IMPRESSION: AP chest x-ray. Right third rib fracture. Right sixth and seventh rib fractures. No pneumothorax seen. Linear atelectasis right midlung. Mild elevation right hemidiaphragm stable. Left lung clear. Left 10th rib fracture suspected. No    pneumothorax. Heart size and pulmonary vascularity within normal limits. Moderate left thoracic curvature. Degenerative changes bilateral shoulders. Visualized upper abdomen unremarkable.           Attestation:  I have reviewed today's vital signs, notes, medications, labs and imaging.  Amount of time spent managing this patient today:  40 minutes.

## 2024-09-16 NOTE — PROGRESS NOTES
[facility]  OUTPATIENT PHYSICAL THERAPY EVALUATION  PLAN OF TREATMENT FOR OUTPATIENT REHABILITATION  (COMPLETE FOR INITIAL CLAIMS ONLY)  Patient's Last Name, First Name, M.I.  YOB: 1935  Nighat Gupta                        Provider's Name  [facility] Medical Record No.  [id number]                             Onset Date:  09/15/24   Start of Care Date:      Type:     _X_PT   ___OT   ___SLP Medical Diagnosis:                 PT Diagnosis:  impaired functional mobility Visits from SOC:  1     See note for plan of treatment, functional goals and certification details    I CERTIFY THE NEED FOR THESE SERVICES FURNISHED UNDER        THIS PLAN OF TREATMENT AND WHILE UNDER MY CARE     (Physician co-signature of this document indicates review and certification of the therapy plan).              09/16/24 0900   Appointment Info   Signing Clinician's Name / Credentials (PT) Lefty Lin PT   Living Environment   People in Home alone   Current Living Arrangements apartment;other (see comments)  (senior apartment)   Home Accessibility no concerns   Transportation Anticipated family or friend will provide   Self-Care   Fall history within last six months yes   Number of times patient has fallen within last six months 10   Activity/Exercise/Self-Care Comment uses rollator at baseline; at baseline: independent with ADLs. per family- has been staying with her this past weekend d/t falls.   General Information   Onset of Illness/Injury or Date of Surgery 09/15/24   Referring Physician Dr. Issa   Patient/Family Therapy Goals Statement (PT) to remain safe and avoid future falls   Pertinent History of Current Problem (include personal factors and/or comorbidities that impact the POC) 88 year old female admitted on 9/15/2024. She has a past medical history significant for chronic kidney disease, chronic anemia, aortic stenosis, hypothyroidism,  hypertension, dyslipidemia, chronic back pain / lumbar radiculopathy, depression, osteopenia, and tobacco use disorder who presented to the emergency department for evaluation of groin pain after a fall and was found to have acute pubic rami fractures, sacral insufficiency fractures, and right sided rib fractures. 88 year old female admitted on 9/15/2024. She has a past medical history significant for chronic kidney disease, chronic anemia, aortic stenosis, hypothyroidism, hypertension, dyslipidemia, chronic back pain / lumbar radiculopathy, depression, osteopenia, and tobacco use disorder who presented to the emergency department for evaluation of groin pain after a fall and was found to have acute pubic rami fractures, sacral insufficiency fractures, and right sided rib fractures.   Existing Precautions/Restrictions fall   Cognition   Affect/Mental Status (Cognition) confused   Orientation Status (Cognition) oriented x 3   Follows Commands (Cognition) delayed response/completion;increased processing time needed;initiation impaired   Pain Assessment   Patient Currently in Pain Yes, see Vital Sign flowsheet   Integumentary/Edema   Integumentary/Edema no deficits were identifed   Posture    Posture Forward head position;Protracted shoulders;Kyphosis   Range of Motion (ROM)   Range of Motion ROM deficits secondary to pain;ROM deficits secondary to weakness   ROM Comment BLE gross muscles   Strength (Manual Muscle Testing)   Strength (Manual Muscle Testing) Deficits observed during functional mobility   Strength Comments impaired due to pain   Bed Mobility   Bed Mobility supine-sit-supine   Supine-Sit-Supine [place] (Bed Mobility) minimum assist (75% patient effort)   Bed Mobility Limitations decreased ability to use arms for pushing/pulling;decreased ability to use legs for bridging/pushing;impaired ability to control trunk for mobility   Impairments Contributing to Impaired Bed Mobility pain;decreased strength    Assistive Device (Bed Mobility) bed rails   Transfers   Transfers sit-stand transfer   Transfer Safety Concerns Noted decreased sequencing ability   Impairments Contributing to Impaired Transfers pain;decreased strength   Sit-Stand Transfer   Sit-Stand [place] (Transfers) contact guard   Assistive Device (Sit-Stand Transfers) walker, front-wheeled   Gait/Stairs (Locomotion)   [place] Level (Gait) contact guard   Assistive Device (Gait) walker, front-wheeled   Distance in Feet (Gait) 40   Pattern (Gait) step-to   Deviations/Abnormal Patterns (Gait) antalgic;gait speed decreased;stride length decreased;weight shifting decreased   Balance   Balance other (describe)   Standing Balance: Dynamic poor balance   Balance Quick Add Standing balance: dynamic   Clinical Impression   Criteria for Skilled Therapeutic Intervention Yes, treatment indicated   PT Diagnosis (PT) impaired functional mobility   Influenced by the following impairments pain, weakness, fatigue   Functional limitations due to impairments bed mobility, transfers, gait   Clinical Presentation (PT Evaluation Complexity) stable   Clinical Presentation Rationale clinical judgement   Clinical Decision Making (Complexity) low complexity   Planned Therapy Interventions (PT) balance training;bed mobility training;gait training;home exercise program;patient/family education;postural re-education;progressive activity/exercise;transfer training;strengthening   Risk & Benefits of therapy have been explained evaluation/treatment results reviewed;care plan/treatment goals reviewed;risks/benefits reviewed;current/potential barriers reviewed;participants voiced agreement with care plan;participants included;patient;daughter   PT Total Evaluation Time   PT Jodieal, Low Complexity Minutes (61988) 11   Physical Therapy Goals   PT Frequency Daily   PT Predicted Duration/Target Date for Goal Attainment 09/23/24   PT Goals Bed Mobility;Transfers;Gait   PT: Bed Mobility  Supervision/stand-by assist;Supine to/from sit   PT: Transfers Supervision/stand-by assist;Sit to/from stand;Bed to/from chair   PT: Gait Supervision/stand-by assist;Rolling walker;100 feet   Interventions   Interventions Quick Adds Therapeutic Activity   Therapeutic Activity   Therapeutic Activities: dynamic activities to improve functional performance Minutes (75184) 10   Symptoms Noted During/After Treatment Fatigue;Increased pain   Treatment Detail/Skilled Intervention Patient and daughters educated on role of PT during hospital stay in regards to obtaining current functional level resulting in a safe disposition plan. Supine to/from EOB with min/mod assist due to pain. Edu on log rolling and bracing with pillow as needed to patient and family. Amb in room to hallway with adjusted FWW x40 feet with reports of fatigue and inability to progress RLE without pain. Left with orthopedic PA and all needs/questions met.   PT Discharge Planning   PT Plan Daily PT: in/out of bed via log rolling; sit to stands; increase gait distance   PT Discharge Recommendation (DC Rec) Transitional Care Facility   PT Rationale for DC Rec Patient's most safe option at this time is admision to TCU to recieve daily therapies to return to PLOF. She is not able to return to her prior level due to her frequency of faling and reduction in functional status.   PT Brief overview of current status min assist bed mobility; CGA transfers; CGA gait x40 feet with FWW   PT Equipment Needed at Discharge other (see comments)  (will provide at TCU)   Total Session Time   Timed Code Treatment Minutes 10   Total Session Time (sum of timed and untimed services) 21

## 2024-09-17 NOTE — PROGRESS NOTES
Occupational Therapy Discharge Summary    Reason for therapy discharge:    No further expectations of functional progress.    Progress towards therapy goal(s). See goals on Care Plan in Epic electronic health record for goal details.  Goals not met.  Barriers to achieving goals:   transitioning to Hospice.    Therapy recommendation(s):    No further therapy is recommended.Pt discharging to LTC with Hospice

## 2024-09-17 NOTE — PLAN OF CARE
"  Problem: Adult Inpatient Plan of Care  Goal: Plan of Care Review  Outcome: Progressing  Flowsheets (Taken 9/17/2024 0037)  Plan of Care Reviewed With: patient  Overall Patient Progress: improving  Goal: Optimal Comfort and Wellbeing  Outcome: Progressing     Problem: Risk for Delirium  Goal: Improved Sleep  Outcome: Progressing  Intervention: Promote Sleep  Recent Flowsheet Documentation  Taken 9/16/2024 2346 by Nayeli Patricia RN  Sleep/Rest Enhancement:   awakenings minimized   noise level reduced   room darkened     Problem: Orthopaedic Fracture  Goal: Optimal Functional Ability  Outcome: Progressing  Intervention: Optimize Functional Ability  Recent Flowsheet Documentation  Taken 9/16/2024 2346 by Nayeli Patricia RN  Activity Management:   activity adjusted per tolerance   ambulated to bathroom   Goal Outcome Evaluation:      Plan of Care Reviewed With: patient    Overall Patient Progress: improvingOverall Patient Progress: improving     Patient is A&O pleasant and cooperative.  Patient is up to bathroom with assist of one using gait belt and walker.  Patient has c/o groin and right hip pain, analgesics given with relief. Patient repositions in bed per self. Patiet has scattered bruises from \"many\" falls.  Patient appears to have slept well, wakes easily.  Call light in reach, bed alarm on.       "

## 2024-09-17 NOTE — PROGRESS NOTES
"[facility]ist Progress Note           Assessment & Plan          Nighat Gupta is a 88 year old female admitted on 9/15/2024. She has a past medical history significant for chronic kidney disease, chronic anemia, aortic stenosis, hypothyroidism, hypertension, dyslipidemia, chronic back pain / lumbar radiculopathy, depression, osteopenia, and tobacco use disorder who presented to the emergency department for evaluation of groin pain after a fall and was found to have acute pubic rami fractures, sacral insufficiency fractures, and right sided rib fractures.      Goals of care   Patient wants to discharge to LTC with hospice.   - holding off on further labs/imaging as of 9/16.    - continued all medications for now, but patient and staff aware that it is fine for her to refuse any medications that she doesn't want.  See lasix and aspirin below.   - follow-up with hospice on discharge planned for 9/18     Closed fractures of pubic rami  Closed fracture of multiple ribs of right side  Sacral insufficiency fracture  Lumbar radiculopathy / chronic back pain  Patient has multiple acute fractures related to her fall on 9/14 (although also had a fall on 9/10, see below), now with difficulty ambulating or getting up due to pain. Has chronic back pain at baseline managed prior to admission with scheduled acetaminophen, scheduled gabapentin (300 mg bid, plus 600 mg qpm), scheduled ibuprofen (800 mg tid), scheduled Norco bid, and prn Flexeril.     Chest x-ray: \"Patient rotated to the left. Cardiac silhouette size is within normal limits. Linear cicatricial changes versus subsegmental atelectasis within the right midlung. There appears to be a small right pleural effusion. Left lung appears clear. Calcified atherosclerotic changes of the thoracic aorta. New fractures of the lateral aspects of the right seventh through ninth ribs. Moderate compression of several mid to lower thoracic vertebral bodies.\"     Right " "femur x-ray: \"Anatomic alignment right femur. No definitive acute displaced right femur fracture. Degenerative change in the right hip and right knee with knee chondrocalcinosis. No change in the small sclerotic lesion supra-acetabular right ilium. Arterial calcification. Horizontally oriented lucencies in the distal femoral and proximal tibial metaphyses do not appear to be acute. No sizable right knee joint effusion. Probable displaced fracture of the superior pubic ramus/right superior pubic root suspected. Questionable inferior pubic ramus fracture on the right. Correlation with pelvis radiographic exam recommended.\"     CT abdomen & pelvis: \"1.  Multiple chronic pelvic fractures including both sides of the symphysis pubis, left superior and inferior pubic rami. There is a more acute appearing impaction fracture at the junction of the right superior pubic ramus and acetabulum. 2.  Chronic bilateral sacral insufficiency fractures. 3.  Advanced lumbar spondylosis without spondylolisthesis. 4.  Sigmoid diverticulosis but no diverticulitis. 5.  Diffuse body wall and mesenteric edema.\"     Case discussed between emergency department and on-call ortho team.  - Ortho consult for trauma evaluation and ambulation recommendations, but no surgery is anticipated at this time -  ok for patient to weight bear as tolerated with walker, then recommended aspirin 81 mg twice daily x 6 weeks then daily but patient does not want to do that - discussed and patient for now wants to do aspirin just once daily.    - Rib fracture order set utilized  - Analgesia: continued home scheduled gabapentin, ibuprofen, and Norco. Utilized analgesia recommendations from rib fracture order set for additional scheduled acetaminophen, prn oxycodone, and pry dilaudid  - PT/OT evaluations  - Care Transitions consult, may need placement     Fall, subsequent encounter  Patient with two recent falls on 9/10/24 and 9/14/24. She had a possible syncopal " "episode on 9/10 and was evaluated in the emergency department, no specific etiology was identified although patient declined some recommended evaluations for full work-up (refer to note on 9/10/24). She fell again on 9/14 while at home in the bathroom; she thinks she slipped on her pajamas and landed between the toilet and the wall. No head trauma or loss of consciousness, but was unable to get up and ambulate on her own due to right groin pain, found to have pubic rami, sacral insufficiency, and rib fractures as noted above.     Low concern for syncopal episode on 9/14, although unclear etiology of 9/10 fall.   - Therapy evaluations as noted above     Mesenteric edema   Incidental finding on CT abdomen & pelvis -  \"5.  Diffuse body wall and mesenteric edema.\" On admission patient does have a distended abdomen, but is non-tender on exam. She mentions chronic constipation and feels bloated, but otherwise no abdominal symptoms. No significant stool burden is noted on CT. CT findings may represent anasarca and she does appear to be hypervolemic, although patient appears stable from a cardiac standpoint.   - patient does not want any furosemide currently - held for 9/17 - discussed options - she would like to have this available daily as needed but does not want to take it daily scheduled as she doesn't like how often it makes her go to the bathroom.  Ordered daily prn so she can ask for it if needed for edema or if she decides she wants it.      Bilateral lower extremity edema, right > left   Has chronic lower extremity edema at baseline managed with furosemide 20 mg daily. However, right lower extremity more edematous than left on exam. Right lower extremity ultrasound was negative for DVT.   - See above regarding furosemide     Leukocytosis   Initial WBC 15.1, ANC 12.7. Afebrile. No evidence of infection by history or exam. Possibly stress response to trauma.  Essentially unchanged 9/16.   - Monitor for fevers or " evidence of infection, but no indication for antibiotics so far      Chronic anemia   Initial hemoglobin 8.5, baseline 8.9 - 9.2. No evidence of acute bleeding. Hemodynamically stable.   - Stable     Chronic kidney disease, stage 3a  Admit creatinine 0.98 (baseline 0.89 - 1.06), GFR 56 (baseline 51 - 63).   - Stable     Essential hypertension, benign  Managed prior to admission with amlodipine 10 mg daily, lisinopril 40 mg daily, and furosemide 20 mg daily. Blood pressure stable at time of admission.   - Continue amlodipine and lisinopril with holding parameters  - See above regarding furosemide      Hyperlipidemia LDL goal <130  Managed prior to admission with Zocor 20 mg q hs, continue. Can reassess with hospice on discharge if she wants to continue this going forward.       Hypothyroidism  Admission TSH elevated (33.56), although free T4 WNL (0.93). Managed prior to admission with levothyroxine 75 mcg daily, continue.      GERD   Managed prior to admission with Protonix 40 mg daily, continue.      Osteopenia  Previously diagnosed. Does not appear to be on bisphosphonate therapy. Likely contributes to patient's current fractures.  No change given goals as above      Major depressive disorder, recurrent episode, moderate   Stable mood. Managed prior to admission with Cymbalta 60 mg daily, continue.      Aortic stenosis  Murmur noted on exam. Echo in 2023 shows moderate aortic stenosis and +1 aortic regurgitation.     Tobacco use disorder  Patient smokes 4 cigarettes in the morning and 2 at night. Would like a nicotine patch.  - Nicotine 7mg patch available           Diet: Regular Diet Adult    DVT Prophylaxis: Pneumatic Compression Devices  Castle Catheter: Not present  Lines: None     Cardiac Monitoring: None  Code Status:  DNR/DNI - discussed at time of admission         Clinically Significant Risk Factors Present on Admission                # Drug Induced Platelet Defect: home medication list includes an  antiplatelet medication   # Hypertension: Noted on problem list    # Anemia: based on hgb <11          Diet  Orders Placed This Encounter      Regular Diet Adult                        Disposition Plan           Medically Ready for Discharge: Anticipated Tomorrow                  Tal Issa MD  Hospitalist Service  [facility]  Securely message with the Vocera Web Console (learn more here)  Text page via [facility] Paging/Directory             Interval History:   Doing well.  Pain controlled.  Bright and alert and friendly.  Happy with plan for hopeful discharge today.  No other concerns/changes.  Discussed lasix and aspirin with plan as below.    No new or worsening pain.                  Review of Systems:    ROS: 10 point ROS neg other than the symptoms noted above in the HPI.           Medications:   Current active medications and PTA medications reviewed, see medication list for details.            Physical Exam:   Vitals were reviewed  Patient Vitals for the past 24 hrs:   BP Temp Temp src Pulse Resp SpO2 Weight   24 1208 123/50 98.4  F (36.9  C) Oral 80 16 90 % --   24 0732 (!) 165/76 98  F (36.7  C) Oral 89 18 93 % --   24 0600 -- -- -- -- -- -- 40.2 kg (88 lb 10 oz)   24 0340 (!) 167/58 98  F (36.7  C) Oral 88 18 92 % --   24 0018 (!) 146/52 97.8  F (36.6  C) Oral 74 16 92 % --   24 (!) 168/48 97.7  F (36.5  C) Oral 79 16 90 % --       Temperatures:  Current - Temp: 98.4  F (36.9  C); Max - Temp  Av  F (36.7  C)  Min: 97.7  F (36.5  C)  Max: 98.4  F (36.9  C)  Respiration range: Resp  Av.8  Min: 16  Max: 18  Pulse range: Pulse  Av  Min: 74  Max: 89  Blood pressure range: Systolic (24hrs), Av , Min:123 , Max:168   ; Diastolic (24hrs), Av, Min:48, Max:76    Pulse oximetry range: SpO2  Av.4 %  Min: 90 %  Max: 93 %  No intake/output data recorded.  No intake or output data in the 24 hours ending 24  1514  EXAM:  General: awake and alert, NAD, oriented x 3  Head: normocephalic  Neck: unremarkable, no lymphadenopathy   HEENT: oropharynx pink and moist    Heart: Regular rate and rhythm, no murmurs, rubs, or gallops  Lungs: clear to auscultation bilaterally with good air movement throughout  Abdomen: soft, non-tender, no masses or organomegaly  Extremities: no edema in lower extremities   Skin unremarkable as visualized.               Data:     Reviewed data:  No results found for this or any previous visit (from the past 24 hour(s)).        Attestation:  I have reviewed today's vital signs, notes, medications, labs and imaging.  Amount of time spent managing this patient today:  35 minutes including discussion of plan with care transitions.                             Skin normal color for race, warm, dry and intact. No evidence of rash.

## 2024-09-17 NOTE — PLAN OF CARE
Problem: Orthopaedic Fracture  Goal: Optimal Pain Control and Function  Outcome: Progressing  Intervention: Manage Acute Orthopaedic-Related Pain  Recent Flowsheet Documentation  Taken 9/16/2024 2020 by Montserrat Flores RN  Pain Management Interventions: medication (see MAR)   Goal Outcome Evaluation:           Overall Patient Progress: no changeOverall Patient Progress: no change    Outcome Evaluation: Patient currently using oxycodone to help control pain. Patient up with stand by assist to the bathroom. I.S used while awake, patient has nonproductive cough.

## 2024-09-17 NOTE — PLAN OF CARE
"  Problem: Adult Inpatient Plan of Care  Goal: Plan of Care Review  Description: The Plan of Care Review/Shift note should be completed every shift.  The Outcome Evaluation is a brief statement about your assessment that the patient is improving, declining, or no change.  This information will be displayed automatically on your shift  note.  Outcome: Progressing  Flowsheets (Taken 9/17/2024 1004)  Outcome Evaluation: Discharge to LTC on hospice planned today.  Plan of Care Reviewed With:   patient   child  Overall Patient Progress: improving  Goal: Patient-Specific Goal (Individualized)  Description: You can add care plan individualizations to a care plan. Examples of Individualization might be:  \"Parent requests to be called daily at 9am for status\", \"I have a hard time hearing out of my right ear\", or \"Do not touch me to wake me up as it startles  me\".  Outcome: Progressing  Goal: Absence of Hospital-Acquired Illness or Injury  Outcome: Progressing  Intervention: Identify and Manage Fall Risk  Recent Flowsheet Documentation  Taken 9/17/2024 0900 by Stephanie Keys RN  Safety Promotion/Fall Prevention:   activity supervised   assistive device/personal items within reach   lighting adjusted   room organization consistent   safety round/check completed  Intervention: Prevent Skin Injury  Recent Flowsheet Documentation  Taken 9/17/2024 0900 by Stephanie Keys RN  Body Position: position changed independently  Intervention: Prevent and Manage VTE (Venous Thromboembolism) Risk  Recent Flowsheet Documentation  Taken 9/17/2024 0900 by Stephanie Keys RN  VTE Prevention/Management: patient refused intervention  Goal: Optimal Comfort and Wellbeing  Outcome: Progressing  Intervention: Monitor Pain and Promote Comfort  Recent Flowsheet Documentation  Taken 9/17/2024 0735 by Stephanie Keys RN  Pain Management Interventions: medication (see MAR)  Goal: Readiness for Transition of Care  Outcome: Progressing   Goal Outcome " Evaluation:      Plan of Care Reviewed With: patient, child    Overall Patient Progress: improvingOverall Patient Progress: improving    Outcome Evaluation: Discharge to LTC on hospice planned today.    Patient is moving very well with walker, gait belt and 1. Daughter who is a RN has been transferring and walking her to the bathroom as well. Pain managed with current pain regimen. Using IS, but can't get it above 800 most of the time. Poor appetite. Vitals are stable/

## 2024-09-17 NOTE — PLAN OF CARE
Physical Therapy Discharge Summary    Reason for therapy discharge:    No further expectations of functional progress.    Progress towards therapy goal(s). See goals on Care Plan in Epic electronic health record for goal details.  Goals not met.  Barriers to achieving goals:   limited tolerance for therapy.    Therapy recommendation(s):    No further therapy is recommended. Pt discharging to LTC with hospice.

## 2024-09-17 NOTE — PROGRESS NOTES
Care Management Follow Up    Length of Stay (days): 2  Expected Discharge Date: 9/18/24  Concerns to be Addressed: all concerns addressed in this encounter     Patient plan of care discussed at interdisciplinary rounds: Yes  Anticipated Discharge Disposition: Hospice, Long Term Care  Anticipated Discharge Services: None  Anticipated Discharge DME: Other (see comment) ([facility] discussed equipment needs with Jenn mckeon and SNF.)  Patient/family educated on Medicare website which has current facility and service quality ratings: yes  Education Provided on the Discharge Plan: Yes  Patient/Family in Agreement with the Plan: yes  Referrals Placed by CM/SW: Internal Clinic Care Coordination, Hospice, Post Acute Facilities  Private pay costs discussed:  LTC.  Patient will provide a $5000 down payment for [facility]  Discussed  Partnership in Safe Discharge Planning  document with patient/family: No   Handoff Completed: Yes, [facility] PCP: Internal handoff referral completed    Additional Information:  The Patient has been accepted at [facility] P) [phone] F) [phone] with [facility] (Phone: [phone] Fax:[phone]).    [id number].  Patient will provide a $5000 down payment upon admission to SNF.  [facility] [phone]. Hospice enrollment will be 9/18 in the afternoon.  Hospice discussed equipment needs with DaughterJenn.  Jenn will provide transportation 9/18 at 1300.  Discussed safe mode of discharge transportation with Tiana RAMOS whom indicated it is safe for daughter to provide transportation..       Discussed the discharge plan with Daughter JennAlberto [facility] and [facility], they are in agreement.    Hand off completed for [facility] Care Coordination.    Plan:  [facility] with [facility] 9/18 at 1300.  Transportation provided by Jenn Mckeon.     Next Steps:  N/A      Cheyenne Hardy RN

## 2024-09-18 NOTE — PLAN OF CARE
Problem: Adult Inpatient Plan of Care  Goal: Plan of Care Review  Description: The Plan of Care Review/Shift note should be completed every shift.  The Outcome Evaluation is a brief statement about your assessment that the patient is improving, declining, or no change.  This information will be displayed automatically on your shift  note.  Flowsheets (Taken 9/18/2024 0236)  Plan of Care Reviewed With: patient  Overall Patient Progress: no change     Problem: Adult Inpatient Plan of Care  Goal: Absence of Hospital-Acquired Illness or Injury  Intervention: Identify and Manage Fall Risk  Recent Flowsheet Documentation  Taken 9/18/2024 0100 by Yamliet Woods, RN  Safety Promotion/Fall Prevention:   activity supervised   assistive device/personal items within reach   clutter free environment maintained   nonskid shoes/slippers when out of bed   room door open  Intervention: Prevent Infection  Recent Flowsheet Documentation  Taken 9/18/2024 0100 by Yamilet Woods, RN  Infection Prevention:   environmental surveillance performed   single patient room provided   rest/sleep promoted   Goal Outcome Evaluation:  Patient is alert and oriented. Was able to ambulate to and from the bathroom with the assist of one. Continues to have pain and requested prn oxycodone at 0038.

## 2024-09-18 NOTE — PLAN OF CARE
Problem: Orthopaedic Fracture  Goal: Optimal Pain Control and Function  Outcome: Not Progressing  Intervention: Manage Acute Orthopaedic-Related Pain  Recent Flowsheet Documentation  Taken 9/17/2024 2024 by Montserrat Flores, RN  Pain Management Interventions: medication (see MAR)  Taken 9/17/2024 1609 by Montserrat Flores, RN  Pain Management Interventions: medication (see MAR)   Goal Outcome Evaluation:       Patient using PRN oxycodone for break through pain.    Overall Patient Progress: no changeOverall Patient Progress: no change    Outcome Evaluation: Patient sleeping inbetween cares. Up with SBA to the bathroom. Patient declined to sit up in the chair or to eat dinner.

## 2024-09-18 NOTE — DISCHARGE SUMMARY
[facility] Discharge Summary    Nighat Gupta MRN# [id number]   Age: 88 year old YOB: 1935     Date of Admission:  9/15/2024  Date of Discharge::  9/18/2024  Admitting Physician:  Stu Herman MD  Discharge Physician:  Tal Issa MD             Admission Diagnoses:   Leg edema, right [R60.0]  Fall, initial encounter [W19.XXXA]  Closed fracture of pubic ramus, unspecified laterality, initial encounter (H) [S32.599A]          Principle Discharge Diagnosis:       Fall  Failure to thrive     See hospital course for further active diagnoses addressed during this admission.                 Medications Prior to Admission:     Medications Prior to Admission   Medication Sig Dispense Refill Last Dose    amLODIPine (NORVASC) 10 MG tablet Take 1 tablet (10 mg) by mouth daily 90 tablet 3 9/15/2024 at noon    ASPIRIN 81 MG OR TABS Take 81 mg by mouth daily  100 0 9/15/2024 at noon    CALCIUM 500 + D 500-200 MG-IU OR TABS Take 1 tablet by mouth daily    9/15/2024 at noon    cyclobenzaprine (FLEXERIL) 5 MG tablet Take 1 tablet (5 mg) by mouth 2 times daily as needed for muscle spasms. 20 tablet 0 filled at never took any    DULoxetine (CYMBALTA) 60 MG capsule Take 1 capsule (60 mg) by mouth daily 90 capsule 3 9/15/2024 at 0500    gabapentin (NEURONTIN) 300 MG capsule Take 300 mg by mouth 2 times daily. 5 AM and Noon.   9/15/2024 at noon    gabapentin (NEURONTIN) 300 MG capsule Take 600 mg by mouth every evening. At 7 PM   9/14/2024 at 1900    ibuprofen (ADVIL/MOTRIN) 200 MG tablet Take 800 mg by mouth 3 times daily.   9/15/2024 at noon    levothyroxine (SYNTHROID/LEVOTHROID) 75 MCG tablet Take 1 tablet (75 mcg) by mouth daily 90 tablet 3 9/15/2024 at noon    lisinopril (ZESTRIL) 40 MG tablet Take 1 tablet (40 mg) by mouth daily 90 tablet 3 9/15/2024 at noon    MULTIVITAMIN TABS   OR Take 1 tablet by mouth daily   0 9/15/2024 at noon    pantoprazole (PROTONIX) 20 MG EC tablet Take 2 tablets (40  mg) by mouth daily 180 tablet 3 9/15/2024 at noon    simvastatin (ZOCOR) 20 MG tablet Take 1 tablet (20 mg) by mouth at bedtime (Patient taking differently: Take 20 mg by mouth daily.) 90 tablet 3 9/15/2024 at noon    vitamin C (ASCORBIC ACID) 500 MG tablet Take 1,000 mg by mouth daily   9/15/2024 at noon    [DISCONTINUED] acetaminophen (TYLENOL) 500 MG tablet Take 500 mg by mouth 2 times daily. 5 AM and Noon   9/15/2024 at noon    [DISCONTINUED] acetaminophen (TYLENOL) 500 MG tablet Take 1,000 mg by mouth at bedtime.   9/14/2024 at 1900    [DISCONTINUED] furosemide (LASIX) 20 MG tablet Take 1 tablet (20 mg) by mouth daily 90 tablet 1 9/15/2024 at 0500    [DISCONTINUED] HYDROcodone-acetaminophen (NORCO) 5-325 MG tablet Take 1.5 tablets by mouth 2 times daily. 5 AM and 7 PM   9/15/2024 at 0500    [DISCONTINUED] HYDROcodone-acetaminophen (NORCO) 5-325 MG tablet Take 1 tablet by mouth 3 times daily Max tab is 3 per day one pill at a time 90 tablet 0 future use at start 9/22/24 or after             Discharge Medications:     Current Discharge Medication List        START taking these medications    Details   Lidocaine (LIDOCARE) 4 % Patch Place 1 patch over 12 hours onto the skin every 24 hours. To prevent lidocaine toxicity, patient should be patch free for 12 hrs daily.    Associated Diagnoses: Closed fracture of multiple ribs of right side, initial encounter      methocarbamol (ROBAXIN) 500 MG tablet Take 1 tablet (500 mg) by mouth every 6 hours as needed for muscle spasms.    Associated Diagnoses: Closed fracture of pubic ramus, unspecified laterality, initial encounter (H); Closed fracture of multiple ribs of right side, initial encounter      nicotine (NICODERM CQ) 7 MG/24HR 24 hr patch Place 1 patch over 24 hours onto the skin nightly as needed for nicotine withdrawal symptoms.    Associated Diagnoses: Encounter for smoking cessation counseling      oxyCODONE (ROXICODONE) 5 MG tablet Take 2 tablets (10 mg) by  mouth 2 times daily. May also take 1 tablet (5 mg) every 4 hours as needed for pain.  Qty: 30 tablet, Refills: 0    Associated Diagnoses: Closed fracture of pubic ramus, unspecified laterality, initial encounter (H); Closed fracture of multiple ribs of right side, initial encounter      polyethylene glycol (MIRALAX) 17 GM/Dose powder Take 17 g by mouth daily as needed for constipation.    Associated Diagnoses: Constipation, unspecified constipation type      senna-docusate (SENOKOT-S/PERICOLACE) 8.6-50 MG tablet Take 1 tablet by mouth 2 times daily as needed for constipation.    Associated Diagnoses: Constipation, unspecified constipation type           CONTINUE these medications which have CHANGED    Details   acetaminophen (TYLENOL) 325 MG tablet Take 3 tablets (975 mg) by mouth every 8 hours.    Associated Diagnoses: Closed fracture of pubic ramus, unspecified laterality, initial encounter (H); Closed fracture of multiple ribs of right side, initial encounter      furosemide (LASIX) 20 MG tablet Take 1 tablet (20 mg) by mouth daily as needed (for edema if patient asks for it).    Associated Diagnoses: Edema, unspecified type           CONTINUE these medications which have NOT CHANGED    Details   amLODIPine (NORVASC) 10 MG tablet Take 1 tablet (10 mg) by mouth daily  Qty: 90 tablet, Refills: 3    Associated Diagnoses: Essential hypertension, benign      ASPIRIN 81 MG OR TABS Take 81 mg by mouth daily   Qty: 100, Refills: 0    Associated Diagnoses: Essential hypertension, benign; Other disorders of lipoid metabolism      CALCIUM 500 + D 500-200 MG-IU OR TABS Take 1 tablet by mouth daily       cyclobenzaprine (FLEXERIL) 5 MG tablet Take 1 tablet (5 mg) by mouth 2 times daily as needed for muscle spasms.  Qty: 20 tablet, Refills: 0    Associated Diagnoses: Chronic left-sided low back pain with left-sided sciatica      DULoxetine (CYMBALTA) 60 MG capsule Take 1 capsule (60 mg) by mouth daily  Qty: 90 capsule,  Refills: 3    Associated Diagnoses: Major depressive disorder, recurrent episode, moderate (H)      !! gabapentin (NEURONTIN) 300 MG capsule Take 300 mg by mouth 2 times daily. 5 AM and Noon.      !! gabapentin (NEURONTIN) 300 MG capsule Take 600 mg by mouth every evening. At 7 PM      ibuprofen (ADVIL/MOTRIN) 200 MG tablet Take 800 mg by mouth 3 times daily.      levothyroxine (SYNTHROID/LEVOTHROID) 75 MCG tablet Take 1 tablet (75 mcg) by mouth daily  Qty: 90 tablet, Refills: 3    Associated Diagnoses: Hypothyroidism, unspecified type      lisinopril (ZESTRIL) 40 MG tablet Take 1 tablet (40 mg) by mouth daily  Qty: 90 tablet, Refills: 3    Associated Diagnoses: Essential hypertension, benign      MULTIVITAMIN TABS   OR Take 1 tablet by mouth daily   Refills: 0      pantoprazole (PROTONIX) 20 MG EC tablet Take 2 tablets (40 mg) by mouth daily  Qty: 180 tablet, Refills: 3    Associated Diagnoses: Gastroesophageal reflux disease without esophagitis      simvastatin (ZOCOR) 20 MG tablet Take 1 tablet (20 mg) by mouth at bedtime  Qty: 90 tablet, Refills: 3    Associated Diagnoses: Hyperlipidemia LDL goal <130      vitamin C (ASCORBIC ACID) 500 MG tablet Take 1,000 mg by mouth daily       !! - Potential duplicate medications found. Please discuss with provider.        STOP taking these medications       HYDROcodone-acetaminophen (NORCO) 5-325 MG tablet Comments:   Reason for Stopping:         HYDROcodone-acetaminophen (NORCO) 5-325 MG tablet Comments:   Reason for Stopping:                       Brief History of Illness from time of admission:     From Admission H+P:   Nighat Gupta is a 88 year old female with a past medical history significant for chronic kidney disease, chronic anemia, aortic stenosis, hypothyroidism, hypertension, dyslipidemia, chronic back pain / lumbar radiculopathy, depression, osteopenia, and tobacco use disorder who presented to the emergency department for evaluation of groin pain after a  "fall and was found to have acute pubic rami fractures, sacral insufficiency fractures, and right sided rib fractures.      Patient has had two recent falls in the past week.  She lives alone independently in a senior living apartment, but has a daughter who lives nearby and is involved.  The first fall occurred five days ago (9/10) and patient fell \"out of nowhere.\"   She was seen in the emergency department on that day and no obvious cause was identified, although she had declined getting blood work or an EKG at that time.   She agreed to imaging, and no obvious acute fractures were identified.  She was aware of the risks of an incomplete work-up and was discharged home.      The second fall occurred last night (9/14) around 10pm.   Patient was in the bathroom and thinks she slipped on her pajamas, and landed on the floor between the toilet and the wall.  She denies hitting her head, no loss of consciousness.  She was unable to get up on her own due to pain, but was able to crawl to the living room and get her cell phone to call her daughter.  Her daughter came to help her up and patient slept at home overnight, but due to pain with ambulating (specifically in the right groin and low back), patient was brought to the emergency department today for evaluation.  Emergency department work-up revealed multiple acute pubic rami fractures, sacral insufficiency fractures, and right sided rib fractures (lateral 7-9th ribs).      Patient denies pain other than her right groin and low back. She has no chest wall pain when at rest, but does feel some mild pain in her lateral right ribs when she uses her arms to get up from a chair.  No respiratory changes, cough, wheeze, shortness of breath.  No headache, vision changes.   She does feel dizzy at times, but wasn't dizzy prior to either fall.      She has chronic lower extremity edema, right worse than left - stable compared to baseline.  She has chronic constipation and feels " "bloated, but no abdominal pain.  She is feeling generally weak.             TODAY:     Subjective:  Doing well, no new concerns.  Pain controlled.  Feels happy with plan for discharge later today.  No new pain.     ROS:   ROS: 10 point ROS neg other than the symptoms noted above in the HPI.   BP (!) 148/61 (BP Location: Left arm, Patient Position: Supine)   Pulse 84   Temp 98.3  F (36.8  C) (Oral)   Resp 18   Ht 1.397 m (4' 7\")   Wt 40.9 kg (90 lb 2.7 oz)   SpO2 91%   BMI 20.96 kg/m     EXAM:  General: awake and alert, NAD, oriented x 3  Head: normocephalic  Neck: unremarkable, no lymphadenopathy   HEENT: oropharynx pink and moist    Heart: Regular rate and rhythm, no murmurs, rubs, or gallops  Lungs: clear to auscultation bilaterally with good air movement throughout  Abdomen: soft, non-tender, no masses or organomegaly  Extremities: no edema in lower extremities   Skin unremarkable as visualized.       Hospital Course:        Nighat Gupta is a 88 year old female admitted on 9/15/2024. She has a past medical history significant for chronic kidney disease, chronic anemia, aortic stenosis, hypothyroidism, hypertension, dyslipidemia, chronic back pain / lumbar radiculopathy, depression, osteopenia, and tobacco use disorder who presented to the emergency department for evaluation of groin pain after a fall and was found to have acute pubic rami fractures, sacral insufficiency fractures, and right sided rib fractures.      Goals of care   Patient wants to discharge to LTC with hospice.   - holding off on further labs/imaging as of 9/16.    - continued all medications for now, but patient and staff aware that it is fine for her to refuse any medications that she doesn't want.  See lasix and aspirin below.   - follow-up with hospice on discharge      Closed fractures of pubic rami  Closed fracture of multiple ribs of right side  Sacral insufficiency fracture  Lumbar radiculopathy / chronic back pain  Patient " "has multiple acute fractures related to her fall on 9/14 (although also had a fall on 9/10, see below), now with difficulty ambulating or getting up due to pain. Has chronic back pain at baseline managed prior to admission with scheduled acetaminophen, scheduled gabapentin (300 mg bid, plus 600 mg qpm), scheduled ibuprofen (800 mg tid), scheduled Norco bid, and prn Flexeril.     Chest x-ray: \"Patient rotated to the left. Cardiac silhouette size is within normal limits. Linear cicatricial changes versus subsegmental atelectasis within the right midlung. There appears to be a small right pleural effusion. Left lung appears clear. Calcified atherosclerotic changes of the thoracic aorta. New fractures of the lateral aspects of the right seventh through ninth ribs. Moderate compression of several mid to lower thoracic vertebral bodies.\"     Right femur x-ray: \"Anatomic alignment right femur. No definitive acute displaced right femur fracture. Degenerative change in the right hip and right knee with knee chondrocalcinosis. No change in the small sclerotic lesion supra-acetabular right ilium. Arterial calcification. Horizontally oriented lucencies in the distal femoral and proximal tibial metaphyses do not appear to be acute. No sizable right knee joint effusion. Probable displaced fracture of the superior pubic ramus/right superior pubic root suspected. Questionable inferior pubic ramus fracture on the right. Correlation with pelvis radiographic exam recommended.\"     CT abdomen & pelvis: \"1.  Multiple chronic pelvic fractures including both sides of the symphysis pubis, left superior and inferior pubic rami. There is a more acute appearing impaction fracture at the junction of the right superior pubic ramus and acetabulum. 2.  Chronic bilateral sacral insufficiency fractures. 3.  Advanced lumbar spondylosis without spondylolisthesis. 4.  Sigmoid diverticulosis but no diverticulitis. 5.  Diffuse body wall and mesenteric " "edema.\"     Case discussed between emergency department and on-call ortho team.  - Ortho consult for trauma evaluation and ambulation recommendations, but no surgery is anticipated at this time -  per ortho - ok for patient to weight bear as tolerated with walker, then recommended aspirin 81 mg twice daily x 6 weeks then daily but patient does not want to do that - discussed and patient for now wants to do aspirin just once daily.    - Rib fracture order set utilized  - Analgesia: continued home scheduled gabapentin, ibuprofen, and Norco. Utilized analgesia recommendations from rib fracture order set for additional scheduled acetaminophen, prn oxycodone, and pry dilaudid  - PT/OT evaluations  - Care Transitions consult, may need placement     Fall  Failure to thrive   Patient with two recent falls on 9/10/24 and 9/14/24. She had a possible syncopal episode on 9/10 and was evaluated in the emergency department, no specific etiology was identified although patient declined some recommended evaluations for full work-up (refer to note on 9/10/24). She fell again on 9/14 while at home in the bathroom; she thinks she slipped on her pajamas and landed between the toilet and the wall. No head trauma or loss of consciousness, but was unable to get up and ambulate on her own due to right groin pain, found to have pubic rami, sacral insufficiency, and rib fractures as noted above.     Low concern for syncopal episode on 9/14, although unclear etiology of 9/10 fall.   - Therapy evaluations as noted above     Mesenteric edema   Incidental finding on CT abdomen & pelvis -  \"5.  Diffuse body wall and mesenteric edema.\" On admission patient does have a distended abdomen, but is non-tender on exam. She mentions chronic constipation and feels bloated, but otherwise no abdominal symptoms. No significant stool burden is noted on CT. CT findings may represent anasarca and she does appear to be hypervolemic, although patient appears " stable from a cardiac standpoint.   - patient does not want any furosemide currently - held for 9/17 - discussed options - she would like to have this available daily as needed but does not want to take it daily scheduled as she doesn't like how often it makes her go to the bathroom.  Ordered daily prn so she can ask for it if needed for edema or if she decides she wants it.      Bilateral lower extremity edema, right > left   Has chronic lower extremity edema at baseline managed with furosemide 20 mg daily. However, right lower extremity more edematous than left on exam. Right lower extremity ultrasound was negative for DVT.   - See above regarding furosemide     Leukocytosis   Initial WBC 15.1, ANC 12.7. Afebrile. No evidence of infection by history or exam. Possibly stress response to trauma.  Essentially unchanged 9/16.   - Monitor for fevers or evidence of infection, but no indication for antibiotics so far      Chronic anemia   Initial hemoglobin 8.5, baseline 8.9 - 9.2. No evidence of acute bleeding. Hemodynamically stable.   - Stable     Chronic kidney disease, stage 3a  Admit creatinine 0.98 (baseline 0.89 - 1.06), GFR 56 (baseline 51 - 63).   - Stable     Essential hypertension, benign  Managed prior to admission with amlodipine 10 mg daily, lisinopril 40 mg daily, and furosemide 20 mg daily. Blood pressure stable at time of admission.   - Continue amlodipine and lisinopril with holding parameters  - See above regarding furosemide      Hyperlipidemia LDL goal <130  Managed prior to admission with Zocor 20 mg q hs, continue. Can reassess with hospice on discharge if she wants to continue this going forward.       Hypothyroidism  Admission TSH elevated (33.56), although free T4 WNL (0.93). Managed prior to admission with levothyroxine 75 mcg daily, continue.      GERD   Managed prior to admission with Protonix 40 mg daily, continue.      Osteopenia  Previously diagnosed. Does not appear to be on  bisphosphonate therapy. Likely contributes to patient's current fractures.  No change given goals as above      Major depressive disorder, recurrent episode, moderate   Stable mood. Managed prior to admission with Cymbalta 60 mg daily, continue.      Aortic stenosis  Murmur noted on exam. Echo in 2023 shows moderate aortic stenosis and +1 aortic regurgitation.     Tobacco use disorder  Patient smokes 4 cigarettes in the morning and 2 at night. Would like a nicotine patch.  - Nicotine 7mg patch available           Diet: Regular Diet Adult    DVT Prophylaxis: Pneumatic Compression Devices  Castle Catheter: Not present  Lines: None     Cardiac Monitoring: None  Code Status:  DNR/DNI - discussed at time of admission         Clinically Significant Risk Factors Present on Admission                # Drug Induced Platelet Defect: home medication list includes an antiplatelet medication   # Hypertension: Noted on problem list    # Anemia: based on hgb <11                Discharge Instructions and Follow-Up:      Discharge diet: Orders Placed This Encounter      Regular Diet Adult       Discharge activity: Activity as tolerated   Discharge follow-up: Follow-up with hospice on discharge as planned           Discharge Disposition:     Discharged to LTC with hospice      Attestation:  Amount of time performed on this discharge summary: 50 minutes.    Tal Issa MD

## 2024-09-18 NOTE — PROGRESS NOTES
[facility] DISCHARGE NOTE    Patient discharged to long term care facility at 12:30 PM via wheel chair. Accompanied by daughter and staff. Discharge instructions reviewed with patient, opportunity offered to ask questions. Prescriptions sent with patient to fill . All belongings sent with patient.    Stephanie Keys RN

## 2024-09-18 NOTE — PROGRESS NOTES
Care Management Discharge Note    Discharge Date: 09/18/2024       Discharge Disposition: Hospice, Long Term Care    Discharge Services: None    Discharge DME: Other (see comment) ([facility] discussed equipment needs with Jenn mckeon and SNF.)    Discharge Transportation: family or friend will provide    Private pay costs discussed:  LTC down payment    Does the patient's insurance plan have a 3 day qualifying hospital stay waiver?  Yes     Which insurance plan 3 day waiver is available? Alternative insurance waiver    Will the waiver be used for post-acute placement? No    PAS Confirmation Code: [id number]  Patient/family educated on Medicare website which has current facility and service quality ratings: yes    Education Provided on the Discharge Plan: Yes  Persons Notified of Discharge Plans: [facility], [facility], [facility]  Patient/Family in Agreement with the Plan: yes    Handoff Referral Completed: No, handoff not indicated or clinically appropriate    Additional Information:    Plan:  [facility] with [facility].  Hospice enrollment at 1400 today.  Transportation provided by mikeJenn.      Cheyenne Hardy RN

## 2024-09-19 NOTE — PROGRESS NOTES
Clinic Care Coordination Contact    Situation: Patient chart reviewed by care coordinator.    Background:   [facility] Discharge Summary     Nighat Gupta MRN# [id number]   Age: 88 year old YOB: 1935      Date of Admission:                  9/15/2024  Date of Discharge::                 9/18/2024  Admitting Physician:               Stu Herman MD  Discharge Physician:              Tal Issa MD             Admission Diagnoses:   Leg edema, right [R60.0]  Fall, initial encounter [W19.XXXA]  Closed fracture of pubic ramus, unspecified laterality, initial encounter (H) [S32.599A]     Fall  Failure to thrive       Assessment: Patient discharged to [facility]/Hospice     Plan/Recommendations: No outreach made     [facility]   Qiana Stewart RN, Care Coordinator   [facility]'s   E-mail [email or web address]   [phone]

## (undated) DEVICE — BONE CLEANING TIP INTERPULSE FEMORAL CANAL 0210-008-000

## (undated) DEVICE — SOL NACL 0.9% INJ 1000ML BAG 2B1324X

## (undated) DEVICE — SOL NACL 0.9% IRRIG 1000ML BOTTLE 2F7124

## (undated) DEVICE — CEMENT PRESSURIZER FEMORAL CANAL MED 0206-546-000

## (undated) DEVICE — SUCTION MANIFOLD NEPTUNE 2 SYS 1 PORT 702-025-000

## (undated) DEVICE — PLATE GROUNDING ADULT W/CORD 9165L

## (undated) DEVICE — HOLDER LIMB VELCRO OR 0814-1533

## (undated) DEVICE — DRSG AQUACEL AG HYDROFIBER  3.5X10" 422605

## (undated) DEVICE — BONE CLEANING TIP INTERPULSE  0210-010-000

## (undated) DEVICE — HOOD FLYTE SURGICOOL

## (undated) DEVICE — GOWN IMPERVIOUS BREATHABLE SMART XLG 89045

## (undated) DEVICE — DRSG STERI STRIP 1/2X4" R1547

## (undated) DEVICE — SUCTION IRR SYSTEM W/O TIP INTERPULSE HANDPIECE 0210-100-000

## (undated) DEVICE — DRAPE IOBAN INCISE 36X23" 6651EZ

## (undated) DEVICE — SU STRATAFIX MONOCRYL 3-0 SPIRAL PS-2 30CM SXMP1B106

## (undated) DEVICE — SUTURE MONOCRYL+ 2-0 27IN SH UND MCP417H

## (undated) DEVICE — SU ETHIBOND 5 V-37 4X30" MB66G

## (undated) DEVICE — GLOVE BIOGEL INDICATOR 7.5 LF 41675

## (undated) DEVICE — TAMPON SUCTION FEMORAL CANAL 110037

## (undated) DEVICE — GLOVE UNDER INDICATOR PI SZ 7.0 LF 41670

## (undated) DEVICE — BLADE SAW SAGITTAL STRK WIDE 25.4X85X1.2MM 2108-151-000

## (undated) DEVICE — PILLOW HIP ABDUCTION CONCAVE 1.9

## (undated) DEVICE — SUTURE VICRYL+ 1 18 CT/CR  VLT VCP753D

## (undated) DEVICE — GLOVE BIOGEL PI SZ 8.0 40880

## (undated) DEVICE — GLOVE BIOGEL PI INDICATOR 8.0 LF 41680

## (undated) DEVICE — BONE CEMENT MIXEVAC HI VAC W/CARTRIDGE 0306-563-000

## (undated) DEVICE — GLOVE BIOGEL PI SZ 7.5 40875

## (undated) DEVICE — SOL WATER IRRIG 1000ML BOTTLE 2F7114

## (undated) DEVICE — CUSTOM PACK TOTAL HIP SOP5BTHHEA

## (undated) DEVICE — GLOVE BIOGEL PI ULTRATOUCH G SZ 7.5 42175

## (undated) DEVICE — ESU ELEC BLADE 6" COATED E1450-6